# Patient Record
Sex: FEMALE | Race: WHITE | NOT HISPANIC OR LATINO | Employment: OTHER | URBAN - METROPOLITAN AREA
[De-identification: names, ages, dates, MRNs, and addresses within clinical notes are randomized per-mention and may not be internally consistent; named-entity substitution may affect disease eponyms.]

---

## 2018-07-03 ENCOUNTER — OFFICE VISIT (OUTPATIENT)
Dept: FAMILY MEDICINE | Facility: CLINIC | Age: 28
End: 2018-07-03
Payer: COMMERCIAL

## 2018-07-03 ENCOUNTER — HEALTH MAINTENANCE LETTER (OUTPATIENT)
Age: 28
End: 2018-07-03

## 2018-07-03 VITALS
TEMPERATURE: 98 F | HEIGHT: 60 IN | WEIGHT: 158 LBS | BODY MASS INDEX: 31.02 KG/M2 | HEART RATE: 87 BPM | RESPIRATION RATE: 16 BRPM | DIASTOLIC BLOOD PRESSURE: 67 MMHG | SYSTOLIC BLOOD PRESSURE: 105 MMHG

## 2018-07-03 DIAGNOSIS — M54.42 ACUTE BILATERAL LOW BACK PAIN WITH LEFT-SIDED SCIATICA: Primary | ICD-10-CM

## 2018-07-03 PROCEDURE — 99214 OFFICE O/P EST MOD 30 MIN: CPT | Performed by: PHYSICIAN ASSISTANT

## 2018-07-03 RX ORDER — ALBUTEROL SULFATE 90 UG/1
1-2 AEROSOL, METERED RESPIRATORY (INHALATION)
COMMUNITY
Start: 2017-12-20 | End: 2020-01-10

## 2018-07-03 RX ORDER — CYCLOBENZAPRINE HCL 10 MG
10 TABLET ORAL 3 TIMES DAILY PRN
Qty: 60 TABLET | Refills: 1 | Status: SHIPPED | OUTPATIENT
Start: 2018-07-03 | End: 2020-01-07

## 2018-07-03 RX ORDER — EPINEPHRINE 0.3 MG/.3ML
0.3 INJECTION SUBCUTANEOUS
COMMUNITY
Start: 2016-09-19 | End: 2020-01-03

## 2018-07-03 RX ORDER — KETOCONAZOLE 20 MG/G
CREAM TOPICAL
COMMUNITY
Start: 2017-12-20 | End: 2020-01-29

## 2018-07-03 ASSESSMENT — ENCOUNTER SYMPTOMS
FEVER: 0
WHEEZING: 0
HEMATURIA: 0
HEADACHES: 0
WEAKNESS: 0
BLURRED VISION: 0
COUGH: 0
SPUTUM PRODUCTION: 0
CHILLS: 0
DYSURIA: 0
VOMITING: 0
SENSORY CHANGE: 0
NAUSEA: 0
TINGLING: 0
FREQUENCY: 0
WEIGHT LOSS: 0
DOUBLE VISION: 0
DIZZINESS: 0
FOCAL WEAKNESS: 0
DIARRHEA: 0
ABDOMINAL PAIN: 0
SHORTNESS OF BREATH: 0
EYE PAIN: 0
PALPITATIONS: 0
SORE THROAT: 0
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
## 2018-07-03 NOTE — MR AVS SNAPSHOT
"              After Visit Summary   7/3/2018    Ana Maria Espinoza    MRN: [id number]           Patient Information     Date Of Birth          1990        Visit Information        Provider Department      7/3/2018 10:40 AM Rahul Barker PA-C [facility]        Today's Diagnoses     Acute bilateral low back pain with left-sided sciatica    -  1       Follow-ups after your visit        Additional Services     PHYSICAL THERAPY REFERRAL       *This therapy referral will be filtered to a centralized scheduling office at [facility] and the patient will receive a call to schedule an appointment at a [place] location most convenient for them. *     [facility] provides Physical Therapy evaluation and treatment and many specialty services across the [place] system.  If requesting a specialty program, please choose from the list below.    If you have not heard from the scheduling office within 2 business days, please call [phone] for all locations, with the exception of [place], please call [phone] and [facility], please call [phone]  Treatment: Evaluation & Treatment  Special Instructions/Modalities: Eval and treat  Special Programs: None    Please be aware that coverage of these services is subject to the terms and limitations of your health insurance plan.  Call member services at your health plan with any benefit or coverage questions.      **Note to Provider:  If you are referring outside of [place] for the therapy appointment, please list the name of the location in the \"special instructions\" above, print the referral and give to the patient to schedule the appointment.                  Follow-up notes from your care team     Return if symptoms worsen or fail to improve.      Who to contact     If you have questions or need follow up information about today's clinic visit or your schedule please contact [facility] directly " "at [phone].  Normal or non-critical lab and imaging results will be communicated to you by MyChart, letter or phone within 4 business days after the clinic has received the results. If you do not hear from us within 7 days, please contact the clinic through PushPagehart or phone. If you have a critical or abnormal lab result, we will notify you by phone as soon as possible.  Submit refill requests through [employer]ighbor[employer] or call your pharmacy and they will forward the refill request to us. Please allow 3 business days for your refill to be completed.          Additional Information About Your Visit        PushPagehart Information     [employer] gives you secure access to your electronic health record. If you see a primary care provider, you can also send messages to your care team and make appointments. If you have questions, please call your primary care clinic.  If you do not have a primary care provider, please call [phone] and they will assist you.        Care EveryWhere ID     This is your Care EveryWhere ID. This could be used by other organizations to access your [place] [facility] medical records  [id number]        Your Vitals Were     Pulse Temperature Respirations Height BMI (Body Mass Index)       87 98  F (36.7  C) (Tympanic) 16 5' 0.24\" (1.53 m) 30.61 kg/m2        Blood Pressure from Last 3 Encounters:   07/03/18 105/67   09/25/16 106/74   11/13/15 129/86    Weight from Last 3 Encounters:   07/03/18 158 lb (71.7 kg)              We Performed the Following     PHYSICAL THERAPY REFERRAL          Today's Medication Changes          These changes are accurate as of 7/3/18 12:01 PM.  If you have any questions, ask your nurse or doctor.               Start taking these medicines.        Dose/Directions    cyclobenzaprine 10 MG tablet   Commonly known as:  FLEXERIL   Used for:  Acute bilateral low back pain with left-sided sciatica   Started by:  Rahul Barker PA-C        Dose:  10 mg   Take 1 tablet (10 mg) by mouth 3 times " daily as needed for muscle spasms   Quantity:  60 tablet   Refills:  1       diclofenac 50 MG EC tablet   Commonly known as:  VOLTAREN   Used for:  Acute bilateral low back pain with left-sided sciatica   Started by:  Rahul Barker PA-C        Dose:  50 mg   Take 1 tablet (50 mg) by mouth 3 times daily as needed for moderate pain   Quantity:  60 tablet   Refills:  1         These medicines have changed or have updated prescriptions.        Dose/Directions    albuterol 108 (90 Base) MCG/ACT Inhaler   Commonly known as:  PROAIR HFA/PROVENTIL HFA/VENTOLIN HFA   This may have changed:  Another medication with the same name was removed. Continue taking this medication, and follow the directions you see here.   Changed by:  Rahul Barker PA-C        Dose:  1-2 puff   Inhale 1-2 puffs into the lungs   Refills:  0         Stop taking these medicines if you haven't already. Please contact your care team if you have questions.     TIZANIDINE HCL PO   Stopped by:  Rahul Barker PA-C                Where to get your medicines      These medications were sent to [place] [address]     Phone:  [phone]     cyclobenzaprine 10 MG tablet    diclofenac 50 MG EC tablet                Primary Care Provider Office Phone # Fax #    Rahul Barker PA-C [phone]       [address] [place], [place] [address]        Equal Access to Services     [facility] AH: Hadii aad ku hadasho Soomaali, waaxda luqadaha, qaybta kaalmada adeegyada, fay webb hayandrew arthur . So [facility] [phone].    ATENCIÓN: Si habla español, tiene a rm disposición servicios gratuitos de asistencia lingüística. Llame al [phone].    We comply with applicable federal civil rights laws and [place] laws. We do not discriminate on the basis of race, color, national origin, age, disability, sex, sexual orientation, or gender identity.             Thank you!     Thank you for choosing [facility]  for your care. Our goal is always to provide you with excellent care. Hearing back from our patients is one way we can continue to improve our services. Please take a few minutes to complete the written survey that you may receive in the mail after your visit with us. Thank you!             Your Updated Medication List - Protect others around you: Learn how to safely use, store and throw away your medicines at www.disposemymeds.org.          This list is accurate as of 7/3/18 12:01 PM.  Always use your most recent med list.                   Brand Name Dispense Instructions for use Diagnosis    albuterol 108 (90 Base) MCG/ACT Inhaler    PROAIR HFA/PROVENTIL HFA/VENTOLIN HFA     Inhale 1-2 puffs into the lungs        cyclobenzaprine 10 MG tablet    FLEXERIL    60 tablet    Take 1 tablet (10 mg) by mouth 3 times daily as needed for muscle spasms    Acute bilateral low back pain with left-sided sciatica       diclofenac 50 MG EC tablet    VOLTAREN    60 tablet    Take 1 tablet (50 mg) by mouth 3 times daily as needed for moderate pain    Acute bilateral low back pain with left-sided sciatica       EPINEPHrine 0.3 MG/0.3ML injection 2-pack    EPIPEN/ADRENACLICK/or ANY BX GENERIC EQUIV     Inject 0.3 mg into the muscle        etonogestrel 68 MG Impl    IMPLANON/NEXPLANON     Inject 1 each Subcutaneous        ibuprofen 200 MG tablet    ADVIL/MOTRIN    30 tablet    Take 4 tablets (800 mg) by mouth every 6 hours as needed for moderate pain        ketoconazole 2 % cream    NIZORAL

## 2018-07-05 ENCOUNTER — TELEPHONE (OUTPATIENT)
Dept: FAMILY MEDICINE | Facility: CLINIC | Age: 28
End: 2018-07-05

## 2018-07-05 ENCOUNTER — APPOINTMENT (OUTPATIENT)
Dept: OCCUPATIONAL MEDICINE | Facility: CLINIC | Age: 28
End: 2018-07-05

## 2018-07-05 ENCOUNTER — ALLIED HEALTH/NURSE VISIT (OUTPATIENT)
Dept: FAMILY MEDICINE | Facility: CLINIC | Age: 28
End: 2018-07-05
Payer: COMMERCIAL

## 2018-07-05 DIAGNOSIS — M54.9 BACK PAIN: Primary | ICD-10-CM

## 2018-07-05 PROCEDURE — 99000 SPECIMEN HANDLING OFFICE-LAB: CPT | Performed by: PHYSICIAN ASSISTANT

## 2018-07-05 PROCEDURE — 99207 ZZC NO CHARGE NURSE ONLY: CPT

## 2018-07-05 PROCEDURE — 99499 UNLISTED E&M SERVICE: CPT | Performed by: PHYSICIAN ASSISTANT

## 2018-07-17 ENCOUNTER — HOSPITAL ENCOUNTER (OUTPATIENT)
Dept: PHYSICAL THERAPY | Facility: CLINIC | Age: 28
Setting detail: THERAPIES SERIES
End: 2018-07-17
Attending: PHYSICIAN ASSISTANT
Payer: COMMERCIAL

## 2018-07-17 PROCEDURE — 97161 PT EVAL LOW COMPLEX 20 MIN: CPT | Mod: GP | Performed by: PHYSICAL THERAPIST

## 2018-07-17 PROCEDURE — 40000718 ZZHC STATISTIC PT DEPARTMENT ORTHO VISIT: Performed by: PHYSICAL THERAPIST

## 2018-07-17 PROCEDURE — 97140 MANUAL THERAPY 1/> REGIONS: CPT | Mod: GP | Performed by: PHYSICAL THERAPIST

## 2018-07-17 PROCEDURE — 97110 THERAPEUTIC EXERCISES: CPT | Mod: GP | Performed by: PHYSICAL THERAPIST
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
## 2018-07-19 ENCOUNTER — HOSPITAL ENCOUNTER (OUTPATIENT)
Dept: PHYSICAL THERAPY | Facility: CLINIC | Age: 28
Setting detail: THERAPIES SERIES
End: 2018-07-19
Attending: PHYSICIAN ASSISTANT
Payer: COMMERCIAL

## 2018-07-19 PROCEDURE — 97140 MANUAL THERAPY 1/> REGIONS: CPT | Mod: GP | Performed by: PHYSICAL THERAPIST

## 2018-07-19 PROCEDURE — 40000718 ZZHC STATISTIC PT DEPARTMENT ORTHO VISIT: Performed by: PHYSICAL THERAPIST

## 2018-07-19 PROCEDURE — 97110 THERAPEUTIC EXERCISES: CPT | Mod: GP | Performed by: PHYSICAL THERAPIST

## 2018-11-12 ENCOUNTER — TELEPHONE (OUTPATIENT)
Dept: FAMILY MEDICINE | Facility: CLINIC | Age: 28
End: 2018-11-12

## 2018-11-12 NOTE — TELEPHONE ENCOUNTER
Left message for patient to call the clinic.   We were reviewing charts and it appears that patient is due for a pap smear/ physical.   Wondering if patient still comes to this clinic and if we can help her make an appointment.   Libia Howell, CMA

## 2019-05-14 ENCOUNTER — APPOINTMENT (OUTPATIENT)
Dept: OCCUPATIONAL MEDICINE | Facility: CLINIC | Age: 29
End: 2019-05-14

## 2019-05-14 PROCEDURE — 99499 UNLISTED E&M SERVICE: CPT | Performed by: FAMILY MEDICINE

## 2019-05-14 PROCEDURE — 99000 SPECIMEN HANDLING OFFICE-LAB: CPT | Performed by: FAMILY MEDICINE

## 2019-05-14 PROCEDURE — 97799 UNLISTED PHYSCL MED/REHAB PX: CPT | Performed by: FAMILY MEDICINE

## 2019-09-16 ENCOUNTER — MYC MEDICAL ADVICE (OUTPATIENT)
Dept: FAMILY MEDICINE | Facility: CLINIC | Age: 29
End: 2019-09-16

## 2020-01-03 ENCOUNTER — OFFICE VISIT (OUTPATIENT)
Dept: FAMILY MEDICINE | Facility: CLINIC | Age: 30
End: 2020-01-03
Payer: MEDICAID

## 2020-01-03 VITALS
RESPIRATION RATE: 16 BRPM | SYSTOLIC BLOOD PRESSURE: 102 MMHG | HEART RATE: 85 BPM | WEIGHT: 145.6 LBS | HEIGHT: 60 IN | OXYGEN SATURATION: 98 % | BODY MASS INDEX: 28.58 KG/M2 | DIASTOLIC BLOOD PRESSURE: 60 MMHG | TEMPERATURE: 98.5 F

## 2020-01-03 DIAGNOSIS — Z30.46 NEXPLANON REMOVAL: Primary | ICD-10-CM

## 2020-01-03 DIAGNOSIS — T78.40XA ALLERGIC REACTION, INITIAL ENCOUNTER: ICD-10-CM

## 2020-01-03 LAB — HCG UR QL: NEGATIVE

## 2020-01-03 PROCEDURE — 99207 ZZC NO BILLABLE SERVICE THIS VISIT: CPT | Performed by: NURSE PRACTITIONER

## 2020-01-03 PROCEDURE — 81025 URINE PREGNANCY TEST: CPT | Performed by: NURSE PRACTITIONER

## 2020-01-03 RX ORDER — EPINEPHRINE 0.3 MG/.3ML
0.3 INJECTION SUBCUTANEOUS PRN
Qty: 1 EACH | Refills: 1 | Status: SHIPPED | OUTPATIENT
Start: 2020-01-03 | End: 2020-07-06

## 2020-01-03 ASSESSMENT — MIFFLIN-ST. JEOR: SCORE: 1301.94

## 2020-01-03 NOTE — PROGRESS NOTES
Subjective     Ana Maria Espinoza is a 29 year old female who presents to clinic today for the following health issues:    HPI   Chief Complaint   Patient presents with     Contraception     pt. is here today fro implanon replacement     Patient Nexplanon was placed 1/2018 not due to be replaced until 1/2021.      No intervention no charge visit.    Morenita Wylie CNP

## 2020-01-07 ENCOUNTER — MYC REFILL (OUTPATIENT)
Dept: FAMILY MEDICINE | Facility: CLINIC | Age: 30
End: 2020-01-07

## 2020-01-07 DIAGNOSIS — M54.42 ACUTE BILATERAL LOW BACK PAIN WITH LEFT-SIDED SCIATICA: ICD-10-CM

## 2020-01-07 RX ORDER — CYCLOBENZAPRINE HCL 10 MG
TABLET ORAL
Qty: 0.0001 TABLET | Refills: 0 | Status: SHIPPED | OUTPATIENT
Start: 2020-01-07 | End: 2020-01-29

## 2020-01-10 ENCOUNTER — OFFICE VISIT (OUTPATIENT)
Dept: URGENT CARE | Facility: URGENT CARE | Age: 30
End: 2020-01-10
Payer: MEDICAID

## 2020-01-10 VITALS
TEMPERATURE: 99.5 F | HEART RATE: 100 BPM | WEIGHT: 140 LBS | SYSTOLIC BLOOD PRESSURE: 112 MMHG | DIASTOLIC BLOOD PRESSURE: 52 MMHG | BODY MASS INDEX: 27.34 KG/M2 | RESPIRATION RATE: 16 BRPM | OXYGEN SATURATION: 97 %

## 2020-01-10 DIAGNOSIS — J20.9 ACUTE BRONCHITIS WITH COEXISTING CONDITION REQUIRING PROPHYLACTIC TREATMENT: Primary | ICD-10-CM

## 2020-01-10 PROCEDURE — 99214 OFFICE O/P EST MOD 30 MIN: CPT | Performed by: FAMILY MEDICINE

## 2020-01-10 RX ORDER — ALBUTEROL SULFATE 90 UG/1
1-2 AEROSOL, METERED RESPIRATORY (INHALATION) EVERY 6 HOURS PRN
Qty: 1 INHALER | Refills: 1 | Status: SHIPPED | OUTPATIENT
Start: 2020-01-10 | End: 2020-09-14

## 2020-01-10 RX ORDER — AZITHROMYCIN 250 MG/1
TABLET, FILM COATED ORAL
Qty: 6 TABLET | Refills: 0 | Status: SHIPPED | OUTPATIENT
Start: 2020-01-10 | End: 2020-01-16

## 2020-01-10 NOTE — NURSING NOTE
Chief Complaint   Patient presents with     URI     Started Tuesday/ WEdnesday with congestion/ headaches.  Coughing, vomiting, dizziness, lethargic, back spasms, and muscle aches.  Yesterday switched was able to get some food down.  No has a productive cough, lung pain, and diarrhea. Hot and cold chills  Tried evelyne pot, hot showers, vicks on chest, nyquil        Initial /52 (BP Location: Right arm, Patient Position: Chair, Cuff Size: Adult Regular)   Pulse 100   Temp 99.5  F (37.5  C) (Tympanic)   Resp 16   Wt 63.5 kg (140 lb)   SpO2 97%   BMI 27.34 kg/m   Estimated body mass index is 27.34 kg/m  as calculated from the following:    Height as of 1/3/20: 1.524 m (5').    Weight as of this encounter: 63.5 kg (140 lb).    Patient presents to the clinic using No DME    Health Maintenance that is potentially due pending provider review:  NONE    n/a    Is there anyone who you would like to be able to receive your results? No  If yes have patient fill out DAYSI Biswas M.A.

## 2020-01-11 NOTE — PROGRESS NOTES
SUBJECTIVE:  Ana Maria Espinoza is a 29 year old female who presents to the clinic today with a chief complaint of cough  for 4 day(s).  Her cough is described as productive green.    The patient's symptoms are moderate and worsening.  Associated symptoms include malaise, wheezing and smokes. The patient's symptoms are exacerbated by lying down  Patient has been using humidified air and OTC cough suppressants  to improve symptoms.    History reviewed. No pertinent past medical history.  Current Outpatient Medications   Medication Sig Dispense Refill     albuterol (PROAIR HFA/PROVENTIL HFA/VENTOLIN HFA) 108 (90 Base) MCG/ACT inhaler Inhale 1-2 puffs into the lungs every 6 hours as needed for shortness of breath / dyspnea or wheezing 1 Inhaler 1     azithromycin (ZITHROMAX Z-ANNA) 250 MG tablet 2 tabs day one, then 1 tab daily 6 tablet 0     cyclobenzaprine (FLEXERIL) 10 MG tablet Refill denied, must establish care 0.0001 tablet 0     diclofenac (VOLTAREN) 50 MG EC tablet Refill denied, must establish care 0.0001 tablet 0     EPINEPHrine (EPIPEN/ADRENACLICK/OR ANY BX GENERIC EQUIV) 0.3 MG/0.3ML injection 2-pack Inject 0.3 mLs (0.3 mg) into the muscle as needed for anaphylaxis 1 each 1     etonogestrel (IMPLANON/NEXPLANON) 68 MG IMPL Inject 1 each Subcutaneous       ibuprofen (ADVIL,MOTRIN) 200 MG tablet Take 4 tablets (800 mg) by mouth every 6 hours as needed for moderate pain 30 tablet 0     ketoconazole (NIZORAL) 2 % cream        History   Smoking Status     Current Every Day Smoker     Packs/day: 2.00     Types: Cigarettes   Smokeless Tobacco     Never Used       ROS  Review of systems negative except as stated above.    OBJECTIVE:  /52 (BP Location: Right arm, Patient Position: Chair, Cuff Size: Adult Regular)   Pulse 100   Temp 99.5  F (37.5  C) (Tympanic)   Resp 16   Wt 63.5 kg (140 lb)   SpO2 97%   BMI 27.34 kg/m    GENERAL APPEARANCE: alert, moderate distress and cooperative  EYES: EOMI,  PERRL,  conjunctiva clear  HENT: ear canals and TM's normal.  Nose and mouth without ulcers, erythema or lesions  NECK: supple, nontender, no lymphadenopathy  RESP: expiratory wheezes throughout  CV: regular rates and rhythm, normal S1 S2, no murmur noted  ABDOMEN:  soft, nontender, no HSM or masses and bowel sounds normal  NEURO: Normal strength and tone, sensory exam grossly normal,  normal speech and mentation  SKIN: no suspicious lesions or rashes    ASSESSMENT:    Acute Bronchitis    PLAN:  Zithromax and albuterol  Symptomatic measures encouraged, humidified air, plenty of fluids.  Follow up with primary care provider if no improvement.

## 2020-01-16 ENCOUNTER — OFFICE VISIT (OUTPATIENT)
Dept: FAMILY MEDICINE | Facility: CLINIC | Age: 30
End: 2020-01-16
Payer: MEDICAID

## 2020-01-16 VITALS
SYSTOLIC BLOOD PRESSURE: 124 MMHG | BODY MASS INDEX: 27.29 KG/M2 | TEMPERATURE: 98.7 F | RESPIRATION RATE: 22 BRPM | WEIGHT: 139 LBS | HEART RATE: 124 BPM | HEIGHT: 60 IN | OXYGEN SATURATION: 97 % | DIASTOLIC BLOOD PRESSURE: 62 MMHG

## 2020-01-16 DIAGNOSIS — M79.672 LEFT FOOT PAIN: Primary | ICD-10-CM

## 2020-01-16 PROCEDURE — 99213 OFFICE O/P EST LOW 20 MIN: CPT | Performed by: FAMILY MEDICINE

## 2020-01-16 ASSESSMENT — MIFFLIN-ST. JEOR: SCORE: 1272

## 2020-01-16 NOTE — PROGRESS NOTES
Ana Maria Espinoza is a 29 year old female comes in today with the following concerns.      1. Left foot pain / corn? Had cut out by [facility] years ago, came back is quite painful intermittently. Has had it froze with no resolve also.     Domenica Briggs CMA([facility])      *     s: Ana Maria Espinoza is a 29 year old female wit hcorn on L foot.  Bottom.  Has had pared down before.  Chronic foot troubles, hx of surgery, has needed orthotics in past, she lost them, would like to see podiatry again.     No acute injury.     On feet at work    Problem list, med list, additional histories reviewed and updated, as indicated.      No rash, no LE edema    O:/62   Pulse 124   Temp 98.7  F (37.1  C) (Tympanic)   Resp 22   Ht 1.524 m (5')   Wt 63 kg (139 lb)   SpO2 97%   BMI 27.15 kg/m    GEN: Alert and oriented, in no acute distress  Has firm corn bottom of L foot over ball of foot    Pared it down, felt much better    A: corns      Chronic foot pain    P: see podiatry, I think orthotics make sense.  Return prn for paring down

## 2020-01-16 NOTE — NURSING NOTE
Chief Complaint   Patient presents with     Toe Pain       Initial /62   Pulse 124   Temp 98.7  F (37.1  C) (Tympanic)   Resp 22   Ht 1.524 m (5')   Wt 63 kg (139 lb)   SpO2 97%   BMI 27.15 kg/m   Estimated body mass index is 27.15 kg/m  as calculated from the following:    Height as of this encounter: 1.524 m (5').    Weight as of this encounter: 63 kg (139 lb).    Patient presents to the clinic using No DME    Health Maintenance that is potentially due pending provider review:  Pap Smear    Pt will schedule appt.    Is there anyone who you would like to be able to receive your results? No  If yes have patient fill out DAYSI      Domenica Briggs CMA([facility])

## 2020-01-22 ENCOUNTER — OFFICE VISIT (OUTPATIENT)
Dept: PODIATRY | Facility: CLINIC | Age: 30
End: 2020-01-22
Payer: MEDICAID

## 2020-01-22 VITALS
HEIGHT: 60 IN | BODY MASS INDEX: 27.29 KG/M2 | SYSTOLIC BLOOD PRESSURE: 120 MMHG | DIASTOLIC BLOOD PRESSURE: 78 MMHG | HEART RATE: 102 BPM | WEIGHT: 139 LBS

## 2020-01-22 DIAGNOSIS — L85.1 PLANTAR POROKERATOSIS, ACQUIRED: Primary | ICD-10-CM

## 2020-01-22 DIAGNOSIS — M77.42 METATARSALGIA OF BOTH FEET: ICD-10-CM

## 2020-01-22 DIAGNOSIS — M77.41 METATARSALGIA OF BOTH FEET: ICD-10-CM

## 2020-01-22 PROCEDURE — 17110 DESTRUCTION B9 LES UP TO 14: CPT | Performed by: PODIATRIST

## 2020-01-22 PROCEDURE — 99203 OFFICE O/P NEW LOW 30 MIN: CPT | Mod: 25 | Performed by: PODIATRIST

## 2020-01-22 ASSESSMENT — MIFFLIN-ST. JEOR: SCORE: 1272

## 2020-01-22 NOTE — LETTER
1/22/2020         RE: Ana Maria Espinoza  [address] [place] [id number]        Dear Colleague,    Thank you for referring your patient, Ana Maria Espinoza, to the [facility]. Please see a copy of my visit note below.    PATIENT HISTORY:  Ana Maria Espinoza is a 29 year old female who presents to clinic for a painful right left foot .  The patient describes the pain as sharp stabbing.  The patient relates the pain level is moderate.  The patient relates pain is located under the balls of both feet.  The patient relates the pain has been present for the past several weeks to months.  The patient relates pain with ambulation.  The patient has tried different shoe inserts with little relief.  The patient was sent by Dr. Michael Price for consultation on the right and left foot.       REVIEW OF SYSTEMS:  Constitutional, HEENT, cardiovascular, pulmonary, GI, , musculoskeletal, neuro, skin, endocrine and psych systems are negative, except as otherwise noted.     PAST MEDICAL HISTORY: History reviewed. No pertinent past medical history.     PAST SURGICAL HISTORY: History reviewed. No pertinent surgical history.     MEDICATIONS:   Current Outpatient Medications:      albuterol (PROAIR HFA/PROVENTIL HFA/VENTOLIN HFA) 108 (90 Base) MCG/ACT inhaler, Inhale 1-2 puffs into the lungs every 6 hours as needed for shortness of breath / dyspnea or wheezing, Disp: 1 Inhaler, Rfl: 1     etonogestrel (IMPLANON/NEXPLANON) 68 MG IMPL, Inject 1 each Subcutaneous, Disp: , Rfl:      ibuprofen (ADVIL,MOTRIN) 200 MG tablet, Take 4 tablets (800 mg) by mouth every 6 hours as needed for moderate pain, Disp: 30 tablet, Rfl: 0     ketoconazole (NIZORAL) 2 % cream, , Disp: , Rfl:      cyclobenzaprine (FLEXERIL) 10 MG tablet, Refill denied, must establish care (Patient not taking: Reported on 1/16/2020), Disp: 0.0001 tablet, Rfl: 0     diclofenac (VOLTAREN) 50 MG EC tablet, Refill denied, must establish care (Patient not taking: Reported on  1/16/2020), Disp: 0.0001 tablet, Rfl: 0     EPINEPHrine (EPIPEN/ADRENACLICK/OR ANY BX GENERIC EQUIV) 0.3 MG/0.3ML injection 2-pack, Inject 0.3 mLs (0.3 mg) into the muscle as needed for anaphylaxis (Patient not taking: Reported on 1/16/2020), Disp: 1 each, Rfl: 1     ALLERGIES:    Allergies   Allergen Reactions     Bee Venom Anaphylaxis     Has epipen     Codeine Rash     Other reaction(s): Intolerance-Can't Take        SOCIAL HISTORY:   Social History     Socioeconomic History     Marital status: Single     Spouse name: Not on file     Number of children: Not on file     Years of education: Not on file     Highest education level: Not on file   Occupational History     Not on file   Social Needs     Financial resource strain: Not on file     Food insecurity:     Worry: Not on file     Inability: Not on file     Transportation needs:     Medical: Not on file     Non-medical: Not on file   Tobacco Use     Smoking status: Current Every Day Smoker     Packs/day: 2.00     Types: Cigarettes     Smokeless tobacco: Never Used   Substance and Sexual Activity     Alcohol use: No     Drug use: No     Sexual activity: Yes     Partners: Male     Birth control/protection: Implant   Lifestyle     Physical activity:     Days per week: Not on file     Minutes per session: Not on file     Stress: Not on file   Relationships     Social connections:     Talks on phone: Not on file     Gets together: Not on file     Attends [employer] service: Not on file     Active member of club or organization: Not on file     Attends meetings of clubs or organizations: Not on file     Relationship status: Not on file     Intimate partner violence:     Fear of current or ex partner: Not on file     Emotionally abused: Not on file     Physically abused: Not on file     Forced sexual activity: Not on file   Other Topics Concern     Parent/sibling w/ CABG, MI or angioplasty before 65F 55M? Not Asked   Social History Narrative     Not on file         FAMILY HISTORY: History reviewed. No pertinent family history.     EXAM:Vitals: /78   Pulse 102   Ht 1.524 m (5')   Wt 63 kg (139 lb)   BMI 27.15 kg/m     BMI= Body mass index is 27.15 kg/m .    Weight management plan: Patient was referred to their PCP to discuss a diet and exercise plan.    General appearance: Patient is alert and fully cooperative with history & exam.  No sign of distress is noted during the visit.     Psychiatric: Affect is pleasant & appropriate.  Patient appears motivated to improve health.     Respiratory: Breathing is regular & unlabored while sitting.     HEENT: Hearing is intact to spoken word.  Speech is clear.  No gross evidence of visual impairment that would impact ambulation.     Dermatologic: Skin is intact to right and left lower extremities without significant lesions, rash or abrasion.  No paronychia or evidence of soft tissue infection is noted.  One notes small hyperkeratotic pinpoint lesions on the bottom of the soles of both feet.  No surrounding erythema noted.  No subdermal hemorrhage noted.     Vascular: DP & PT pulses are intact & regular on the right and left.  No significant edema or varicosities noted.  CFT and skin temperature is normal to the right and left lower extremities.     Neurologic: Lower extremity sensation is intact to light touch.  No evidence of weakness or contracture in the right and left lower extremities.  No evidence of neuropathy.     Musculoskeletal: Patient is ambulatory without assistive device or brace.  No gross ankle deformity noted.  No foot or ankle joint effusion is noted.  Noted pain on palpation under the second metatarsal phalangeal joint bilaterally.  One notes a positive Lockman's test of the second toe bilaterally.       ASSESSMENT / PLAN:     ICD-10-CM    1. 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 Metatarsalgia of both feet M77.41 ORTHOTICS REFERRAL    M77.42        I have explained to  Ana Maria  about the conditions.  We discussed the nature of the condition as well as the treatment plan and expected length of recovery.  At this time, the hyperkeratotic skin lesions were sharply debrided with a #10 blade down to healthy epidermis.  The skin lesions were treated with topical Cantharone application to the skin lesions under occlusion.  The patient was informed that should cause a blister hopefully removing the underlying skin lesions.    The patient was instructed on icing, stretching, tissue massage and support.  The patient was referred to [facility] for custom orthotics that will aid in offloading the tension forces to the soft tissues and prevent further inflammation.    The patient will return in four weeks for reevaluation if the symptoms do not resolve.          Ana Maria verbalized agreement with and understanding of the rational for the diagnosis and treatment plan.  All questions were answered to best of my ability and the patient's satisfaction. The patient was advised to contact the clinic with any questions that may arise after the clinic visit.      Disclaimer: This note consists of symbols derived from keyboarding, dictation and/or voice recognition software. As a result, there may be errors in the script that have gone undetected. Please consider this when interpreting information found in this chart.       GISSEL Gates D.P.M., F.A.C.F.A.S.        Again, thank you for allowing me to participate in the care of your patient.        Sincerely,        Radu Gates DPM

## 2020-01-22 NOTE — NURSING NOTE
Chief Complaint   Patient presents with     Consult     new orthtics and wants a corn removed on left foot       Initial /78   Pulse 102   Ht 1.524 m (5')   Wt 63 kg (139 lb)   BMI 27.15 kg/m   Estimated body mass index is 27.15 kg/m  as calculated from the following:    Height as of this encounter: 1.524 m (5').    Weight as of this encounter: 63 kg (139 lb).  Medications and allergies reviewed.      Rosita PATEL MA

## 2020-01-22 NOTE — PROGRESS NOTES
PATIENT HISTORY:  Ana Maria Espinoza is a 29 year old female who presents to clinic for a painful right left foot .  The patient describes the pain as sharp stabbing.  The patient relates the pain level is moderate.  The patient relates pain is located under the balls of both feet.  The patient relates the pain has been present for the past several weeks to months.  The patient relates pain with ambulation.  The patient has tried different shoe inserts with little relief.  The patient was sent by Dr. Michael Price for consultation on the right and left foot.       REVIEW OF SYSTEMS:  Constitutional, HEENT, cardiovascular, pulmonary, GI, , musculoskeletal, neuro, skin, endocrine and psych systems are negative, except as otherwise noted.     PAST MEDICAL HISTORY: History reviewed. No pertinent past medical history.     PAST SURGICAL HISTORY: History reviewed. No pertinent surgical history.     MEDICATIONS:   Current Outpatient Medications:      albuterol (PROAIR HFA/PROVENTIL HFA/VENTOLIN HFA) 108 (90 Base) MCG/ACT inhaler, Inhale 1-2 puffs into the lungs every 6 hours as needed for shortness of breath / dyspnea or wheezing, Disp: 1 Inhaler, Rfl: 1     etonogestrel (IMPLANON/NEXPLANON) 68 MG IMPL, Inject 1 each Subcutaneous, Disp: , Rfl:      ibuprofen (ADVIL,MOTRIN) 200 MG tablet, Take 4 tablets (800 mg) by mouth every 6 hours as needed for moderate pain, Disp: 30 tablet, Rfl: 0     ketoconazole (NIZORAL) 2 % cream, , Disp: , Rfl:      cyclobenzaprine (FLEXERIL) 10 MG tablet, Refill denied, must establish care (Patient not taking: Reported on 1/16/2020), Disp: 0.0001 tablet, Rfl: 0     diclofenac (VOLTAREN) 50 MG EC tablet, Refill denied, must establish care (Patient not taking: Reported on 1/16/2020), Disp: 0.0001 tablet, Rfl: 0     EPINEPHrine (EPIPEN/ADRENACLICK/OR ANY BX GENERIC EQUIV) 0.3 MG/0.3ML injection 2-pack, Inject 0.3 mLs (0.3 mg) into the muscle as needed for anaphylaxis (Patient not taking: Reported on  1/16/2020), Disp: 1 each, Rfl: 1     ALLERGIES:    Allergies   Allergen Reactions     Bee Venom Anaphylaxis     Has epipen     Codeine Rash     Other reaction(s): Intolerance-Can't Take        SOCIAL HISTORY:   Social History     Socioeconomic History     Marital status: Single     Spouse name: Not on file     Number of children: Not on file     Years of education: Not on file     Highest education level: Not on file   Occupational History     Not on file   Social Needs     Financial resource strain: Not on file     Food insecurity:     Worry: Not on file     Inability: Not on file     Transportation needs:     Medical: Not on file     Non-medical: Not on file   Tobacco Use     Smoking status: Current Every Day Smoker     Packs/day: 2.00     Types: Cigarettes     Smokeless tobacco: Never Used   Substance and Sexual Activity     Alcohol use: No     Drug use: No     Sexual activity: Yes     Partners: Male     Birth control/protection: Implant   Lifestyle     Physical activity:     Days per week: Not on file     Minutes per session: Not on file     Stress: Not on file   Relationships     Social connections:     Talks on phone: Not on file     Gets together: Not on file     Attends [employer] service: Not on file     Active member of club or organization: Not on file     Attends meetings of clubs or organizations: Not on file     Relationship status: Not on file     Intimate partner violence:     Fear of current or ex partner: Not on file     Emotionally abused: Not on file     Physically abused: Not on file     Forced sexual activity: Not on file   Other Topics Concern     Parent/sibling w/ CABG, MI or angioplasty before 65F 55M? Not Asked   Social History Narrative     Not on file        FAMILY HISTORY: History reviewed. No pertinent family history.     EXAM:Vitals: /78   Pulse 102   Ht 1.524 m (5')   Wt 63 kg (139 lb)   BMI 27.15 kg/m    BMI= Body mass index is 27.15 kg/m .    Weight management plan: Patient  was referred to their PCP to discuss a diet and exercise plan.    General appearance: Patient is alert and fully cooperative with history & exam.  No sign of distress is noted during the visit.     Psychiatric: Affect is pleasant & appropriate.  Patient appears motivated to improve health.     Respiratory: Breathing is regular & unlabored while sitting.     HEENT: Hearing is intact to spoken word.  Speech is clear.  No gross evidence of visual impairment that would impact ambulation.     Dermatologic: Skin is intact to right and left lower extremities without significant lesions, rash or abrasion.  No paronychia or evidence of soft tissue infection is noted.  One notes small hyperkeratotic pinpoint lesions on the bottom of the soles of both feet.  No surrounding erythema noted.  No subdermal hemorrhage noted.     Vascular: DP & PT pulses are intact & regular on the right and left.  No significant edema or varicosities noted.  CFT and skin temperature is normal to the right and left lower extremities.     Neurologic: Lower extremity sensation is intact to light touch.  No evidence of weakness or contracture in the right and left lower extremities.  No evidence of neuropathy.     Musculoskeletal: Patient is ambulatory without assistive device or brace.  No gross ankle deformity noted.  No foot or ankle joint effusion is noted.  Noted pain on palpation under the second metatarsal phalangeal joint bilaterally.  One notes a positive Lockman's test of the second toe bilaterally.       ASSESSMENT / PLAN:     ICD-10-CM    1. 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 Metatarsalgia of both feet M77.41 ORTHOTICS REFERRAL    M77.42        I have explained to Ana Maria  about the conditions.  We discussed the nature of the condition as well as the treatment plan and expected length of recovery.  At this time, the hyperkeratotic skin lesions were sharply debrided with a #10 blade down to healthy  epidermis.  The skin lesions were treated with topical Cantharone application to the skin lesions under occlusion.  The patient was informed that should cause a blister hopefully removing the underlying skin lesions.    The patient was instructed on icing, stretching, tissue massage and support.  The patient was referred to [facility] for custom orthotics that will aid in offloading the tension forces to the soft tissues and prevent further inflammation.    The patient will return in four weeks for reevaluation if the symptoms do not resolve.          Ana Maria verbalized agreement with and understanding of the rational for the diagnosis and treatment plan.  All questions were answered to best of my ability and the patient's satisfaction. The patient was advised to contact the clinic with any questions that may arise after the clinic visit.      Disclaimer: This note consists of symbols derived from keyboarding, dictation and/or voice recognition software. As a result, there may be errors in the script that have gone undetected. Please consider this when interpreting information found in this chart.       GISSEL Gates D.P.M., F.DIANNA.C.F.A.S.

## 2020-01-22 NOTE — PATIENT INSTRUCTIONS
CALLUS / CORNS / IPKs  When there is excessive friction or pressure on the skin, the body responds by making the skin thicker to protect the deeper structures from becoming exposed. While this works well to protect the deeper structures, the thickened skin can increase pressure and pain.    CALLUS: Flat, diffuse thickening are simple calluses and they are usually caused by friction. Often these are the result of rubbing on a shoe or going barefoot.    CORNS: Calluses with a central core between the toes are called corns. These result from prominent joints on adjacent toes rubbing together. Theses are a symptom of bone malalignment and will always recur unless the underlying bones are addressed surgically.    IPKs: Calluses with a central core on the ball of the foot are usually IPKs (intractable plantar keratosis). These are caused by excessive pressure from the metatarsals, the bones that make up the ball of the foot. Often one of these bones is too long or too prominent.  Again, these will always recur unless the underlying bone issue is addressed. There is no cure for these. They will either go away by themselves, recur, or more could develop.    ROUTINE MAINTENANCE  1. File them down with a pumice stone or callus file a couple times a week.   2. An electric callus removing device. Amope Pedi Perfect Electronic Pedicure Foot File and Callus Remover can be a good option.   3. Lotion can be applied to soften the callus. A urea based cream such as Kersal or Vanicream or thicker cream with shea butter are good options.  4. Toe spacers or toe covers can be used for corns, gel pads can be used for other lesions on the bottom of the foot.   If there is a surgical pathology noted, such as a prominent bone, often this needs to be addressed surgically to minimize recurrence. However, sometimes the lesion simply migrates to another spot after surgery, so it is not a guaranteed cure.     There was also discussion of the cost  structure of callus care if they were to come back and have it treated in the clinic. Explained that if insurance does not cover it, they would be billed. This charge could range from $100 - $160.  They were also provided information on places to get the callus treatment.          SMOKING CESSATION  What's in cigarette smoke? - Cigarette smoke contains over 4,000 chemicals. Nicotine is one of the main ingredients which is an insecticide/herbicide. It is poisonous to our nervous system, increases blood clotting risk, and decreases the body's defenses to fight off infection. Another chemical is Carbon Monoxide is an asphyxiating gas that permanently binds to blood cells and blocks the transport of oxygen. This leads to tissue death and decreases your metabolism. Tar is a chemical that coats your lungs and trachea which impairs new oxygen coming in and carbon dioxide getting out of your body.   How does smoking impact surgery? - Smoking is particularly hazardous with regards to surgery. Surgery puts stress on the body and a smoker's body is already under strain from these chemicals. Putting the two together, especially for an elective surgery, could be a recipe for disaster. Smoking before and after surgery increases your risk of heart problems, slow wound healing, delayed bone healing, blood clots, wound infection and anesthesia complications.   What are the benefits of quitting? - In 20 minutes your blood pressure will drop back down to normal. In 8 hours the carbon monoxide (a toxic gas) levels in your blood stream will drop by half, and oxygen levels will return to normal. In 48 hours your chance of having a heart attack will have decreased. All nicotine will have left your body. Your sense of taste and smell will return to a normal level. In 72 hours your bronchial tubes will relax, and your energy levels will increase. In 2 weeks your circulation will increase, and it will continue to improve for the next 10  weeks.    Recommendations for elective surgery - Ideally, patients should quit smoking 8 weeks before and at least 2 weeks after elective surgery in order to avoid complications. Simply cutting back on the amount of cigarettes smoked per day does not offer any benefit or decrease the risk of poor wound healing, heart problems, and infection. Smokers should also start taking Vitamin C and B for two weeks before surgery and two weeks after surgery.    Ways to Stop Smokin. Nicotine patches, lozenges, or gum  2. Support Groups  3. Medications (see below)    List of Medications:  1. Varenicline Tartrate (CHANTIX)   2. Bupropion HCL (WELLBUTRIN, ZYBAN) - note: make sure Wellbutrin is for smoking cessation and not other issues   3. Nicotine Patch (NICODERM)   4. Nicotine Inhaler (NICOTROL)   5. Nicotine Gum Nicotine Polacrilex   6. Nicotine Lozenge: Nicotine Polacrilex (COMMIT)   * [facility] offers a smoking support group as well!  Please visit: [email or web address]  If you are interested in these, ask about getting a prescription or talk to your primary care doctor about what may be the best way for you to quit.

## 2020-01-28 ASSESSMENT — ENCOUNTER SYMPTOMS
PALPITATIONS: 0
SHORTNESS OF BREATH: 0
EYE PAIN: 0
DYSURIA: 0
CHILLS: 1
HEARTBURN: 1
SORE THROAT: 0
HEADACHES: 1
DIZZINESS: 0
NAUSEA: 0
DIARRHEA: 0
MYALGIAS: 1
FEVER: 0
BREAST MASS: 0
HEMATURIA: 0
WEAKNESS: 0
FREQUENCY: 1
ARTHRALGIAS: 1
CONSTIPATION: 0
COUGH: 0
NERVOUS/ANXIOUS: 0
ABDOMINAL PAIN: 0
PARESTHESIAS: 1
HEMATOCHEZIA: 0
JOINT SWELLING: 1

## 2020-01-29 ENCOUNTER — OFFICE VISIT (OUTPATIENT)
Dept: FAMILY MEDICINE | Facility: CLINIC | Age: 30
End: 2020-01-29
Payer: MEDICAID

## 2020-01-29 VITALS
OXYGEN SATURATION: 99 % | BODY MASS INDEX: 28.74 KG/M2 | SYSTOLIC BLOOD PRESSURE: 106 MMHG | DIASTOLIC BLOOD PRESSURE: 76 MMHG | RESPIRATION RATE: 16 BRPM | WEIGHT: 146.4 LBS | HEART RATE: 107 BPM | HEIGHT: 60 IN | TEMPERATURE: 98.4 F

## 2020-01-29 DIAGNOSIS — Z72.0 TOBACCO USE: ICD-10-CM

## 2020-01-29 DIAGNOSIS — M54.50 LOW BACK PAIN, UNSPECIFIED BACK PAIN LATERALITY, UNSPECIFIED CHRONICITY, UNSPECIFIED WHETHER SCIATICA PRESENT: ICD-10-CM

## 2020-01-29 DIAGNOSIS — Z00.00 WELL ADULT EXAM: Primary | ICD-10-CM

## 2020-01-29 DIAGNOSIS — L21.9 SEBORRHEIC DERMATITIS: ICD-10-CM

## 2020-01-29 PROCEDURE — 99213 OFFICE O/P EST LOW 20 MIN: CPT | Mod: 25 | Performed by: FAMILY MEDICINE

## 2020-01-29 PROCEDURE — 99395 PREV VISIT EST AGE 18-39: CPT | Performed by: FAMILY MEDICINE

## 2020-01-29 RX ORDER — KETOCONAZOLE 20 MG/G
CREAM TOPICAL DAILY
Qty: 30 G | Refills: 3 | Status: SHIPPED | OUTPATIENT
Start: 2020-01-29 | End: 2020-07-06

## 2020-01-29 RX ORDER — CYCLOBENZAPRINE HCL 10 MG
TABLET ORAL
Qty: 30 TABLET | Refills: 3 | Status: SHIPPED | OUTPATIENT
Start: 2020-01-29 | End: 2020-07-06

## 2020-01-29 RX ORDER — KETOCONAZOLE 20 MG/ML
SHAMPOO TOPICAL
Qty: 120 ML | Refills: 4 | Status: SHIPPED | OUTPATIENT
Start: 2020-01-29 | End: 2020-07-06

## 2020-01-29 ASSESSMENT — ENCOUNTER SYMPTOMS
ARTHRALGIAS: 1
PALPITATIONS: 0
PARESTHESIAS: 1
DIZZINESS: 0
CHILLS: 1
JOINT SWELLING: 1
NAUSEA: 0
BREAST MASS: 0
ABDOMINAL PAIN: 0
WEAKNESS: 0
FEVER: 0
DYSURIA: 0
HEMATURIA: 0
HEADACHES: 1
HEARTBURN: 1
BACK PAIN: 1
COUGH: 0
HEMATOCHEZIA: 0
CONSTIPATION: 0
SORE THROAT: 0
SHORTNESS OF BREATH: 0
NERVOUS/ANXIOUS: 0
EYE PAIN: 0
FREQUENCY: 1
MYALGIAS: 1
DIARRHEA: 0

## 2020-01-29 ASSESSMENT — MIFFLIN-ST. JEOR: SCORE: 1309.32
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
## 2020-01-30 ENCOUNTER — HOSPITAL ENCOUNTER (OUTPATIENT)
Dept: PHYSICAL THERAPY | Facility: CLINIC | Age: 30
Setting detail: THERAPIES SERIES
End: 2020-01-30
Attending: FAMILY MEDICINE
Payer: MEDICAID

## 2020-01-30 DIAGNOSIS — M54.50 LOW BACK PAIN, UNSPECIFIED BACK PAIN LATERALITY, UNSPECIFIED CHRONICITY, UNSPECIFIED WHETHER SCIATICA PRESENT: ICD-10-CM

## 2020-01-30 PROCEDURE — 97110 THERAPEUTIC EXERCISES: CPT | Mod: GP | Performed by: PHYSICAL THERAPIST

## 2020-01-30 PROCEDURE — 97161 PT EVAL LOW COMPLEX 20 MIN: CPT | Mod: GP | Performed by: PHYSICAL THERAPIST
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
## 2020-01-30 NOTE — PROGRESS NOTES
[facility]          OUTPATIENT PHYSICAL THERAPY ORTHOPEDIC EVALUATION  PLAN OF TREATMENT FOR OUTPATIENT REHABILITATION  (COMPLETE FOR INITIAL CLAIMS ONLY)  Patient's Last Name, First Name, M.I.  YOB: 1990  Ana Maria Espinoza       Provider s Name:  [facility]   Medical Record No.  [id number]   Start of Care Date:  01/30/20   Onset Date:  07/01/18   Type:     _X__PT   ___OT   ___SLP Medical Diagnosis:  Low back pain, unspecified back pain laterality, unspecified chronicity, unspecified whether sciatica present M54.5      PT Diagnosis:  chronic LBP    Visits from SOC:  1      _________________________________________________________________________________  Plan of Treatment/Functional Goals:  balance training, gait training, joint mobilization, manual therapy, neuromuscular re-education, ROM, strengthening, stretching     Cryotherapy, Electrical stimulation, TENS, Traction, Ultrasound     Goals  Goal Identifier: walking  Goal Description: Pt will be fabi to walk 60 min w less than 2/10 pain to be able to go gorcery shopping  Target Date: 02/20/20    Goal Identifier: sitting  Goal Description: Pt will be able to sit 30 min to be able to sit and have family dinner  Target Date: 02/20/20    Goal Identifier: core strength  Goal Description: Pt will be able to hold plank 30 secs to demonstrate improve core strength and reduce LBP  Target Date: 03/12/20    Goal Identifier: DOUGLAS  Goal Description: Pt will report <10% disability on DOUGLAS to demonstrate improved ability to complete daily activities and demonstrate significant clinical improvement.   Target Date: 03/12/20                                                Therapy Frequency:  1 time/week  Predicted Duration of Therapy Intervention:  6 weeks    Margo Capellan, PT                 I CERTIFY THE NEED FOR THESE SERVICES FURNISHED UNDER        THIS PLAN OF TREATMENT AND WHILE UNDER MY CARE .             Physician  Signature               Date    X_____________________________________________________                             Certification Date From:  01/30/20   Certification Date To:  03/12/20    Referring Provider:  Michael Price MD     Initial Assessment        See Epic Evaluation Start of Care Date: 01/30/20

## 2020-03-10 ENCOUNTER — HEALTH MAINTENANCE LETTER (OUTPATIENT)
Age: 30
End: 2020-03-10

## 2020-03-19 ENCOUNTER — VIRTUAL VISIT (OUTPATIENT)
Dept: FAMILY MEDICINE | Facility: CLINIC | Age: 30
End: 2020-03-19
Payer: COMMERCIAL

## 2020-03-19 DIAGNOSIS — Z72.0 TOBACCO USE: Primary | ICD-10-CM

## 2020-03-19 DIAGNOSIS — Z71.6 ENCOUNTER FOR SMOKING CESSATION COUNSELING: ICD-10-CM

## 2020-03-19 PROCEDURE — 99442 ZZC PHYSICIAN TELEPHONE EVALUATION 11-20 MIN: CPT | Performed by: NURSE PRACTITIONER

## 2020-03-19 RX ORDER — BUPROPION HYDROCHLORIDE 150 MG/1
150 TABLET, FILM COATED, EXTENDED RELEASE ORAL 2 TIMES DAILY
Qty: 60 TABLET | Refills: 2 | Status: SHIPPED | OUTPATIENT
Start: 2020-03-19 | End: 2020-07-06

## 2020-03-19 RX ORDER — NICOTINE 21 MG/24HR
1 PATCH, TRANSDERMAL 24 HOURS TRANSDERMAL EVERY 24 HOURS
Qty: 28 PATCH | Refills: 0 | Status: SHIPPED | OUTPATIENT
Start: 2020-03-19 | End: 2021-02-09
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
## 2020-03-23 NOTE — PROGRESS NOTES
Outpatient Physical Therapy Discharge Note     Patient: Ana Maria Espinoza  : 1990    Evaluation date:  20     Referring Provider: Albert Chacko Diagnosis: LBP    Client Self Report:   Current status is unknown since patient did not return for further PT visits.    Objective Measurements:  Current objective status is unknown since patient failed to complete treatment     Goals:  Goal Identifier walking   Goal Description Pt will be fabi to walk 60 min w less than 2/10 pain to be able to go gorcery shopping   Target Date 20   Date Met      Progress:     Goal Identifier sitting   Goal Description Pt will be able to sit 30 min to be able to sit and have family dinner   Target Date 20   Date Met      Progress:     Goal Identifier core strength   Goal Description Pt will be able to hold plank 30 secs to demonstrate improve core strength and reduce LBP   Target Date 20   Date Met      Progress:     Goal Identifier DOUGLAS   Goal Description Pt will report <10% disability on DOUGLAS to demonstrate improved ability to complete daily activities and demonstrate significant clinical improvement.    Target Date 20   Date Met      Progress:           Progress towards Goals:   Progress this reporting period: Pt has failed to schedule f/u visits within 30 days from last visit thus is being d/c from therapy at this time. Objective measures are all taken from last visit. Current status is unknown at this time.    Plan:  Discharge from therapy.    Discharge:    Reason for Discharge: Patient has failed to schedule further appointments.    Equipment Issued: none    Discharge Plan:  Not completed since patient failed to schedule further appointments.    Margo Capellan  Physical Therapist  [address] [address]  [email or web address]   [email or web address]   Office: [phone] Fax: [phone]

## 2020-04-23 ENCOUNTER — VIRTUAL VISIT (OUTPATIENT)
Dept: FAMILY MEDICINE | Facility: OTHER | Age: 30
End: 2020-04-23

## 2020-04-23 NOTE — PROGRESS NOTES
"Date: 2020 13:30:46  Clinician: Betsey Cunningham  Clinician NPI: [id number]  Patient: Ana Maria Espinoza  Patient : 1990  Patient Address: [address] [place] [address]  Patient Phone: [phone]  Visit Protocol: URI  Patient Summary:  Ana Maria is a 30 year old ( : 1990 ) female who initiated a Visit for COVID-19 (Coronavirus) evaluation and screening. When asked the question \"Please sign me up to receive news, health information and promotions. \", Ana Maria responded \"No\".    Ana Maria states her symptoms started suddenly 3-4 days ago.   Her symptoms consist of nasal congestion, malaise, myalgia, and a headache. Ana Maria also feels feverish but was unable to measure her temperature.   Symptom details     Nasal secretions: The color of her mucus is yellow.    Headache: She states the headache is severe (7-9 on a 10 point pain scale).      Ana Maria denies having rhinitis, chills, diarrhea, facial pain or pressure, sore throat, cough, ear pain, vomiting, nausea, teeth pain, wheezing, anosmia, and ageusia. She also denies taking antibiotic medication for the symptoms, having a sinus infection within the past year, double sickening (worsening symptoms after initial improvement), and having recent facial or sinus surgery in the past 60 days. She is not experiencing dyspnea.   Precipitating events  She has not recently been exposed to someone with influenza. Ana Maria has been in close contact with the following high risk individuals: people with asthma, heart disease or diabetes.   Pertinent COVID-19 (Coronavirus) information  Ana Maria has not traveled internationally or to the areas where COVID-19 (Coronavirus) is widespread, including cruise ship travel in the last 14 days before the start of her symptoms.   Ana Maria does not work or volunteer as healthcare worker or a  and does not work or volunteer in a healthcare facility.   She does not live with a healthcare worker.   Ana Maria " has not had a close contact with a laboratory-confirmed COVID-19 patient within 14 days of symptom onset. She has had a close contact with a suspected COVID-19 patient within 14 days of symptom onset. Additional information about contact with COVID-19 (Coronavirus) patient as reported by the patient (free text): Neighbors daughter. Kavon has weekend custody of her daughter who was [place] with her father who is a [employer] officer who came into contact with prisoner who was positive for covid 19. Neighbor got her daughter 10 days after her father found out and came to [place]   Pertinent medical history  Ana Maria does not get yeast infections when she takes antibiotics.   Ana Maria does not need a return to work/school note.   Weight: 157 lbs   Ana Maria smokes or uses smokeless tobacco.   She denies pregnancy and denies breastfeeding. She does not menstruate.   Weight: 157 lbs    MEDICATIONS: No current medications, ALLERGIES: NKDA  Clinician Response:  Dear Ana Maria,  Based on the information provided, you have a viral upper respiratory infection, otherwise known as a cold. Symptoms vary from person to person, but can include sneezing, coughing, a runny nose, sore throat, and headache and range from mild to severe.  Unfortunately, there are no medications that can cure a cold, so treatment is focused on controlling symptoms as much as possible. Most people gradually feel better until symptoms are gone in 1-2 weeks.  Based on the information provided, you have viral sinusitis, also known as a sinus infection. Sinus infections are caused by bacteria or a virus and symptoms are almost always identical. The difference between the 2 types of infections is timing.  Sinus infections start as viral infections and symptoms improve on their own in about 7 days. If symptoms have not improved after 7 days or have even worsened, a bacterial infection may have developed.  Medication information  Because you have a viral  infection, antibiotics will not help you get better. Treating a viral infection with antibiotics could actually make you feel worse.  I am prescribing:     Fluticasone 50 mcg/actuation nasal spray. Inhale 2 sprays in each nostril 1 time per day; after 1 week, may adjust to 1 - 2 sprays in each nostril 1 time per day. This medication takes several days to start working, so keep taking it even if it doesn't help right away. There are no refills with this prescription.   Unless you are allergic to the over-the-counter medication(s) below, I recommend using:       Acetaminophen (Tylenol or store brand) oral tablet. Take 1-2 tablets by mouth every 4-6 hours to help with the discomfort.    A decongestant such as Sudafed PE or store brand.     Over-the-counter medications do not require a prescription. Ask the pharmacist if you have any questions.  Self care  Steps you can take to be as comfortable as possible:     Rest.    Drink plenty of fluids.    Take a warm shower to loosen congestion    Use a cool-mist humidifier.     Also, as your provider, I need you to know that becoming tobacco-free is the most important thing you can do to protect your current and future health.  When to seek care  Please be seen in a clinic or urgent care if any of the following occur:   New symptoms develop, or symptoms become worse   Call ahead before going to the clinic or urgent care.  COVID-19 (Coronavirus) General Information  Because there is currently no vaccine to prevent infection, the best way to protect yourself is to avoid being exposed to this virus. Common symptoms of COVID-19 include but are not limited to fever, cough, and shortness of breath. These symptoms appear 2-14 days after you are exposed to the virus that causes COVID-19. Click here for more information from the CDC on how to protect yourself.  If you are sick with COVID-19 or suspect you are infected with the virus that causes COVID-19, follow the steps here from the  CDC to help prevent the disease from spreading to people in your home and community.  Click here for general information from the CDC on testing.  If you develop any of these emergency warning signs for COVID-19, get medical attention immediately:     Trouble breathing    Persistent pain or pressure in the chest    New confusion or inability to arouse    Bluish lips or face      Call your doctor or clinic before going in. Call 911 if you have a medical emergency and notify the  you have or think you may have COVID-19.  For more detailed and up to date information on COVID-19 (Coronavirus), please visit the CDC website.   Diagnosis: Viral URI  Diagnosis ICD: J06.9  Prescription: fluticasone 50 mcg/actuation nasal spray,suspension 1 120 spray aerosol with adapter (grams), 30 days supply. Inhale 2 sprays in each nostril 1 time per day; after 1 week, may adjust to 1 - 2 sprays in each nostril 1 time per day.. Refills: 0, Refill as needed: no, Allow substitutions: yes

## 2020-07-06 ENCOUNTER — VIRTUAL VISIT (OUTPATIENT)
Dept: FAMILY MEDICINE | Facility: CLINIC | Age: 30
End: 2020-07-06
Payer: COMMERCIAL

## 2020-07-06 ENCOUNTER — MYC REFILL (OUTPATIENT)
Dept: FAMILY MEDICINE | Facility: CLINIC | Age: 30
End: 2020-07-06

## 2020-07-06 DIAGNOSIS — Z71.6 ENCOUNTER FOR SMOKING CESSATION COUNSELING: ICD-10-CM

## 2020-07-06 DIAGNOSIS — T78.40XA ALLERGIC REACTION, INITIAL ENCOUNTER: ICD-10-CM

## 2020-07-06 DIAGNOSIS — Z72.0 TOBACCO USE: ICD-10-CM

## 2020-07-06 DIAGNOSIS — L55.9 SUNBURN: Primary | ICD-10-CM

## 2020-07-06 DIAGNOSIS — L21.9 SEBORRHEIC DERMATITIS: ICD-10-CM

## 2020-07-06 DIAGNOSIS — M54.50 LOW BACK PAIN, UNSPECIFIED BACK PAIN LATERALITY, UNSPECIFIED CHRONICITY, UNSPECIFIED WHETHER SCIATICA PRESENT: ICD-10-CM

## 2020-07-06 PROCEDURE — 99213 OFFICE O/P EST LOW 20 MIN: CPT | Mod: 95 | Performed by: NURSE PRACTITIONER

## 2020-07-06 NOTE — PROGRESS NOTES
"Ana Maria Espinoza is a 30 year old female who is being evaluated via a billable telephone visit.      The patient has been notified of following:     \"This telephone visit will be conducted via a call between you and your physician/provider. We have found that certain health care needs can be provided without the need for a physical exam.  This service lets us provide the care you need with a short phone conversation.  If a prescription is necessary we can send it directly to your pharmacy.  If lab work is needed we can place an order for that and you can then stop by our lab to have the test done at a later time.    Telephone visits are billed at different rates depending on your insurance coverage. During this emergency period, for some insurers they may be billed the same as an in-person visit.  Please reach out to your insurance provider with any questions.    If during the course of the call the physician/provider feels a telephone visit is not appropriate, you will not be charged for this service.\"    Patient has given verbal consent for Telephone visit?  Yes    What phone number would you like to be contacted at? [phone]    How would you like to obtain your AVS? MyChart    Subjective     Ana Maria Espinoza is a 30 year old female who presents via phone visit today for the following health issues:    HPI  Derm problem      Duration: Friday     Description  Location: shoulders, arms, chest, back, legs   Itching: no    Intensity:  7/10    Accompanying signs and symptoms: redness, feels heat come off the skin, no blisters as of now, body pain, felt light headed when she was taking a cold shower. Pt states she has a slight temp at 101.2F last night and it went down to 99.8F this morning.     History (similar episodes/previous evaluation): is allergic to aloe     Precipitating or alleviating factors:  New exposures:  None  Recent travel: no      Therapies tried and outcome: oatmeal, cold shower, sunburn care with soy, " luke warm showers and baths, ibuprofen, drinking more fluids. Nothing has helped     Aloe made it worse  Taking ibuprofen every 6 hours - takes the edge off      Patient Active Problem List   Diagnosis     Tobacco use     History reviewed. No pertinent surgical history.    Social History     Tobacco Use     Smoking status: Current Every Day Smoker     Packs/day: 2.00     Types: Cigarettes     Start date: 3/19/2009     Smokeless tobacco: Never Used   Substance Use Topics     Alcohol use: No     History reviewed. No pertinent family history.      Current Outpatient Medications   Medication Sig Dispense Refill     albuterol (PROAIR HFA/PROVENTIL HFA/VENTOLIN HFA) 108 (90 Base) MCG/ACT inhaler Inhale 1-2 puffs into the lungs every 6 hours as needed for shortness of breath / dyspnea or wheezing 1 Inhaler 1     buPROPion (ZYBAN) 150 MG 12 hr tablet Take 1 tablet (150 mg) by mouth 2 times daily 60 tablet 2     cyclobenzaprine (FLEXERIL) 10 MG tablet One tab bid prn 30 tablet 3     etonogestrel (IMPLANON/NEXPLANON) 68 MG IMPL Inject 1 each Subcutaneous       ketoconazole (NIZORAL) 2 % external cream Apply topically daily 30 g 3     ketoconazole (NIZORAL) 2 % external shampoo Use shampoo daily for flaky skin 120 mL 4     nicotine (NICODERM CQ) 21 MG/24HR 24 hr patch Place 1 patch onto the skin every 24 hours 28 patch 0     EPINEPHrine (EPIPEN/ADRENACLICK/OR ANY BX GENERIC EQUIV) 0.3 MG/0.3ML injection 2-pack Inject 0.3 mLs (0.3 mg) into the muscle as needed for anaphylaxis (Patient not taking: Reported on 1/16/2020) 1 each 1     Allergies   Allergen Reactions     Bee Venom Anaphylaxis     Has epipen     Aloe Hives     Dry skin as well      Codeine Rash     Other reaction(s): Intolerance-Can't Take       Reviewed and updated as needed this visit by Provider  Tobacco  Allergies  Meds  Problems  Med Hx  Surg Hx  Fam Hx         Review of Systems   Constitutional, HEENT, cardiovascular, pulmonary, gi and gu systems are  negative, except as otherwise noted.       Objective   Reported vitals:  There were no vitals taken for this visit.   healthy, alert and no distress  PSYCH: Alert and oriented times 3; coherent speech, normal   rate and volume, able to articulate logical thoughts, able   to abstract reason, no tangential thoughts, no hallucinations   or delusions  Her affect is normal and pleasant  RESP: No cough, no audible wheezing, able to talk in full sentences  Remainder of exam unable to be completed due to telephone visits    Diagnostic Test Results:  Labs reviewed in Epic  none         Assessment/Plan:  1. Sunburn  Acute, got on Friday. Has been doing multiple at home therapies, ibuprofen takes the edge off. Small blisters reported by patient, none open. Low grade fever last evening. Recommend conservative management with what patient has been doing with the addition of trial of calamine and cool compresses. Advised if not improving by the end of the week to be seen in clinic. Patient agreeable.       Return in about 1 week (around 7/13/2020), or if symptoms worsen or fail to improve.      Phone call duration:  13 minutes    SY Hunt CNP

## 2020-07-06 NOTE — PATIENT INSTRUCTIONS
For Sunburn try Calamine lotion on top of what you've been doing already    Start cool compresses with cold, wet towels on burn areas    Continue Ibuprofen as needed for discomfort    Hydrate with fluids     If blisters open keep clean with warm, soapy water     If things not improving over this week please let me know

## 2020-07-07 RX ORDER — EPINEPHRINE 0.3 MG/.3ML
0.3 INJECTION SUBCUTANEOUS PRN
Qty: 1 EACH | Refills: 1 | Status: SHIPPED | OUTPATIENT
Start: 2020-07-07 | End: 2022-04-11

## 2020-07-07 RX ORDER — BUPROPION HYDROCHLORIDE 150 MG/1
150 TABLET, FILM COATED, EXTENDED RELEASE ORAL 2 TIMES DAILY
Qty: 60 TABLET | Refills: 0 | Status: SHIPPED | OUTPATIENT
Start: 2020-07-07 | End: 2021-01-08

## 2020-07-08 RX ORDER — KETOCONAZOLE 20 MG/G
CREAM TOPICAL DAILY
Qty: 30 G | Refills: 3 | Status: SHIPPED | OUTPATIENT
Start: 2020-07-08 | End: 2023-03-08

## 2020-07-08 RX ORDER — CYCLOBENZAPRINE HCL 10 MG
TABLET ORAL
Qty: 30 TABLET | Refills: 3 | Status: SHIPPED | OUTPATIENT
Start: 2020-07-08 | End: 2021-01-08

## 2020-07-08 RX ORDER — KETOCONAZOLE 20 MG/ML
SHAMPOO TOPICAL
Qty: 120 ML | Refills: 4 | Status: SHIPPED | OUTPATIENT
Start: 2020-07-08 | End: 2021-12-31

## 2020-09-10 ENCOUNTER — VIRTUAL VISIT (OUTPATIENT)
Dept: FAMILY MEDICINE | Facility: OTHER | Age: 30
End: 2020-09-10

## 2020-09-10 NOTE — PROGRESS NOTES
"Date: 09/10/2020 07:47:09  Clinician: Cassi Wallace  Clinician NPI: [id number]  Patient: Ana Maria Espinoza  Patient : 1990  Patient Address: [address] [place], [place] [address]  Patient Phone: [phone]  Visit Protocol: URI  Patient Summary:  Ana Maria is a 30 year old ( : 1990 ) female who initiated a Visit for COVID-19 (Coronavirus) evaluation and screening. When asked the question \"Please sign me up to receive news, health information and promotions. \", Ana Maria responded \"No\".    Ana Maria states her symptoms started 1-2 days ago.   Her symptoms consist of ear pain, vomiting, rhinitis, nausea, a sore throat, a cough, nasal congestion, a headache, chills, malaise, and myalgia.   Symptom details     Nasal secretions: The color of her mucus is yellow.    Cough: Ana Maria coughs a few times an hour and her cough is more bothersome at night. Phlegm comes into her throat when she coughs. She does not believe her cough is caused by post-nasal drip. The color of the phlegm is yellow.     Sore throat: Ana Maria reports having mild throat pain (1-3 on a 10 point pain scale), does not have exudate on her tonsils, and can swallow liquids. She is not sure if the lymph nodes in her neck are enlarged. A rash has not appeared on the skin since the sore throat started.     Headache: She states the headache is moderate (4-6 on a 10 point pain scale).      Ana Maria denies having anosmia, facial pain or pressure, fever, wheezing, teeth pain, ageusia, and diarrhea. She also denies taking antibiotic medication in the past month, having recent facial or sinus surgery in the past 60 days, and having a sinus infection within the past year. She is not experiencing dyspnea.   Precipitating events  Within the past week, Ana Maria has not been exposed to someone with strep throat. She has not recently been exposed to someone with influenza. Ana Maria has been in close contact with the following high risk individuals: adults 65 " or older and people with asthma, heart disease or diabetes.   Pertinent COVID-19 (Coronavirus) information  In the past 14 days, Ana Maria has not worked in a congregate living setting.   She does not work or volunteer as healthcare worker or a  and does not work or volunteer in a healthcare facility.   Ana Maria also has not lived in a congregate living setting in the past 14 days. She does not live with a healthcare worker.   Ana Maria has not had a close contact with a laboratory-confirmed COVID-19 patient within 14 days of symptom onset.   Since December 2019, Ana Maria and has had upper respiratory infection (URI) or influenza-like illness. Has not been diagnosed with lab-confirmed COVID-19 test      Date(s) of previous URI or influenza-like illness (free-text): December for 3 weeks     Symptoms Ana Maria experienced during previous URI or influenza-like illness as reported by the patient (free-text): Fever, chills, vomiting, diarrhea, fatigue, dizziness, coughing, body pain        Pertinent medical history  Ana Maria does not get yeast infections when she takes antibiotics.   Ana Maria needs a return to work/school note.   Weight: 160 lbs   Ana Maria smokes or uses smokeless tobacco.   She denies pregnancy and denies breastfeeding. She does not menstruate.   Weight: 160 lbs    MEDICATIONS: albuterol sulfate inhalation, Nexplanon subdermal, ALLERGIES: NKDA  Clinician Response:  Dear Ana Maria,   Your symptoms show that you may have coronavirus (COVID-19). This illness can cause fever, cough and trouble breathing. Many people get a mild case and get better on their own. Some people can get very sick.  Based on the symptoms you have shared, I would like you to be re-checked in 2 to 3 days. Please call your family clinic to set up a video or phone visit.  Will I be tested for COVID-19?  We would like to test you for this virus.   Please call [phone] to schedule your visit. Explain that you were referred  "by [facility] to have a COVID-19 test. Be ready to share your [facility] visit ID number.   The following will serve as your written order for this COVID Test, ordered by me, for the indication of suspected COVID [Z20.828]: The test will be ordered in [facility], our electronic health record, after you are scheduled. It will show as ordered and authorized by Ridge Espinoza MD.  Order: COVID-19 (Coronavirus) PCR for SYMPTOMATIC testing from [facility].  1.When it's time for your COVID test:   Stay at least 6 feet away from others. (If someone will drive you to your test, stay in the backseat, as far away from the  as you can.)   Cover your mouth and nose with a mask, tissue or washcloth.  Go straight to the testing site. Don't make any stops on the way there or back.      2.Starting now: Stay home and away from others (self-isolate) until:   You've had no fever---and no medicine that reduces fever---for one full day (24 hours). And...   Your other symptoms have gotten better. For example, your cough or breathing has improved. And...   At least 10 days have passed since your symptoms started.       During this time, don't leave the house except for testing or medical care.   Stay in your own room, even for meals. Use your own bathroom if you can.   Stay away from others in your home. No hugging, kissing or shaking hands. No visitors.  Don't go to work, school or anywhere else.    Clean \"high touch\" surfaces often (doorknobs, counters, handles, etc.). Use a household cleaning spray or wipes. You'll find a full list of  on the EPA website: www.epa.gov/pesticide-registration/list-n-disinfectants-use-against-sars-cov-2.   Cover your mouth and nose with a mask, tissue or washcloth to avoid spreading germs.  Wash your hands and face often. Use soap and water.  Caregivers in these groups are at risk for severe illness due to COVID-19:  o People 65 years and older  o People who live in a nursing home or long-term care facility  o " People with chronic disease (lung, heart, cancer, diabetes, kidney, liver, immunologic)   o People who have a weakened immune system, including those who:   Are in cancer treatment  Take medicine that weakens the immune system, such as corticosteroids  Had a bone marrow or organ transplant  Have an immune deficiency  Have poorly controlled HIV or AIDS  Are obese (body mass index of 40 or higher)  Smoke regularly   o Caregivers should wear gloves while washing dishes, handling laundry and cleaning bedrooms and bathrooms.  o Use caution when washing and drying laundry: Don't shake dirty laundry, and use the warmest water setting that you can.  o For more tips, go to www.cdc.gov/coronavirus/2019-ncov/downloads/10Things.pdf.      How can I take care of myself?   Get lots of rest. Drink extra fluids (unless a doctor has told you not to)   Take Tylenol (acetaminophen) for fever or pain. If you have liver or kidney problems, ask your family doctor if it's okay to take Tylenol.   Adults can take either:    650 mg (two 325 mg pills) every 4 to 6 hours, or...   1,000 mg (two 500 mg pills) every 8 hours as needed.    Note: Don't take more than 3,000 mg in one day. Acetaminophen is found in many medicines (both prescribed and over-the-counter medicines). Read all labels to be sure you don't take too much.   For children, check the Tylenol bottle for the right dose. The dose is based on the child's age or weight.    If you have other health problems (like cancer, heart failure, an organ transplant or severe kidney disease): Call your specialty clinic if you don't feel better in the next 2 days.       Know when to call 911. Emergency warning signs include:    Trouble breathing or shortness of breath Pain or pressure in the chest that doesn't go away Feeling confused like you haven't felt before, or not being able to wake up Bluish-colored lips or face  Where can I get more information?   [facility] -- About COVID-19:  [email or web address]   CDC -- What to Do If You're Sick: www.cdc.gov/coronavirus/2019-ncov/about/steps-when-sick.html   CDC -- Ending Home Isolation: www.cdc.gov/coronavirus/2019-ncov/hcp/disposition-in-home-patients.html   CDC -- Caring for Someone: www.cdc.gov/coronavirus/2019-ncov/if-you-are-sick/care-for-someone.html   [facility] -- Interim Guidance for Hospital Discharge to Home: www.[facility].mn./diseases/coronavirus/hcp/hospdischarge.pdf   [facility] clinical trials (COVID-19 research studies): clinicalaffairs.[facility]-clinical-trials    Below are the COVID-19 hotlines at the [place] [facility] ([facility]). Interpreters are available.    For health questions: Call 633-995-8468 or 1-811.274.1654 (7 a.m. to 7 p.m.) For questions about schools and childcare: Call 953-116-3433 or 1-431.827.4654 (7 a.m. to 7 p.m.)       Diagnosis: Cough  Diagnosis ICD: R05

## 2020-09-12 DIAGNOSIS — Z20.822 ENCOUNTER FOR LABORATORY TESTING FOR COVID-19 VIRUS: Primary | ICD-10-CM

## 2020-09-12 PROCEDURE — U0003 INFECTIOUS AGENT DETECTION BY NUCLEIC ACID (DNA OR RNA); SEVERE ACUTE RESPIRATORY SYNDROME CORONAVIRUS 2 (SARS-COV-2) (CORONAVIRUS DISEASE [COVID-19]), AMPLIFIED PROBE TECHNIQUE, MAKING USE OF HIGH THROUGHPUT TECHNOLOGIES AS DESCRIBED BY CMS-2020-01-R: HCPCS | Performed by: FAMILY MEDICINE

## 2020-09-13 ENCOUNTER — MYC MEDICAL ADVICE (OUTPATIENT)
Dept: FAMILY MEDICINE | Facility: CLINIC | Age: 30
End: 2020-09-13

## 2020-09-13 LAB
SARS-COV-2 RNA SPEC QL NAA+PROBE: NOT DETECTED
SPECIMEN SOURCE: NORMAL

## 2020-09-14 ENCOUNTER — VIRTUAL VISIT (OUTPATIENT)
Dept: FAMILY MEDICINE | Facility: CLINIC | Age: 30
End: 2020-09-14
Payer: COMMERCIAL

## 2020-09-14 DIAGNOSIS — J06.9 VIRAL URI WITH COUGH: Primary | ICD-10-CM

## 2020-09-14 PROCEDURE — 99213 OFFICE O/P EST LOW 20 MIN: CPT | Mod: 95 | Performed by: NURSE PRACTITIONER

## 2020-09-14 RX ORDER — ALBUTEROL SULFATE 90 UG/1
1-2 AEROSOL, METERED RESPIRATORY (INHALATION) EVERY 6 HOURS PRN
Qty: 1 INHALER | Refills: 1 | Status: SHIPPED | OUTPATIENT
Start: 2020-09-14 | End: 2022-04-11

## 2020-09-14 NOTE — LETTER
September 14, 2020      Ana Maria Espinoza  [id number] [place] [facility] [place] [facility] [address]        To Whom It May Concern:      Ana Maria Espinoza has been evaluated and needs to remain out of work to isolate for 10 days from when symptoms started AND 72 hours after fever resolves (without fever reducing medications) AND improvement of respiratory symptoms (whichever is longer).  Symptoms started 9/10/2020.        Sincerely,        SY Manuel CNP

## 2020-09-14 NOTE — PATIENT INSTRUCTIONS
"Discharge Instructions for COVID-19 Patients  You have--or may have--COVID-19. Please follow the instructions listed below.   If you have a weakened immune system, discuss with your doctor any other actions you need to take.  How can I protect others?  If you have symptoms (fever, cough, body aches or trouble breathing):    Stay home and away from others (self-isolate) until:  ? At least 10 days have passed since your symptoms started. And   ? You've had no fever--and no medicine that reduces fever--for 1 full day (24 hours). And   ? Your other symptoms have resolved (gotten better).  If you don't show symptoms, but testing showed that you have COVID-19:    Stay home and away from others (self-isolate) until at least 10 days have passed since the date of your first positive COVID-19 test.  During this time    Stay in your own room, even for meals. Use your own bathroom if you can.    Stay away from others in your home. No hugging, kissing or shaking hands. No visitors.    Don't go to work, school or anywhere else.    Clean \"high touch\" surfaces often (doorknobs, counters, handles). Use household cleaning spray or wipes. You'll find a full list of  on the EPA website: www.epa.gov/pesticide-registration/list-n-disinfectants-use-against-sars-cov-2.    Cover your mouth and nose with a mask or other face covering to avoid spreading germs.    Wash your hands and face often. Use soap and water.    Caregivers in these groups are at risk for severe illness due to COVID-19:  ? People 65 years and older  ? People who live in a nursing home or long-term care facility  ? People with chronic disease (lung, heart, cancer, diabetes, kidney, liver, immunologic)  ? People who have a weakened immune system, including those who:    Are in cancer treatment    Take medicine that weakens the immune system, such as corticosteroids    Had a bone marrow or organ transplant    Have an immune deficiency    Have poorly controlled HIV or " AIDS    Are obese (body mass index of 40 or higher)    Smoke regularly    Caregivers should wear gloves while washing dishes, handling laundry and cleaning bedrooms and bathrooms.    Use caution when washing and drying laundry: Don't shake dirty laundry and use the warmest water setting that you can.    For more tips on managing your health at home, go to www.cdc.gov/coronavirus/2019-ncov/downloads/10Things.pdf.  How can I take care of myself at home?  1. Get lots of rest. Drink extra fluids (unless a doctor has told you not to).  2. Take Tylenol (acetaminophen) for fever or pain. If you have liver or kidney problems, ask your family doctor if it's okay to take Tylenol.     Adults can take either:  ? 650 mg (two 325 mg pills) every 4 to 6 hours, or   ? 1,000 mg (two 500 mg pills) every 8 hours as needed.  ? Note: Don't take more than 3,000 mg in one day. Acetaminophen is found in many medicines (both prescribed and over-the-counter medicines). Read all labels to be sure you don't take too much.   For children, check the Tylenol bottle for the right dose. The dose is based on the child's age or weight.  3. If you have other health problems (like cancer, heart failure, an organ transplant or severe kidney disease): Call your specialty clinic if you don't feel better in the next 2 days.  4. Know when to call 911. Emergency warning signs include:  ? Trouble breathing or shortness of breath  ? Pain or pressure in the chest that doesn't go away  ? Feeling confused like you haven't felt before, or not being able to wake up  ? Bluish-colored lips or face  5. Your doctor may have prescribed a blood thinner medicine. Follow their instructions.  Where can I get more information?     [employer] [place] - About COVID-19: [email or web address]    CDC - What to Do If You're Sick: www.cdc.gov/coronavirus/2019-ncov/about/steps-when-sick.html    CDC - Ending Home Isolation:  www.cdc.gov/coronavirus/2019-ncov/hcp/disposition-in-home-patients.html    CDC - Caring for Someone: www.cdc.gov/coronavirus/2019-ncov/if-you-are-sick/care-for-someone.html    [facility] - Interim Guidance for Hospital Discharge to Home: www.[facility].[place]/diseases/coronavirus/hcp/hospdischarge.pdf    [facility] clinical trials (COVID-19 research studies): clinicalaffairs.[facility]-clinical-trials    Below are the COVID-19 hotlines at the [place] Department of Health ([facility]). Interpreters are available.  ? For health questions: Call 439-900-2818 or 1-135.953.8449 (7 a.m. to 7 p.m.)  ? For questions about schools and childcare: Call 573-452-4822 or 1-769.831.3955 (7 a.m. to 7 p.m.)    For informational purposes only. Not to replace the advice of your health care provider. Clinically reviewed by the Infection Prevention Team. Copyright   2020 [facility]. All rights reserved. [employer] 608358 - REV 08/04/20.

## 2020-09-14 NOTE — PROGRESS NOTES
"Ana Maria Espinoza is a 30 year old female who is being evaluated via a billable video visit.      The patient has been notified of following:     \"This video visit will be conducted via a call between you and your physician/provider. We have found that certain health care needs can be provided without the need for an in-person physical exam.  This service lets us provide the care you need with a video conversation.  If a prescription is necessary we can send it directly to your pharmacy.  If lab work is needed we can place an order for that and you can then stop by our lab to have the test done at a later time.    Video visits are billed at different rates depending on your insurance coverage.  Please reach out to your insurance provider with any questions.    If during the course of the call the physician/provider feels a video visit is not appropriate, you will not be charged for this service.\"    Patient has given verbal consent for Video visit? Yes  How would you like to obtain your AVS? MyChart  If you are dropped from the video visit, the video invite should be resent to: Text to cell phone: [phone]  Will anyone else be joining your video visit? No    Subjective     Ana Maria Espinoza is a 30 year old female who presents today via video visit for the following health issues:    HPI    Acute Illness  Onset/Duration: 5 days   Symptoms:  Fever: no  Chills/Sweats: YES  Headache (location?): YES  Sinus Pressure: YES  Conjunctivitis:  no  Ear Pain: no - only 2 days   Rhinorrhea: YES  Congestion: YES  Sore Throat: YES  Cough: YES  Wheeze: no  Decreased Appetite: YES  Nausea: YES  Vomiting: YES  Diarrhea: YES - yesterday   Dysuria/Freq.: YES - more often   Dysuria or Hematuria: no  Fatigue/Achiness: YES  Sick/Strep Exposure: no  Therapies tried and outcome: Sudafed, ibu tylenol, halls   COVID test 9/12/2020    Fatigue, body pain and vomiting-2-3 times per day since Thursday  No abdominal pain or fever  Does not work in " health care or congregate care facility  No known exposure to COVID 19    Video Start Time: 08:05 AM    Review of Systems   Constitutional, HEENT, cardiovascular, pulmonary, gi and gu systems are negative, except as otherwise noted.      Objective           Vitals:  No vitals were obtained today due to virtual visit.    Physical Exam     GENERAL: Healthy, alert and no distress  EYES: Eyes grossly normal to inspection.  No discharge or erythema, or obvious scleral/conjunctival abnormalities.  RESP: No audible wheeze, cough, or visible cyanosis.  No visible retractions or increased work of breathing.    SKIN: Visible skin clear. No significant rash, abnormal pigmentation or lesions.  NEURO: Cranial nerves grossly intact.  Mentation and speech appropriate for age.  PSYCH: Mentation appears normal, affect normal/bright, judgement and insight intact, normal speech and appearance well-groomed.          Assessment & Plan     Viral URI with cough  No acute distress.  COVID 19 testing ordered as previous testing was only 2 days after symptoms started which could result in a negative test. Recommend quarantine until 10 days after symptoms started as long as symptoms improving, information provided.  Albuterol refilled.  Symptomatic care and follow up discussed.  - albuterol (PROAIR HFA/PROVENTIL HFA/VENTOLIN HFA) 108 (90 Base) MCG/ACT inhaler; Inhale 1-2 puffs into the lungs every 6 hours as needed for shortness of breath / dyspnea or wheezing  - Symptomatic COVID-19 Virus (Coronavirus) by PCR; Future     Home care instructions were reviewed with the patient. The risks, benefits and treatment options of prescribed medications or other treatments have been discussed with the patient. The patient verbalized their understanding and should call or follow up if no improvement or if they develop further problems.    Patient Instructions   Discharge Instructions for COVID-19 Patients  You have--or may have--COVID-19. Please follow  "the instructions listed below.   If you have a weakened immune system, discuss with your doctor any other actions you need to take.  How can I protect others?  If you have symptoms (fever, cough, body aches or trouble breathing):    Stay home and away from others (self-isolate) until:  ? At least 10 days have passed since your symptoms started. And   ? You've had no fever--and no medicine that reduces fever--for 1 full day (24 hours). And   ? Your other symptoms have resolved (gotten better).  If you don't show symptoms, but testing showed that you have COVID-19:    Stay home and away from others (self-isolate) until at least 10 days have passed since the date of your first positive COVID-19 test.  During this time    Stay in your own room, even for meals. Use your own bathroom if you can.    Stay away from others in your home. No hugging, kissing or shaking hands. No visitors.    Don't go to work, school or anywhere else.    Clean \"high touch\" surfaces often (doorknobs, counters, handles). Use household cleaning spray or wipes. You'll find a full list of  on the EPA website: www.epa.gov/pesticide-registration/list-n-disinfectants-use-against-sars-cov-2.    Cover your mouth and nose with a mask or other face covering to avoid spreading germs.    Wash your hands and face often. Use soap and water.    Caregivers in these groups are at risk for severe illness due to COVID-19:  ? People 65 years and older  ? People who live in a nursing home or long-term care facility  ? People with chronic disease (lung, heart, cancer, diabetes, kidney, liver, immunologic)  ? People who have a weakened immune system, including those who:    Are in cancer treatment    Take medicine that weakens the immune system, such as corticosteroids    Had a bone marrow or organ transplant    Have an immune deficiency    Have poorly controlled HIV or AIDS    Are obese (body mass index of 40 or higher)    Smoke regularly    Caregivers should " wear gloves while washing dishes, handling laundry and cleaning bedrooms and bathrooms.    Use caution when washing and drying laundry: Don't shake dirty laundry and use the warmest water setting that you can.    For more tips on managing your health at home, go to www.cdc.gov/coronavirus/2019-ncov/downloads/10Things.pdf.  How can I take care of myself at home?  1. Get lots of rest. Drink extra fluids (unless a doctor has told you not to).  2. Take Tylenol (acetaminophen) for fever or pain. If you have liver or kidney problems, ask your family doctor if it's okay to take Tylenol.     Adults can take either:  ? 650 mg (two 325 mg pills) every 4 to 6 hours, or   ? 1,000 mg (two 500 mg pills) every 8 hours as needed.  ? Note: Don't take more than 3,000 mg in one day. Acetaminophen is found in many medicines (both prescribed and over-the-counter medicines). Read all labels to be sure you don't take too much.   For children, check the Tylenol bottle for the right dose. The dose is based on the child's age or weight.  3. If you have other health problems (like cancer, heart failure, an organ transplant or severe kidney disease): Call your specialty clinic if you don't feel better in the next 2 days.  4. Know when to call 911. Emergency warning signs include:  ? Trouble breathing or shortness of breath  ? Pain or pressure in the chest that doesn't go away  ? Feeling confused like you haven't felt before, or not being able to wake up  ? Bluish-colored lips or face  5. Your doctor may have prescribed a blood thinner medicine. Follow their instructions.  Where can I get more information?     [employer] [place] - About COVID-19: [email or web address]    CDC - What to Do If You're Sick: www.cdc.gov/coronavirus/2019-ncov/about/steps-when-sick.html    CDC - Ending Home Isolation: www.cdc.gov/coronavirus/2019-ncov/hcp/disposition-in-home-patients.html    CDC - Caring for Someone:  www.cdc.gov/coronavirus/2019-ncov/if-you-are-sick/care-for-someone.html    [facility] - Interim Guidance for Hospital Discharge to Home: www.health.[facility].mn.us/diseases/coronavirus/hcp/hospdischarge.pdf    [facility] clinical trials (COVID-19 research studies): clinicalaffairs.[facility]-clinical-trials    Below are the COVID-19 hotlines at the [place] Department of Health ([facility]). Interpreters are available.  ? For health questions: Call 990-792-0246 or 1-269.707.7683 (7 a.m. to 7 p.m.)  ? For questions about schools and childcare: Call 573-703-5316 or 1-681.685.8965 (7 a.m. to 7 p.m.)    For informational purposes only. Not to replace the advice of your health care provider. Clinically reviewed by the Infection Prevention Team. Copyright   2020 [facility]. All rights reserved. [employer] 928119 - REV 08/04/20.          Return in about 1 week (around 9/21/2020), or if symptoms worsen or fail to improve.    SY Manuel CNP  [facility]      Video-Visit Details    Type of service:  Video Visit    Video End Time:8:15 AM    Originating Location (pt. Location): Home    Distant Location (provider location):  [facility]     Platform used for Video Visit: Kevin

## 2020-09-14 NOTE — LETTER
September 14, 2020      Ana Maria Espinoza  [id number] [address]        To Whom It May Concern:    Ana Maria Espinoza has been evaluated and needs to remain out of work to isolate for 10 days from when symptoms started AND 72 hours after fever resolves (without fever reducing medications) AND improvement of respiratory symptoms (whichever is longer).  Symptoms started 9/10/2020.      Sincerely,        SY Manuel CNP

## 2020-09-23 ENCOUNTER — VIRTUAL VISIT (OUTPATIENT)
Dept: FAMILY MEDICINE | Facility: CLINIC | Age: 30
End: 2020-09-23
Payer: COMMERCIAL

## 2020-09-23 DIAGNOSIS — Z20.822 SUSPECTED 2019 NOVEL CORONAVIRUS INFECTION: Primary | ICD-10-CM

## 2020-09-23 DIAGNOSIS — M77.9 TENDONITIS: ICD-10-CM

## 2020-09-23 DIAGNOSIS — J01.90 ACUTE SINUSITIS WITH SYMPTOMS > 10 DAYS: ICD-10-CM

## 2020-09-23 PROCEDURE — 99214 OFFICE O/P EST MOD 30 MIN: CPT | Mod: 95 | Performed by: NURSE PRACTITIONER

## 2020-09-23 RX ORDER — FLUTICASONE PROPIONATE 50 MCG
1-2 SPRAY, SUSPENSION (ML) NASAL DAILY
Qty: 1 G | Refills: 0 | Status: SHIPPED | OUTPATIENT
Start: 2020-09-23 | End: 2020-12-29

## 2020-09-23 NOTE — PROGRESS NOTES
"Ana Maria Espinoza is a 30 year old female who is being evaluated via a billable video visit.      The patient has been notified of following:     \"This video visit will be conducted via a call between you and your physician/provider. We have found that certain health care needs can be provided without the need for an in-person physical exam.  This service lets us provide the care you need with a video conversation.  If a prescription is necessary we can send it directly to your pharmacy.  If lab work is needed we can place an order for that and you can then stop by our lab to have the test done at a later time.    Video visits are billed at different rates depending on your insurance coverage.  Please reach out to your insurance provider with any questions.    If during the course of the call the physician/provider feels a video visit is not appropriate, you will not be charged for this service.\"    Patient has given verbal consent for Video visit? Yes  How would you like to obtain your AVS? MyChart  If you are dropped from the video visit, the video invite should be resent to: Text to cell phone: [phone]  Will anyone else be joining your video visit? No    Subjective     Ana Maria Espinoza is a 30 year old female who presents today via video visit for the following health issues:    HPI    Patient would like to follow up with her COVID symptoms. She needs a letter for work because she is still having fevers, nausea, and vomiting.    Musculoskeletal problem/pain  Onset/Duration: a couple days  Description  Location: left hand  Joint Swelling: YES  Redness: no  Pain: YES  Warmth: no  Intensity:  moderate  Progression of Symptoms:  worsening  Accompanying signs and symptoms:   Fevers: no  Numbness/tingling/weakness: YES  History  Trauma to the area: YES- about a year ago  Recent illness:  no  Previous similar problem: YES- inured it about a year ago  Previous evaluation:  no  Precipitating or alleviating " "factors:  Aggravating factors include: none  Therapies tried and outcome: acetaminophen     Video Start Time: 9:54 AM        Review of Systems   Constitutional, HEENT, cardiovascular, pulmonary, GI, , musculoskeletal, neuro, skin, endocrine and psych systems are negative, except as otherwise noted.      Objective           Vitals:  No vitals were obtained today due to virtual visit.    Physical Exam     GENERAL: Healthy, alert and no distress  EYES: Eyes grossly normal to inspection.  No discharge or erythema, or obvious scleral/conjunctival abnormalities.  RESP: No audible wheeze, cough, or visible cyanosis.  No visible retractions or increased work of breathing.    SKIN: Visible skin clear. No significant rash, abnormal pigmentation or lesions.  NEURO: Cranial nerves grossly intact.  Mentation and speech appropriate for age.  PSYCH: Mentation appears normal, affect normal/bright, judgement and insight intact, normal speech and appearance well-groomed.      No results found for any visits on 09/23/20.        Assessment & Plan     Suspected 2019 novel coronavirus infection  Patient continues to have a cough and cold symptoms was once tested for Kovic was negative however still has concerns for COVID.  Will have patient retested  - Symptomatic COVID-19 Virus (Coronavirus) by PCR; Future    Acute sinusitis with symptoms > 10 days  - fluticasone (FLONASE) 50 MCG/ACT nasal spray; Spray 1-2 sprays into both nostrils daily  - amoxicillin-clavulanate (AUGMENTIN) 875-125 MG tablet; Take 1 tablet by mouth 2 times daily    Tendonitis  Patient continues to have hand pain most likely a tendinitis of the hand will have her start with hand therapy  - OCCUPATIONAL THERAPY REFERRAL; Future     BMI:   Estimated body mass index is 28.37 kg/m  as calculated from the following:    Height as of 1/29/20: 1.53 m (5' 0.24\").    Weight as of 1/29/20: 66.4 kg (146 lb 6.4 oz).           See Patient Instructions    No follow-ups on " file.    SY Romo CNP  [facility]      Video-Visit Details    Type of service:  Video Visit    Video End Time:10:24    Originating Location (pt. Location): Home    Distant Location (provider location):  [facility]     Platform [facility] for Video Visit[facility]

## 2020-09-23 NOTE — LETTER
[facility]  [address] [place] [id number]  Phone: [phone]  Fax: [phone]    September 23, 2020        Ana Maria Espinoza  [address] [place] [id number]          To whom it may concern:    RE: Ana Maria Espinoza    Patient was seen and treated today at our clinic and missed work. Will be off of work for the next 7 days.  Can return to work in 9/30/2020 if symptoms improve and fever for 72 hours.    When pateint returns to work on 9/30/2020 she will have the following restrictions in place for 2 weeks: Light duty-unable to lift more than 15  Pounds and no repetitive  movements of left hand.     Please contact me for questions or concerns.      Sincerely,        SY Romo CNP

## 2020-09-23 NOTE — PATIENT INSTRUCTIONS
"  Patient Education   Discharge Instructions for COVID-19 Patients  You have--or may have--COVID-19. Please follow the instructions listed below.   If you have a weakened immune system, discuss with your doctor any other actions you need to take.  How can I protect others?  If you have symptoms (fever, cough, body aches or trouble breathing):    Stay home and away from others (self-isolate) until:  ? At least 10 days have passed since your symptoms started. And   ? You've had no fever--and no medicine that reduces fever--for 1 full day (24 hours). And   ? Your other symptoms have resolved (gotten better).  If you don't show symptoms, but testing showed that you have COVID-19:    Stay home and away from others (self-isolate) until at least 10 days have passed since the date of your first positive COVID-19 test.  During this time    Stay in your own room, even for meals. Use your own bathroom if you can.    Stay away from others in your home. No hugging, kissing or shaking hands. No visitors.    Don't go to work, school or anywhere else.    Clean \"high touch\" surfaces often (doorknobs, counters, handles). Use household cleaning spray or wipes. You'll find a full list of  on the EPA website: www.epa.gov/pesticide-registration/list-n-disinfectants-use-against-sars-cov-2.    Cover your mouth and nose with a mask or other face covering to avoid spreading germs.    Wash your hands and face often. Use soap and water.    Caregivers in these groups are at risk for severe illness due to COVID-19:  ? People 65 years and older  ? People who live in a nursing home or long-term care facility  ? People with chronic disease (lung, heart, cancer, diabetes, kidney, liver, immunologic)  ? People who have a weakened immune system, including those who:    Are in cancer treatment    Take medicine that weakens the immune system, such as corticosteroids    Had a bone marrow or organ transplant    Have an immune deficiency    Have " poorly controlled HIV or AIDS    Are obese (body mass index of 40 or higher)    Smoke regularly    Caregivers should wear gloves while washing dishes, handling laundry and cleaning bedrooms and bathrooms.    Use caution when washing and drying laundry: Don't shake dirty laundry and use the warmest water setting that you can.    For more tips on managing your health at home, go to www.cdc.gov/coronavirus/2019-ncov/downloads/10Things.pdf.  How can I take care of myself at home?  1. Get lots of rest. Drink extra fluids (unless a doctor has told you not to).  2. Take Tylenol (acetaminophen) for fever or pain. If you have liver or kidney problems, ask your family doctor if it's okay to take Tylenol.     Adults can take either:  ? 650 mg (two 325 mg pills) every 4 to 6 hours, or   ? 1,000 mg (two 500 mg pills) every 8 hours as needed.  ? Note: Don't take more than 3,000 mg in one day. Acetaminophen is found in many medicines (both prescribed and over-the-counter medicines). Read all labels to be sure you don't take too much.   For children, check the Tylenol bottle for the right dose. The dose is based on the child's age or weight.  3. If you have other health problems (like cancer, heart failure, an organ transplant or severe kidney disease): Call your specialty clinic if you don't feel better in the next 2 days.  4. Know when to call 911. Emergency warning signs include:  ? Trouble breathing or shortness of breath  ? Pain or pressure in the chest that doesn't go away  ? Feeling confused like you haven't felt before, or not being able to wake up  ? Bluish-colored lips or face  5. Your doctor may have prescribed a blood thinner medicine. Follow their instructions.  Where can I get more information?     [facility] - About COVID-19: [facility] at [email or web address]    CDC - What to Do If You're Sick: www.cdc.gov/coronavirus/2019-ncov/about/steps-when-sick.html    CDC - Ending Home Isolation:  www.cdc.gov/coronavirus/2019-ncov/hcp/disposition-in-home-patients.html    CDC - Caring for Someone: www.cdc.gov/coronavirus/2019-ncov/if-you-are-sick/care-for-someone.html    [facility] - Interim Guidance for Hospital Discharge to Home: www.health.[facility].[place]/diseases/coronavirus/hcp/hospdischarge.pdf    [facility] clinical trials (COVID-19 research studies): clinicalaffairs.[facility]-clinical-trials    Below are the COVID-19 hotlines at the [place] Department of Health ([facility]). Interpreters are available.  ? For health questions: Call 595-557-0846 or 1-257.760.3847 (7 a.m. to 7 p.m.)  ? For questions about schools and childcare: Call 009-147-7358 or 1-809.804.1395 (7 a.m. to 7 p.m.)    For informational purposes only. Not to replace the advice of your health care provider. Clinically reviewed by the Infection Prevention Team. Copyright   2020 [facility]. All rights reserved. [employer] [id number] - REV 08/04/20.       Patient Education     Sinusitis (Antibiotic Treatment)    The sinuses are air-filled spaces within the bones of the face. They connect to the inside of the nose. Sinusitis is an inflammation of the tissue that lines the sinuses. Sinusitis can occur during a cold. It can also happen due to allergies to pollens and other particles in the air. Sinusitis can cause symptoms of sinus congestion and a feeling of fullness. A sinus infection causes fever, headache, and facial pain. There is often green or yellow fluid draining from the nose or into the back of the throat (post-nasal drip). You have been given antibiotics to treat this condition.  Home care    Take the full course of antibiotics as instructed. Do not stop taking them, even when you feel better.    Drink plenty of water, hot tea, and other liquids. This may help thin nasal mucus. It also may help your sinuses drain fluids.    Heat may help soothe painful areas of your face. Use a towel soaked in hot water. Or,  the  shower and direct the warm spray onto your face. Using a vaporizer along with a menthol rub at night may also help soothe symptoms.     An expectorant with guaifenesin may help thin nasal mucus and help your sinuses drain fluids.    You can use an over-the-counter decongestant, unless a similar medicine was prescribed to you. Nasal sprays work the fastest. Use one that contains phenylephrine or oxymetazoline. First blow your nose gently. Then use the spray. Do not use these medicines more often than directed on the label. If you do, your symptoms may get worse. You may also take pills that contain pseudoephedrine. Don t use products that combine multiple medicines. This is because side effects may be increased. Read labels. You can also ask the pharmacist for help. (People with high blood pressure should not use decongestants. They can raise blood pressure.)    Over-the-counter antihistamines may help if allergies contributed to your sinusitis.      Do not use nasal rinses or irrigation during an acute sinus infection, unless your healthcare provider tells you to. Rinsing may spread the infection to other areas in your sinuses.    Use acetaminophen or ibuprofen to control pain, unless another pain medicine was prescribed to you. If you have chronic liver or kidney disease or ever had a stomach ulcer, talk with your healthcare provider before using these medicines. (Aspirin should never be taken by anyone under age 18 who is ill with a fever. It may cause severe liver damage.)    Don't smoke. This can make symptoms worse.  Follow-up care  Follow up with your healthcare provider or our staff if you are not better in 1 week.  When to seek medical advice  Call your healthcare provider if any of these occur:    Facial pain or headache that gets worse    Stiff neck    Unusual drowsiness or confusion    Swelling of your forehead or eyelids    Vision problems, such as blurred or double vision    Fever of 100.4 F (38 C) or  higher, or as directed by your healthcare provider    Seizure    Breathing problems    Symptoms don't go away in 10 days  Prevention  Here are steps you can take to help prevent an infection:    Keep good hand washing habits.    Don t have close contact with people who have sore throats, colds, or other upper respiratory infections.    Don t smoke, and stay away from secondhand smoke.    Stay up to date with of your vaccines.  Date Last Reviewed: 11/1/2017 2000-2019 [employer]. [address] [place] [id number]. All rights reserved. This information is not intended as a substitute for professional medical care. Always follow your healthcare professional's instructions.           Patient Education     Tendonitis  A tendon is the thick fibrous cord that joins muscle to bone and allows joints to move. When a tendon becomes inflamed, it is called tendonitis. This can occur from overuse, injury, or infection. This usually involves the shoulders, forearm, wrist, hands and feet. Symptoms include pain, swelling and tenderness to the touch. Moving the joint increases the pain.  It takes 4 to 6 weeks or more for tendonitis to heal. It is treated by preventing motion of the tendon, occasionally with a splint or brace, and the use of anti-inflammatory medicine.  Home care    Some people find relief with ice packs. These can be crushed or cubed ice in a plastic bag or a bag of frozen vegetables wrapped in a thin towel. Other people get better relief with heat. This can include a hot shower, hot bath, or a moist towel warmed in a microwave. Try each and use the method that feels best, for 15 to 20 minutes several times a day.    Rest the inflamed joint and protect it from movement.    You may use over-the-counter ibuprofen or naproxen to treat pain and inflammation, unless another medicine was prescribed. If you can't take these medicines, acetaminophen may help with the pain, but does not treat  inflammation. If you have chronic liver or kidney disease or ever had a stomach ulcer or gastrointestinal bleeding, talk with your doctor before using these medicines.    As your symptoms improve, begin gradual motion at the involved joint.  Follow-up care  Follow up with your healthcare provider if you are not improving after 5 to 7 days of treatment.  When to seek medical advice  Call your healthcare provider right away if any of these occur:    Redness over the painful area    Increasing pain or swelling at the joint    Fever lasting 24 to 48 hours or chills, or as advised by your healthcare provider  Date Last Reviewed: 5/1/2018 2000-2019 [employer]. [address] [place] [address]. All rights reserved. This information is not intended as a substitute for professional medical care. Always follow your healthcare professional's instructions.

## 2020-09-27 DIAGNOSIS — Z20.822 SUSPECTED 2019 NOVEL CORONAVIRUS INFECTION: ICD-10-CM

## 2020-09-27 PROCEDURE — U0003 INFECTIOUS AGENT DETECTION BY NUCLEIC ACID (DNA OR RNA); SEVERE ACUTE RESPIRATORY SYNDROME CORONAVIRUS 2 (SARS-COV-2) (CORONAVIRUS DISEASE [COVID-19]), AMPLIFIED PROBE TECHNIQUE, MAKING USE OF HIGH THROUGHPUT TECHNOLOGIES AS DESCRIBED BY CMS-2020-01-R: HCPCS | Performed by: NURSE PRACTITIONER

## 2020-09-28 LAB
SARS-COV-2 RNA SPEC QL NAA+PROBE: NOT DETECTED
SPECIMEN SOURCE: NORMAL

## 2020-10-05 ENCOUNTER — HOSPITAL ENCOUNTER (OUTPATIENT)
Dept: OCCUPATIONAL THERAPY | Facility: CLINIC | Age: 30
Setting detail: THERAPIES SERIES
End: 2020-10-05
Attending: NURSE PRACTITIONER
Payer: COMMERCIAL

## 2020-10-05 DIAGNOSIS — M77.9 TENDONITIS: ICD-10-CM

## 2020-10-05 PROCEDURE — 97535 SELF CARE MNGMENT TRAINING: CPT | Mod: GO | Performed by: OCCUPATIONAL THERAPIST

## 2020-10-05 PROCEDURE — 97165 OT EVAL LOW COMPLEX 30 MIN: CPT | Mod: GO | Performed by: OCCUPATIONAL THERAPIST

## 2020-10-05 PROCEDURE — 97760 ORTHOTIC MGMT&TRAING 1ST ENC: CPT | Mod: GO | Performed by: OCCUPATIONAL THERAPIST

## 2020-10-05 PROCEDURE — 97110 THERAPEUTIC EXERCISES: CPT | Mod: GO | Performed by: OCCUPATIONAL THERAPIST

## 2020-10-05 NOTE — PROGRESS NOTES
10/05/20   Hand Therapy Evaluation   General Information/History   Start Of Care Date 10/05/20   Referring Physician Morenita Wylie CNP   Orders Evaluate And Treat As Indicated   Orders Date 09/23/20   Medical Diagnosis Tendonitis   Precautions/Limitations no lifting over 15 pounds  had for a week.   Additional Occupational Profile Info/Pertinent history of current problem Patient relates that back in 2019 had a left wrist injury that she was braced for and therapy. She has had on and off pain since and says she has been having pain through her fingers and wrist since in left and just started having some pain in left this past week. Feels like she is getting shocked bu not touching anything that would shock her.    Previous treatment or current condition therapy and bracing on left in 2019  and nothing on right .    Past medical history smoker 2 packs per day   How/Where did it occur During contact with an object;At work   Onset date of current episode/exacerbation 04/04/19  (left 3 months ago for right ( overuse on right))   Chronicity New   Hand Dominance Right   Affected side Bilateral   Functional limitations perform activities of daily living;perform required work activities;perform desired leisure / sports activities   Reported Symptoms Pain;Loss of Motion/Stiffness;Tingling;Locking;Edema   Prior level of function Independent ADL;Independent IADL   Important Activities arts, crafts, hunting, riding motorcycle, 4 wheeling, caring for child   Level of functions comments has to do a lot of lifting at work and at home   Patient role/Employment history Employed   Occupation    Employment Status Working in normal job without restrictions   Primary Job Tasks Keyboarding;Pushing/pulling;Repetitive tasks;Lifting;Carrying;Gripping/pinching;Using a mouse;Prolonged standing;Reaching   Occupation Comments hurts to lift at work   Patient/Family goals statement Patient wants to be able to lift without pain  tingling and numbness   Fall Risk Screen   Fall screen completed by OT   Have you fallen 2 or more times in the past year? No   Have you fallen and had an injury in the past year? No   Is patient a fall risk? No   Abuse Screen (yes response referral indicated)   Feels Unsafe at Home or Work/School no   Feels Threatened by Someone no   Does Anyone Try to Keep You From Having Contact with Others or Doing Things Outside Your Home? no   Physical Signs of Abuse Present no   Pain   Pain Primary Pain Report   Primary Pain Report   Location left ulnar digits and hand, right ulnar digits and into index an middle with shocking pain - more on dorsal side   Pain Quality Sharp;Shooting;Stabbing;Cramping;Throbbing   Frequency Intermittent   Scale 0/10;7/10   Pain Is Worse During The Day   Pain Is Exacerbated By Lifting;Carrying;Pushing;Gripping;Twisting , Pulling;Activity/movement   Pain Is Relieved By Immobilization;Ice   Progression Since Onset Unchanged   Edema   Edema Distal Wrist Crease;Distal Palmar Crease   Distal Palmar Crease (measured in cm)   - Left 18.5   - Right 19   Distal Wrist Crease (measured in cm)   Distal Wrist Crease - - Left 16   Distal Wrist Crease - - Right 16   Tenderness   Overall - Left numbness inside of index and thumb   Overall - Right ulnar wrist   Palpation   Palpation Assessed   Left Hand  Palpation Comments FCR insertion and into fingers with mild palpation   Right Hand Tenderness Present At: ECU Insertion;FCU Insertion   Right Hand Palpation Comments moderate   ROM   ROM AROM   AROM   Comments generally WNL   Special Tests   Special Tests Assessed   Left Hand Positive For The Following Special Tests Phalen's   Right Hand Positive For The Following Special Tests Phalen's   Sensation Findings   Sensation Findings Other (see comments)   Sensation Comments reported numbness and tingling bilaterally today) [place] monofilament test 3.61 entire hand  bilaterally   Strength   Strength Strength;Other  (see comments)    Avg - Left 50    Avg - Right 45   Lateral Pinch - Left 14   Lateral Pinch - Right 16   3 Point Pinch - Left 14   3 Point Pinch - Right 14   Education Assessment   Preferred Learning Style Demonstration   Barriers to Learning No barriers   Therapy Interventions   Planned Therapy Interventions Ultrasound;Iontophoresis (list drug);Light Therapy;Fluidotherapy;Strengthening;Stretching;Manual Therapy;Splinting;Education of splint wear, care, fit and precautions;Edema Management;Self Care/Home Management;Ergonomic Patient Education;Home Program   Therapy plan comments to be added as appropriate   Clinical Impression   Criteria for Skilled Therapeutic Interventions Met yes;treatment indicated   OT Diagnosis decreased ADL's IADL's   Influenced by the following impairments Pain;Edema;Decreased strength;Impaired sensation   Assessment of Occupational Performance 3-5 Performance Deficits   Identified Performance Deficits hunting, four- wheeling, lifting child, work tasks, using tools, lifting groceries   Clinical Decision Making (Complexity) Low complexity   Therapy Frequency 2x/week   Predicted Duration of Therapy Intervention (days/wks) 6 weeks   Risks and Benefits of Treatment have been explained. Yes   Patient, Family & other staff in agreement with plan of care Yes   Clinical Impression Comments clinical findings suggestive of both Tendonitis and has Carpal Tunnel type symptoms as well.   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

## 2020-10-07 ENCOUNTER — HOSPITAL ENCOUNTER (OUTPATIENT)
Dept: OCCUPATIONAL THERAPY | Facility: CLINIC | Age: 30
Setting detail: THERAPIES SERIES
End: 2020-10-07
Attending: NURSE PRACTITIONER
Payer: COMMERCIAL

## 2020-10-07 PROCEDURE — 97760 ORTHOTIC MGMT&TRAING 1ST ENC: CPT | Mod: GO | Performed by: OCCUPATIONAL THERAPIST

## 2020-10-07 PROCEDURE — 97110 THERAPEUTIC EXERCISES: CPT | Mod: GO | Performed by: OCCUPATIONAL THERAPIST

## 2020-10-07 PROCEDURE — 97026 INFRARED THERAPY: CPT | Mod: GO | Performed by: OCCUPATIONAL THERAPIST

## 2020-10-07 NOTE — PROGRESS NOTES
[facility]      OUTPATIENT OCCUPATIONAL THERAPY HAND EVALUATION  PLAN OF TREATMENT FOR OUTPATIENT REHABILITATION  (COMPLETE FOR INITIAL CLAIMS ONLY)  Patient's Last Name, First Name, M.I.  YOB: 1990  Ana Maria Espinoza                        Provider s Name: [facility] Medical Record No.  [id number]     Onset Date: 04/04/19(left 3 months ago for right ( overuse on right))    Start of Care Date: 10/05/20   Type:     ___PT  _X_OT   ___SLP    Medical Diagnosis: Bilateral tendonitis   Occupational Therapy Diagnosis:  decreased ADL's IADL's    Visits from SOC: 1      _________________________________________________________________________________  Plan of Treatment/Functional Goals:  Planned Therapy Interventions:  Ultrasound, Iontophoresis (list drug), Light Therapy, Fluidotherapy, Strengthening, Stretching, Manual Therapy, Splinting, Education of splint wear, care, fit and precautions, Edema Management, Self Care/Home Management, Ergonomic Patient Education, Home Program     Goals  1.  Goal Identifier: lifting child       Goal Description: Patient to be able to lift her child into her truck with minimal pain complaints       Target Date: 11/19/20   2. 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 Goal Identifier: 4 caicedo       Goal Description: Patient to report 80% improvement in being able to ride four caicedo without  symptoms       Target Date: 11/12/20              Treatment Frequency: Therapy Frequency: 2x/week  Predicated Duration of Therapy Intervention:  Predicted Duration of Therapy Intervention (days/wks): 6 weeks    SHERRIE Shell/L CHT  Occupational Therapist, Certified Hand Therapist         I CERTIFY THE NEED FOR THESE SERVICES FURNISHED  UNDER        THIS PLAN OF TREATMENT AND WHILE UNDER MY CARE     (Physician co-signature of this document indicates review and certification of the therapy plan).                Certification Period:   10/5/20  to 11/30/20             Referring Physician:  Morenita Wylie CNP    Initial Assessment        See Epic Evaluation Start of Care Date: 10/05/20

## 2020-10-12 ENCOUNTER — HOSPITAL ENCOUNTER (OUTPATIENT)
Dept: OCCUPATIONAL THERAPY | Facility: CLINIC | Age: 30
Setting detail: THERAPIES SERIES
End: 2020-10-12
Attending: NURSE PRACTITIONER
Payer: COMMERCIAL

## 2020-10-12 PROCEDURE — 97760 ORTHOTIC MGMT&TRAING 1ST ENC: CPT | Mod: GO | Performed by: OCCUPATIONAL THERAPIST

## 2020-10-12 PROCEDURE — 97140 MANUAL THERAPY 1/> REGIONS: CPT | Mod: GO | Performed by: OCCUPATIONAL THERAPIST

## 2020-10-12 PROCEDURE — 97026 INFRARED THERAPY: CPT | Mod: GO | Performed by: OCCUPATIONAL THERAPIST

## 2020-10-30 ENCOUNTER — VIRTUAL VISIT (OUTPATIENT)
Dept: FAMILY MEDICINE | Facility: OTHER | Age: 30
End: 2020-10-30
Payer: COMMERCIAL

## 2020-10-30 DIAGNOSIS — J02.9 SORE THROAT: ICD-10-CM

## 2020-10-30 DIAGNOSIS — J02.9 SORE THROAT: Primary | ICD-10-CM

## 2020-10-30 LAB
DEPRECATED S PYO AG THROAT QL EIA: NEGATIVE
SPECIMEN SOURCE: NORMAL
SPECIMEN SOURCE: NORMAL
STREP GROUP A PCR: NOT DETECTED

## 2020-10-30 PROCEDURE — 99N1174 PR STATISTIC STREP A RAPID: Performed by: PHYSICIAN ASSISTANT

## 2020-10-30 PROCEDURE — 87651 STREP A DNA AMP PROBE: CPT | Performed by: PHYSICIAN ASSISTANT

## 2020-10-30 PROCEDURE — 99213 OFFICE O/P EST LOW 20 MIN: CPT | Mod: 95 | Performed by: PHYSICIAN ASSISTANT

## 2020-10-30 RX ORDER — PREDNISONE 20 MG/1
40 TABLET ORAL DAILY
Qty: 10 TABLET | Refills: 0 | Status: SHIPPED | OUTPATIENT
Start: 2020-10-30 | End: 2020-11-04

## 2020-10-30 NOTE — PROGRESS NOTES
"Ana Maria Espinoza is a 30 year old female who is being evaluated via a billable video visit.      The patient has been notified of following:     \"This video visit will be conducted via a call between you and your physician/provider. We have found that certain health care needs can be provided without the need for an in-person physical exam.  This service lets us provide the care you need with a video conversation.  If a prescription is necessary we can send it directly to your pharmacy.  If lab work is needed we can place an order for that and you can then stop by our lab to have the test done at a later time.    Video visits are billed at different rates depending on your insurance coverage.  Please reach out to your insurance provider with any questions.    If during the course of the call the physician/provider feels a video visit is not appropriate, you will not be charged for this service.\"    Patient has given verbal consent for Video visit? Yes  How would you like to obtain your AVS? MyChart  If you are dropped from the video visit, the video invite should be resent to: Text to cell phone: shant  Will anyone else be joining your video visit? No      Subjective     Ana Maria Espinoza is a 30 year old female who presents today via video visit for the following health issues:    HPI     Acute Illness  Acute illness concerns: throat pain   Onset/Duration: 2 days  Symptoms:  Fever: no  Chills/Sweats: no  Headache (location?): no  Sinus Pressure: no  Conjunctivitis:  no  Ear Pain: no  Rhinorrhea: no  Congestion: no  Sore Throat: YES, feels like its swelling   Cough: no  Wheeze: no  Decreased Appetite: no  Nausea: no  Vomiting: no  Diarrhea: no  Dysuria/Freq.: no  Dysuria or Hematuria: no  Fatigue/Achiness: no  Sick/Strep Exposure: no  Therapies tried and outcome: advil    Patient reports throat feels very irritated. Yesterday was talking fine but today voice is horse and throat feels more swollen. She reports waking up " last night due to the pain. Otherwise has been feeling well Denies headaches, nasal congestion, cough, fevers, etc. No ill contacts.     Video Start Time: 7:07 AM    Review of Systems   Constitutional, HEENT, cardiovascular, pulmonary, gi and gu systems are negative, except as otherwise noted.      Objective           Vitals:  No vitals were obtained today due to virtual visit.    Physical Exam     GENERAL: Healthy, alert and no distress  EYES: Eyes grossly normal to inspection.  No discharge or erythema, or obvious scleral/conjunctival abnormalities.  Mouth: Uvula appears erythematous and edematous  RESP: No audible wheeze, cough, or visible cyanosis.  No visible retractions or increased work of breathing.    SKIN: Visible skin clear. No significant rash, abnormal pigmentation or lesions.  NEURO: Cranial nerves grossly intact.  Mentation and speech appropriate for age.  PSYCH: Mentation appears normal, affect normal/bright, judgement and insight intact, normal speech and appearance well-groomed.      No results found for this or any previous visit (from the past 24 hour(s)).        Assessment & Plan     Sore throat  Patient reports her work is requiring her to have a COVID test. Will place orders for this as below. She will have strep testing done today with nursing staff. Given swelling and pain will start patient on a 5 day course of prednisone to help symptomatically. Continue supportive cares. Patient will follow-up in clinic if new symptoms develop or current symptoms fail to improve.  - predniSONE (DELTASONE) 20 MG tablet; Take 2 tablets (40 mg) by mouth daily for 5 days  - Streptococcus A Rapid Scr w Reflx to PCR; Future  - Symptomatic COVID-19 Virus (Coronavirus) by PCR; Future      The patient indicates understanding of these issues and agrees with the plan.    Lizzy Walker PA-C  [facility]      Video-Visit Details    Type of service:  Video Visit    Video End Time:7:21  [place]    Originating Location (pt. Location): Home    Distant Location (provider location):  [facility]     Platform used for Video Visit: Raoul

## 2020-12-26 ENCOUNTER — MYC MEDICAL ADVICE (OUTPATIENT)
Dept: FAMILY MEDICINE | Facility: CLINIC | Age: 30
End: 2020-12-26

## 2020-12-28 ENCOUNTER — MYC MEDICAL ADVICE (OUTPATIENT)
Dept: FAMILY MEDICINE | Facility: CLINIC | Age: 30
End: 2020-12-28

## 2020-12-29 ENCOUNTER — VIRTUAL VISIT (OUTPATIENT)
Dept: FAMILY MEDICINE | Facility: CLINIC | Age: 30
End: 2020-12-29
Payer: COMMERCIAL

## 2020-12-29 DIAGNOSIS — Z86.19 H/O HERPES SIMPLEX TYPE 2 INFECTION: Primary | ICD-10-CM

## 2020-12-29 PROCEDURE — 99213 OFFICE O/P EST LOW 20 MIN: CPT | Mod: 95 | Performed by: NURSE PRACTITIONER

## 2020-12-29 RX ORDER — VALACYCLOVIR HYDROCHLORIDE 500 MG/1
500 TABLET, FILM COATED ORAL 2 TIMES DAILY
Qty: 6 TABLET | Refills: 0 | Status: SHIPPED | OUTPATIENT
Start: 2020-12-29 | End: 2021-01-08

## 2020-12-29 NOTE — PROGRESS NOTES
"Ana Maria Espinoza is a 30 year old female who is being evaluated via a billable video visit.      The patient has been notified of following:     \"This video visit will be conducted via a call between you and your physician/provider. We have found that certain health care needs can be provided without the need for an in-person physical exam.  This service lets us provide the care you need with a video conversation.  If a prescription is necessary we can send it directly to your pharmacy.  If lab work is needed we can place an order for that and you can then stop by our lab to have the test done at a later time.    Video visits are billed at different rates depending on your insurance coverage.  Please reach out to your insurance provider with any questions.    If during the course of the call the physician/provider feels a video visit is not appropriate, you will not be charged for this service.\"    Patient has given verbal consent for Video visit? Yes  How would you like to obtain your AVS? MyChart  If you are dropped from the video visit, the video invite should be resent to: Text to cell phone: .[phone]  Will anyone else be joining your video visit? No      Subjective     Ana Maria Espinoza is a 30 year old female who presents today via video visit for the following health issues:    HPI     Rash  Onset/Duration: 1 week  Description  Location: back of thigh  Character: Painful puss filled blisters on back of thigh. STD outbreak  Itching: moderate  Intensity:  moderate  Progression of Symptoms:  worsening  Accompanying signs and symptoms:   Fever: no  Body aches or joint pain: no  Sore throat symptoms: no  Recent cold symptoms: no  History:           Previous episodes of similar rash: yes   New exposures:  None  Recent travel: no  Exposure to similar rash: YES- in 2017 exposed to genital warts  Precipitating or alleviating factors: 2017 diagnosed with herpes and chlamydia, diagnosed at [facility]  Therapies tried and " outcome: none, this time, patient had gabapentin and Valtrex in 2017 - - usually goes away for a long period of time and then comes back  Has been more stressed recently            Video Start Time: 3:56 PM        Review of Systems   Constitutional, HEENT, cardiovascular, pulmonary, gi and gu systems are negative, except as otherwise noted.      Objective           Vitals:  No vitals were obtained today due to virtual visit.    Physical Exam     GENERAL: Healthy, alert and no distress  EYES: Eyes grossly normal to inspection.  No discharge or erythema, or obvious scleral/conjunctival abnormalities.  RESP: No audible wheeze, cough, or visible cyanosis.  No visible retractions or increased work of breathing.    SKIN: red, erythematous pustules on posterior thight  NEURO: Cranial nerves grossly intact.  Mentation and speech appropriate for age.  PSYCH: Mentation appears normal, affect normal/bright, judgement and insight intact, normal speech and appearance well-groomed.      Diagnostic Test Results:  none          Assessment & Plan     1. H/O herpes simplex type 2 infection  History of HSV with outbreaks. Current outbreak, painful lesions present. Will start course of Valtrex. If pain worsens patient to contact provider for Gabapentin. Otherwise Return to clinic if symptoms not improving or worsening.   - valACYclovir (VALTREX) 500 MG tablet; Take 1 tablet (500 mg) by mouth 2 times daily for 3 days  Dispense: 6 tablet; Refill: 0      Tobacco Cessation:   reports that she has been smoking cigarettes. She started smoking about 11 years ago. She has been smoking about 2.00 packs per day. She has never used smokeless tobacco.         See Patient Instructions    Return in about 1 week (around 1/5/2021), or if symptoms worsen or fail to improve.    SY [facility]      Video-Visit Details    Type of service:  Video Visit    Video End Time:4:07 PM    Originating Location (pt.  Location): Home    Distant Location (provider location):  [facility]     Platform used for Video Visit: Raoul

## 2020-12-30 NOTE — PATIENT INSTRUCTIONS
1. H/O herpes simplex type 2 infection  History of HSV with outbreaks. Current outbreak, painful lesions present. Will start course of Valtrex. If pain worsens patient to contact provider for Gabapentin. Otherwise Return to clinic if symptoms not improving or worsening.   - valACYclovir (VALTREX) 500 MG tablet; Take 1 tablet (500 mg) by mouth 2 times daily for 3 days  Dispense: 6 tablet; Refill: 0

## 2021-01-08 ENCOUNTER — OFFICE VISIT (OUTPATIENT)
Dept: FAMILY MEDICINE | Facility: CLINIC | Age: 31
End: 2021-01-08
Payer: COMMERCIAL

## 2021-01-08 VITALS
HEIGHT: 60 IN | TEMPERATURE: 98.7 F | SYSTOLIC BLOOD PRESSURE: 120 MMHG | BODY MASS INDEX: 31.8 KG/M2 | WEIGHT: 162 LBS | DIASTOLIC BLOOD PRESSURE: 70 MMHG | RESPIRATION RATE: 18 BRPM | HEART RATE: 68 BPM

## 2021-01-08 DIAGNOSIS — Z97.5 NEXPLANON IN PLACE: ICD-10-CM

## 2021-01-08 DIAGNOSIS — Z30.017 NEXPLANON INSERTION: Primary | ICD-10-CM

## 2021-01-08 DIAGNOSIS — Z30.46 NEXPLANON REMOVAL: ICD-10-CM

## 2021-01-08 PROCEDURE — 11983 REMOVE/INSERT DRUG IMPLANT: CPT | Performed by: FAMILY MEDICINE

## 2021-01-08 ASSESSMENT — MIFFLIN-ST. JEOR: SCORE: 1375.14

## 2021-01-08 NOTE — NURSING NOTE
"Chief Complaint   Patient presents with     Contraception     /70 (Cuff Size: Adult Large)   Pulse 68   Temp 98.7  F (37.1  C) (Tympanic)   Resp 18   Ht 1.53 m (5' 0.24\")   Wt 73.5 kg (162 lb)   Breastfeeding No   BMI 31.39 kg/m   Estimated body mass index is 31.39 kg/m  as calculated from the following:    Height as of this encounter: 1.53 m (5' 0.24\").    Weight as of this encounter: 73.5 kg (162 lb).  Patient presents to the clinic using No DME      Health Maintenance that is potentially due pending provider review:    Health Maintenance Due   Topic Date Due     Pneumococcal Vaccine: Pediatrics (0 to 5 Years) and At-Risk Patients (6 to 64 Years) (1 of 1 - PPSV23) 04/14/1995     HIV SCREENING  04/14/2004     HEPATITIS C SCREENING  04/14/2007     PAP  10/20/2017     INFLUENZA VACCINE (1) 09/01/2020     PHQ-2  01/01/2021     PREVENTIVE CARE VISIT  01/29/2021                "

## 2021-01-08 NOTE — PROGRESS NOTES
"NEXPLANON REMOVAL/REINSERTION:    Is a pregnancy test required: No.  inserted on 1/30/2018   Was a consent obtained?  Yes    Subjective: Ana Maria Espinoza is a 30 year old No obstetric history on file. presents for Nexplanon.    Patient has been given the opportunity to ask questions about all forms of birth control, including all options appropriate for Ana Maria Espinoza. Discussed that no method of birth control, except abstinence is 100% effective against pregnancy or sexually transmitted infection.     Ana Maria Espinoza understands planning removal and reinsertion today    The entire removal and insertion procedure was reviewed with the patient, including care after placement.      /70 (Cuff Size: Adult Large)   Pulse 68   Temp 98.7  F (37.1  C) (Tympanic)   Resp 18   Ht 1.53 m (5' 0.24\")   Wt 73.5 kg (162 lb)   Breastfeeding No   BMI 31.39 kg/m      PROCEDURE NOTE: -- Nexplanon Removal/Insertion    Technique: On the left arm  Skin prep Betadine  Anesthesia 2% lidocaine  Procedure: Patient was placed supine with left arm exposed.  Implant located by palpitation. Chace was made 8-10 cm above medial epicondyle and a guiding chace 4 cm above the first.  Arm was prepped with Betadine. Incision point was anesthetized with 3 mL 2% lidocaine.     Small incision (<5mm) was made at distal end of palpable implant, curved hemostat or mosquito forceps was used to isolate the implant and bring it to the incision, the fibrous capsule containing the implant  was incised and the implant was removed intact.    After stretching the skin with thumb and index finger around the insertion site, skin punctured with the tip of the needle inserted at 30 degrees and then lowered to horizontal position. While lifting the skin with the tip of the needle, inserted the needle to its full length. Applicator was then stabilized and the slider was unlocked by pushing it slightly down. Slider moved back completely until it stopped. Applicator " was then removed.    Correct placement of the implant was confirmed by palpation in the patient's arm and visualizing the purple top of the obturator.   Bandage and pressure dressing applied to insertion site.      EBL: minimal    Complications: none    ASSESSMENT:     ICD-10-CM    1. Nexplanon insertion  Z30.017 etonogestrel (NEXPLANON) subdermal implant 68 mg     INSERTION NON-BIODEGRADABLE DRUG DELIVERY IMPLANT   2. Nexplanon removal  Z30.46 REMOVAL NON-BIODEGRADABLE DRUG DELIVERY IMPLANT   3. Nexplanon in place  Z97.5        PLAN:  Given 's handouts, including when to have Nexplanon removed, list of danger s/sx, side effects and follow up recommended. Encouraged condom use for prevention of STD. Back up contraception advised for 7 days. Advised to call for any fever, for prolonged or severe pain or bleeding, abnormal vaginal dischage. She was advised to use pain medications (ibuprofen) as needed for mild to moderate pain.     Dimitris Zambrano MD

## 2021-01-27 ENCOUNTER — MYC MEDICAL ADVICE (OUTPATIENT)
Dept: PSYCHOLOGY | Facility: CLINIC | Age: 31
End: 2021-01-27

## 2021-01-27 ENCOUNTER — OFFICE VISIT (OUTPATIENT)
Dept: FAMILY MEDICINE | Facility: CLINIC | Age: 31
End: 2021-01-27
Payer: COMMERCIAL

## 2021-01-27 VITALS
BODY MASS INDEX: 31.61 KG/M2 | RESPIRATION RATE: 16 BRPM | TEMPERATURE: 99 F | SYSTOLIC BLOOD PRESSURE: 122 MMHG | DIASTOLIC BLOOD PRESSURE: 80 MMHG | HEART RATE: 74 BPM | HEIGHT: 60 IN | WEIGHT: 161 LBS

## 2021-01-27 DIAGNOSIS — M25.50 PAIN IN JOINT, MULTIPLE SITES: Primary | ICD-10-CM

## 2021-01-27 DIAGNOSIS — R76.8 POSITIVE ANA (ANTINUCLEAR ANTIBODY): ICD-10-CM

## 2021-01-27 DIAGNOSIS — F33.1 MODERATE EPISODE OF RECURRENT MAJOR DEPRESSIVE DISORDER (H): ICD-10-CM

## 2021-01-27 DIAGNOSIS — F41.1 GENERALIZED ANXIETY DISORDER: ICD-10-CM

## 2021-01-27 LAB
CRP SERPL-MCNC: 4.6 MG/L (ref 0–8)
ERYTHROCYTE [SEDIMENTATION RATE] IN BLOOD BY WESTERGREN METHOD: 9 MM/H (ref 0–20)

## 2021-01-27 PROCEDURE — 86618 LYME DISEASE ANTIBODY: CPT | Performed by: NURSE PRACTITIONER

## 2021-01-27 PROCEDURE — 86039 ANTINUCLEAR ANTIBODIES (ANA): CPT | Performed by: NURSE PRACTITIONER

## 2021-01-27 PROCEDURE — 36415 COLL VENOUS BLD VENIPUNCTURE: CPT | Performed by: NURSE PRACTITIONER

## 2021-01-27 PROCEDURE — 99214 OFFICE O/P EST MOD 30 MIN: CPT | Performed by: NURSE PRACTITIONER

## 2021-01-27 PROCEDURE — 86038 ANTINUCLEAR ANTIBODIES: CPT | Performed by: NURSE PRACTITIONER

## 2021-01-27 PROCEDURE — 86431 RHEUMATOID FACTOR QUANT: CPT | Performed by: NURSE PRACTITIONER

## 2021-01-27 PROCEDURE — 85652 RBC SED RATE AUTOMATED: CPT | Performed by: NURSE PRACTITIONER

## 2021-01-27 PROCEDURE — 86140 C-REACTIVE PROTEIN: CPT | Performed by: NURSE PRACTITIONER

## 2021-01-27 RX ORDER — DULOXETIN HYDROCHLORIDE 20 MG/1
20 CAPSULE, DELAYED RELEASE ORAL 2 TIMES DAILY
Qty: 60 CAPSULE | Refills: 3 | Status: SHIPPED | OUTPATIENT
Start: 2021-01-27 | End: 2021-08-04

## 2021-01-27 RX ORDER — HYDROXYZINE HYDROCHLORIDE 25 MG/1
12-25 TABLET, FILM COATED ORAL 3 TIMES DAILY PRN
Qty: 30 TABLET | Refills: 1 | Status: SHIPPED | OUTPATIENT
Start: 2021-01-27 | End: 2021-09-02

## 2021-01-27 ASSESSMENT — ANXIETY QUESTIONNAIRES
1. FEELING NERVOUS, ANXIOUS, OR ON EDGE: MORE THAN HALF THE DAYS
7. FEELING AFRAID AS IF SOMETHING AWFUL MIGHT HAPPEN: SEVERAL DAYS
2. NOT BEING ABLE TO STOP OR CONTROL WORRYING: MORE THAN HALF THE DAYS
GAD7 TOTAL SCORE: 16
3. WORRYING TOO MUCH ABOUT DIFFERENT THINGS: NEARLY EVERY DAY
6. BECOMING EASILY ANNOYED OR IRRITABLE: NEARLY EVERY DAY
5. BEING SO RESTLESS THAT IT IS HARD TO SIT STILL: MORE THAN HALF THE DAYS

## 2021-01-27 ASSESSMENT — MIFFLIN-ST. JEOR: SCORE: 1370.76

## 2021-01-27 ASSESSMENT — PATIENT HEALTH QUESTIONNAIRE - PHQ9
SUM OF ALL RESPONSES TO PHQ QUESTIONS 1-9: 12
5. POOR APPETITE OR OVEREATING: NEARLY EVERY DAY
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
## 2021-01-27 NOTE — PATIENT INSTRUCTIONS
Labs today-we will notify you with those results    Cymbalta 20 mg twice daily    Hydroxyzine as needed for sleep    Referral for counseling    Recheck in 1-2 months, sooner if needed

## 2021-01-28 LAB
ANA PAT SER IF-IMP: ABNORMAL
ANA SER QL IF: ABNORMAL
ANA TITR SER IF: ABNORMAL {TITER}
B BURGDOR IGG+IGM SER QL: 0.1 (ref 0–0.89)
RHEUMATOID FACT SER NEPH-ACNC: <7 IU/ML (ref 0–20)

## 2021-01-28 ASSESSMENT — ANXIETY QUESTIONNAIRES: GAD7 TOTAL SCORE: 16

## 2021-02-08 NOTE — PROGRESS NOTES
10/12/20   Note: This is a copy of patient's last daily visit and will also serve as their Discharge Summary as they have not been in for further treatment 30 days past this date. Final assessment of goals and physical and functional status , therefore unavailable.     Signing Clinician's Name / Credentials   Signing clinician's name / credentials SHERRIE Shell/L CHT   Session Number   Session Number 3 ( reporting period 10/5/20 to 10/12/20)   Quick Add   Quick Add  Hand   Hand   Referring Physician Morenita Wylie CNP   Medical Diagnosis Tendonitis   Orders Evaluate And Treat As Indicated   Other Orders suspect bilateral CTS as well as ECU/ FCU involvement   Adult OT Eval Goals   Hand Eval Goals 1;2;3   Hand Goal 1   Goal Identifier lifting child   Goal Description Patient to be able to lift her child into her truck with minimal pain complaints   Target Date 11/19/20   Hand Goal 2   Goal Identifier work   Goal Description 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 in wrists   Target Date 11/05/20   Hand Goal 3   Goal Identifier 4 caicedo   Goal Description Patient to report 80% improvement in being able to ride four caicedo without  symptoms   Target Date 11/12/20   Subjective Report   Subjective Report not having any pain and exercises are really helping   Initial Pain level 7/10  (at worst 1 at best)   Current Pain level 0/10   Objective Measures   Objective Measures Objective Measure 1   Objective Measure 1   Objective Measure phalens   Details no issue today   Modalities   Modalities Infrared   Infrared   Infrared Treatment Minutes (51457) 5 Minutes   Skilled Intervention to enhance tissue repair   Treatment Detail 3 j/cm2 bilateral carpal tunnel areas and dorsal irritated areas of hand   Therapeutic Interventions   Therapeutic Interventions Therapeutic Procedure/Exercise;Self Care/Home Management;Orthotics;Manual Therapy   Therapeutic Procedure/Exercise    Therapeutic Procedure: strength, endurance, ROM, flexibility minutes (28165) 3   Skilled Intervention verbal and physical cuing needed   Treatment Detail review only today   HAND Therapeutic Exercise Special Techniques;Other Exercises/Activities   Other Exer/Activities/Educ   Exercise 1 distal nerve gliding   Description 1 PTRX- x10   Exercise 2 proximal median nerve gliding   Description 2 x 5 bilaterally with HEP instruction   Special Techniques   Tendon Gliding Hook fist;Full fist;Straight fist   Sets/Reps 1 set;10 reps;reviewed;HEP   Manual Therapy   Manual Therapy Minutes (05197) 10   Skilled Intervention to enhance tissue mobility   Treatment Detail STM into volar forearm lightly with myofacial techniques   Orthotics   Orthotic Assessment, Initial session minutes (42403) 8   Type of Orthotic refit dorsal portion of left splint   Skilled Intervention positioning for night   Assessments Completed   Assessments Completed splint is helping at night   Education   Learner Patient   Readiness Eager   Method Booklet/handout;Explanation;Demonstration   Response Verbalizes understanding;Demonstrates understanding   Plan   Plan for next session patient doing very well- may cancel next treatment   Total Session Time

## 2021-02-09 ENCOUNTER — OFFICE VISIT (OUTPATIENT)
Dept: FAMILY MEDICINE | Facility: CLINIC | Age: 31
End: 2021-02-09
Payer: COMMERCIAL

## 2021-02-09 ENCOUNTER — MYC MEDICAL ADVICE (OUTPATIENT)
Dept: FAMILY MEDICINE | Facility: CLINIC | Age: 31
End: 2021-02-09

## 2021-02-09 VITALS
HEIGHT: 60 IN | DIASTOLIC BLOOD PRESSURE: 70 MMHG | TEMPERATURE: 98.6 F | BODY MASS INDEX: 31.61 KG/M2 | WEIGHT: 161 LBS | HEART RATE: 82 BPM | SYSTOLIC BLOOD PRESSURE: 120 MMHG | RESPIRATION RATE: 14 BRPM

## 2021-02-09 DIAGNOSIS — M25.512 ACUTE PAIN OF LEFT SHOULDER: Primary | ICD-10-CM

## 2021-02-09 DIAGNOSIS — Z72.0 TOBACCO USE: ICD-10-CM

## 2021-02-09 DIAGNOSIS — Z71.6 ENCOUNTER FOR SMOKING CESSATION COUNSELING: ICD-10-CM

## 2021-02-09 PROCEDURE — 99214 OFFICE O/P EST MOD 30 MIN: CPT | Performed by: NURSE PRACTITIONER

## 2021-02-09 RX ORDER — NICOTINE 21 MG/24HR
1 PATCH, TRANSDERMAL 24 HOURS TRANSDERMAL EVERY 24 HOURS
Qty: 42 PATCH | Refills: 0 | Status: SHIPPED | OUTPATIENT
Start: 2021-02-09 | End: 2021-08-04

## 2021-02-09 ASSESSMENT — MIFFLIN-ST. JEOR: SCORE: 1370.76

## 2021-02-09 NOTE — PROGRESS NOTES
Assessment & Plan     Tobacco use  Interested in smoking cessation.  Patches have been effective in the past.  Stop smoking and start with the 21 mg/day patch daily for 6 weeks then go down to the 14 mg/day patch for 2 weeks and finally the 7 mg/day patch for 2 weeks.  - nicotine (NICODERM CQ) 21 MG/24HR 24 hr patch; Place 1 patch onto the skin every 24 hours    Encounter for smoking cessation counseling  Per above.   - nicotine (NICODERM CQ) 21 MG/24HR 24 hr patch; Place 1 patch onto the skin every 24 hours    Acute pain of left shoulder  No acute distress.  No evidence of fracture on exam, with improving symptoms since injury this morning will defer imaging at this time.  Use Tylenol or Ibuprofen for pain, ice. Plan to rest shoulder and follow up with any worsening or ongoing symptoms.      Return in about 2 days (around 2/11/2021), or if symptoms worsen or fail to improve.    SY Manuel CNP  [facility]    Doris Rodgers is a 30 year old who presents to clinic today for the following health issues     HPI       Musculoskeletal problem/pain  Onset/Duration: this am  Description  Location: left shoulder -   Joint Swelling: no  Redness: no  Pain: YES  Warmth: no  Intensity:  4/10 sitting still     7/10 with movement  Progression of Symptoms:  improving  Accompanying signs and symptoms:   Fevers: no  Numbness/tingling/weakness: no  History  Trauma to the area: YES  Recent illness:    Previous similar problem:   Previous evaluation:    Precipitating or alleviating factors:  Aggravating factors include: lift   Therapies tried and outcome: ibuprofen     Fell down the stairs landing on left shoulder    Smoking 1.5-2 PPD  Patches has been helpful in the past  Chantix caused bad thoughts      Review of Systems   Constitutional, HEENT, cardiovascular, pulmonary, gi and gu systems are negative, except as otherwise noted.      Objective    /70   Pulse 82   Temp 98.6  F  "(37  C) (Tympanic)   Resp 14   Ht 1.53 m (5' 0.25\")   Wt 73 kg (161 lb)   BMI 31.18 kg/m    Body mass index is 31.18 kg/m .  Physical Exam   GENERAL: healthy, alert and no distress  MS: tenderness to palpation left ac, significant limitation in flexion, extension, abduction, full internal and external rotation  PSYCH: mentation appears normal, affect normal/bright            "

## 2021-02-09 NOTE — PATIENT INSTRUCTIONS
Stop smoking and start with the 21 mg/day patch daily for 6 weeks then go down to the 14 mg/day patch for 2 weeks and finally the 7 mg/day patch for 2 weeks.    Let me know how things are going and we can extend your note if needed    Patient Education     Shoulder Pain  Shoulder pain can have many causes. Pain often comes from the structures that surround the shoulder joint. These are the joint capsule, ligaments, tendons, muscles, and bursa. Pain can also come from cartilage in the joint. Cartilage can become worn out or injured. It s important to know what s causing your pain so the healthcare provider can use the correct treatment. But sometimes it s difficult to find the exact cause of shoulder pain. You may need to see a specialist (orthopedist). You may also need special tests such as a CT scan or MRI. The provider may need to use special tools to look inside the joint (arthroscopy).  Shoulder pain can be treated with a sling or a device that keeps your shoulder from moving. You can take an anti-inflammatory medicine such as ibuprofen to ease pain. You may need to do special shoulder exercises. Follow up with a specialist if the pain is severe or doesn t go away after a few weeks.  Home care  Follow these tips when caring for yourself at home:    If a sling was given to you, leave it in place for the time advised by your healthcare provider. If you aren t sure how long to wear it, ask for advice. If the sling becomes loose, adjust it so that your forearm is level with the ground. Your shoulder should feel well supported.    Put an ice pack on the injured area for 20 minutes every 1 to 2 hours the first day. You can make your own ice pack by putting ice cubes in a plastic bag. Wrap the bag in a thin towel. Continue with ice packs 3 to 4 times a day for the next 2 days. Then use the pack as needed to ease pain and swelling.    You may use acetaminophen or ibuprofen to control pain, unless another pain medicine  was prescribed. If you have chronic liver or kidney disease, talk with your healthcare provider before using these medicines. Also talk with your provider if you ve ever had a stomach ulcer or digestive bleeding.    Shoulder pain may seem worse at night, when there is less to distract you from the pain. If you sleep on your side, try to keep weight off your painful shoulder. Propping pillows behind you may stop you from rolling over onto that shoulder during sleep.     Shoulder and elbow joints can become stiff if left in a sling for too long. You should start range of motion exercises about 7 to 10 days after the injury. Talk with your provider to find out what type of exercises to do and how soon to start.    You can take the sling off to shower or bathe.  Follow-up care  Follow up with your healthcare provider if you don t start to get better in the next 5 days.  When to seek medical advice  Call your healthcare provider right away if any of these occur:    Pain or swelling gets worse or continues for more than a few days    Your hand or fingers become cold, blue, numb, or tingly    Large amount of bruising on your shoulder or upper arm    Trouble moving your hand or fingers    Weakness in your hand or fingers    Your shoulder becomes stiff    It feels like your shoulder is popping out    You are less able to do your daily activities  [employer] last reviewed this educational content on 1/1/2020 2000-2020 [employer] [address] [place] [address], [place] [address]. All rights reserved. This information is not intended as a substitute for professional medical care. Always follow your healthcare professional's instructions.
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
## 2021-02-25 ENCOUNTER — OFFICE VISIT - HEALTHEAST (OUTPATIENT)
Dept: RHEUMATOLOGY | Facility: CLINIC | Age: 31
End: 2021-02-25

## 2021-02-25 DIAGNOSIS — M54.42 CHRONIC BILATERAL LOW BACK PAIN WITH BILATERAL SCIATICA: ICD-10-CM

## 2021-02-25 DIAGNOSIS — M25.50 CHRONIC PAIN OF MULTIPLE JOINTS: ICD-10-CM

## 2021-02-25 DIAGNOSIS — M24.9 HYPERMOBILE JOINTS: ICD-10-CM

## 2021-02-25 DIAGNOSIS — G89.29 CHRONIC PAIN OF MULTIPLE JOINTS: ICD-10-CM

## 2021-02-25 DIAGNOSIS — M54.9 CHRONIC MID BACK PAIN: ICD-10-CM

## 2021-02-25 DIAGNOSIS — M54.41 CHRONIC BILATERAL LOW BACK PAIN WITH BILATERAL SCIATICA: ICD-10-CM

## 2021-02-25 DIAGNOSIS — G89.29 CHRONIC MID BACK PAIN: ICD-10-CM

## 2021-02-25 DIAGNOSIS — R76.8 ANA POSITIVE: ICD-10-CM

## 2021-02-25 DIAGNOSIS — G89.29 CHRONIC BILATERAL LOW BACK PAIN WITH BILATERAL SCIATICA: ICD-10-CM

## 2021-02-27 ENCOUNTER — HOSPITAL ENCOUNTER (OUTPATIENT)
Dept: RADIOLOGY | Facility: HOSPITAL | Age: 31
Discharge: HOME | End: 2021-02-27
Attending: INTERNAL MEDICINE

## 2021-02-27 ENCOUNTER — COMMUNICATION - HEALTHEAST (OUTPATIENT)
Dept: PHYSICAL MEDICINE AND REHAB | Facility: CLINIC | Age: 31
End: 2021-02-27

## 2021-02-27 DIAGNOSIS — G89.29 CHRONIC PAIN OF MULTIPLE JOINTS: ICD-10-CM

## 2021-02-27 DIAGNOSIS — M25.50 CHRONIC PAIN OF MULTIPLE JOINTS: ICD-10-CM

## 2021-03-01 ENCOUNTER — COMMUNICATION - HEALTHEAST (OUTPATIENT)
Dept: RHEUMATOLOGY | Facility: CLINIC | Age: 31
End: 2021-03-01

## 2021-03-05 ENCOUNTER — AMBULATORY - HEALTHEAST (OUTPATIENT)
Dept: LAB | Facility: HOSPITAL | Age: 31
End: 2021-03-05

## 2021-03-05 ENCOUNTER — HOSPITAL ENCOUNTER (OUTPATIENT)
Dept: PHYSICAL MEDICINE AND REHAB | Facility: CLINIC | Age: 31
Discharge: HOME | End: 2021-03-05
Attending: INTERNAL MEDICINE

## 2021-03-05 DIAGNOSIS — G89.29 CHRONIC MID BACK PAIN: ICD-10-CM

## 2021-03-05 DIAGNOSIS — G89.29 CHRONIC PAIN OF MULTIPLE JOINTS: ICD-10-CM

## 2021-03-05 DIAGNOSIS — M25.50 CHRONIC PAIN OF MULTIPLE JOINTS: ICD-10-CM

## 2021-03-05 DIAGNOSIS — M54.42 CHRONIC BILATERAL LOW BACK PAIN WITH BILATERAL SCIATICA: ICD-10-CM

## 2021-03-05 DIAGNOSIS — M25.562 CHRONIC PAIN OF BOTH KNEES: ICD-10-CM

## 2021-03-05 DIAGNOSIS — M54.41 CHRONIC BILATERAL LOW BACK PAIN WITH BILATERAL SCIATICA: ICD-10-CM

## 2021-03-05 DIAGNOSIS — R76.8 ANA POSITIVE: ICD-10-CM

## 2021-03-05 DIAGNOSIS — G89.29 CHRONIC BILATERAL LOW BACK PAIN WITHOUT SCIATICA: ICD-10-CM

## 2021-03-05 DIAGNOSIS — G89.29 CHRONIC BILATERAL LOW BACK PAIN WITH BILATERAL SCIATICA: ICD-10-CM

## 2021-03-05 DIAGNOSIS — G89.29 CHRONIC PAIN OF BOTH KNEES: ICD-10-CM

## 2021-03-05 DIAGNOSIS — M79.641 BILATERAL HAND PAIN: ICD-10-CM

## 2021-03-05 DIAGNOSIS — M25.561 CHRONIC PAIN OF BOTH KNEES: ICD-10-CM

## 2021-03-05 DIAGNOSIS — M79.642 BILATERAL HAND PAIN: ICD-10-CM

## 2021-03-05 DIAGNOSIS — M54.50 CHRONIC BILATERAL LOW BACK PAIN WITHOUT SCIATICA: ICD-10-CM

## 2021-03-05 DIAGNOSIS — M54.9 CHRONIC MID BACK PAIN: ICD-10-CM

## 2021-03-05 LAB
ALBUMIN SERPL-MCNC: 3.9 G/DL (ref 3.5–5)
ALBUMIN UR-MCNC: ABNORMAL MG/DL
ALT SERPL W P-5'-P-CCNC: 25 U/L (ref 0–45)
APPEARANCE UR: CLEAR
AST SERPL W P-5'-P-CCNC: 19 U/L (ref 0–40)
BACTERIA #/AREA URNS HPF: ABNORMAL HPF
BASOPHILS # BLD AUTO: 0 THOU/UL (ref 0–0.2)
BASOPHILS NFR BLD AUTO: 0 % (ref 0–2)
BILIRUB UR QL STRIP: NEGATIVE
C REACTIVE PROTEIN LHE: 0.4 MG/DL (ref 0–0.8)
COLOR UR AUTO: YELLOW
CREAT SERPL-MCNC: 0.81 MG/DL (ref 0.6–1.1)
EOSINOPHIL # BLD AUTO: 0.2 THOU/UL (ref 0–0.4)
EOSINOPHIL NFR BLD AUTO: 1 % (ref 0–6)
ERYTHROCYTE [DISTWIDTH] IN BLOOD BY AUTOMATED COUNT: 13 % (ref 11–14.5)
ERYTHROCYTE [SEDIMENTATION RATE] IN BLOOD BY WESTERGREN METHOD: 8 MM/HR (ref 0–20)
GFR SERPL CREATININE-BSD FRML MDRD: >60 ML/MIN/1.73M2
GLUCOSE UR STRIP-MCNC: NEGATIVE MG/DL
HCT VFR BLD AUTO: 38.8 % (ref 35–47)
HGB BLD-MCNC: 13.6 G/DL (ref 12–16)
HGB UR QL STRIP: NEGATIVE
IMM GRANULOCYTES # BLD: 0 THOU/UL
IMM GRANULOCYTES NFR BLD: 0 %
KETONES UR STRIP-MCNC: NEGATIVE MG/DL
LEUKOCYTE ESTERASE UR QL STRIP: NEGATIVE
LYMPHOCYTES # BLD AUTO: 4.2 THOU/UL (ref 0.8–4.4)
LYMPHOCYTES NFR BLD AUTO: 35 % (ref 20–40)
MCH RBC QN AUTO: 33.9 PG (ref 27–34)
MCHC RBC AUTO-ENTMCNC: 35.1 G/DL (ref 32–36)
MCV RBC AUTO: 97 FL (ref 80–100)
MONOCYTES # BLD AUTO: 0.3 THOU/UL (ref 0–0.9)
MONOCYTES NFR BLD AUTO: 3 % (ref 2–10)
MUCOUS THREADS #/AREA URNS LPF: ABNORMAL LPF
NEUTROPHILS # BLD AUTO: 7.1 THOU/UL (ref 2–7.7)
NEUTROPHILS NFR BLD AUTO: 60 % (ref 50–70)
NITRATE UR QL: NEGATIVE
PH UR STRIP: 8 [PH] (ref 4.5–8)
PLATELET # BLD AUTO: 321 THOU/UL (ref 140–440)
PMV BLD AUTO: 9.6 FL (ref 8.5–12.5)
RBC # BLD AUTO: 4.01 MILL/UL (ref 3.8–5.4)
RBC #/AREA URNS AUTO: ABNORMAL HPF
SP GR UR STRIP: 1.02 (ref 1–1.03)
SQUAMOUS #/AREA URNS AUTO: ABNORMAL LPF
TSH SERPL DL<=0.005 MIU/L-ACNC: 1.5 UIU/ML (ref 0.3–5)
URATE SERPL-MCNC: 4.1 MG/DL (ref 2–7.5)
UROBILINOGEN UR STRIP-ACNC: ABNORMAL
WBC #/AREA URNS AUTO: ABNORMAL HPF
WBC: 11.8 THOU/UL (ref 4–11)

## 2021-03-05 ASSESSMENT — MIFFLIN-ST. JEOR: SCORE: 1374.49

## 2021-03-06 LAB
CREAT UR-MCNC: 236.2 MG/DL
MICROALBUMIN UR-MCNC: 1.09 MG/DL (ref 0–1.99)
MICROALBUMIN/CREAT UR: 4.6 MG/G

## 2021-03-08 LAB
25(OH)D3 SERPL-MCNC: 19.2 NG/ML (ref 30–80)
B BURGDOR IGG+IGM SER QL: 0.06 INDEX VALUE
CARDIOLIPIN IGA SER IA-ACNC: 0.7 APL U/ML (ref 0–19.9)
CARDIOLIPIN IGG SER IA-ACNC: <1.6 GPL-U/ML (ref 0–19.9)
CARDIOLIPIN IGM SER IA-ACNC: 0.5 MPL-U/ML (ref 0–19.9)

## 2021-03-09 ENCOUNTER — COMMUNICATION - HEALTHEAST (OUTPATIENT)
Dept: RHEUMATOLOGY | Facility: CLINIC | Age: 31
End: 2021-03-09

## 2021-03-09 DIAGNOSIS — E55.9 VITAMIN D DEFICIENCY: ICD-10-CM

## 2021-03-09 LAB
DNA (DS) ANTIBODY - HISTORICAL: 4 IU
LA PPP-IMP: NEGATIVE
SCL-70 AUTOANTIBODIES - HISTORICAL: 0 EU
SM (SMITH AUTOANTIBODIES - HISTORICAL: 1 EU
SM/RNP AUTOANTIBODIES - HISTORICAL: 0 EU
SS-A/RO AUTOANTIBODIES - HISTORICAL: 0 EU
SS-B/LA AUTOANTIBODIES - HISTORICAL: 0 EU

## 2021-03-10 LAB — JO-1 AUTOANTIBODIES - HISTORICAL: 0 EU

## 2021-03-11 LAB
B LOCUS: NORMAL
B27TEST METHOD: NORMAL

## 2021-03-15 ENCOUNTER — MYC MEDICAL ADVICE (OUTPATIENT)
Dept: FAMILY MEDICINE | Facility: CLINIC | Age: 31
End: 2021-03-15

## 2021-03-15 DIAGNOSIS — Z71.6 ENCOUNTER FOR SMOKING CESSATION COUNSELING: ICD-10-CM

## 2021-03-15 DIAGNOSIS — Z72.0 TOBACCO USE: ICD-10-CM

## 2021-03-16 ENCOUNTER — COMMUNICATION - HEALTHEAST (OUTPATIENT)
Dept: RHEUMATOLOGY | Facility: CLINIC | Age: 31
End: 2021-03-16

## 2021-03-16 DIAGNOSIS — G89.29 CHRONIC PAIN OF MULTIPLE JOINTS: ICD-10-CM

## 2021-03-16 DIAGNOSIS — R76.8 ANA POSITIVE: ICD-10-CM

## 2021-03-16 DIAGNOSIS — M25.50 CHRONIC PAIN OF MULTIPLE JOINTS: ICD-10-CM

## 2021-03-17 NOTE — TELEPHONE ENCOUNTER
Left message for patient to return call re: bupropion request. Last visit on 02/09/21 indicates patient using nicotine patch.   Keiry KIM RN

## 2021-03-22 RX ORDER — BUPROPION HYDROCHLORIDE 150 MG/1
TABLET, FILM COATED, EXTENDED RELEASE ORAL
Qty: 60 TABLET | Refills: 0 | OUTPATIENT
Start: 2021-03-22

## 2021-03-22 NOTE — TELEPHONE ENCOUNTER
buPROPion (ZYBAN) 150 MG 12 hr tablet (Discontinued)  This Order Has Been Discontinued    Order Status Reason By On   Discontinued Therapy completed Natty Mendiola, CARINA 1/8/21 0012     Isi KIM RN, BSN

## 2021-03-24 ENCOUNTER — HOSPITAL ENCOUNTER (OUTPATIENT)
Dept: PHYSICAL MEDICINE AND REHAB | Facility: CLINIC | Age: 31
Discharge: HOME | End: 2021-03-24
Attending: PAIN MEDICINE

## 2021-03-24 DIAGNOSIS — M79.642 BILATERAL HAND PAIN: ICD-10-CM

## 2021-03-24 DIAGNOSIS — M79.641 BILATERAL HAND PAIN: ICD-10-CM

## 2021-06-02 ENCOUNTER — MYC MEDICAL ADVICE (OUTPATIENT)
Dept: FAMILY MEDICINE | Facility: CLINIC | Age: 31
End: 2021-06-02

## 2021-06-03 ENCOUNTER — VIRTUAL VISIT (OUTPATIENT)
Dept: FAMILY MEDICINE | Facility: CLINIC | Age: 31
End: 2021-06-03
Payer: COMMERCIAL

## 2021-06-03 DIAGNOSIS — J01.00 ACUTE MAXILLARY SINUSITIS, RECURRENCE NOT SPECIFIED: Primary | ICD-10-CM

## 2021-06-03 DIAGNOSIS — Z72.0 TOBACCO ABUSE: ICD-10-CM

## 2021-06-03 PROCEDURE — 99214 OFFICE O/P EST MOD 30 MIN: CPT | Mod: 95 | Performed by: FAMILY MEDICINE

## 2021-06-03 RX ORDER — NICOTINE 21 MG/24HR
1 PATCH, TRANSDERMAL 24 HOURS TRANSDERMAL EVERY 24 HOURS
Qty: 30 PATCH | Refills: 1 | Status: SHIPPED | OUTPATIENT
Start: 2021-06-03 | End: 2021-08-04

## 2021-06-03 NOTE — PROGRESS NOTES
Ana Maria is a 31 year old who is being evaluated via a billable video visit.      How would you like to obtain your AVS? MyChart  If the video visit is dropped, the invitation should be resent by: Text to cell phone: [phone]  Will anyone else be joining your video visit? No    Video Start Time: 3:37 PM    Assessment & Plan     Acute maxillary sinusitis, recurrence not specified  Differential discussed in detail.  Suspect symptoms secondary to acute maxillary sinusitis.  Patient deferred COVID-19 test.  Suggested well hydration, warm fluids, over-the-counter antihistamine, Flonase, steam inhalation.  Delayed prescription of Augmentin sent to patient's pharmacy, recommended to use if symptoms persist by next week.  Patient understood and in agreement with above plan.  All questions answered.  - amoxicillin-clavulanate (AUGMENTIN) 875-125 MG tablet; Take 1 tablet by mouth 2 times daily for 10 days    Tobacco abuse  Smoking about half a pack of cigarettes per day, associated health hazards explained in detail, agreeable to try nicotine patches prescription sent to patient's pharmacy.  - nicotine (NICODERM CQ) 14 MG/24HR 24 hr patch; Place 1 patch onto the skin every 24 hours       Tobacco Cessation:   reports that she has been smoking cigarettes. She started smoking about 12 years ago. She has a 20.00 pack-year smoking history. She has never used smokeless tobacco.  Tobacco Cessation Action Plan: Pharmacotherapies : Nicotine patch        Dimitris Zambrano MD  [facility]    Subjective   Ana Maria is a 31 year old who presents for the following health issues     HPI     Acute Illness  Acute illness concerns: Sinus   Onset/Duration: 3 days   Symptoms:  Fever: no  Chills/Sweats: no  Headache (location?): YES  Sinus Pressure: YES  Conjunctivitis:  no  Ear Pain: no  Rhinorrhea: YES  Congestion: YES  Sore Throat: YES- in the mornings from drainage   Cough: YES - when she wakes up lots of phlegm-  greenish  Wheeze: no  Decreased Appetite: no  Nausea: no  Vomiting: no  Diarrhea: no  Dysuria/Freq.: no  Dysuria or Hematuria: no  Fatigue/Achiness: no  Sick/Strep Exposure: no  Therapies tried and outcome: Sudafed, tylenol, ibuprofen         Review of Systems   Constitutional, HEENT, cardiovascular, pulmonary, GI, , musculoskeletal, neuro, skin, endocrine and psych systems are negative, except as otherwise noted.      Objective           Vitals:  No vitals were obtained today due to virtual visit.    Physical Exam   GENERAL: alert and no distress  EYES: Eyes grossly normal to inspection.  No discharge or erythema, or obvious scleral/conjunctival abnormalities.  RESP: No audible wheeze, cough, or visible cyanosis.  No visible retractions or increased work of breathing.    SKIN: Visible skin clear. No significant rash, abnormal pigmentation or lesions.  NEURO: Cranial nerves grossly intact.  Mentation and speech appropriate for age.  PSYCH: Mentation appears normal, affect normal/bright, judgement and insight intact, normal speech and appearance well-groomed.      Video-Visit Details    Type of service:  Video Visit    Video End Time:3:49 PM    Originating Location (pt. Location): Home    Distant Location (provider location):  [facility]     Platform used for Video Visit: Kevin

## 2021-06-05 VITALS — WEIGHT: 163.8 LBS | HEIGHT: 60 IN | BODY MASS INDEX: 32.16 KG/M2
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
## 2021-06-15 NOTE — PATIENT INSTRUCTIONS - HE
I have ordered an EMG nerve conduction study of your hands.  Once have reviewed this I will have one of our schedulers call you to get you scheduled for a video visit to discuss findings.    ~Please call Nurse Navigation line [phone] with any questions or concerns about your treatment plan, if symptoms worsen and you would like to be seen urgently, or if you have problems controlling bladder and bowel function.

## 2021-06-15 NOTE — PATIENT INSTRUCTIONS - HE
Summary of Your Rheumatology Visit    Next Appointment:  2 Months    Medications:    Please follow directives on pill bottle on how to take medication(s) provided.    Referrals:    Spine clinic     Tests:     Please have labs and x-rays that were ordered performed.     Recommend having labs (and x-rays if ordered) performed at a [facility] facility, as results are then automatically uploaded into our in-basket system.  If having labs performed at a [facility] facility then patient should contact/notify us soon thereafter, so we can actively retrieve results from [facility] system.         Injections:      Other:

## 2021-06-16 NOTE — TELEPHONE ENCOUNTER
Pt notified of lab result comments, pt picked up Vit D rx and started taking it.     Pt will have labs repeated prior to May f/u appt with Dr Landon. Lab orders entered.

## 2021-06-16 NOTE — PROGRESS NOTES
Patient presents at the request of Dr. Roque Griffith for bilateral upper extremity EMG.  She has bilateral forearm and hand pain and cramping through digits 1-4.  Right equal to left in severity.  She is right-handed.  Happens mostly at night.  On exam she has normal sensation and strength in the upper extremities and negative Garfield's.    EMG/NCS  results: Please see scanned full report    Comment NCS: Normal study  1.  Normal nerve conduction studies bilateral upper extremities.  This includes right median and ulnar SNAPs, bilateral median and ulnar transcarpal studies, and bilateral median and ulnar CMAP's.    Comment EMG: Normal study  1.  Normal needle EMG bilateral upper extremities.     Interpretation: Normal study:    1. There is no electrodiagnostic evidence of cervical radiculopathy, brachial plexopathy, or focal neuropathy in the bilateral upper extremities.  Specifically, there is no electrodiagnostic evidence of median neuropathy at the wrist in the bilateral upper extremities.     The testing was completed in its entirety by the physician.       It was our pleasure caring for your patient today, if there any questions or concerns please do not hesitate to contact us.

## 2021-06-16 NOTE — TELEPHONE ENCOUNTER
----- Message from Andre Landon DO sent at 3/10/2021  8:57 PM CST -----  Vitamin D level was low, suggest replenishing with 50,000 units qweek x 12 weeks (12 doses), no refills. Thereafter, after completing this 12 week course, suggest taking over-the-counter 1000 units of vitamin D daily.  Can hold over-the-counter dose over summer months if has some sun exposure.    Noted have minimally elevated white blood cell count reassuring that white blood cell components (neutrophils, lymphocytes, etc.) were within normal limits.  Remainder of CBC/cell count results were within normal limits as well.    Some protein and microscopic RBCs detected on urinalysis.  Reassuring that urine microalbumin/creatinine ratio level was within limits.    Otherwise remainder of lab results to date were within normal limits.    HLA-B27 level still in process, will update when finalized.    Given microscopic RBCs/red blood cells on urinalysis recommend rechecking urinalysis along with urine microalbumin, urine protein/creatinine ratio, C3/C4, creatinine, AST/ALT and CBC with differential, double-stranded DNA approximately 1 week prior to follow-up visit with us.  Recommend scheduling a follow-up visit with us in about 2 months time.    We can discuss details of results more extensively on future visit.    Please place lab orders mentioned above.

## 2021-06-21 ENCOUNTER — MYC MEDICAL ADVICE (OUTPATIENT)
Dept: FAMILY MEDICINE | Facility: CLINIC | Age: 31
End: 2021-06-21

## 2021-06-22 ENCOUNTER — VIRTUAL VISIT (OUTPATIENT)
Dept: FAMILY MEDICINE | Facility: CLINIC | Age: 31
End: 2021-06-22
Payer: COMMERCIAL

## 2021-06-22 DIAGNOSIS — Z86.19 H/O HERPES SIMPLEX TYPE 2 INFECTION: Primary | ICD-10-CM

## 2021-06-22 PROCEDURE — 99213 OFFICE O/P EST LOW 20 MIN: CPT | Mod: 95 | Performed by: NURSE PRACTITIONER

## 2021-06-22 RX ORDER — VALACYCLOVIR HYDROCHLORIDE 500 MG/1
500 TABLET, FILM COATED ORAL 2 TIMES DAILY
Qty: 6 TABLET | Refills: 3 | Status: SHIPPED | OUTPATIENT
Start: 2021-06-22 | End: 2022-01-11

## 2021-06-22 NOTE — PROGRESS NOTES
Ana Maria is a 31 year old who is being evaluated via a billable video visit.      How would you like to obtain your AVS? MyChart  If the video visit is dropped, the invitation should be resent by: Text to cell phone: [phone]  Will anyone else be joining your video visit? No      Video Start Time: 4:21 PM    Assessment & Plan     H/O herpes simplex type 2 infection  Recurrent, chronic. Has about 3 outbreaks a year. Will treat with 3 day treatment of Valtrex. Refills provided for future outbreaks. Discussed with patient if having increased outbreaks to notify provider to discuss prophylactic treatment.   - valACYclovir (VALTREX) 500 MG tablet; Take 1 tablet (500 mg) by mouth 2 times daily for 3 days             BMI:   Estimated body mass index is 31.99 kg/m  as calculated from the following:    Height as of 3/5/21: 1.524 m (5').    Weight as of 3/5/21: 74.3 kg (163 lb 12.8 oz).       See Patient Instructions    Return in about 1 month (around 7/22/2021) for Physical Exam.    Susana Agosto, SY CNP  [facility]    Subjective   Ana Maria is a 31 year old who presents for the following health issues     HPI     Concern - herpes  Onset: dx 7 years ago, new breakout 2 days ago    Description: blister upper thigh and and buttocks  Intensity: mild  Progression of Symptoms:  worsening  Accompanying Signs & Symptoms: 0  Previous history of similar problem: 0  Precipitating factors:        Worsened by: 0  Alleviating factors:        Improved by: 0  Therapies tried and outcome:  none  this outbreak, covering with bandaid      Review of Systems   Constitutional, HEENT, cardiovascular, pulmonary, gi and gu systems are negative, except as otherwise noted.      Objective           Vitals:  No vitals were obtained today due to virtual visit.    Physical Exam   GENERAL: Healthy, alert and no distress  EYES: Eyes grossly normal to inspection.  No discharge or erythema, or obvious scleral/conjunctival  abnormalities.  RESP: No audible wheeze, cough, or visible cyanosis.  No visible retractions or increased work of breathing.    SKIN: erythematous blister on posterior right upper thigh  NEURO: Cranial nerves grossly intact.  Mentation and speech appropriate for age.  PSYCH: Mentation appears normal, affect normal/bright, judgement and insight intact, normal speech and appearance well-groomed.    Diagnostic Test Results:  none              Video-Visit Details    Type of service:  Video Visit    Video End Time:4:34 PM    Originating Location (pt. Location): Home    Distant Location (provider location):  [facility]     Platform used for Video Visit: [employer]

## 2021-06-22 NOTE — PATIENT INSTRUCTIONS
Your clinic record indicates that you are due for:   Pap and physical exam  Please schedule [phone] ([facility])      H/O herpes simplex type 2 infection  Recurrent, chronic. Has about 3 outbreaks a year. Will treat with 3 day treatment of Valtrex. Refills provided for future outbreaks. Discussed with patient if having increased outbreaks to notify provider to discuss prophylactic treatment.   - valACYclovir (VALTREX) 500 MG tablet; Take 1 tablet (500 mg) by mouth 2 times daily for 3 days

## 2021-06-30 ENCOUNTER — VIRTUAL VISIT (OUTPATIENT)
Dept: FAMILY MEDICINE | Facility: CLINIC | Age: 31
End: 2021-06-30
Payer: COMMERCIAL

## 2021-06-30 ENCOUNTER — MYC MEDICAL ADVICE (OUTPATIENT)
Dept: FAMILY MEDICINE | Facility: CLINIC | Age: 31
End: 2021-06-30

## 2021-06-30 DIAGNOSIS — R11.0 NAUSEA: ICD-10-CM

## 2021-06-30 DIAGNOSIS — G43.109 OPHTHALMIC MIGRAINE: ICD-10-CM

## 2021-06-30 DIAGNOSIS — A08.4 VIRAL GASTROENTERITIS: Primary | ICD-10-CM

## 2021-06-30 DIAGNOSIS — M25.50 ARTHRALGIA, UNSPECIFIED JOINT: ICD-10-CM

## 2021-06-30 PROCEDURE — 99213 OFFICE O/P EST LOW 20 MIN: CPT | Mod: 95 | Performed by: PHYSICIAN ASSISTANT

## 2021-06-30 NOTE — PROGRESS NOTES
Ana Maria is a 31 year old who is being evaluated via a billable telephone visit.      What phone number would you like to be contacted at? [phone]  How would you like to obtain your AVS? MyChart    Assessment & Plan     Viral gastroenteritis  Improving    Nausea  - ondansetron (ZOFRAN) 4 MG tablet; Take 1 tablet (4 mg) by mouth every 8 hours as needed for nausea    Ophthalmic migraine  New tentative diagnosis   - NEUROLOGY ADULT REFERRAL  - SUMAtriptan (IMITREX) 25 MG tablet; Take 1-2 tablets (25-50 mg) by mouth at onset of headache for migraine May repeat in 2 hours. Max 8 tablets/24 hours.    Arthralgia, unspecified joint  - Orthopedic  Referral; Future    Patient Instructions   Nausea medication ondansetron as needed  If stools not continuing to improve we can test further    Can try sumatriptan at first hint of oncoming headache.    Do see neurology as there are some unusual features to your headaches, and family history, would like neurology to confirm diagnosis and that there are no other concerns      No follow-ups on file.    Medina Peraza PA-C  [facility]    Subjective   Ana Maria is a 31 year old who presents for the following health issues     HPI     Concern - vomiting and diarrhea  Onset: Sat  Description: vomiting and change is stool pattern - from dark green to dark almost black  Intensity: severe  Progression of Symptoms:  same  Accompanying Signs & Symptoms :Headaches, cramping, fatigue, vision changes and decrease appetite  Previous history of similar problem: no  Precipitating factors:        Worsened by: no  Alleviating factors:        Improved by: tylenol and midol  Therapies tried and outcome: none    Tends to get morning phlegm - chronic.  But vomiting 1-3 times daily.  Tried water, apple juice, chicken noodle soup, crackers.  No appetite.  Does have nausea.  No belly pain.    Had been having diarrhea, now formed yesterday and today but still  dark color - very dark this morning but more green now.  She does feel was black and sticky this morning.  She sent pics.      HA not new, x several yrs but worsening.  Severe HA behind eyes.  Starts with funny vision.  Vision and HA bilateral.  Several times a week now.  Lasts couple hours.  Better with sleep.  Fam history coagulopathy.  Saw optometry a year ago or so - no concerns found.    Neg pregnancy test Monday, does have nexplanon.      Ortho request.            Objective           Vitals:  No vitals were obtained today due to virtual visit.    Phone call duration: 28 minutes

## 2021-06-30 NOTE — LETTER
[facility]  [address] [place] [id number]  Phone: [phone]  Fax: [phone]    07/02/21    Ana Maria Espinoza  [address] [place] [id number]      To whom it may concern:     Ana Maria was evaluated on 6/30/21.  She has been off work this week due to medical condition.  She can return this week if she feels able, otherwise can return next week without restrictions.    Sincerely,      Medina Peraza PA-C

## 2021-07-02 ENCOUNTER — MYC MEDICAL ADVICE (OUTPATIENT)
Dept: FAMILY MEDICINE | Facility: CLINIC | Age: 31
End: 2021-07-02

## 2021-07-02 RX ORDER — SUMATRIPTAN 25 MG/1
25 TABLET, FILM COATED ORAL
Qty: 18 TABLET | Refills: 0 | Status: SHIPPED | OUTPATIENT
Start: 2021-07-02 | End: 2021-07-02

## 2021-07-02 RX ORDER — SUMATRIPTAN 25 MG/1
25-50 TABLET, FILM COATED ORAL
Qty: 18 TABLET | Refills: 0 | Status: SHIPPED | OUTPATIENT
Start: 2021-07-02 | End: 2022-04-22

## 2021-07-02 RX ORDER — ONDANSETRON 4 MG/1
4 TABLET, FILM COATED ORAL EVERY 8 HOURS PRN
Qty: 12 TABLET | Refills: 0 | Status: SHIPPED | OUTPATIENT
Start: 2021-07-02 | End: 2022-04-22

## 2021-07-02 NOTE — PATIENT INSTRUCTIONS
Nausea medication ondansetron as needed  If stools not continuing to improve we can test further    Can try sumatriptan at first hint of oncoming headache.    Do see neurology as there are some unusual features to your headaches, and family history, would like neurology to confirm diagnosis and that there are no other concerns

## 2021-07-02 NOTE — TELEPHONE ENCOUNTER
Please call patient.  I am very sorry about delay.  All orders are in and she should be able to print off my chart.

## 2021-07-08 ENCOUNTER — OFFICE VISIT (OUTPATIENT)
Dept: NEUROLOGY | Facility: CLINIC | Age: 31
End: 2021-07-08
Payer: COMMERCIAL

## 2021-07-08 VITALS
HEIGHT: 60 IN | BODY MASS INDEX: 33.89 KG/M2 | HEART RATE: 88 BPM | DIASTOLIC BLOOD PRESSURE: 64 MMHG | SYSTOLIC BLOOD PRESSURE: 106 MMHG | WEIGHT: 172.6 LBS

## 2021-07-08 DIAGNOSIS — H53.9 VISION CHANGES: ICD-10-CM

## 2021-07-08 DIAGNOSIS — G43.109 MIGRAINE WITH AURA AND WITHOUT STATUS MIGRAINOSUS, NOT INTRACTABLE: Primary | ICD-10-CM

## 2021-07-08 PROCEDURE — 99204 OFFICE O/P NEW MOD 45 MIN: CPT | Performed by: PSYCHIATRY & NEUROLOGY

## 2021-07-08 ASSESSMENT — MIFFLIN-ST. JEOR: SCORE: 1414.41

## 2021-07-08 NOTE — LETTER
7/8/2021         RE: Ana Maria Espinoza  [address] [place] [id number]        Dear Colleague,    Thank you for referring your patient, Ana Maria Espinoza, to the [facility] [place]. Please see a copy of my visit note below.    INITIAL NEUROLOGY CONSULTATION    DATE OF VISIT: 7/8/2021  MRN: [id number]  PATIENT NAME: Ana Maria Espinoza  YOB: 1990    REFERRING PROVIDER: Medina Peraza    Chief Complaint   Patient presents with     Migraines     Vision changes with migraine.       SUBJECTIVE:                                                        HPI:   Ana Maria Espinoza is a 31 year old female whom I have been asked by Medina Hightower PA-C, to see in consultation for headaches.     Per primary care note from last week, the patient reported several year history of headaches.  She described a severe headache behind the eyes that began with visual changes.  She is having several headaches per week at this point.  There is family history of coagulopathy noted.  She did see an optometrist about a year ago, and was told there were no issues with her eyes.  She has been prescribed Imitrex to use as needed.  She also has some problems with nausea related to viral gastroenteritis.    Ana Maria says she has been having migraines for years. She has not seen a neurologist for these in the past.  She has also not been on treatment for migraines in the past.  She does feel like they have become a little bit more frequent lately.  She reports a typical migraine headache 1 or 2 times per month.    She gets water-like changes in her vision before the headaches.  Sometimes her vision will go completely blank, in either or both eyes.  Then she develops pain.  The pain can be in the forehead, back of the head, behind the eyes.  She describes pressure-like pain.  She has sensitivity and to light and sound with her headaches. She also has some nausea. The visual changes seem to last at least an  hour or 2, into the headache.  No numbness or tingling in the extremities with her headaches.  Between the headaches, her vision is fine.  Her most recent eye exam was 2 months ago.    She takes ibuprofen and Tylenol and usually the vision returns over an hour or so. Without treatment, the headaches and visual changes can last all day.     Her trigger seems to be extreme noise. 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  She says the smell is quite overwhelming and it is noisy there.  The headaches can occur at home as well.  She does have a very active household, with her brother and boyfriend living with she and her child.    She says she has not tried the Imitrex, yet as this was a new prescription for her last week.    She says her mother used to have headaches, and her mother and brother. Her daughter also has migraines.     There is family history of blood clots in her father. No known family history of brain aneurysm.     There is a brain MRI from 2011 through the [employer] system which shows two areas of T2 hyperintense in the left parietal and occipital lobes.      Past Medical History:   Diagnosis Date     Uncomplicated asthma 1996     Past Surgical History:   Procedure Laterality Date     ABDOMEN SURGERY      2 c sections 2009 @ 2011     ENT SURGERY      Unsure of year     ORTHOPEDIC SURGERY      Had surgery on both big toes       albuterol (PROAIR HFA/PROVENTIL HFA/VENTOLIN HFA) 108 (90 Base) MCG/ACT inhaler, Inhale 1-2 puffs into the lungs every 6 hours as needed for shortness of breath / dyspnea or wheezing  EPINEPHrine (ANY BX GENERIC EQUIV) 0.3 MG/0.3ML injection 2-pack, Inject 0.3 mLs (0.3 mg) into the muscle as needed for anaphylaxis  etonogestrel (NEXPLANON) 68 MG IMPL, 1 each by Subdermal route once Inserted 1/8/2021  Due to come out 1/8/2024  hydrOXYzine (ATARAX) 25 MG tablet, Take 0.5-1 tablets (12.5-25 mg) by mouth 3 times daily as  needed for anxiety (sleep)  ketoconazole (NIZORAL) 2 % external cream, Apply topically daily  ketoconazole (NIZORAL) 2 % external shampoo, Use shampoo daily for flaky skin  nicotine (NICODERM CQ) 21 MG/24HR 24 hr patch, Place 1 patch onto the skin every 24 hours  ondansetron (ZOFRAN) 4 MG tablet, Take 1 tablet (4 mg) by mouth every 8 hours as needed for nausea  SUMAtriptan (IMITREX) 25 MG tablet, Take 1-2 tablets (25-50 mg) by mouth at onset of headache for migraine May repeat in 2 hours. Max 8 tablets/24 hours.  DULoxetine (CYMBALTA) 20 MG capsule, Take 1 capsule (20 mg) by mouth 2 times daily (Patient not taking: Reported on 6/22/2021)  nicotine (NICODERM CQ) 14 MG/24HR 24 hr patch, Place 1 patch onto the skin every 24 hours (Patient not taking: Reported on 7/8/2021)  valACYclovir (VALTREX) 500 MG tablet, Take 1 tablet (500 mg) by mouth 2 times daily for 3 days (Patient not taking: Reported on 7/8/2021)    No current facility-administered medications on file prior to visit.     Allergies   Allergen Reactions     Bee Venom Anaphylaxis     Has epipen     Aloe Hives     Dry skin as well      Codeine Rash     Other reaction(s): Intolerance-Can't Take        Problem (# of Occurrences) Relation (Name,Age of Onset)    Asthma (2) Sister (April), Brother (Rahul)    Connective Tissue Disorder (4) Father (Dad): EDS, Mother (Mom): EDS, Maternal Cousin: EDS, Maternal Cousin: EDS    Coronary Artery Disease (1) Daughter (Kaylie)    Depression (1) Mother (Mom)    Diabetes (1) Father (Dad)    Genetic Disorder (1) Daughter (Alis cueto): Avascular necrosis and Legg calve perthes disease    Mental Illness (1) Mother (Mom)    Obesity (5) Father (Dad), Mother (Mom), Sister (April), Brother (Rahul), Paternal Grandmother (Sofi)    Thyroid Disease (1) Sister (April)        Social History     Tobacco Use     Smoking status: Former Smoker     Packs/day: 2.00     Years: 10.00     Pack years: 20.00     Types: Cigarettes     Start date:  3/19/2009     Quit date: 2021     Years since quittin.0     Smokeless tobacco: Never Used   Substance Use Topics     Alcohol use: No     Drug use: No       REVIEW OF SYSTEMS:                                                      10-point review of systems is negative except as mentioned above in HPI.     EXAM:                                                      Physical Exam:   Vitals: /64   Pulse 88   Ht 1.524 m (5')   Wt 78.3 kg (172 lb 9.6 oz)   BMI 33.71 kg/m    BMI= Body mass index is 33.71 kg/m .  GENERAL: NAD.  HEENT: NC/AT.   CV: RRR. S1, S2.   NECK: No bruits.  PULM: Non-labored breathing.   Neurologic:  MENTAL STATUS: Alert, attentive. Speech is fluent. Normal comprehension. Normal concentration. Adequate fund of knowledge.   CRANIAL NERVES: Discs flat. Visual fields intact to confrontation. Pupils equally, round and reactive to light. Facial sensation and movement normal. EOM full. Hearing intact to conversation. Trapezius strength intact. Palate moves symmetrically. Tongue midline.  MOTOR: 5/5 in proximal and distal muscle groups of upper and lower extremities. Tone and bulk normal.   DTRs: Intact and symmetric in biceps, BR, patellae.  Babinski down-going bilaterally.   SENSATION: Normal light touch and pinprick. Intact proprioception at great toes. Vibration: Normal at both ankles.   COORDINATION: Normal finger nose finger. Finger tapping normal. Knee heel shin normal.  STATION AND GAIT: Casual gait is normal.  Right hand-dominant.    Relevant Data:  Brain MRI (5.25.11):  IMPRESSION:    1.  No evidence of acute infarction, intracranial mass or hemorrhage or enhancing lesion.      2.  Two small areas of increased FLAIR signal intensity in the left parietal and occipital lobe periventricular white matter are not specific as described above.      Images not available for review.      ASSESSMENT and PLAN:                                                      Assessment:     ICD-10-CM     1. Migraine with aura and without status migrainosus, not intractable  G43.109 MR Brain w/o & w Contrast   2. Vision changes  H53.9         Ms. Espinoza is a pleasant 31-year-old woman with history of anxiety and tobacco use here for increased migraines.  Her headaches are typical for classic migraines with aura.  She has not tried any specific abortive therapies, so I agree with the plan to try Imitrex for headache symptom onset.  I discussed that she can take another dose in a couple of hours if the first dose is not helpful.  It does not sound like she is overusing over-the-counter analgesics at this time.  Because of the increase in frequency, and the rather prolonged vision changes that she has, I think it would be reasonable to get updated brain imaging.  I would like to see Ana Maria back in clinic in a few months.  I did tell her that if the headaches become more frequent we could consider preventative treatment options.  In the meantime we will see how things go with the Imitrex.  Sintia understands and agrees with the plan.    Plan:  -- Updated brain MRI.  We will notify you of the results.  -- Try the Imitrex (sumatriptan), right at the onset of visual/headache symptoms.  You can take an additional dose 2 hours later if symptoms persist.  -- As needed ibuprofen and Tylenol also okay. Make sure that you do not take these more than a couple of days per week, to avoid medication overuse headache.  -- Return to Neurology in 3-4 months.  Please let me know if the headaches become more frequent in the meantime.    Total Time: 45 minutes were spent with the patient and in chart review/documentation (as described above in Assessment and Plan) /coordinating the care on day of service.    Karen Morse MD  Neurology    CC: Medina Peraza PA-C    Dragon software used in the dictation of this note.        Again, thank you for allowing me to participate in the care of your patient.         Sincerely,        Karen Morse MD

## 2021-07-08 NOTE — PATIENT INSTRUCTIONS
Plan:  -- Updated brain MRI.  We will notify you of the results.  -- Try the Imitrex (sumatriptan), right at the onset of visual/headache symptoms.  You can take an additional dose 2 hours later if symptoms persist.  -- As needed ibuprofen and Tylenol also okay. Make sure that you do not take these more than a couple of days per week, to avoid medication overuse headache.  -- Return to Neurology in 3-4 months.  Please let me know if the headaches become more frequent in the meantime.

## 2021-07-08 NOTE — NURSING NOTE
Chief Complaint   Patient presents with     Migraines     Vision changes with migraine.     Carmen Ross RN on 7/8/2021 at 8:39 AM

## 2021-07-08 NOTE — PROGRESS NOTES
INITIAL NEUROLOGY CONSULTATION    DATE OF VISIT: 7/8/2021  MRN: [id number]  PATIENT NAME: Ana Maria Espinoza  YOB: 1990    REFERRING PROVIDER: Medina Peraza    Chief Complaint   Patient presents with     Migraines     Vision changes with migraine.       SUBJECTIVE:                                                        HPI:   Ana Maria Espinoza is a 31 year old female whom I have been asked by Medina Hightower PA-C, to see in consultation for headaches.     Per primary care note from last week, the patient reported several year history of headaches.  She described a severe headache behind the eyes that began with visual changes.  She is having several headaches per week at this point.  There is family history of coagulopathy noted.  She did see an optometrist about a year ago, and was told there were no issues with her eyes.  She has been prescribed Imitrex to use as needed.  She also has some problems with nausea related to viral gastroenteritis.    Ana Maria says she has been having migraines for years. She has not seen a neurologist for these in the past.  She has also not been on treatment for migraines in the past.  She does feel like they have become a little bit more frequent lately.  She reports a typical migraine headache 1 or 2 times per month.    She gets water-like changes in her vision before the headaches.  Sometimes her vision will go completely blank, in either or both eyes.  Then she develops pain.  The pain can be in the forehead, back of the head, behind the eyes.  She describes pressure-like pain.  She has sensitivity and to light and sound with her headaches. She also has some nausea. The visual changes seem to last at least an hour or 2, into the headache.  No numbness or tingling in the extremities with her headaches.  Between the headaches, her vision is fine.  Her most recent eye exam was 2 months ago.    She takes ibuprofen and Tylenol and usually the vision returns over an  hour or so. Without treatment, the headaches and visual changes can last all day.     Her trigger seems to be extreme noise. 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  She says the smell is quite overwhelming and it is noisy there.  The headaches can occur at home as well.  She does have a very active household, with her brother and boyfriend living with she and her child.    She says she has not tried the Imitrex, yet as this was a new prescription for her last week.    She says her mother used to have headaches, and her mother and brother. Her daughter also has migraines.     There is family history of blood clots in her father. No known family history of brain aneurysm.     There is a brain MRI from 2011 through the [employer] system which shows two areas of T2 hyperintense in the left parietal and occipital lobes.      Past Medical History:   Diagnosis Date     Uncomplicated asthma 1996     Past Surgical History:   Procedure Laterality Date     ABDOMEN SURGERY      2 c sections 2009 @ 2011     ENT SURGERY      Unsure of year     ORTHOPEDIC SURGERY      Had surgery on both big toes       albuterol (PROAIR HFA/PROVENTIL HFA/VENTOLIN HFA) 108 (90 Base) MCG/ACT inhaler, Inhale 1-2 puffs into the lungs every 6 hours as needed for shortness of breath / dyspnea or wheezing  EPINEPHrine (ANY BX GENERIC EQUIV) 0.3 MG/0.3ML injection 2-pack, Inject 0.3 mLs (0.3 mg) into the muscle as needed for anaphylaxis  etonogestrel (NEXPLANON) 68 MG IMPL, 1 each by Subdermal route once Inserted 1/8/2021  Due to come out 1/8/2024  hydrOXYzine (ATARAX) 25 MG tablet, Take 0.5-1 tablets (12.5-25 mg) by mouth 3 times daily as needed for anxiety (sleep)  ketoconazole (NIZORAL) 2 % external cream, Apply topically daily  ketoconazole (NIZORAL) 2 % external shampoo, Use shampoo daily for flaky skin  nicotine (NICODERM CQ) 21 MG/24HR 24 hr patch, Place 1 patch onto the skin every 24  hours  ondansetron (ZOFRAN) 4 MG tablet, Take 1 tablet (4 mg) by mouth every 8 hours as needed for nausea  SUMAtriptan (IMITREX) 25 MG tablet, Take 1-2 tablets (25-50 mg) by mouth at onset of headache for migraine May repeat in 2 hours. Max 8 tablets/24 hours.  DULoxetine (CYMBALTA) 20 MG capsule, Take 1 capsule (20 mg) by mouth 2 times daily (Patient not taking: Reported on 2021)  nicotine (NICODERM CQ) 14 MG/24HR 24 hr patch, Place 1 patch onto the skin every 24 hours (Patient not taking: Reported on 2021)  valACYclovir (VALTREX) 500 MG tablet, Take 1 tablet (500 mg) by mouth 2 times daily for 3 days (Patient not taking: Reported on 2021)    No current facility-administered medications on file prior to visit.     Allergies   Allergen Reactions     Bee Venom Anaphylaxis     Has epipen     Aloe Hives     Dry skin as well      Codeine Rash     Other reaction(s): Intolerance-Can't Take        Problem (# of Occurrences) Relation (Name,Age of Onset)    Asthma (2) Sister (April), Brother (Rahul)    Connective Tissue Disorder (4) Father (Dad): EDS, Mother (Mom): EDS, Maternal Cousin: EDS, Maternal Cousin: EDS    Coronary Artery Disease (1) Daughter (Kaylie)    Depression (1) Mother (Mom)    Diabetes (1) Father (Dad)    Genetic Disorder (1) Daughter (Alis cueto): Avascular necrosis and Legg calve perthes disease    Mental Illness (1) Mother (Mom)    Obesity (5) Father (Dad), Mother (Mom), Sister (April), Brother (Rahul), Paternal Grandmother (Sofi)    Thyroid Disease (1) Sister (April)        Social History     Tobacco Use     Smoking status: Former Smoker     Packs/day: 2.00     Years: 10.00     Pack years: 20.00     Types: Cigarettes     Start date: 3/19/2009     Quit date: 2021     Years since quittin.0     Smokeless tobacco: Never Used   Substance Use Topics     Alcohol use: No     Drug use: No       REVIEW OF SYSTEMS:                                                      10-point review of  systems is negative except as mentioned above in HPI.     EXAM:                                                      Physical Exam:   Vitals: /64   Pulse 88   Ht 1.524 m (5')   Wt 78.3 kg (172 lb 9.6 oz)   BMI 33.71 kg/m    BMI= Body mass index is 33.71 kg/m .  GENERAL: NAD.  HEENT: NC/AT.   CV: RRR. S1, S2.   NECK: No bruits.  PULM: Non-labored breathing.   Neurologic:  MENTAL STATUS: Alert, attentive. Speech is fluent. Normal comprehension. Normal concentration. Adequate fund of knowledge.   CRANIAL NERVES: Discs flat. Visual fields intact to confrontation. Pupils equally, round and reactive to light. Facial sensation and movement normal. EOM full. Hearing intact to conversation. Trapezius strength intact. Palate moves symmetrically. Tongue midline.  MOTOR: 5/5 in proximal and distal muscle groups of upper and lower extremities. Tone and bulk normal.   DTRs: Intact and symmetric in biceps, BR, patellae.  Babinski down-going bilaterally.   SENSATION: Normal light touch and pinprick. Intact proprioception at great toes. Vibration: Normal at both ankles.   COORDINATION: Normal finger nose finger. Finger tapping normal. Knee heel shin normal.  STATION AND GAIT: Casual gait is normal.  Right hand-dominant.    Relevant Data:  Brain MRI (5.25.11):  IMPRESSION:    1.  No evidence of acute infarction, intracranial mass or hemorrhage or enhancing lesion.      2.  Two small areas of increased FLAIR signal intensity in the left parietal and occipital lobe periventricular white matter are not specific as described above.      Images not available for review.      ASSESSMENT and PLAN:                                                      Assessment:     ICD-10-CM    1. Migraine with aura and without status migrainosus, not intractable  G43.109 MR Brain w/o & w Contrast   2. Vision changes  H53.9         Ms. Espinoza is a pleasant 31-year-old woman with history of anxiety and tobacco use here for increased migraines.  Her  headaches are typical for classic migraines with aura.  She has not tried any specific abortive therapies, so I agree with the plan to try Imitrex for headache symptom onset.  I discussed that she can take another dose in a couple of hours if the first dose is not helpful.  It does not sound like she is overusing over-the-counter analgesics at this time.  Because of the increase in frequency, and the rather prolonged vision changes that she has, I think it would be reasonable to get updated brain imaging.  I would like to see Ana Maria back in clinic in a few months.  I did tell her that if the headaches become more frequent we could consider preventative treatment options.  In the meantime we will see how things go with the Imitrex.  Sintia understands and agrees with the plan.    Plan:  -- Updated brain MRI.  We will notify you of the results.  -- Try the Imitrex (sumatriptan), right at the onset of visual/headache symptoms.  You can take an additional dose 2 hours later if symptoms persist.  -- As needed ibuprofen and Tylenol also okay. Make sure that you do not take these more than a couple of days per week, to avoid medication overuse headache.  -- Return to Neurology in 3-4 months.  Please let me know if the headaches become more frequent in the meantime.    Total Time: 45 minutes were spent with the patient and in chart review/documentation (as described above in Assessment and Plan) /coordinating the care on day of service.    Karen Morse MD  Neurology    CC: Medina Peraza PA-C    Dragon software used in the dictation of this note.

## 2021-07-12 ENCOUNTER — HOSPITAL ENCOUNTER (OUTPATIENT)
Dept: MRI IMAGING | Facility: CLINIC | Age: 31
Discharge: HOME | End: 2021-07-12
Attending: PSYCHIATRY & NEUROLOGY | Admitting: PSYCHIATRY & NEUROLOGY
Payer: COMMERCIAL

## 2021-07-12 DIAGNOSIS — G43.109 MIGRAINE WITH AURA AND WITHOUT STATUS MIGRAINOSUS, NOT INTRACTABLE: ICD-10-CM

## 2021-07-12 PROCEDURE — 70553 MRI BRAIN STEM W/O & W/DYE: CPT

## 2021-07-12 PROCEDURE — 255N000002 HC RX 255 OP 636: Performed by: PSYCHIATRY & NEUROLOGY

## 2021-07-12 PROCEDURE — A9585 GADOBUTROL INJECTION: HCPCS | Performed by: PSYCHIATRY & NEUROLOGY

## 2021-07-12 RX ORDER — GADOBUTROL 604.72 MG/ML
7 INJECTION INTRAVENOUS ONCE
Status: COMPLETED | OUTPATIENT
Start: 2021-07-12 | End: 2021-07-12

## 2021-07-12 RX ADMIN — GADOBUTROL 7 ML: 604.72 INJECTION INTRAVENOUS at 14:30

## 2021-07-13 ENCOUNTER — HOSPITAL ENCOUNTER (EMERGENCY)
Facility: CLINIC | Age: 31
Discharge: HOME | End: 2021-07-13
Attending: FAMILY MEDICINE | Admitting: FAMILY MEDICINE
Payer: COMMERCIAL

## 2021-07-13 ENCOUNTER — TELEPHONE (OUTPATIENT)
Dept: NEUROLOGY | Facility: CLINIC | Age: 31
End: 2021-07-13

## 2021-07-13 ENCOUNTER — VIRTUAL VISIT (OUTPATIENT)
Dept: FAMILY MEDICINE | Facility: CLINIC | Age: 31
End: 2021-07-13
Payer: COMMERCIAL

## 2021-07-13 VITALS
SYSTOLIC BLOOD PRESSURE: 138 MMHG | TEMPERATURE: 97.6 F | WEIGHT: 173 LBS | RESPIRATION RATE: 16 BRPM | DIASTOLIC BLOOD PRESSURE: 86 MMHG | BODY MASS INDEX: 33.96 KG/M2 | HEIGHT: 60 IN | HEART RATE: 72 BPM | OXYGEN SATURATION: 100 %

## 2021-07-13 DIAGNOSIS — R11.11 NON-INTRACTABLE VOMITING WITHOUT NAUSEA, UNSPECIFIED VOMITING TYPE: ICD-10-CM

## 2021-07-13 DIAGNOSIS — R11.11 NON-INTRACTABLE VOMITING WITHOUT NAUSEA, UNSPECIFIED VOMITING TYPE: Primary | ICD-10-CM

## 2021-07-13 DIAGNOSIS — K21.9 GASTROESOPHAGEAL REFLUX DISEASE, UNSPECIFIED WHETHER ESOPHAGITIS PRESENT: ICD-10-CM

## 2021-07-13 DIAGNOSIS — Z11.59 ENCOUNTER FOR SCREENING FOR OTHER VIRAL DISEASES: ICD-10-CM

## 2021-07-13 DIAGNOSIS — K21.00 GASTROESOPHAGEAL REFLUX DISEASE WITH ESOPHAGITIS, UNSPECIFIED WHETHER HEMORRHAGE: ICD-10-CM

## 2021-07-13 LAB
ALBUMIN SERPL-MCNC: 3.5 G/DL (ref 3.4–5)
ALP SERPL-CCNC: 111 U/L (ref 40–150)
ALT SERPL W P-5'-P-CCNC: 27 U/L (ref 0–50)
ANION GAP SERPL CALCULATED.3IONS-SCNC: 6 MMOL/L (ref 3–14)
AST SERPL W P-5'-P-CCNC: 28 U/L (ref 0–45)
BASOPHILS # BLD AUTO: 0.1 10E3/UL (ref 0–0.2)
BASOPHILS NFR BLD AUTO: 1 %
BILIRUB SERPL-MCNC: 0.3 MG/DL (ref 0.2–1.3)
BUN SERPL-MCNC: 9 MG/DL (ref 7–30)
CALCIUM SERPL-MCNC: 8.5 MG/DL (ref 8.5–10.1)
CHLORIDE BLD-SCNC: 110 MMOL/L (ref 94–109)
CO2 SERPL-SCNC: 25 MMOL/L (ref 20–32)
CREAT SERPL-MCNC: 0.81 MG/DL (ref 0.52–1.04)
EOSINOPHIL # BLD AUTO: 0.2 10E3/UL (ref 0–0.7)
EOSINOPHIL NFR BLD AUTO: 2 %
ERYTHROCYTE [DISTWIDTH] IN BLOOD BY AUTOMATED COUNT: 12.4 % (ref 10–15)
GFR SERPL CREATININE-BSD FRML MDRD: >90 ML/MIN/1.73M2
GLUCOSE BLD-MCNC: 84 MG/DL (ref 70–99)
HCG SERPL QL: NEGATIVE
HCT VFR BLD AUTO: 41.6 % (ref 35–47)
HGB BLD-MCNC: 14.4 G/DL (ref 11.7–15.7)
IMM GRANULOCYTES # BLD: 0 10E3/UL
IMM GRANULOCYTES NFR BLD: 0 %
LIPASE SERPL-CCNC: 150 U/L (ref 73–393)
LYMPHOCYTES # BLD AUTO: 3.5 10E3/UL (ref 0.8–5.3)
LYMPHOCYTES NFR BLD AUTO: 40 %
MCH RBC QN AUTO: 33.4 PG (ref 26.5–33)
MCHC RBC AUTO-ENTMCNC: 34.6 G/DL (ref 31.5–36.5)
MCV RBC AUTO: 97 FL (ref 78–100)
MONOCYTES # BLD AUTO: 0.4 10E3/UL (ref 0–1.3)
MONOCYTES NFR BLD AUTO: 4 %
NEUTROPHILS # BLD AUTO: 4.6 10E3/UL (ref 1.6–8.3)
NEUTROPHILS NFR BLD AUTO: 53 %
NRBC # BLD AUTO: 0 10E3/UL
NRBC BLD AUTO-RTO: 0 /100
PLATELET # BLD AUTO: 302 10E3/UL (ref 150–450)
POTASSIUM BLD-SCNC: 4.4 MMOL/L (ref 3.4–5.3)
PROT SERPL-MCNC: 6.9 G/DL (ref 6.8–8.8)
RBC # BLD AUTO: 4.31 10E6/UL (ref 3.8–5.2)
SODIUM SERPL-SCNC: 141 MMOL/L (ref 133–144)
WBC # BLD AUTO: 8.7 10E3/UL (ref 4–11)

## 2021-07-13 PROCEDURE — 36415 COLL VENOUS BLD VENIPUNCTURE: CPT | Performed by: FAMILY MEDICINE

## 2021-07-13 PROCEDURE — 99213 OFFICE O/P EST LOW 20 MIN: CPT | Mod: 95 | Performed by: NURSE PRACTITIONER

## 2021-07-13 PROCEDURE — 99284 EMERGENCY DEPT VISIT MOD MDM: CPT | Performed by: FAMILY MEDICINE

## 2021-07-13 PROCEDURE — 99283 EMERGENCY DEPT VISIT LOW MDM: CPT | Performed by: FAMILY MEDICINE

## 2021-07-13 PROCEDURE — 36592 COLLECT BLOOD FROM PICC: CPT | Performed by: FAMILY MEDICINE

## 2021-07-13 PROCEDURE — 83690 ASSAY OF LIPASE: CPT | Performed by: FAMILY MEDICINE

## 2021-07-13 PROCEDURE — 80053 COMPREHEN METABOLIC PANEL: CPT | Performed by: FAMILY MEDICINE

## 2021-07-13 PROCEDURE — 84703 CHORIONIC GONADOTROPIN ASSAY: CPT | Performed by: FAMILY MEDICINE

## 2021-07-13 PROCEDURE — 85025 COMPLETE CBC W/AUTO DIFF WBC: CPT | Performed by: FAMILY MEDICINE

## 2021-07-13 ASSESSMENT — MIFFLIN-ST. JEOR: SCORE: 1416.22

## 2021-07-13 NOTE — PROGRESS NOTES
Ana Maria is a 31 year old who is being evaluated via a billable video visit.      How would you like to obtain your AVS? MyChart  If the video visit is dropped, the invitation should be resent by: Text  [phone]  Will anyone else be joining your video visit? No      Video Start Time: 1:07 PM    Assessment & Plan     Non-intractable vomiting without nausea, unspecified vomiting type  2-week history of increased morning vomiting, and then seen in emergency room and started on Prilosec.  Patient has a history of acid reflux, was on medication years ago and has not taken any since.  Patient has also been having intermittent black stools, with diarrhea prior to this.  Discussed reflux management as below.  Also advised given black stools should proceed with upper endoscopy to further evaluate.  - Adult Gastro Ref - Procedure Only; Future    Gastroesophageal reflux disease, unspecified whether esophagitis present  Long history of reflux disease, had been on medication previously and has not taken it in some time.  When seen in ER, start Prilosec and patient has been taking since.  Has not noticed much of a difference yet.  Advised to continue Prilosec daily, reviewed proper administration instructions and advised to schedule upper endoscopy for further evaluation.  - Adult Gastro Ref - Procedure Only; Future             BMI:   Estimated body mass index is 33.79 kg/m  as calculated from the following:    Height as of 7/16/21: 1.524 m (5').    Weight as of 7/16/21: 78.5 kg (173 lb).     See Patient Instructions    Return in about 1 month (around 8/13/2021), or if symptoms worsen or fail to improve.    SY Hunt CNP  [facility]   Ana Maria is a 31 year old who presents for the following health issues     HPI     ED/UC Followup:    Facility:  [facility]   Date of visit: 07/13/2021  Reason for visit: Vomiting   Current Status: Says that she has not thrown up  since this morning. Was told that she should talk to the clinic about a referral for a endoscopy to look for a hital hernia.     Vomiting in the last 2 weeks, more in the morning   No blood noted  Stools started off black when stools started getting more formed after diarrhea - this is better   Has had reflux since 2009 - was on Zantac and changed to Prilosec  Was not taking prilosec consistently before ER visit   Prescribed Prilosec and has been taking       Review of Systems   Constitutional, HEENT, cardiovascular, pulmonary, gi and gu systems are negative, except as otherwise noted.      Objective           Vitals:  No vitals were obtained today due to virtual visit.    Physical Exam   GENERAL: Healthy, alert and no distress  EYES: Eyes grossly normal to inspection.  No discharge or erythema, or obvious scleral/conjunctival abnormalities.  RESP: No audible wheeze, cough, or visible cyanosis.  No visible retractions or increased work of breathing.    SKIN: Visible skin clear. No significant rash, abnormal pigmentation or lesions.  NEURO: Cranial nerves grossly intact.  Mentation and speech appropriate for age.  PSYCH: Mentation appears normal, affect normal/bright, judgement and insight intact, normal speech and appearance well-groomed.    Diagnostic Test Results:  Endoscopy ordered           Video-Visit Details    Type of service:  Video Visit    Video End Time:1:20 PM    Originating Location (pt. Location): Home    Distant Location (provider location):  [facility]     Platform used for Video Visit: [employer]

## 2021-07-13 NOTE — LETTER
July 13, 2021      To Whom It May Concern:      Ana Maria Espinoza was seen in our Emergency Department today, 07/13/21.       Sincerely,        Stevie Jones MD

## 2021-07-13 NOTE — TELEPHONE ENCOUNTER
Ana Maria informed of Dr. Morse message regarding MRI results.  I reminded Ana Maria that Dr. Morse plan was for follow up appointment in 3-4 months.  Ana Maria says she will call [facility] to schedule appointment at that location.  I asked Ana Maria to call back if she has any questions prior to next follow up appointment.  Carmen Ross RN on 7/13/2021 at 2:50 PM

## 2021-07-13 NOTE — TELEPHONE ENCOUNTER
----- Message from Karen Morse MD sent at 7/13/2021  8:02 AM CDT -----  Please let Ana Maria know that her brain MRI is normal/unremarkable.     Thank you,  Dr. Morse

## 2021-07-13 NOTE — PROGRESS NOTES
Called pt to let her know that she left without a work note. However, she was able to access it on her MyChart. Note shredded per patient request.

## 2021-07-13 NOTE — DISCHARGE INSTRUCTIONS
Return to the Emergency Room if the following occurs:     Severely worsened pain, vomiting and dehydration, fainting, fever greater than 101, or for any concern at anytime.    Or, follow-up with the following provider as we discussed:     Return to your primary doctor in 2 to 3 weeks for reevaluation.    Medications discussed:    Prilosec twice daily x 30 days.  Maalox/Mylanta/Tums for acute pain.  Avoid caffeine, smoking, chewing tobacco, ibuprofen/advil/aleve, alcohol, eating within 2-3 hours of bed.    If you received pain-relieving or sedating medication during your time in the ER, avoid alcohol, driving automobiles, or working with machinery.  Also, a responsible adult must stay with you.        Call the Nurse Advice Line at [phone] or [phone] for any concern at anytime.

## 2021-07-13 NOTE — ED TRIAGE NOTES
"Vomiting ongoing over the past week described as \"neon pink\" denies blood. Started with black stools. States the black stools have stopped. States long standing history of 10+ years of acid reflux. Virtual appointment with PCP last week, rx Zofran. No diagnostics done. Intermittent abdominal pain described as cramping, denies in triage. No abdominal surgeries except  x 2. Does state hx of pregnancy while on birth control. Currently has Nexplanon.  "

## 2021-07-13 NOTE — ED PROVIDER NOTES
"  HPI   The patient is a 31-year-old female presenting with vomiting.  Symptoms have been present for about 1 to 2 weeks.  She has sudden onset of vomiting especially in the morning.  Occasionally she will have symptoms in the afternoon.  She denies nausea.  She denies having a decreased appetite.  She does report some black stool about 2 weeks ago.  That slowly resolved on its own.  She has had normal bowel movements since that time.  She denies significant abdominal pain.  She does experience reflux on a regular basis, especially at night.  She denies regular use of NSAIDs.  She does smoke tobacco.  She does not use chewing tobacco.  She does not use alcohol.  No lightheadedness or fainting.  No chest pain.  No shortness of breath.  She threw up twice this morning.  She describes the vomit as \"hot pink in color.\"  No black material or bright red blood.  No clot.        Allergies:  Allergies   Allergen Reactions     Bee Venom Anaphylaxis     Has epipen     Aloe Hives     Dry skin as well      Codeine Rash     Other reaction(s): Intolerance-Can't Take     Problem List:    Patient Active Problem List    Diagnosis Date Noted     Moderate episode of recurrent major depressive disorder (H) 01/27/2021     Priority: Medium     Generalized anxiety disorder 01/27/2021     Priority: Medium     Nexplanon in place 01/08/2021     Priority: Medium     Placed in the left arm on 1/8/2021 by Dr. Dimitris Zambrano  Due to come out 1/8/2024       Tobacco use 01/29/2020     Priority: Medium     H/O herpes simplex type 2 infection 10/16/2017     Priority: Medium      Past Medical History:    Past Medical History:   Diagnosis Date     Uncomplicated asthma 1996     Past Surgical History:    Past Surgical History:   Procedure Laterality Date     ABDOMEN SURGERY      2 c sections 2009 @ 2011     ENT SURGERY      Unsure of year     ORTHOPEDIC SURGERY      Had surgery on both big toes     Family History:    Family History   Problem Relation Age " of Onset     Diabetes Father      Obesity Father      Connective Tissue Disorder Father         EDS     Coronary Artery Disease Daughter      Depression Mother      Mental Illness Mother      Obesity Mother      Connective Tissue Disorder Mother         EDS     Asthma Sister      Thyroid Disease Sister      Obesity Sister      Asthma Brother      Obesity Brother      Genetic Disorder Daughter         Avascular necrosis and Legg calve perthes disease     Obesity Paternal Grandmother      Connective Tissue Disorder Maternal Cousin         EDS     Connective Tissue Disorder Maternal Cousin         EDS     Social History:  Marital Status:  Single [1]  Social History     Tobacco Use     Smoking status: Former Smoker     Packs/day: 2.00     Years: 10.00     Pack years: 20.00     Types: Cigarettes     Start date: 3/19/2009     Quit date: 2021     Years since quittin.0     Smokeless tobacco: Never Used   Substance Use Topics     Alcohol use: No     Drug use: No      Medications:    omeprazole (PRILOSEC) 20 MG DR capsule  albuterol (PROAIR HFA/PROVENTIL HFA/VENTOLIN HFA) 108 (90 Base) MCG/ACT inhaler  DULoxetine (CYMBALTA) 20 MG capsule  EPINEPHrine (ANY BX GENERIC EQUIV) 0.3 MG/0.3ML injection 2-pack  etonogestrel (NEXPLANON) 68 MG IMPL  hydrOXYzine (ATARAX) 25 MG tablet  ketoconazole (NIZORAL) 2 % external cream  ketoconazole (NIZORAL) 2 % external shampoo  nicotine (NICODERM CQ) 14 MG/24HR 24 hr patch  nicotine (NICODERM CQ) 21 MG/24HR 24 hr patch  ondansetron (ZOFRAN) 4 MG tablet  SUMAtriptan (IMITREX) 25 MG tablet  valACYclovir (VALTREX) 500 MG tablet      Review of Systems   All other systems reviewed and are negative.      PE   BP: (!) 151/108  Pulse: 73  Temp: 97.6  F (36.4  C)  Resp: 16  Height: 152.4 cm (5')  Weight: 78.5 kg (173 lb)  SpO2: 99 %  Physical Exam  Vitals reviewed.   Constitutional:       General: She is not in acute distress.     Appearance: She is well-developed.   HENT:      Head:  Normocephalic and atraumatic.      Right Ear: External ear normal.      Left Ear: External ear normal.      Nose: Nose normal.      Mouth/Throat:      Mouth: Mucous membranes are moist.      Pharynx: Oropharynx is clear.   Eyes:      Extraocular Movements: Extraocular movements intact.      Conjunctiva/sclera: Conjunctivae normal.      Pupils: Pupils are equal, round, and reactive to light.   Cardiovascular:      Rate and Rhythm: Normal rate and regular rhythm.   Pulmonary:      Effort: Pulmonary effort is normal.   Abdominal:      Tenderness: There is no abdominal tenderness.   Musculoskeletal:         General: Normal range of motion.      Cervical back: Normal range of motion.   Skin:     General: Skin is warm and dry.   Neurological:      Mental Status: She is alert and oriented to person, place, and time.   Psychiatric:         Behavior: Behavior normal.         ED COURSE and [facility]   0725.  The patient has vomiting and reflux.  She reports having a recent history of black stool.  Lab values pending.  No emergent need for imaging at this point.    0835.  Lab values unremarkable.  The patient is without significant symptoms here in the ED.  Vital signs within normal limits except for some mild hypertension.  Follow-up discussed.  Start Prilosec twice a day.  Avoid risk factors.  Return for worsening.    LABS  Labs Ordered and Resulted from Time of ED Arrival Up to the Time of Departure from the ED   COMPREHENSIVE METABOLIC PANEL - Abnormal; Notable for the following components:       Result Value    Chloride 110 (*)     All other components within normal limits   CBC WITH PLATELETS AND DIFFERENTIAL - Abnormal; Notable for the following components:    MCH 33.4 (*)     All other components within normal limits   LIPASE - Normal   HCG QUALITATIVE PREGNANCY - Normal   CBC WITH PLATELETS & DIFFERENTIAL    Narrative:     The following orders were created for panel order CBC with platelets, differential.  Procedure                                Abnormality         Status                     ---------                               -----------         ------                     CBC with platelets and d...[[id number]]  Abnormal            Final result                 Please view results for these tests on the individual orders.       IMAGING  Images reviewed by me.  Radiology report also reviewed.  No orders to display       Procedures    Medications - No data to display      IMPRESSION       ICD-10-CM    1. Non-intractable vomiting without nausea, unspecified vomiting type  R11.11    2. Gastroesophageal reflux disease with esophagitis, unspecified whether hemorrhage  K21.00             Medication List      Started    omeprazole 20 MG DR capsule  Commonly known as: priLOSEC  20 mg, Oral, 2 TIMES DAILY                          Stevie Jones MD  07/13/21 0809

## 2021-07-16 ENCOUNTER — ANCILLARY PROCEDURE (OUTPATIENT)
Dept: GENERAL RADIOLOGY | Facility: CLINIC | Age: 31
End: 2021-07-16
Attending: FAMILY MEDICINE
Payer: COMMERCIAL

## 2021-07-16 ENCOUNTER — OFFICE VISIT (OUTPATIENT)
Dept: ORTHOPEDICS | Facility: CLINIC | Age: 31
End: 2021-07-16
Attending: PHYSICIAN ASSISTANT
Payer: COMMERCIAL

## 2021-07-16 VITALS
BODY MASS INDEX: 33.96 KG/M2 | HEIGHT: 60 IN | WEIGHT: 173 LBS | DIASTOLIC BLOOD PRESSURE: 78 MMHG | SYSTOLIC BLOOD PRESSURE: 122 MMHG

## 2021-07-16 DIAGNOSIS — G89.29 CHRONIC LEFT SHOULDER PAIN: Primary | ICD-10-CM

## 2021-07-16 DIAGNOSIS — M25.512 LEFT SHOULDER PAIN: ICD-10-CM

## 2021-07-16 DIAGNOSIS — M25.50 ARTHRALGIA, UNSPECIFIED JOINT: ICD-10-CM

## 2021-07-16 DIAGNOSIS — M25.512 CHRONIC LEFT SHOULDER PAIN: Primary | ICD-10-CM

## 2021-07-16 PROCEDURE — 73030 X-RAY EXAM OF SHOULDER: CPT | Mod: LT | Performed by: RADIOLOGY

## 2021-07-16 PROCEDURE — 99243 OFF/OP CNSLTJ NEW/EST LOW 30: CPT | Performed by: FAMILY MEDICINE

## 2021-07-16 ASSESSMENT — MIFFLIN-ST. JEOR: SCORE: 1416.22
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
## 2021-07-16 NOTE — PATIENT INSTRUCTIONS
# Multiple joint pain, notably left shoulder pain: Symptoms noted over the past 4 months with general left shoulder pain worse with movement, improved with rest.  She has been on anti-inflammatory medications particularly ones prescribed by her rheumatologist at [facility].  On exam today she does not have any significant pain with mild tenderness over the front part of her shoulder worse with biceps tendon testing.  Reviewed x-rays today showing a calcification in the subacromial region concerning for calcific tendinitis.  In the setting of this she has been worked up for possible rheumatological condition that would contribute to her multiple joint pain.  Also she has a family history of Judith Danlos syndrome.  Beighton score today was 2.  Given the family history and the multiple joint pain as well as reported history of shoulder dislocations will refer to the [facility] for genetic work-up.  She can follow-up with me as needed.  Image Findings: Calcific tendinitis over the left shoulder  Treatment: Activities as tolerated, restart home exercises given today  Job:  No restrictions  Medications/Injections: Limited tylenol/ibuprofen for pain for 1-2 weeks, defer steroid injection for now can return as needed for this  Follow-up: with [facility] Rheumatologist  Can consider subacromial steroid injection     Please call [phone]   Ask for my team if you have any questions or concerns    It was great seeing you today!    Navin Desai MD, [facility]

## 2021-07-18 ENCOUNTER — LAB (OUTPATIENT)
Dept: LAB | Facility: CLINIC | Age: 31
End: 2021-07-18
Attending: SURGERY
Payer: COMMERCIAL

## 2021-07-18 DIAGNOSIS — Z11.59 ENCOUNTER FOR SCREENING FOR OTHER VIRAL DISEASES: ICD-10-CM

## 2021-07-18 PROCEDURE — U0005 INFEC AGEN DETEC AMPLI PROBE: HCPCS

## 2021-07-18 PROCEDURE — U0003 INFECTIOUS AGENT DETECTION BY NUCLEIC ACID (DNA OR RNA); SEVERE ACUTE RESPIRATORY SYNDROME CORONAVIRUS 2 (SARS-COV-2) (CORONAVIRUS DISEASE [COVID-19]), AMPLIFIED PROBE TECHNIQUE, MAKING USE OF HIGH THROUGHPUT TECHNOLOGIES AS DESCRIBED BY CMS-2020-01-R: HCPCS

## 2021-07-19 ENCOUNTER — ANESTHESIA EVENT (OUTPATIENT)
Dept: GASTROENTEROLOGY | Facility: CLINIC | Age: 31
End: 2021-07-19
Payer: COMMERCIAL

## 2021-07-19 LAB — SARS-COV-2 RNA RESP QL NAA+PROBE: NEGATIVE

## 2021-07-19 ASSESSMENT — LIFESTYLE VARIABLES: TOBACCO_USE: 1

## 2021-07-19 NOTE — ANESTHESIA PREPROCEDURE EVALUATION
Anesthesia Pre-Procedure Evaluation    Patient: Ana Maria Espinoza   MRN: [id number] : 1990        Preoperative Diagnosis: Intractable vomiting without nausea, unspecified vomiting type [R11.11]  Gastroesophageal reflux disease, unspecified whether esophagitis present [K21.9]   Procedure : Procedure(s):  ESOPHAGOGASTRODUODENOSCOPY (EGD)     Past Medical History:   Diagnosis Date     Uncomplicated asthma       Past Surgical History:   Procedure Laterality Date     ABDOMEN SURGERY      2 c sections  @      ENT SURGERY      Unsure of year     ORTHOPEDIC SURGERY      Had surgery on both big toes      Allergies   Allergen Reactions     Bee Venom Anaphylaxis     Has epipen     Aloe Hives     Dry skin as well      Codeine Rash     Other reaction(s): Intolerance-Can't Take      Social History     Tobacco Use     Smoking status: Former Smoker     Packs/day: 2.00     Years: 10.00     Pack years: 20.00     Types: Cigarettes     Start date: 3/19/2009     Quit date: 2021     Years since quittin.0     Smokeless tobacco: Never Used   Substance Use Topics     Alcohol use: No      Wt Readings from Last 1 Encounters:   21 78.5 kg (173 lb)        Anesthesia Evaluation   Pt has had prior anesthetic. Type: General.        ROS/MED HX  ENT/Pulmonary:     (+) tobacco use, Current use, asthma     Neurologic:  - neg neurologic ROS     Cardiovascular:  - neg cardiovascular ROS     METS/Exercise Tolerance:     Hematologic:  - neg hematologic  ROS     Musculoskeletal:  - neg musculoskeletal ROS     GI/Hepatic:  - neg GI/hepatic ROS     Renal/Genitourinary:  - neg Renal ROS     Endo:  - neg endo ROS     Psychiatric/Substance Use:     (+) psychiatric history depression and anxiety     Infectious Disease:  - neg infectious disease ROS     Malignancy:  - neg malignancy ROS     Other:            Physical Exam    Airway        Mallampati: II   TM distance: > 3 FB   Neck ROM: full   Mouth opening: > 3 cm    Respiratory  Devices and Support         Dental  no notable dental history         Cardiovascular   cardiovascular exam normal          Pulmonary   pulmonary exam normal                OUTSIDE LABS:  CBC:   Lab Results   Component Value Date    WBC 8.7 07/13/2021    WBC 11.8 (H) 03/05/2021    HGB 14.4 07/13/2021    HGB 13.6 03/05/2021    HCT 41.6 07/13/2021    HCT 38.8 03/05/2021     07/13/2021     03/05/2021     BMP:   Lab Results   Component Value Date     07/13/2021    POTASSIUM 4.4 07/13/2021    CHLORIDE 110 (H) 07/13/2021    CO2 25 07/13/2021    BUN 9 07/13/2021    CR 0.81 07/13/2021    CR 0.81 03/05/2021    GLC 84 07/13/2021     COAGS: No results found for: PTT, INR, FIBR  POC:   Lab Results   Component Value Date    HCG Negative 01/03/2020    HCGS Negative 07/13/2021     HEPATIC:   Lab Results   Component Value Date    ALBUMIN 3.5 07/13/2021    PROTTOTAL 6.9 07/13/2021    ALT 27 07/13/2021    AST 28 07/13/2021    ALKPHOS 111 07/13/2021    BILITOTAL 0.3 07/13/2021     OTHER:   Lab Results   Component Value Date    FELICIA 8.5 07/13/2021    LIPASE 150 07/13/2021    TSH 1.50 03/05/2021    CRP 0.4 03/05/2021    SED 8 03/05/2021       Anesthesia Plan    ASA Status:  2      Anesthesia Type: General.   Induction: Intravenous.           Consents            Postoperative Care            Comments:                Andrzej Viveros, CRNA, APRN CRNA

## 2021-07-19 NOTE — PATIENT INSTRUCTIONS
Non-intractable vomiting without nausea, unspecified vomiting type  2-week history of increased morning vomiting, and then seen in emergency room and started on Prilosec.  Patient has a history of acid reflux, was on medication years ago and has not taken any since.  Patient has also been having intermittent black stools, with diarrhea prior to this.  Discussed reflux management as below.  Also advised given black stools should proceed with upper endoscopy to further evaluate.  - Adult Gastro Ref - Procedure Only; Future    Gastroesophageal reflux disease, unspecified whether esophagitis present  Long history of reflux disease, had been on medication previously and has not taken it in some time.  When seen in ER, start Prilosec and patient has been taking since.  Has not noticed much of a difference yet.  Advised to continue Prilosec daily, reviewed proper administration instructions and advised to schedule upper endoscopy for further evaluation.  - Adult Gastro Ref - Procedure Only; Future

## 2021-07-20 ENCOUNTER — HOSPITAL ENCOUNTER (OUTPATIENT)
Facility: CLINIC | Age: 31
Discharge: HOME | End: 2021-07-20
Attending: SURGERY | Admitting: SURGERY
Payer: COMMERCIAL

## 2021-07-20 ENCOUNTER — ANESTHESIA (OUTPATIENT)
Dept: GASTROENTEROLOGY | Facility: CLINIC | Age: 31
End: 2021-07-20
Payer: COMMERCIAL

## 2021-07-20 VITALS
DIASTOLIC BLOOD PRESSURE: 76 MMHG | TEMPERATURE: 97.6 F | WEIGHT: 173 LBS | OXYGEN SATURATION: 95 % | SYSTOLIC BLOOD PRESSURE: 111 MMHG | RESPIRATION RATE: 16 BRPM | HEART RATE: 70 BPM | HEIGHT: 60 IN | BODY MASS INDEX: 33.96 KG/M2

## 2021-07-20 LAB — UPPER GI ENDOSCOPY: NORMAL

## 2021-07-20 PROCEDURE — 88305 TISSUE EXAM BY PATHOLOGIST: CPT | Mod: TC | Performed by: SURGERY

## 2021-07-20 PROCEDURE — 43239 EGD BIOPSY SINGLE/MULTIPLE: CPT | Performed by: SURGERY

## 2021-07-20 PROCEDURE — 250N000011 HC RX IP 250 OP 636: Performed by: NURSE ANESTHETIST, CERTIFIED REGISTERED

## 2021-07-20 PROCEDURE — 250N000009 HC RX 250: Performed by: NURSE ANESTHETIST, CERTIFIED REGISTERED

## 2021-07-20 PROCEDURE — 250N000009 HC RX 250: Performed by: SURGERY

## 2021-07-20 PROCEDURE — 370N000017 HC ANESTHESIA TECHNICAL FEE, PER MIN: Performed by: SURGERY

## 2021-07-20 PROCEDURE — 258N000003 HC RX IP 258 OP 636: Performed by: SURGERY

## 2021-07-20 RX ORDER — PROPOFOL 10 MG/ML
INJECTION, EMULSION INTRAVENOUS PRN
Status: DISCONTINUED | OUTPATIENT
Start: 2021-07-20 | End: 2021-07-20

## 2021-07-20 RX ORDER — ONDANSETRON 2 MG/ML
4 INJECTION INTRAMUSCULAR; INTRAVENOUS
Status: DISCONTINUED | OUTPATIENT
Start: 2021-07-20 | End: 2021-07-20 | Stop reason: HOSPADM

## 2021-07-20 RX ORDER — LIDOCAINE 40 MG/G
CREAM TOPICAL
Status: DISCONTINUED | OUTPATIENT
Start: 2021-07-20 | End: 2021-07-20 | Stop reason: HOSPADM

## 2021-07-20 RX ORDER — LIDOCAINE HYDROCHLORIDE 10 MG/ML
INJECTION, SOLUTION EPIDURAL; INFILTRATION; INTRACAUDAL; PERINEURAL PRN
Status: DISCONTINUED | OUTPATIENT
Start: 2021-07-20 | End: 2021-07-20

## 2021-07-20 RX ORDER — SODIUM CHLORIDE, SODIUM LACTATE, POTASSIUM CHLORIDE, CALCIUM CHLORIDE 600; 310; 30; 20 MG/100ML; MG/100ML; MG/100ML; MG/100ML
INJECTION, SOLUTION INTRAVENOUS CONTINUOUS
Status: DISCONTINUED | OUTPATIENT
Start: 2021-07-20 | End: 2021-07-20 | Stop reason: HOSPADM

## 2021-07-20 RX ORDER — ERGOCALCIFEROL 1.25 MG/1
50000 CAPSULE ORAL
COMMUNITY
Start: 2021-03-11 | End: 2021-08-04

## 2021-07-20 RX ADMIN — LIDOCAINE HYDROCHLORIDE 50 MG: 10 INJECTION, SOLUTION EPIDURAL; INFILTRATION; INTRACAUDAL; PERINEURAL at 11:30

## 2021-07-20 RX ADMIN — PROPOFOL 100 MG: 10 INJECTION, EMULSION INTRAVENOUS at 11:30

## 2021-07-20 RX ADMIN — SODIUM CHLORIDE, POTASSIUM CHLORIDE, SODIUM LACTATE AND CALCIUM CHLORIDE: 600; 310; 30; 20 INJECTION, SOLUTION INTRAVENOUS at 11:25

## 2021-07-20 RX ADMIN — PROPOFOL 200 MG: 10 INJECTION, EMULSION INTRAVENOUS at 11:30

## 2021-07-20 ASSESSMENT — MIFFLIN-ST. JEOR: SCORE: 1416.22

## 2021-07-20 NOTE — BRIEF OP NOTE
[facility]   Brief Operative Note    Pre-operative diagnosis: Intractable vomiting without nausea, unspecified vomiting type [R11.11]  Gastroesophageal reflux disease, unspecified whether esophagitis present [K21.9]   Post-operative diagnosis small hiatal hernia, otherwise normal     Procedure: Procedure(s):  ESOPHAGOGASTRODUODENOSCOPY (EGD)   Surgeon(s): Surgeon(s) and Role:     * German Flynn MD - Primary   Estimated blood loss: * No values recorded between 7/20/2021 11:33 AM and 7/20/2021 11:41 AM *    Specimens: ID Type Source Tests Collected by Time Destination   1 :  Biopsy Stomach, Antrum SURGICAL PATHOLOGY EXAM German Flynn MD 7/20/2021 11:37 AM       Findings: 1. Normal esophagus  2. z-line regular at 33 cm  3. Hiatus at 35 cm = 2 cm hh  4. Normal stomach - biopsied for h. Pylori  5. Normal duodenum  6. No ulcers seen

## 2021-07-20 NOTE — ANESTHESIA POSTPROCEDURE EVALUATION
Patient: Ana Maria Espinoza    Procedure(s):  ESOPHAGOGASTRODUODENOSCOPY, WITH BIOPSY    Diagnosis:Intractable vomiting without nausea, unspecified vomiting type [R11.11]  Gastroesophageal reflux disease, unspecified whether esophagitis present [K21.9]  Diagnosis Additional Information: No value filed.    Anesthesia Type:  General    Note:  Disposition: Outpatient   Postop Pain Control: Uneventful            Sign Out: Well controlled pain   PONV: No   Neuro/Psych: Uneventful            Sign Out: Acceptable/Baseline neuro status   Airway/Respiratory: Uneventful            Sign Out: Acceptable/Baseline resp. status   CV/Hemodynamics: Uneventful            Sign Out: Acceptable CV status; No obvious hypovolemia; No obvious fluid overload   Other NRE: NONE   DID A NON-ROUTINE EVENT OCCUR?            Last vitals:  Vitals Value Taken Time   BP 99/50 07/20/21 1151   Temp  07/20/21 1155   Pulse 75 07/20/21 1151   Resp  07/20/21 1155   SpO2 100 % 07/20/21 1154   Vitals shown include unvalidated device data.    Electronically Signed By: SY Mares CRNA  July 20, 2021  11:55 AM

## 2021-07-20 NOTE — H&P
31 year old year old female here for upper endoscopy for evaluation of nausea, vomiting, and GERD symptoms.        Patient Active Problem List   Diagnosis     Tobacco use     H/O herpes simplex type 2 infection     Nexplanon in place     Moderate episode of recurrent major depressive disorder (H)     Generalized anxiety disorder       Past Medical History:   Diagnosis Date     Uncomplicated asthma 1996       Past Surgical History:   Procedure Laterality Date     ABDOMEN SURGERY      2 c sections 2009 @ 2011     ENT SURGERY      Unsure of year     ORTHOPEDIC SURGERY      Had surgery on both big toes       Family History   Problem Relation Age of Onset     Diabetes Father      Obesity Father      Connective Tissue Disorder Father         EDS     Coronary Artery Disease Daughter      Depression Mother      Mental Illness Mother      Obesity Mother      Connective Tissue Disorder Mother         EDS     Asthma Sister      Thyroid Disease Sister      Obesity Sister      Asthma Brother      Obesity Brother      Genetic Disorder Daughter         Avascular necrosis and Legg calve perthes disease     Obesity Paternal Grandmother      Connective Tissue Disorder Maternal Cousin         EDS     Connective Tissue Disorder Maternal Cousin         EDS       No current outpatient medications on file.       Allergies   Allergen Reactions     Bee Venom Anaphylaxis     Has epipen     Aloe Hives     Dry skin as well      Codeine Rash     Other reaction(s): Intolerance-Can't Take       Pt reports that she quit smoking about 4 weeks ago. Her smoking use included cigarettes. She started smoking about 12 years ago. She has a 20.00 pack-year smoking history. She has never used smokeless tobacco. She reports that she does not drink alcohol and does not use drugs.    Exam:    Awake, Alert OX3  Lungs - CTA bilaterally  CV - RRR, no murmurs, distal pulses intact  Abd - soft, non-distended, non-tender, +BS  Extr - No cyanosis or edema    A/P 31  year old year old female in need of upper endoscopy for N/V/GERD. Risks, benefits, alternatives, and complications were discussed including the possibility of perforation and the patient agreed to proceed.    German Flynn MD

## 2021-07-20 NOTE — ANESTHESIA CARE TRANSFER NOTE
Patient: Ana Maria Espinoza    Procedure(s):  ESOPHAGOGASTRODUODENOSCOPY, WITH BIOPSY    Diagnosis: Intractable vomiting without nausea, unspecified vomiting type [R11.11]  Gastroesophageal reflux disease, unspecified whether esophagitis present [K21.9]  Diagnosis Additional Information: No value filed.    Anesthesia Type:   General     Note:    Oropharynx: oropharynx clear of all foreign objects  Level of Consciousness: awake  Oxygen Supplementation: room air    Independent Airway: airway patency satisfactory and stable  Dentition: dentition unchanged  Vital Signs Stable: post-procedure vital signs reviewed and stable  Report to RN Given: handoff report given  Patient transferred to: Phase II    Handoff Report: Identifed the Patient, Identified the Reponsible Provider, Reviewed the pertinent medical history, Discussed the surgical course, Reviewed Intra-OP anesthesia mangement and issues during anesthesia, Set expectations for post-procedure period and Allowed opportunity for questions and acknowledgement of understanding      Vitals:  Vitals Value Taken Time   BP  07/20/21 1154   Temp  07/20/21 1154   Pulse  07/20/21 1154   Resp  07/20/21 1154   SpO2 96 % 07/20/21 1152   Vitals shown include unvalidated device data.    Electronically Signed By: SY Mares CRNA  July 20, 2021  11:54 AM

## 2021-07-22 LAB
PATH REPORT.COMMENTS IMP SPEC: NORMAL
PATH REPORT.COMMENTS IMP SPEC: NORMAL
PATH REPORT.FINAL DX SPEC: NORMAL
PATH REPORT.GROSS SPEC: NORMAL
PATH REPORT.MICROSCOPIC SPEC OTHER STN: NORMAL
PATH REPORT.RELEVANT HX SPEC: NORMAL
PHOTO IMAGE: NORMAL

## 2021-07-22 PROCEDURE — 88305 TISSUE EXAM BY PATHOLOGIST: CPT | Mod: 26 | Performed by: PATHOLOGY

## 2021-07-26 ENCOUNTER — OFFICE VISIT (OUTPATIENT)
Dept: FAMILY MEDICINE | Facility: CLINIC | Age: 31
End: 2021-07-26
Payer: COMMERCIAL

## 2021-07-26 VITALS
HEART RATE: 80 BPM | RESPIRATION RATE: 12 BRPM | DIASTOLIC BLOOD PRESSURE: 74 MMHG | WEIGHT: 172 LBS | BODY MASS INDEX: 33.59 KG/M2 | SYSTOLIC BLOOD PRESSURE: 100 MMHG

## 2021-07-26 DIAGNOSIS — R11.0 NAUSEA: Primary | ICD-10-CM

## 2021-07-26 PROCEDURE — 99214 OFFICE O/P EST MOD 30 MIN: CPT | Performed by: NURSE PRACTITIONER

## 2021-07-26 ASSESSMENT — ANXIETY QUESTIONNAIRES
5. BEING SO RESTLESS THAT IT IS HARD TO SIT STILL: NOT AT ALL
2. NOT BEING ABLE TO STOP OR CONTROL WORRYING: NOT AT ALL
7. FEELING AFRAID AS IF SOMETHING AWFUL MIGHT HAPPEN: NOT AT ALL
3. WORRYING TOO MUCH ABOUT DIFFERENT THINGS: NOT AT ALL
GAD7 TOTAL SCORE: 2
6. BECOMING EASILY ANNOYED OR IRRITABLE: NOT AT ALL
1. FEELING NERVOUS, ANXIOUS, OR ON EDGE: SEVERAL DAYS

## 2021-07-26 ASSESSMENT — PAIN SCALES - GENERAL: PAINLEVEL: NO PAIN (0)

## 2021-07-26 ASSESSMENT — PATIENT HEALTH QUESTIONNAIRE - PHQ9
SUM OF ALL RESPONSES TO PHQ QUESTIONS 1-9: 2
5. POOR APPETITE OR OVEREATING: SEVERAL DAYS

## 2021-07-26 NOTE — PROGRESS NOTES
Assessment & Plan     Nausea  No acute distress.  With ongoing symptoms after eating and normal EGD will do ultrasound for further evaluation.  Plan pending ultrasound results.  - US Abdomen Limited; Future    Return in about 1 week (around 8/2/2021), or if symptoms worsen or fail to improve.    SY Manuel CNP  [facility]    Doris Rodgers is a 31 year old who presents for the following health issues     HPI     EGD Results:  Findings: 1. Normal esophagus  2. z-line regular at 33 cm  3. Hiatus at 35 cm = 2 cm hh  4. Normal stomach - biopsied for h. Pylori  5. Normal duodenum  6. No ulcers seen       Pt states Surgeon suggested to have Gallbladder checked  Pt still vomiting for not much for the past week not as much diarrhea and dark stools  Pain through back but no other symptoms    Review of Systems   Constitutional, HEENT, cardiovascular, pulmonary, gi and gu systems are negative, except as otherwise noted.      Objective    /74   Pulse 80   Resp 12   Wt 78 kg (172 lb)   BMI 33.59 kg/m    Body mass index is 33.59 kg/m .  Physical Exam   GENERAL: healthy, alert and no distress  NECK: no adenopathy, no asymmetry, masses, or scars and thyroid normal to palpation  RESP: lungs clear to auscultation - no rales, rhonchi or wheezes  CV: regular rate and rhythm, normal S1 S2, no S3 or S4, no murmur, click or rub, no peripheral edema and peripheral pulses strong  ABDOMEN: no tenderness to palpation and bowel sounds normal  PSYCH: mentation appears normal, affect normal/bright    No results found for any visits on 07/26/21.

## 2021-07-26 NOTE — NURSING NOTE
Chief Complaint   Patient presents with     Results     /74   Pulse 80   Resp 12   Wt 78 kg (172 lb)   BMI 33.59 kg/m   Estimated body mass index is 33.59 kg/m  as calculated from the following:    Height as of 7/20/21: 1.524 m (5').    Weight as of this encounter: 78 kg (172 lb).  Patient presents to the clinic using No DME      Health Maintenance that is potentially due pending provider review:    Health Maintenance Due   Topic Date Due     ANNUAL REVIEW OF HM ORDERS  Never done     ADVANCE CARE PLANNING  Never done     DEPRESSION ACTION PLAN  Never done     COVID-19 Vaccine (1) Never done     HIV SCREENING  Never done     HEPATITIS C SCREENING  Never done     PAP  10/20/2017     PREVENTIVE CARE VISIT  01/29/2021     DTAP/TDAP/TD IMMUNIZATION (2 - Td or Tdap) 02/16/2021     PHQ-9  07/27/2021        Possibly completing today per provider review.

## 2021-07-27 ASSESSMENT — ANXIETY QUESTIONNAIRES: GAD7 TOTAL SCORE: 2

## 2021-08-03 ENCOUNTER — HOSPITAL ENCOUNTER (OUTPATIENT)
Dept: ULTRASOUND IMAGING | Facility: CLINIC | Age: 31
Discharge: HOME | End: 2021-08-03
Attending: NURSE PRACTITIONER | Admitting: NURSE PRACTITIONER
Payer: COMMERCIAL

## 2021-08-03 DIAGNOSIS — R11.0 NAUSEA: ICD-10-CM

## 2021-08-03 PROCEDURE — 76705 ECHO EXAM OF ABDOMEN: CPT

## 2021-08-04 ENCOUNTER — MYC MEDICAL ADVICE (OUTPATIENT)
Dept: FAMILY MEDICINE | Facility: CLINIC | Age: 31
End: 2021-08-04

## 2021-08-04 ENCOUNTER — OFFICE VISIT (OUTPATIENT)
Dept: FAMILY MEDICINE | Facility: CLINIC | Age: 31
End: 2021-08-04
Payer: COMMERCIAL

## 2021-08-04 VITALS
SYSTOLIC BLOOD PRESSURE: 110 MMHG | DIASTOLIC BLOOD PRESSURE: 62 MMHG | HEIGHT: 60 IN | BODY MASS INDEX: 32.59 KG/M2 | TEMPERATURE: 98.5 F | RESPIRATION RATE: 16 BRPM | WEIGHT: 166 LBS | HEART RATE: 84 BPM

## 2021-08-04 DIAGNOSIS — F33.1 MODERATE EPISODE OF RECURRENT MAJOR DEPRESSIVE DISORDER (H): ICD-10-CM

## 2021-08-04 DIAGNOSIS — Z01.419 ENCOUNTER FOR GYNECOLOGICAL EXAMINATION WITHOUT ABNORMAL FINDING: ICD-10-CM

## 2021-08-04 DIAGNOSIS — Z00.00 ROUTINE GENERAL MEDICAL EXAMINATION AT A HEALTH CARE FACILITY: Primary | ICD-10-CM

## 2021-08-04 DIAGNOSIS — B37.9 CANDIDA INFECTION: ICD-10-CM

## 2021-08-04 DIAGNOSIS — F41.1 GENERALIZED ANXIETY DISORDER: ICD-10-CM

## 2021-08-04 DIAGNOSIS — Z11.4 SCREENING FOR HUMAN IMMUNODEFICIENCY VIRUS WITHOUT PRESENCE OF RISK FACTORS: ICD-10-CM

## 2021-08-04 DIAGNOSIS — M25.50 PAIN IN JOINT, MULTIPLE SITES: ICD-10-CM

## 2021-08-04 DIAGNOSIS — Z13.6 CARDIOVASCULAR SCREENING; LDL GOAL LESS THAN 160: ICD-10-CM

## 2021-08-04 DIAGNOSIS — Z11.59 ENCOUNTER FOR HEPATITIS C SCREENING TEST FOR LOW RISK PATIENT: ICD-10-CM

## 2021-08-04 LAB
ALBUMIN UR-MCNC: 30 MG/DL
ALT SERPL W P-5'-P-CCNC: 25 U/L (ref 0–50)
APPEARANCE UR: CLEAR
AST SERPL W P-5'-P-CCNC: 20 U/L (ref 0–45)
BACTERIA #/AREA URNS HPF: ABNORMAL /HPF
BASOPHILS # BLD AUTO: 0 10E3/UL (ref 0–0.2)
BASOPHILS NFR BLD AUTO: 0 %
BILIRUB UR QL STRIP: ABNORMAL
CHOLEST SERPL-MCNC: 190 MG/DL
COLOR UR AUTO: YELLOW
CREAT SERPL-MCNC: 0.94 MG/DL (ref 0.52–1.04)
CREAT UR-MCNC: 459 MG/DL
CREAT UR-MCNC: 459 MG/DL
EOSINOPHIL # BLD AUTO: 0.2 10E3/UL (ref 0–0.7)
EOSINOPHIL NFR BLD AUTO: 2 %
ERYTHROCYTE [DISTWIDTH] IN BLOOD BY AUTOMATED COUNT: 12.9 % (ref 10–15)
FASTING STATUS PATIENT QL REPORTED: NO
FASTING STATUS PATIENT QL REPORTED: NO
GFR SERPL CREATININE-BSD FRML MDRD: 81 ML/MIN/1.73M2
GLUCOSE BLD-MCNC: 86 MG/DL (ref 70–99)
GLUCOSE UR STRIP-MCNC: NEGATIVE MG/DL
HCT VFR BLD AUTO: 41.2 % (ref 35–47)
HDLC SERPL-MCNC: 43 MG/DL
HGB BLD-MCNC: 14.7 G/DL (ref 11.7–15.7)
HGB UR QL STRIP: ABNORMAL
KETONES UR STRIP-MCNC: NEGATIVE MG/DL
LDLC SERPL CALC-MCNC: 129 MG/DL
LEUKOCYTE ESTERASE UR QL STRIP: NEGATIVE
LYMPHOCYTES # BLD AUTO: 3.4 10E3/UL (ref 0.8–5.3)
LYMPHOCYTES NFR BLD AUTO: 38 %
MCH RBC QN AUTO: 33.8 PG (ref 26.5–33)
MCHC RBC AUTO-ENTMCNC: 35.7 G/DL (ref 31.5–36.5)
MCV RBC AUTO: 95 FL (ref 78–100)
MICROALBUMIN UR-MCNC: 62 MG/L
MICROALBUMIN/CREAT UR: 13.51 MG/G CR (ref 0–25)
MONOCYTES # BLD AUTO: 0.4 10E3/UL (ref 0–1.3)
MONOCYTES NFR BLD AUTO: 5 %
NEUTROPHILS # BLD AUTO: 5.1 10E3/UL (ref 1.6–8.3)
NEUTROPHILS NFR BLD AUTO: 56 %
NITRATE UR QL: NEGATIVE
NONHDLC SERPL-MCNC: 147 MG/DL
PH UR STRIP: 6 [PH] (ref 5–7)
PLATELET # BLD AUTO: 344 10E3/UL (ref 150–450)
PROT UR-MCNC: 0.32 G/L
PROT/CREAT 24H UR: 0.07 G/G CR (ref 0–0.2)
RBC # BLD AUTO: 4.35 10E6/UL (ref 3.8–5.2)
RBC #/AREA URNS AUTO: ABNORMAL /HPF
SP GR UR STRIP: 1.02 (ref 1–1.03)
SQUAMOUS #/AREA URNS AUTO: ABNORMAL /LPF
TRIGL SERPL-MCNC: 88 MG/DL
TSH SERPL DL<=0.005 MIU/L-ACNC: 0.93 MU/L (ref 0.4–4)
UROBILINOGEN UR STRIP-ACNC: 1 E.U./DL
WBC # BLD AUTO: 9.1 10E3/UL (ref 4–11)
WBC #/AREA URNS AUTO: ABNORMAL /HPF

## 2021-08-04 PROCEDURE — 82947 ASSAY GLUCOSE BLOOD QUANT: CPT | Performed by: NURSE PRACTITIONER

## 2021-08-04 PROCEDURE — 84443 ASSAY THYROID STIM HORMONE: CPT | Performed by: NURSE PRACTITIONER

## 2021-08-04 PROCEDURE — 84460 ALANINE AMINO (ALT) (SGPT): CPT | Performed by: NURSE PRACTITIONER

## 2021-08-04 PROCEDURE — G0145 SCR C/V CYTO,THINLAYER,RESCR: HCPCS | Performed by: NURSE PRACTITIONER

## 2021-08-04 PROCEDURE — 99213 OFFICE O/P EST LOW 20 MIN: CPT | Mod: 25 | Performed by: NURSE PRACTITIONER

## 2021-08-04 PROCEDURE — 90471 IMMUNIZATION ADMIN: CPT | Performed by: NURSE PRACTITIONER

## 2021-08-04 PROCEDURE — 99395 PREV VISIT EST AGE 18-39: CPT | Mod: 25 | Performed by: NURSE PRACTITIONER

## 2021-08-04 PROCEDURE — 82043 UR ALBUMIN QUANTITATIVE: CPT | Performed by: NURSE PRACTITIONER

## 2021-08-04 PROCEDURE — 81001 URINALYSIS AUTO W/SCOPE: CPT | Performed by: NURSE PRACTITIONER

## 2021-08-04 PROCEDURE — 87389 HIV-1 AG W/HIV-1&-2 AB AG IA: CPT | Performed by: NURSE PRACTITIONER

## 2021-08-04 PROCEDURE — 36415 COLL VENOUS BLD VENIPUNCTURE: CPT | Performed by: NURSE PRACTITIONER

## 2021-08-04 PROCEDURE — 82565 ASSAY OF CREATININE: CPT | Performed by: NURSE PRACTITIONER

## 2021-08-04 PROCEDURE — 86160 COMPLEMENT ANTIGEN: CPT | Performed by: NURSE PRACTITIONER

## 2021-08-04 PROCEDURE — 87624 HPV HI-RISK TYP POOLED RSLT: CPT | Performed by: NURSE PRACTITIONER

## 2021-08-04 PROCEDURE — 84156 ASSAY OF PROTEIN URINE: CPT | Performed by: NURSE PRACTITIONER

## 2021-08-04 PROCEDURE — 80061 LIPID PANEL: CPT | Performed by: NURSE PRACTITIONER

## 2021-08-04 PROCEDURE — 84450 TRANSFERASE (AST) (SGOT): CPT | Performed by: NURSE PRACTITIONER

## 2021-08-04 PROCEDURE — 86225 DNA ANTIBODY NATIVE: CPT | Performed by: NURSE PRACTITIONER

## 2021-08-04 PROCEDURE — 86803 HEPATITIS C AB TEST: CPT | Performed by: NURSE PRACTITIONER

## 2021-08-04 PROCEDURE — 85025 COMPLETE CBC W/AUTO DIFF WBC: CPT | Performed by: NURSE PRACTITIONER

## 2021-08-04 PROCEDURE — 90715 TDAP VACCINE 7 YRS/> IM: CPT | Performed by: NURSE PRACTITIONER

## 2021-08-04 RX ORDER — DULOXETIN HYDROCHLORIDE 20 MG/1
20 CAPSULE, DELAYED RELEASE ORAL 2 TIMES DAILY
Qty: 60 CAPSULE | Refills: 5 | Status: SHIPPED | OUTPATIENT
Start: 2021-08-04 | End: 2021-11-04

## 2021-08-04 RX ORDER — NYSTATIN 100000 [USP'U]/G
POWDER TOPICAL 2 TIMES DAILY
Qty: 45 G | Refills: 0 | Status: ON HOLD | OUTPATIENT
Start: 2021-08-04 | End: 2023-09-07

## 2021-08-04 ASSESSMENT — ANXIETY QUESTIONNAIRES
2. NOT BEING ABLE TO STOP OR CONTROL WORRYING: NOT AT ALL
GAD7 TOTAL SCORE: 1
6. BECOMING EASILY ANNOYED OR IRRITABLE: SEVERAL DAYS
3. WORRYING TOO MUCH ABOUT DIFFERENT THINGS: NOT AT ALL
7. FEELING AFRAID AS IF SOMETHING AWFUL MIGHT HAPPEN: NOT AT ALL
5. BEING SO RESTLESS THAT IT IS HARD TO SIT STILL: NOT AT ALL
1. FEELING NERVOUS, ANXIOUS, OR ON EDGE: NOT AT ALL

## 2021-08-04 ASSESSMENT — PATIENT HEALTH QUESTIONNAIRE - PHQ9
SUM OF ALL RESPONSES TO PHQ QUESTIONS 1-9: 0
5. POOR APPETITE OR OVEREATING: NOT AT ALL

## 2021-08-04 ASSESSMENT — MIFFLIN-ST. JEOR: SCORE: 1384.47

## 2021-08-04 ASSESSMENT — ENCOUNTER SYMPTOMS
JOINT SWELLING: 0
CONSTIPATION: 0
EYE PAIN: 0
PALPITATIONS: 0
ABDOMINAL PAIN: 0
HEADACHES: 0
DIZZINESS: 0
HEMATURIA: 0
MYALGIAS: 0
SORE THROAT: 0
DIARRHEA: 0
HEMATOCHEZIA: 0
FREQUENCY: 0
DYSURIA: 0
NERVOUS/ANXIOUS: 0
HEARTBURN: 0
SHORTNESS OF BREATH: 0
NAUSEA: 0
ARTHRALGIAS: 0
FEVER: 0
CHILLS: 0
PARESTHESIAS: 0
COUGH: 0
WEAKNESS: 0
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
## 2021-08-04 NOTE — PATIENT INSTRUCTIONS
Nystatin sent to the pharmacy for rash    Follow up if symptoms do not improve or worsen.    Preventive Health Recommendations  Female Ages 26 - 39  Yearly exam:   See your health care provider every year in order to    Review health changes.     Discuss preventive care.      Review your medicines if you your doctor has prescribed any.    Until age 30: Get a Pap test every three years (more often if you have had an abnormal result).    After age 30: Talk to your doctor about whether you should have a Pap test every 3 years or have a Pap test with HPV screening every 5 years.   You do not need a Pap test if your uterus was removed (hysterectomy) and you have not had cancer.  You should be tested each year for STDs (sexually transmitted diseases), if you're at risk.   Talk to your provider about how often to have your cholesterol checked.  If you are at risk for diabetes, you should have a diabetes test (fasting glucose).  Shots: Get a flu shot each year. Get a tetanus shot every 10 years.   Nutrition:     Eat at least 5 servings of fruits and vegetables each day.    Eat whole-grain bread, whole-wheat pasta and brown rice instead of white grains and rice.    Get adequate Calcium and Vitamin D.     Lifestyle    Exercise at least 150 minutes a week (30 minutes a day, 5 days of the week). This will help you control your weight and prevent disease.    Limit alcohol to one drink per day.    No smoking.     Wear sunscreen to prevent skin cancer.    See your dentist every six months for an exam and cleaning.

## 2021-08-05 ENCOUNTER — TELEPHONE (OUTPATIENT)
Dept: RHEUMATOLOGY | Facility: CLINIC | Age: 31
End: 2021-08-05

## 2021-08-05 DIAGNOSIS — R82.90 ABNORMAL URINALYSIS: Primary | ICD-10-CM

## 2021-08-05 LAB
C3 SERPL-MCNC: 137 MG/DL (ref 81–157)
C4 SERPL-MCNC: 24 MG/DL (ref 13–39)
DSDNA AB SER-ACNC: <0.6 IU/ML
HCV AB SERPL QL IA: NONREACTIVE
HIV 1+2 AB+HIV1 P24 AG SERPL QL IA: NONREACTIVE

## 2021-08-05 ASSESSMENT — ANXIETY QUESTIONNAIRES: GAD7 TOTAL SCORE: 1

## 2021-08-05 NOTE — TELEPHONE ENCOUNTER
Patient is calling to get her lab results. She had them completed 8/4/2021 @ [facility].

## 2021-08-08 NOTE — TELEPHONE ENCOUNTER
Please see my comments associated with lab results and discuss with the patient.    Please place lab orders mentioned.

## 2021-08-09 LAB
BKR LAB AP GYN ADEQUACY: NORMAL
BKR LAB AP GYN INTERPRETATION: NORMAL
BKR LAB AP HPV REFLEX: NORMAL
BKR LAB AP PREVIOUS ABNORMAL: NORMAL
PATH REPORT.COMMENTS IMP SPEC: NORMAL
PATH REPORT.RELEVANT HX SPEC: NORMAL

## 2021-08-10 NOTE — TELEPHONE ENCOUNTER
"Pt notified of lab results below.     \"Although small amount of red blood cells and protein on urinalysis (not new) reassuring that urine microalbumin level, urine protein/creatinine ratio levels and creatinine/kidney function level were unremarkable.  Recommend rechecking urinalysis (when not experiencing period) and urine microalbumin level in about 4 to 6 weeks.  Please inquire if any history of kidney stones?     Otherwise remainder of lab results that have already returned were unremarkable.     We can discuss details of results more extensively on future visit.\"     "

## 2021-08-11 LAB
HUMAN PAPILLOMA VIRUS 16 DNA: NEGATIVE
HUMAN PAPILLOMA VIRUS 18 DNA: NEGATIVE
HUMAN PAPILLOMA VIRUS FINAL DIAGNOSIS: NORMAL
HUMAN PAPILLOMA VIRUS OTHER HR: NEGATIVE

## 2021-09-02 ENCOUNTER — OFFICE VISIT (OUTPATIENT)
Dept: FAMILY MEDICINE | Facility: CLINIC | Age: 31
End: 2021-09-02
Payer: COMMERCIAL

## 2021-09-02 VITALS
DIASTOLIC BLOOD PRESSURE: 70 MMHG | SYSTOLIC BLOOD PRESSURE: 120 MMHG | HEART RATE: 94 BPM | TEMPERATURE: 99.2 F | WEIGHT: 169 LBS | OXYGEN SATURATION: 98 % | BODY MASS INDEX: 33.18 KG/M2 | HEIGHT: 60 IN

## 2021-09-02 DIAGNOSIS — G89.29 CHRONIC RIGHT SHOULDER PAIN: Primary | ICD-10-CM

## 2021-09-02 DIAGNOSIS — M25.511 CHRONIC RIGHT SHOULDER PAIN: Primary | ICD-10-CM

## 2021-09-02 PROCEDURE — 99214 OFFICE O/P EST MOD 30 MIN: CPT | Performed by: NURSE PRACTITIONER

## 2021-09-02 ASSESSMENT — MIFFLIN-ST. JEOR: SCORE: 1398.08

## 2021-09-02 NOTE — PROGRESS NOTES
Assessment & Plan     Chronic right shoulder pain  Recommend MRI - suspect rotator cuff tendinitis vs partial tearing.  Next steps may include steroid injection +/or PT.  - MR Shoulder Left w/o Contrast; Future      The risks, benefits and treatment options of prescribed medications or other treatments have been discussed with the patient. The patient verbalized their understanding and should call or follow up if no improvement or if they develop further problems.    SY Pradhan CNP  [facility] Rodgers is a 31 year old who presents for the following health issues     HPI     Musculoskeletal problem/pain  Onset/Duration:   6 months  Description  Location: shoulder - left   Joint Swelling: no  Redness: no  Pain: YES  Warmth: no  Intensity:  Mild to moderate;   7-8/10 at its worst  Progression of Symptoms:  worsening  Accompanying signs and symptoms:   Fevers: no  Numbness/tingling/weakness: no  History  Trauma to the area: yes - started after she fell in January  Recent illness:  no  Previous similar problem: YES- -  2 previous appointments and xray  Discuss xray results  Previous evaluation:  YES  Precipitating or alleviating factors:  Aggravating factors include: hurts all the time; doesn't matter  Therapies tried and outcome: switched job- lighter lifting    Was lifting heavy objects at work all day.  Switched jobs a month ago - no lifting. Didn't make a difference.      XRAY 7/16/2021:  IMPRESSION: Small calcification which may represent supraspinatus or  infraspinatus calcific tendinitis. No evidence of fracture or  dislocation.        Review of Systems   Constitutional, HEENT, cardiovascular, pulmonary, gi and gu systems are negative, except as otherwise noted.      Objective    /70 (BP Location: Right arm)   Pulse 94   Temp 99.2  F (37.3  C) (Tympanic)   Ht 1.524 m (5')   Wt 76.7 kg (169 lb)   SpO2 98%   BMI 33.01 kg/m    Body mass index is  33.01 kg/m .  Physical Exam   GENERAL: healthy, alert and no distress  MS: Inspection: no swelling, bruising, discoloration, or obvious deformity or asymmetry  Non-tender: throughout  Range of Motion  Active:all normal  Passive: all normal  Strength: rotator cuff strength full  Special tests:  Positive: Neers, Toussaint and empty can test

## 2021-09-05 ENCOUNTER — HOSPITAL ENCOUNTER (OUTPATIENT)
Dept: MRI IMAGING | Facility: CLINIC | Age: 31
Discharge: HOME | End: 2021-09-05
Attending: NURSE PRACTITIONER | Admitting: NURSE PRACTITIONER
Payer: COMMERCIAL

## 2021-09-05 DIAGNOSIS — M25.511 CHRONIC RIGHT SHOULDER PAIN: ICD-10-CM

## 2021-09-05 DIAGNOSIS — G89.29 CHRONIC RIGHT SHOULDER PAIN: ICD-10-CM

## 2021-09-05 PROCEDURE — 73221 MRI JOINT UPR EXTREM W/O DYE: CPT | Mod: LT

## 2021-09-05 PROCEDURE — 73221 MRI JOINT UPR EXTREM W/O DYE: CPT | Mod: 26 | Performed by: RADIOLOGY

## 2021-09-07 DIAGNOSIS — M25.519 CHRONIC SHOULDER PAIN, UNSPECIFIED LATERALITY: Primary | ICD-10-CM

## 2021-09-07 DIAGNOSIS — G89.29 CHRONIC SHOULDER PAIN, UNSPECIFIED LATERALITY: Primary | ICD-10-CM

## 2021-09-07 NOTE — RESULT ENCOUNTER NOTE
Covering for primary/ordering provider    The MRI shows mild chronic tendonitis of the left shoulder with a very small partial tear of the tendon.  Next step is physical therapy, order placed.

## 2021-09-29 ENCOUNTER — MYC MEDICAL ADVICE (OUTPATIENT)
Dept: FAMILY MEDICINE | Facility: CLINIC | Age: 31
End: 2021-09-29

## 2021-10-05 ENCOUNTER — TELEPHONE (OUTPATIENT)
Dept: FAMILY MEDICINE | Facility: CLINIC | Age: 31
End: 2021-10-05

## 2021-10-05 NOTE — TELEPHONE ENCOUNTER
Reason for Call:      Detailed comments: Lethargic, cold chills, runny nose, 96.8 temp is this normal per pt. Pt said these symptoms last night.  Negative Covid Test 10/1/21 at [facility] with no symptoms.     Phone Number Patient can be reached at: Home number on file [phone] (home)    Best Time: Any Time      Can we leave a detailed message on this number? YES    Call taken on 10/5/2021 at 1:47 PM by Sushma Rajput

## 2021-10-13 ENCOUNTER — OFFICE VISIT (OUTPATIENT)
Dept: URGENT CARE | Facility: URGENT CARE | Age: 31
End: 2021-10-13
Payer: COMMERCIAL

## 2021-10-13 ENCOUNTER — ANCILLARY PROCEDURE (OUTPATIENT)
Dept: GENERAL RADIOLOGY | Facility: CLINIC | Age: 31
End: 2021-10-13
Attending: NURSE PRACTITIONER
Payer: COMMERCIAL

## 2021-10-13 VITALS
TEMPERATURE: 98.3 F | BODY MASS INDEX: 31.17 KG/M2 | OXYGEN SATURATION: 98 % | WEIGHT: 159.6 LBS | HEART RATE: 98 BPM | SYSTOLIC BLOOD PRESSURE: 112 MMHG | RESPIRATION RATE: 18 BRPM | DIASTOLIC BLOOD PRESSURE: 68 MMHG

## 2021-10-13 DIAGNOSIS — R07.0 THROAT PAIN: ICD-10-CM

## 2021-10-13 DIAGNOSIS — B34.9 VIRAL SYNDROME: Primary | ICD-10-CM

## 2021-10-13 DIAGNOSIS — R05.9 COUGH: ICD-10-CM

## 2021-10-13 DIAGNOSIS — R06.02 SOB (SHORTNESS OF BREATH): ICD-10-CM

## 2021-10-13 LAB — DEPRECATED S PYO AG THROAT QL EIA: NEGATIVE

## 2021-10-13 PROCEDURE — 87651 STREP A DNA AMP PROBE: CPT | Performed by: NURSE PRACTITIONER

## 2021-10-13 PROCEDURE — U0005 INFEC AGEN DETEC AMPLI PROBE: HCPCS | Performed by: NURSE PRACTITIONER

## 2021-10-13 PROCEDURE — 99214 OFFICE O/P EST MOD 30 MIN: CPT | Performed by: NURSE PRACTITIONER

## 2021-10-13 PROCEDURE — 71046 X-RAY EXAM CHEST 2 VIEWS: CPT | Performed by: RADIOLOGY

## 2021-10-13 PROCEDURE — U0003 INFECTIOUS AGENT DETECTION BY NUCLEIC ACID (DNA OR RNA); SEVERE ACUTE RESPIRATORY SYNDROME CORONAVIRUS 2 (SARS-COV-2) (CORONAVIRUS DISEASE [COVID-19]), AMPLIFIED PROBE TECHNIQUE, MAKING USE OF HIGH THROUGHPUT TECHNOLOGIES AS DESCRIBED BY CMS-2020-01-R: HCPCS | Performed by: NURSE PRACTITIONER

## 2021-10-13 RX ORDER — PREDNISONE 20 MG/1
40 TABLET ORAL DAILY
Qty: 10 TABLET | Refills: 0 | Status: SHIPPED | OUTPATIENT
Start: 2021-10-13 | End: 2021-10-18

## 2021-10-13 RX ORDER — BENZONATATE 200 MG/1
200 CAPSULE ORAL 3 TIMES DAILY PRN
Qty: 30 CAPSULE | Refills: 0 | Status: SHIPPED | OUTPATIENT
Start: 2021-10-13 | End: 2021-11-05

## 2021-10-13 NOTE — PROGRESS NOTES
Assessment & Plan   Problem List Items Addressed This Visit     None      Visit Diagnoses     Viral syndrome    -  Primary    Relevant Medications    benzonatate (TESSALON) 200 MG capsule    predniSONE (DELTASONE) 20 MG tablet    Cough        Relevant Medications    benzonatate (TESSALON) 200 MG capsule    predniSONE (DELTASONE) 20 MG tablet    Other Relevant Orders    Symptomatic COVID-19 Virus (Coronavirus) by PCR Nasopharyngeal    XR Chest 2 Views (Completed)    Streptococcus A Rapid Screen w/Reflex to PCR - Clinic Collect (Completed)    Group A Streptococcus PCR Throat Swab    SOB (shortness of breath)        Relevant Medications    benzonatate (TESSALON) 200 MG capsule    predniSONE (DELTASONE) 20 MG tablet    Other Relevant Orders    XR Chest 2 Views (Completed)    Streptococcus A Rapid Screen w/Reflex to PCR - Clinic Collect (Completed)    Group A Streptococcus PCR Throat Swab    Throat pain        Relevant Medications    benzonatate (TESSALON) 200 MG capsule    predniSONE (DELTASONE) 20 MG tablet    Other Relevant Orders    XR Chest 2 Views (Completed)    Streptococcus A Rapid Screen w/Reflex to PCR - Clinic Collect (Completed)    Group A Streptococcus PCR Throat Swab             15 minutes spent on the date of the encounter doing chart review, history and exam, documentation and further activities per the note       Patient Instructions   Increase rest and fluids. Tylenol and/or Ibuprofen for comfort. Cool mist vaporizer. If your symptoms worsen or do not resolve follow up with your primary care provider in 1 week and sooner if needed.     Mucinex 600-1200 mg 12 hour formula for ear, head and chest congestion.  It can also thin post nasal drip which can cause a cough and sore throat.       Indications for emergent return to emergency department discussed with patient, who verbalized good understanding and agreement.  Patient understands the limitations of today's evaluation.           Patient Education  "    Viral Syndrome (Adult)  A viral illness may cause a number of symptoms such as fever. Other symptoms depend on the part of the body that the virus affects. If it settles in your nose, throat, and lungs, it may cause cough, sore throat, congestion, runny nose, headache, earache and other ear symptoms, or shortness of breath. If it settles in your stomach and intestinal tract, it may cause nausea, vomiting, cramping, and diarrhea. Sometimes it causes generalized symptoms like \"aching all over,\" feeling tired, loss of energy, or loss of appetite.  A viral illness usually lasts anywhere from several days to several weeks, but sometimes it lasts longer. In some cases, a more serious infection can look like a viral syndrome in the first few days of the illness. You may need another exam and additional tests to know the difference. Watch for the warning signs listed below for when to seek medical advice.  Home care  Follow these guidelines for taking care of yourself at home:    If symptoms are severe, rest at home for the first 2 to 3 days.    Stay away from cigarette smoke - both your smoke and the smoke from others.    You may use over-the-counter acetaminophen or ibuprofen for fever, muscle aching, and headache, unless another medicine was prescribed for this. If you have chronic liver or kidney disease or ever had a stomach ulcer or gastrointestinal bleeding, talk with your healthcare provider before using these medicines. No one who is younger than 18 and ill with a fever should take aspirin. It may cause severe disease or death.    Your appetite may be poor, so a light diet is fine. Avoid dehydration by drinking 8 to 12, 8-ounce glasses of fluids each day. This may include water; orange juice; lemonade; apple, grape, and cranberry juice; clear fruit drinks; electrolyte replacement and sports drinks; and decaffeinated teas and coffee. If you have been diagnosed with a kidney disease, ask your healthcare provider " how much and what types of fluids you should drink to prevent dehydration. If you have kidney disease, drinking too much fluid can cause it build up in the your body and be dangerous to your health.    Over-the-counter remedies won't shorten the length of the illness but may be helpful for symptoms such as cough, sore throat, nasal and sinus congestion, or diarrhea. Don't use decongestants if you have high blood pressure.  Follow-up care  Follow up with your healthcare provider if you do not improve over the next week.  Call 911  Call 911 if any of the following occur:    Convulsion    Feeling weak, dizzy, or like you are going to faint    Chest pain, or more than mild shortness of breath  When to seek medical advice  Call your healthcare provider right away if any of these occur:    Cough with lots of colored sputum (mucus) or blood in your sputum    Chest pain, shortness of breath, wheezing, or trouble breathing    Severe headache; face, neck, or ear pain    Severe, constant pain in the lower right side of your belly (abdominal)    Continued vomiting (can t keep liquids down)    Frequent diarrhea (more than 5 times a day); blood (red or black color) or mucus in diarrhea    Feeling weak, dizzy, or like you are going to faint    Extreme thirst    Fever of 100.4 F (38 C) or higher, or as directed by your healthcare provider  [employer] last reviewed this educational content on 4/1/2018 2000-2021 [employer]. All rights reserved. This information is not intended as a substitute for professional medical care. Always follow your healthcare professional's instructions.           Patient Education       Coronavirus Disease 2019 (COVID-19): Caring for Yourself or Others  If you or a household member have symptoms of COVID-19, follow these guidelines for preventing spread of the virus and managing symptoms. This is regardless of your vaccination status.  If you have COVID-19 symptoms    Stay home. Call your  "healthcare provider and tell them you have symptoms of COVID-19. Do this before going to any hospital or clinic. Follow your provider's instructions. You may be advised to isolate yourself at home. This is called self-isolation. You may also be told to stay at least 6 feet from others to prevent the spread of COVID-19. This is called \"social distancing.\"    Stay away from work, school, and public places. Limit physical contact with family members. Limit visitors. Don't kiss anyone or share eating or drinking utensils. Clean surfaces you touch with disinfectant. This is to help prevent the virus from spreading.    If you need to cough or sneeze, do it into a tissue. Then throw the tissue into the trash. If you don't have tissues, cough or sneeze into the bend of your elbow.    Wear a cloth face mask with two or more layers of washable, breathable fabric and a nose wire while in public or when indoors with people who don't live with you. Or you can wear a disposable paper mask with a cloth mask on top. Wear the mask so that it covers both your nose and mouth.    Don t share food or personal items with people in your household. This includes items like eating and drinking utensils, towels, and bedding.    If you need to go to a hospital or clinic, expect that the healthcare staff will wear protective equipment such as masks, gowns, gloves, and eye protection. You may be advised to wait in or enter through a separate area. This is to prevent the possible virus from spreading.    Tell the healthcare staff about recent travel. This includes local travel on public transport. Staff may need to find other people you have been in contact with.    Follow all instructions the healthcare staff give you.    If you have been diagnosed with COVID-19    Stay home and start self-isolation. Don t leave your home unless you need to get medical care. Don't go to work, school, or public areas. Don't use public transportation or " taxis.    Follow all instructions from your healthcare provider. Call your healthcare provider s office before going. They can prepare and give you instructions. This will help prevent the virus from spreading.    If you need to go to a hospital or clinic, expect that the healthcare staff will wear protective equipment such as masks, gowns, gloves, and eye protection. You may be advised to wait in or enter through a separate area. This is to prevent the possible virus from spreading.    Wear a face mask with 2 or more layers and a nose wire. Use either a cloth mask with layers of tightly woven, breathable fabric or a disposable paper mask with a cloth mask on top. This is to protect other people from your germs. If you are not able to wear a mask, your caregivers should. Wear the mask so that it covers both your nose and mouth.    Stay away from other people in your home.    Stay away from pets and other animals.    Don t share food or personal items with people in your household. This includes items like eating and drinking utensils, towels, and bedding.    If you need to cough or sneeze, do it into a tissue. Then throw the tissue into the trash. If you don't have tissues, cough or sneeze into the bend of your elbow.    Wash your hands often.    Self-care at home   Prevention  The FDA has approved several vaccines to prevent COVID-19 or reduce its severity. These vaccines also reduce how severe the illness will be if you get the virus. No vaccine is ever 100% effective in preventing any illness, but the COVID-19 vaccines work well and are safe. One vaccine has been approved for people as young as 12. Expert groups, including ACOG and CDC, advise pregnant or breastfeeding people to be vaccinated. Talk with your healthcare provider about which vaccine is best for you and your family.  Treatment  Current treatment is mainly aimed at helping your body while it fights the virus. This is known as supportive care. For  serious COVID-19, you may need to stay in the hospital. Supportive care includes:    Getting rest. This helps your body fight the illness.    Staying hydrated.  Drinking liquids is the best way to prevent dehydration. Try to drink 6 to 8 glasses of liquids every day, or as advised by your provider. Also check with your provider about which fluids are best for you. Don't drink fluids that contain caffeine or alcohol.    Taking over-the-counter (OTC) pain medicine. These are used to help ease pain and reduce fever. Follow your healthcare provider's instructions for which OTC medicine to use.  If you've been treated for suspected or confirmed COVID-19 , follow all of your healthcare team's instructions. This will include when it's OK to stop self-isolation. You may also get instructions on position changes to help your breathing, such as lying on your belly (prone positioning). If you were treated at a hospital and discharged, you may be sent home with a pulse oximeter. This is a small electronic device that you clip on your fingertip. It measures the amount of oxygen in your body. Follow your healthcare team's instructions on its use, how they will be in touch with you, and when to call them.  The FDA approved monoclonal antibody therapy for emergency investigational use in certain people who have a positive COVID-19 viral test and have mild to moderate symptoms but are not in the hospital. Your healthcare provider will advise you on whether monoclonal antibody therapy is appropriate for you. It's not approved to prevent COVID-19. It's approved for people 12 years and older who weigh about 88 pounds (40 kgs) and are at high risk for severe COVID-19 and a hospital stay. This includes people who are 65 years and older and people with certain chronic conditions. Monoclonal antibody therapy is not approved for people who:    Are in the hospital with COVID-19, or    Need oxygen therapy for COVID-19, or    Need oxygen  therapy for a chronic condition and need to have oxygen flow increased because of COVID-19     If you've had confirmed COVID-19, your healthcare team may ask you to consider donating your plasma. This is called COVID-19 convalescent plasma donation. Plasma from people fully recovered from COVID-19 may contain antibodies to help fight COVID-19 in people who are currently seriously ill with the disease. Experts don't know the safety of COVID-19 convalescent plasma or how well it works. Research continues. The FDA has approved it for emergency use in certain people with serious or life-threatening COVID-19.  Home care for a sick person     Follow all instructions from healthcare staff.    Wash your hands often.    Wear protective clothing as advised.    Make sure the sick person wears a mask. If they can't wear a mask, don't stay in the same room with the person. If you must be in the same room, wear a face mask. When wearing a mask, make sure that it covers both the nose and mouth.    Keep track of the sick person s symptoms.    Clean home surfaces often with disinfectant. This includes phones, kitchen counters, fridge door handle, bathroom surfaces, and others.    Don t let anyone share household items with the sick person. This includes eating and drinking tools, towels, sheets, or blankets.    Clean fabrics and laundry thoroughly.    Keep other people and pets away from the sick person.    When you can stop self-isolation  When you are sick with COVID-19, you should stay away from other people. This is called self-isolation.  For normally healthy children or adults with COVID-19 symptoms, CDC advises stopping self-isolation when all 3 of these are true:  1. You have had no fever for at least 24 hours. This means no fever without medicine that reduces fever, such as acetaminophen, for at least 24 hours.  2. Your symptoms such as cough or trouble breathing have improved.  3. It has been at least 10 days since your  first symptoms started.  For people who have COVID-19 but no symptoms , isolation can stop 10 days after the first positive test.  If you have a weak immune system and COVID-19, or if you've had severe COVID-19,  your instructions on when to stop isolation will be somewhat different. Some conditions and treatments can cause a weak immune system. These include cancer treatment, bone marrow or organ transplants, and conditions such as HIV or other immune system disorders. You may be advised to self-isolate up to 20 days after your symptoms first started. Your healthcare provider may want to retest you for COVID-19. Follow your provider's instructions.  CDC mask guidance  Consider the CDC's guidance and your local community's instructions on face masks.       Cloth masks may help prevent people who have COVID-19 from spreading the virus to others.    Cloth masks are most likely to reduce COVID-19 spread when masks are widely used by people who are out in the public.    Wear a mask inside your house if you live with someone who has symptoms of COVID-19 or has tested positive for COVID-19.    CDC advises all people older than 2 who are not fully vaccinated to wear a mask and stay 6 feet away from others while in public.    CDC advises people with weak immune systems, even if fully vaccinated, to continue wearing masks and to stay 6 feet away from others while in public.    CDC advises indoor masking for all teachers, staff, students, and visitors to schools, regardless of vaccination status.    Like other viruses, the virus that causes COVID-19 changes (mutates) all the time. This leads to variants that are easily spread, including the delta variant. To protect against variants, CDC advises all people over age 2, including those who are fully vaccinated, to wear a mask indoors in public settings if you are in an area of high numbers of COVID-19 cases. See the CDC's county data website for current transmission information  in your area.     Certain people should not wear a face mask. This includes:    Children younger than 2 years old    Anyone with a health, developmental, or mental health condition that can be made worse by wearing a mask    Anyone who is unconscious or unable to remove the face covering without help     See the CDC's mask guidance.  When to call your healthcare provider  Call your healthcare provider right away if a sick person has any of these:    Trouble breathing    Pain or pressure in chest  If a sick person has any of these, call 911:    Trouble breathing that gets worse    Pain or pressure in chest that gets worse    Blue tint to lips or face    Fast or irregular heartbeat    Confusion or trouble waking    Fainting or loss of consciousness    Coughing up blood  Going home from the hospital  If you were diagnosed with COVID-19 and were recently discharged from the hospital:    Follow the instructions above for self-care and isolation.    Follow the hospital healthcare team s specific instructions.    Ask questions if anything is unclear to you. Write down answers so you remember them.  Date last modified: 9/24/2021  [employer] last reviewed this educational content on 4/1/2020 2000-2021 [employer]. All rights reserved. This information is not intended as a substitute for professional medical care. Always follow your healthcare professional's instructions.               Return in about 1 week (around 10/20/2021), or if symptoms worsen or fail to improve, for Follow up with your primary care provider.    SY Rausch [facility] [place]    Doris Rodgers is a 31 year old who presents for the following health issues     HPI     Chief Complaint   Patient presents with     URI or pnuemonia     sore throat, vomiting, cough, lathargic-per patient, sinus pressure, short of breath started Sunday. Negative test last week.            Review of Systems    Constitutional, HEENT, cardiovascular, pulmonary, GI, , musculoskeletal, neuro, skin, endocrine and psych systems are negative, except as otherwise noted.      Objective    /68 (BP Location: Right arm, Patient Position: Sitting, Cuff Size: Adult Regular)   Pulse 98   Temp 98.3  F (36.8  C) (Tympanic)   Resp 18   Wt 72.4 kg (159 lb 9.6 oz)   SpO2 98%   Breastfeeding No   BMI 31.17 kg/m    Body mass index is 31.17 kg/m .  Physical Exam   GENERAL: healthy, alert and no distress, nontoxic in appearance,nontoxic in appearance  EYES: Eyes grossly normal to inspection, PERRL and conjunctivae and sclerae normal  HENT: ear canals and TM's intact bilaterally and left is red, nose and mouth without ulcers or lesions  NECK: no adenopathy, supple with full ROM  RESP: lungs clear to auscultation - no rales, rhonchi or wheezes  CV: regular rate and rhythm, normal S1 S2, no S3 or S4, no murmur, click or rub, no peripheral edema   ABDOMEN: soft, nontender, no hepatosplenomegaly, no masses and bowel sounds normal  MS: no gross musculoskeletal defects noted, no edema    Results for orders placed or performed in visit on 10/13/21 (from the past 24 hour(s))   Streptococcus A Rapid Screen w/Reflex to PCR - Clinic Collect    Specimen: Throat; Swab   Result Value Ref Range    Group A Strep antigen Negative Negative       No opacities - will await over read        CHEST TWO VIEWS 10/13/2021 12:48 PM      HISTORY: Cough. SOB (shortness of breath). Throat pain.     COMPARISON: August 21, 2015                                                                       IMPRESSION:  There are no acute infiltrates. The cardiac silhouette is  not enlarged. Pulmonary vasculature is unremarkable.      NICOLÁS PATTON MD

## 2021-10-13 NOTE — LETTER
October 13, 2021      Ana Maria Espinoza  [address] [place] [id number]        To Whom It May Concern:    Ana Maria Espinoza was seen in our clinic. Please release from work today and the next 2 days.    Sincerely,        SY Rausch CNP

## 2021-10-14 LAB
GROUP A STREP BY PCR: NOT DETECTED
SARS-COV-2 RNA RESP QL NAA+PROBE: NEGATIVE

## 2021-10-14 NOTE — RESULT ENCOUNTER NOTE
Group A Streptococcus PCR is NEGATIVE  No treatment or change in treatment [facility] ED lab result Strep Group A protocol.

## 2021-10-15 ENCOUNTER — TELEPHONE (OUTPATIENT)
Dept: FAMILY MEDICINE | Facility: CLINIC | Age: 31
End: 2021-10-15

## 2021-10-15 NOTE — TELEPHONE ENCOUNTER
Reason for call:  Patient reporting a symptom    Symptom or request: Pt is having some SOB inhaler not helping. Pt was seen Wed 10/13/21 treated for Viral Bronchitis and sinus infection.  Pt tested Neg for Covid and Strep.    Phone Number patient can be reached at:  Home number on file [phone] (home)    Best Time:  Any Time      Can we leave a detailed message on this number:  YES    Call taken on 10/15/2021 at 10:20 AM by Sushma Rajput

## 2021-10-15 NOTE — TELEPHONE ENCOUNTER
S-(situation): 10-13-21 UC visit viral syndrome, cough SOA. Strep, COVID neg. Reports worsening productive cough, congestion, wheezing, chest tightness not relieved by albuterol inhaler. Also has some sinus pressure/ pain. In addition, states was up all night with GERD sx. Requesting advice.    B-(background): See above. States onset sx Mon 10/11, gradually worsening. Denies acute resp distress. No fevers. 10-13-21 CXR neg. Using albuterol inhaler which is not helping much.     A-(assessment): Worsening resp sx.    R-(recommendations): Advise return to  today for eval due to worsening sx. Pt voices understanding/ agreement.  ANNIE Capellan RN

## 2021-10-17 ENCOUNTER — OFFICE VISIT (OUTPATIENT)
Dept: URGENT CARE | Facility: URGENT CARE | Age: 31
End: 2021-10-17
Payer: COMMERCIAL

## 2021-10-17 VITALS
OXYGEN SATURATION: 99 % | TEMPERATURE: 98.6 F | RESPIRATION RATE: 16 BRPM | HEIGHT: 60 IN | WEIGHT: 159.6 LBS | HEART RATE: 84 BPM | DIASTOLIC BLOOD PRESSURE: 74 MMHG | SYSTOLIC BLOOD PRESSURE: 124 MMHG | BODY MASS INDEX: 31.33 KG/M2

## 2021-10-17 DIAGNOSIS — J01.00 ACUTE MAXILLARY SINUSITIS, RECURRENCE NOT SPECIFIED: Primary | ICD-10-CM

## 2021-10-17 DIAGNOSIS — J31.0 CHRONIC RHINITIS: ICD-10-CM

## 2021-10-17 PROCEDURE — 99213 OFFICE O/P EST LOW 20 MIN: CPT | Performed by: NURSE PRACTITIONER

## 2021-10-17 RX ORDER — FLUTICASONE PROPIONATE 50 MCG
2 SPRAY, SUSPENSION (ML) NASAL DAILY
Qty: 16 G | Refills: 1 | Status: SHIPPED | OUTPATIENT
Start: 2021-10-17 | End: 2022-04-22

## 2021-10-17 ASSESSMENT — MIFFLIN-ST. JEOR: SCORE: 1355.44

## 2021-10-17 NOTE — LETTER
[facility] [place]  [address] [id number]          October 17, 2021    Ana Maria Espinoza                                                                                                                     [address] [place] [id number]        To Whom it May Concern,    Ana Maria was seen in clinic today for acute illness and will need to be off of work until 10/19/2021 with return to work on 10/20/2021 without restrictions.         Sincerely,       Susana Ch, DNP, APRN-CNP

## 2021-10-17 NOTE — PATIENT INSTRUCTIONS
Acute maxillary sinusitis, recurrence not specified  Acute, symptoms x 1 week, worsening symptoms. No improvement with prednisone and tessalon. Will start course of antibiotics. Encouraged patient to  a saline nasal rinse and do 1-2 times daily. Keep head of bed elevated, push fluids, warm salt water gargles. Stop Allegra for now until feeling fully better. Follow-up with primary care provider if not improving or worsening in the next 7-10 days.   - amoxicillin-clavulanate (AUGMENTIN) 875-125 MG tablet; Take 1 tablet by mouth 2 times daily for 10 days    Chronic rhinitis  Chronic, would recommend starting Flonase nasal spray in about 48-72 hours and continue for good for allergies. Follow-up with primary care provider as needed if not improving.   - fluticasone (FLONASE) 50 MCG/ACT nasal spray; Spray 2 sprays into both nostrils daily

## 2021-10-17 NOTE — PROGRESS NOTES
Assessment & Plan     Acute maxillary sinusitis, recurrence not specified  Acute, symptoms x 1 week, worsening symptoms. No improvement with prednisone and tessalon. Will start course of antibiotics. Encouraged patient to  a saline nasal rinse and do 1-2 times daily. Keep head of bed elevated, push fluids, warm salt water gargles. Stop Allegra for now until feeling fully better. Follow-up with primary care provider if not improving or worsening in the next 7-10 days.   - amoxicillin-clavulanate (AUGMENTIN) 875-125 MG tablet; Take 1 tablet by mouth 2 times daily for 10 days    Chronic rhinitis  Chronic, would recommend starting Flonase nasal spray in about 48-72 hours and continue for good for allergies. Follow-up with primary care provider as needed if not improving.   - fluticasone (FLONASE) 50 MCG/ACT nasal spray; Spray 2 sprays into both nostrils daily           Patient Instructions   Acute maxillary sinusitis, recurrence not specified  Acute, symptoms x 1 week, worsening symptoms. No improvement with prednisone and tessalon. Will start course of antibiotics. Encouraged patient to  a saline nasal rinse and do 1-2 times daily. Keep head of bed elevated, push fluids, warm salt water gargles. Stop Allegra for now until feeling fully better. Follow-up with primary care provider if not improving or worsening in the next 7-10 days.   - amoxicillin-clavulanate (AUGMENTIN) 875-125 MG tablet; Take 1 tablet by mouth 2 times daily for 10 days    Chronic rhinitis  Chronic, would recommend starting Flonase nasal spray in about 48-72 hours and continue for good for allergies. Follow-up with primary care provider as needed if not improving.   - fluticasone (FLONASE) 50 MCG/ACT nasal spray; Spray 2 sprays into both nostrils daily      Return in about 1 week (around 10/24/2021), or if symptoms worsen or fail to improve.    Susana Ch, DNP, APRN-CNP   [facility]    Subjective      Ana Maria Espinoza is a 31 year old female who presents today for the following health issues:      HPI    RESPIRATORY SYMPTOMS      Duration: 6 days     Description  sore throat, facial pain/pressure, cough, acid reflux     Severity: moderate    Accompanying signs and symptoms: coughing, hard to breathe, phlegm, sinus pressure, hurts to swallow and acid reflux     History (predisposing factors):  none    Precipitating or alleviating factors: None    Therapies tried and outcome:  Tessalon, prednisone     Has been trying to the use mucinex at night, not helping   3 tums a night, pepto and prilosec   Using inhaler that is not working     CHEST TWO VIEWS 10/13/2021 12:48 PM   HISTORY: Cough. SOB (shortness of breath). Throat pain.   COMPARISON: August 21, 2015                                                                       IMPRESSION:  There are no acute infiltrates. The cardiac silhouette is  not enlarged. Pulmonary vasculature is unremarkable.      NICOLÁS PATTON MD        Review of Systems  Constitutional, HEENT, cardiovascular, pulmonary, gi and gu systems are negative, except as otherwise noted.    Objective   /74 (BP Location: Right arm, Patient Position: Sitting, Cuff Size: Adult Regular)   Pulse 84   Temp 98.6  F (37  C) (Tympanic)   Resp 16   Ht 1.524 m (5')   Wt 72.4 kg (159 lb 9.6 oz)   SpO2 99%   BMI 31.17 kg/m    Body mass index is 31.17 kg/m .    Physical Exam  GENERAL APPEARANCE: alert, no distress and fatigued  EYES: Eyes grossly normal to inspection, PERRL, conjunctivae and sclerae normal and upper eyelids-mildly swollen bilateral   HENT: ear canals normal and TM's congested with serous fluid bilateral, nose and mouth without ulcers or lesions and nasal mucosa edematous without rhinorrhea  NECK: no adenopathy, no asymmetry, masses, or scars and thyroid normal to palpation  RESP: lungs clear to auscultation - no rales, rhonchi or wheezes and congested cough present  CV: regular rates  and rhythm, normal S1 S2, no S3 or S4 and no murmur, click or rub  SKIN: no suspicious lesions or rashes  PSYCH: mentation appears normal and affect normal/bright    Diagnostic Test Results:  none          Chart documentation with Dragon Voice recognition Software. Although reviewed after completion, some words and grammatical errors may remain.

## 2021-11-04 ENCOUNTER — VIRTUAL VISIT (OUTPATIENT)
Dept: RHEUMATOLOGY | Facility: CLINIC | Age: 31
End: 2021-11-04
Payer: COMMERCIAL

## 2021-11-04 DIAGNOSIS — R76.8 ANA POSITIVE: ICD-10-CM

## 2021-11-04 DIAGNOSIS — E55.9 VITAMIN D DEFICIENCY: Primary | ICD-10-CM

## 2021-11-04 DIAGNOSIS — G89.29 CHRONIC LEFT SHOULDER PAIN: ICD-10-CM

## 2021-11-04 DIAGNOSIS — R82.90 ABNORMAL URINALYSIS: ICD-10-CM

## 2021-11-04 DIAGNOSIS — R31.29 MICROSCOPIC HEMATURIA: ICD-10-CM

## 2021-11-04 DIAGNOSIS — M24.9 HYPERMOBILE JOINTS: ICD-10-CM

## 2021-11-04 DIAGNOSIS — M25.512 CHRONIC LEFT SHOULDER PAIN: ICD-10-CM

## 2021-11-04 PROCEDURE — 99283 EMERGENCY DEPT VISIT LOW MDM: CPT | Performed by: EMERGENCY MEDICINE

## 2021-11-04 PROCEDURE — 99215 OFFICE O/P EST HI 40 MIN: CPT | Mod: 95 | Performed by: INTERNAL MEDICINE

## 2021-11-04 NOTE — PATIENT INSTRUCTIONS
Summary of Your Rheumatology Visit    Next Appointment:  6 Months, sooner as needed    Medications:      Referrals:      Tests:     Recommend having urine labs and vitamin D level checked this month.    Recheck labs approximately 1 week prior to next visit.  Injections:        Other:
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
## 2021-11-05 ENCOUNTER — HOSPITAL ENCOUNTER (EMERGENCY)
Facility: CLINIC | Age: 31
Discharge: HOME | End: 2021-11-05
Attending: EMERGENCY MEDICINE | Admitting: EMERGENCY MEDICINE
Payer: COMMERCIAL

## 2021-11-05 ENCOUNTER — PATIENT OUTREACH (OUTPATIENT)
Dept: FAMILY MEDICINE | Facility: CLINIC | Age: 31
End: 2021-11-05

## 2021-11-05 VITALS
OXYGEN SATURATION: 97 % | WEIGHT: 165 LBS | RESPIRATION RATE: 16 BRPM | HEIGHT: 60 IN | TEMPERATURE: 98.8 F | SYSTOLIC BLOOD PRESSURE: 130 MMHG | HEART RATE: 112 BPM | BODY MASS INDEX: 32.39 KG/M2 | DIASTOLIC BLOOD PRESSURE: 88 MMHG

## 2021-11-05 DIAGNOSIS — H10.32 ACUTE CONJUNCTIVITIS OF LEFT EYE, UNSPECIFIED ACUTE CONJUNCTIVITIS TYPE: ICD-10-CM

## 2021-11-05 RX ORDER — POLYMYXIN B SULFATE AND TRIMETHOPRIM 1; 10000 MG/ML; [USP'U]/ML
2 SOLUTION OPHTHALMIC EVERY 6 HOURS
Status: DISCONTINUED | OUTPATIENT
Start: 2021-11-05 | End: 2021-11-05 | Stop reason: HOSPADM

## 2021-11-05 ASSESSMENT — MIFFLIN-ST. JEOR: SCORE: 1379.94

## 2021-11-05 ASSESSMENT — VISUAL ACUITY
OD: 20/30
OS: 20/40

## 2021-11-05 NOTE — LETTER
November 5, 2021      To Whom It May Concern:      Ana Maria Espinoza was seen in our Emergency Department today, 11/05/21. May return to work on 11/7/2021 without restriction.    Sincerely,        Orlin White MD

## 2021-11-05 NOTE — TELEPHONE ENCOUNTER
ED / Discharge Outreach Protocol    Patient Contact    Attempt # 1    Was call answered?  No. Generic message left on unidentified voicemail to return call to care team as follow up.   Keiry KIM RN

## 2021-11-09 ASSESSMENT — ENCOUNTER SYMPTOMS
EYE DISCHARGE: 1
PHOTOPHOBIA: 0
NECK PAIN: 0
EYE REDNESS: 1
FEVER: 0
HEADACHES: 0

## 2021-11-10 NOTE — ED PROVIDER NOTES
History     Chief Complaint   Patient presents with     Eye Drainage     eye drainage started today - awoke this morning with crusty discharge - now with continued yellow discharge to left eye     HPI  Ana Maria Espinoza is a 31 year old female with one day of left eye redness, irritation and yellow colored drainage. Does not wear contact lenses. No headache, neck pain, pain with eye movement. No occular trauma. No change in visual acuity. No fever, chills, n/v/d. No neck pain.     The patient's PMHx, Surgical Hx, Allergies, and Medications were all reviewed with the patient.    Allergies:  Allergies   Allergen Reactions     Bee Venom Anaphylaxis     Has epipen     Aloe Hives     Dry skin as well      Codeine Rash     Other reaction(s): Intolerance-Can't Take       Problem List:    Patient Active Problem List    Diagnosis Date Noted     Moderate episode of recurrent major depressive disorder (H) 01/27/2021     Priority: Medium     Generalized anxiety disorder 01/27/2021     Priority: Medium     Nexplanon in place 01/08/2021     Priority: Medium     Placed in the left arm on 1/8/2021 by Dr. Dimitris Zambrano  Due to come out 1/8/2024       Tobacco use 01/29/2020     Priority: Medium     H/O herpes simplex type 2 infection 10/16/2017     Priority: Medium        Past Medical History:    Past Medical History:   Diagnosis Date     Uncomplicated asthma 1996       Past Surgical History:    Past Surgical History:   Procedure Laterality Date     ABDOMEN SURGERY      2 c sections 2009 @ 2011     ENT SURGERY      Unsure of year     ESOPHAGOSCOPY, GASTROSCOPY, DUODENOSCOPY (EGD), COMBINED N/A 7/20/2021    Procedure: ESOPHAGOGASTRODUODENOSCOPY, WITH BIOPSY;  Surgeon: German Flynn MD;  Location: [facility]     ORTHOPEDIC SURGERY      Had surgery on both big toes       Family History:    Family History   Problem Relation Age of Onset     Diabetes Father      Obesity Father      Connective Tissue Disorder Father         EDS     Coronary  Artery Disease Daughter      Depression Mother      Mental Illness Mother      Obesity Mother      Connective Tissue Disorder Mother         EDS     Asthma Sister      Thyroid Disease Sister      Obesity Sister      Asthma Brother      Obesity Brother      Genetic Disorder Daughter         Avascular necrosis and Legg calve perthes disease     Obesity Paternal Grandmother      Connective Tissue Disorder Maternal Cousin         EDS     Connective Tissue Disorder Maternal Cousin         EDS       Social History:  Marital Status:  Single [1]  Social History     Tobacco Use     Smoking status: Current Every Day Smoker     Packs/day: 0.50     Years: 10.00     Pack years: 5.00     Types: Cigarettes     Start date: 3/19/2009     Last attempt to quit: 2021     Years since quittin.3     Smokeless tobacco: Never Used   Vaping Use     Vaping Use: Never used   Substance Use Topics     Alcohol use: No     Drug use: No        Medications:    albuterol (PROAIR HFA/PROVENTIL HFA/VENTOLIN HFA) 108 (90 Base) MCG/ACT inhaler  EPINEPHrine (ANY BX GENERIC EQUIV) 0.3 MG/0.3ML injection 2-pack  etonogestrel (NEXPLANON) 68 MG IMPL  fluticasone (FLONASE) 50 MCG/ACT nasal spray  ketoconazole (NIZORAL) 2 % external cream  ketoconazole (NIZORAL) 2 % external shampoo  nystatin (MYCOSTATIN) 146767 UNIT/GM external powder  ondansetron (ZOFRAN) 4 MG tablet  SUMAtriptan (IMITREX) 25 MG tablet  valACYclovir (VALTREX) 500 MG tablet          Review of Systems   Constitutional: Negative for fever.   Eyes: Positive for discharge and redness. Negative for photophobia.   Musculoskeletal: Negative for neck pain.   Skin: Negative for rash.   Neurological: Negative for headaches.       Physical Exam   BP: 137/85  Pulse: 112  Temp: 97.9  F (36.6  C)  Resp: 12  Height: 152.4 cm (5')  Weight: 74.8 kg (165 lb)  SpO2: 99 %    Physical Exam  GEN: Awake, alert, and cooperative. Appears mildly distressed.   HENT: MMM. External ears and nose normal  bilaterally.  EYES: EOM intact. Right conjunctiva injected with sparing of limbus. No  RAPD. Acuity right eye 20/20, left eye 20/30.   NECK: Symmetric, freely mobile.   CV : Extremities warm and well perfused.   PULM: Normal effort. Speaking in full sentences.    NEURO: Normal speech. Following commands. CN II-XII grossly intact. Answering questions and interacting appropriately.   EXT: No gross deformity.   INT: Warm. No diaphoresis. Normal color.        ED Course        Procedures          Critical Care time:  none               No results found for this or any previous visit (from the past 24 hour(s)).    Medications - No data to display    Assessments & Plan (with Medical Decision Making)   31 year old female with one day of left eye redness with yellowish drainage. Exam consistent with conjunctivitis.  polytrim given in ED with plans for 2 drops q 6 hours for one week.  Eye clinic follow up if symptoms do not resolve in 2-3 days. Will return to ED if symptoms worsen.     I have reviewed the nursing notes.         Discharge Medication List as of 11/5/2021  4:04 AM          Final diagnoses:   Acute conjunctivitis of left eye, unspecified acute conjunctivitis type     Orlin White MD    11/4/2021   [facility] EMERGENCY DEPT    Disclaimer: This note consists of words and symbols derived from keyboarding and dictation using voice recognition software.  As a result, there may be errors that have gone undetected.  Please consider this when interpreting information found in this note.             Orlin White MD  11/09/21 2044

## 2021-11-18 ENCOUNTER — VIRTUAL VISIT (OUTPATIENT)
Dept: FAMILY MEDICINE | Facility: CLINIC | Age: 31
End: 2021-11-18
Payer: COMMERCIAL

## 2021-11-18 DIAGNOSIS — N30.00 ACUTE CYSTITIS WITHOUT HEMATURIA: Primary | ICD-10-CM

## 2021-11-18 PROCEDURE — 99213 OFFICE O/P EST LOW 20 MIN: CPT | Mod: 95 | Performed by: NURSE PRACTITIONER

## 2021-11-18 RX ORDER — CEPHALEXIN 500 MG/1
500 CAPSULE ORAL 2 TIMES DAILY
Qty: 14 CAPSULE | Refills: 0 | Status: SHIPPED | OUTPATIENT
Start: 2021-11-18 | End: 2021-11-25

## 2021-11-18 NOTE — LETTER
[facility]  [address] [place] [address]  Phone: [phone]  Fax: [phone]    November 18, 2021        Ana Maria Espinoza  [address] [place] [id number]          To whom it may concern:    RE: Ana Maria Espinoza    Patient was seen and treated today at our clinic and missed work.    Please contact me for questions or concerns.      Sincerely,        SY Romo CNP

## 2021-11-18 NOTE — PROGRESS NOTES
Ana Maria is a 31 year old who is being evaluated via a billable telephone visit.      What phone number would you like to be contacted at? [phone]  How would you like to obtain your AVS? Dyana    Assessment & Plan     (N30.00) Acute cystitis without hematuria  (primary encounter diagnosis)  Comment: Based on symptoms will treat for UTI if not improving patient should be seen or make lab appointment of ua.  Reviewed symptoms when patient should be seen in the emergency department.   Plan: cephALEXin (KEFLEX) 500 MG capsule, UA macro         with reflex to Microscopic and Culture - Clinc         Collect      Return in about 1 week (around 11/25/2021), or if symptoms worsen or fail to improve.    SY Romo CNP  [facility]    Subjective   Ana Maria is a 31 year old who presents for the following health issues     HPI     Genitourinary - Female  Onset/Duration: 2 days   Description:   Painful urination (Dysuria): YES           Frequency: YES  Blood in urine (Hematuria): YES- possibly  Delay in urine (Hesitency): YES  Intensity: severe  Progression of Symptoms:  worsening  Accompanying Signs & Symptoms:  Fever/chills: no  Flank pain: no  Nausea and vomiting: no  Vaginal symptoms: none  Abdominal/Pelvic Pain: YES- cramping   History:   History of frequent UTI s: no  History of kidney stones: no  Sexually Active: YES  Possibility of pregnancy: No  Precipitating or alleviating factors: None  Therapies tried and outcome: Cranberry juice prn (contraindicated in Coumadin patients)        Review of Systems   Constitutional, HEENT, cardiovascular, pulmonary, gi and gu systems are negative, except as otherwise noted.      Objective           Vitals:  No vitals were obtained today due to virtual visit.    Physical Exam   healthy, alert and no distress  PSYCH: Alert and oriented times 3; coherent speech, normal   rate and volume, able to articulate logical thoughts, able   to abstract  reason, no tangential thoughts, no hallucinations   or delusions  Her affect is normal  RESP: No cough, no audible wheezing, able to talk in full sentences  Remainder of exam unable to be completed due to telephone visits    No results found for any visits on 11/18/21.          Phone call duration: 15  minutes

## 2021-11-18 NOTE — PATIENT INSTRUCTIONS
Patient Education     Understanding Urinary Tract Infections (UTIs)   Most UTIs are caused by bacteria, but they may also be caused by viruses or fungi. Bacteria from the bowel are the most common source of infection. The infection may start because of any of the following:     Sexual activity.  During sex, bacteria can travel from the penis, vagina, or rectum into the urethra.     Bacteria outside the rectum getting into the urethra.  Bacteria on the skin outside the rectum may travel into the urethra. This is more common in women since the rectum and urethra are closer to each other than in men. Wiping from front to back after using the toilet and keeping the area clean can help prevent germs from getting to the urethra.    Blocked urine flow through the urinary tract. If urine sits too long, germs may start to grow out of control.  Parts of the urinary tract  The infection can occur in any part of the urinary tract.       The kidneys. These organs collect and store urine.    The ureters. These tubes carry urine from the kidneys to the bladder.    The bladder. This holds urine until you are ready to let it out.    The urethra. This tube carries urine from the bladder out of the body. It is shorter in women, so bacteria can move through it more easily. The urethra is longer in men, so a UTI is less likely to reach the bladder or kidneys in men.  [employer] last reviewed this educational content on 1/1/2020 2000-2021 [employer]. All rights reserved. This information is not intended as a substitute for professional medical care. Always follow your healthcare professional's instructions.

## 2021-11-28 ENCOUNTER — HEALTH MAINTENANCE LETTER (OUTPATIENT)
Age: 31
End: 2021-11-28

## 2021-12-06 ENCOUNTER — HOSPITAL ENCOUNTER (OUTPATIENT)
Dept: PHYSICAL THERAPY | Facility: CLINIC | Age: 31
Setting detail: THERAPIES SERIES
End: 2021-12-06
Attending: INTERNAL MEDICINE
Payer: COMMERCIAL

## 2021-12-06 DIAGNOSIS — G89.29 CHRONIC SHOULDER PAIN, UNSPECIFIED LATERALITY: ICD-10-CM

## 2021-12-06 DIAGNOSIS — M25.519 CHRONIC SHOULDER PAIN, UNSPECIFIED LATERALITY: ICD-10-CM

## 2021-12-06 PROCEDURE — 97161 PT EVAL LOW COMPLEX 20 MIN: CPT | Mod: GP | Performed by: PHYSICAL THERAPIST

## 2021-12-06 PROCEDURE — 97110 THERAPEUTIC EXERCISES: CPT | Mod: GP | Performed by: PHYSICAL THERAPIST

## 2021-12-07 NOTE — PROGRESS NOTES
[facility]    OUTPATIENT PHYSICAL THERAPY ORTHOPEDIC EVALUATION  PLAN OF TREATMENT FOR OUTPATIENT REHABILITATION  (COMPLETE FOR INITIAL CLAIMS ONLY)  Patient's Last Name, First Name, M.I.  YOB: 1990  Ana Maria Espinoza    Provider s Name:  [facility]   Medical Record No.  [id number]   Start of Care Date:  12/06/21   Onset Date:      Type:     _X__PT   ___OT   ___SLP Medical Diagnosis:  Chronic shoulder pain, unspecified laterality M25.519, G89.29  - Primary     PT Diagnosis:  L shoulder pain   Visits from SOC:  1      _________________________________________________________________________________  Plan of Treatment/Functional Goals:  gait training,balance training,joint mobilization,manual therapy,neuromuscular re-education,ROM,strengthening,stretching,transfer training     Cryotherapy,Electrical stimulation,TENS     Goals  Goal Identifier: Full ROM  Goal Description: Pt will demonstrate 180 deg shoulder flex and abd to complete overhead tasks at work  Target Date: 12/27/21    Goal Identifier: Strength  Goal Description: Pt will demonstrate 5/5 UE strength to be able to complete lifting tasks at work.   Target Date: 12/27/21    Goal Identifier: HEP  Goal Description: Pt will be compliant and competent in HEP to allow them to achieve maximal strength and reduce pain   Target Date: 01/17/22               Therapy Frequency:  other (see comments) (1x/every other work)  Predicted Duration of Therapy Intervention:  6 weeks    Margo Capellan, PT                 I CERTIFY THE NEED FOR THESE SERVICES FURNISHED UNDER        THIS PLAN OF TREATMENT AND WHILE UNDER MY CARE     (Physician co-signature of this document indicates review and certification of the therapy plan).                       Certification Date From:  12/06/21   Certification Date To:  01/17/22    Referring  Provider:  Polina Esteban, DO     Initial Assessment        See Epic Evaluation Start of Care Date: 12/06/21 12/06/21 1100   General Information   Type of Visit Initial OP Ortho PT Evaluation   Start of Care Date 12/06/21   Referring Physician Polina Esteban, DO    Patient/Family Goals Statement reduce pain   Orders Evaluate and Treat   Date of Order 09/07/21   Certification Required? Yes   Medical Diagnosis Chronic shoulder pain, unspecified laterality M25.519, G89.29  - Primary   Surgical/Medical history reviewed Yes   Precautions/Limitations no known precautions/limitations   Body Part(s)   Body Part(s) Shoulder   Presentation and Etiology   Pertinent history of current problem (include personal factors and/or comorbidities that impact the POC) Pt relates chronic L shoulder pain probably due to bone spur. Ongoing for 6+ months. Pt relates is also getting tested for autoimmune issues, possibly lupus or EDS. Pt relates also has neck and back pain but working with chiropractor to relieve those symptoms. Original injury may be due to fall. Pt also works in a physically demanding job.     Impairments A. Pain   Functional Limitations perform required work activities   Symptom Location L arm    How/Where did it occur Other   Chronicity Chronic   Pain rating (0-10 point scale) Best (/10);Worst (/10)   Best (/10) 0   Worst (/10) 7   Pain quality F. Stabbing   Frequency of pain/symptoms B. Intermittent   Pain/symptoms exacerbated by C. Lifting;D. Carrying;L. Work tasks   Pain/symptoms eased by C. Rest   Progression of symptoms since onset: Improved   Current Level of Function   Current Community Support Family/friend caregiver   Patient role/employment history A. Employed   Employment Comments siding    Living environment [facility]   Fall Risk Screen   Fall screen completed by PT   Have you fallen 2 or more times in the past year? Yes   Have you fallen and had an injury in the past year? Yes    Is patient a fall risk? No   Fall screen comments Ambulation WNL   Abuse Screen (yes response referral indicated)   Feels Unsafe at Home or Work/School no   Feels Threatened by Someone no   Does Anyone Try to Keep You From Having Contact with Others or Doing Things Outside Your Home? no   Physical Signs of Abuse Present no   Functional Scales   Functional Scales Other   Other Scales  SPADI:16%    Shoulder Objective Findings   Side (if bilateral, select both right and left) Left   Posture fwd rounded shoulders   Cervical Screen (ROM, quadrant) WNL   Neer's Test -   Toussaint-Thien Test -   Dyer's Test -   Load and Shift Test -   Relocation Test +   Sulcus Test -   Crossover Test +   Left Shoulder Flexion AROM 170   Left Shoulder Abduction AROM 160   Left Shoulder ER AROM T2   Left Shoulder ER PROM 100   Left Shoulder IR AROM T10   Left Shoulder IR PROM 60   Left Shoulder Flexion Strength 4/5   Left Shoulder Abduction Strength 5/5   Left Shoulder ER Strength 4/5   Left Shoulder IR Strength 5/5   Left Mid Trapezius Strength 3/5   Left Lower Trapezius Strength 2/5   Planned Therapy Interventions   Planned Therapy Interventions gait training;balance training;joint mobilization;manual therapy;neuromuscular re-education;ROM;strengthening;stretching;transfer training   Planned Modality Interventions   Planned Modality Interventions Cryotherapy;Electrical stimulation;TENS   Clinical Impression   Criteria for Skilled Therapeutic Interventions Met yes, treatment indicated   PT Diagnosis L shoulder pain   Influenced by the following impairments limitations in ROM, weakness -    Functional limitations due to impairments pain at end range of motion, mild weakness noted and pt relates increased pain at work   Clinical Presentation Stable/Uncomplicated   Clinical Presentation Rationale Pt is 31 yr old female who presents w L shoulder pain. Pt has fair ROM and strength but has increased pain with work tasks such as carrying and  cris julia. Pt is motivated to get better but is also beinng tested for autoimmune disease as well. Pt overall is improving   Clinical Decision Making (Complexity) Low complexity   Therapy Frequency other (see comments)  (1x/every other work)   Predicted Duration of Therapy Intervention (days/wks) 6 weeks   Risk & Benefits of therapy have been explained Yes   Patient, Family & other staff in agreement with plan of care Yes   Education Assessment   Preferred Learning Style Listening;Reading;Demonstration;Pictures/video   Barriers to Learning No barriers   ORTHO GOALS   PT Ortho Eval Goals 1;2;3;4   Ortho Goal 1   Goal Identifier Full ROM   Goal Description Pt will demonstrate 180 deg shoulder flex and abd to complete overhead tasks at work   Target Date 12/27/21   Ortho Goal 2   Goal Identifier Strength   Goal Description Pt will demonstrate 5/5 UE strength to be able to complete lifting tasks at work.    Target Date 12/27/21   Ortho Goal 3   Goal Identifier HEP   Goal Description Pt will be compliant and competent in HEP to allow them to achieve maximal strength and reduce pain    Target Date 01/17/22   Total Evaluation Time   PT Eval, Low Complexity Minutes (47944) 20   Therapy Certification   Certification date from 12/06/21   Certification date to 01/17/22   Medical Diagnosis Chronic shoulder pain, unspecified laterality M25.519, G89.29  - Primary

## 2021-12-15 ENCOUNTER — LAB (OUTPATIENT)
Dept: LAB | Facility: CLINIC | Age: 31
End: 2021-12-15
Payer: COMMERCIAL

## 2021-12-15 DIAGNOSIS — E55.9 VITAMIN D DEFICIENCY: ICD-10-CM

## 2021-12-15 DIAGNOSIS — R31.29 MICROSCOPIC HEMATURIA: ICD-10-CM

## 2021-12-15 DIAGNOSIS — R82.90 ABNORMAL URINALYSIS: ICD-10-CM

## 2021-12-15 LAB
ALBUMIN UR-MCNC: 30 MG/DL
APPEARANCE UR: CLEAR
BACTERIA #/AREA URNS HPF: ABNORMAL /HPF
BILIRUB UR QL STRIP: ABNORMAL
COLOR UR AUTO: YELLOW
CREAT UR-MCNC: 430 MG/DL
CREAT UR-MCNC: 430 MG/DL
DEPRECATED CALCIDIOL+CALCIFEROL SERPL-MC: 18 UG/L (ref 20–75)
GLUCOSE UR STRIP-MCNC: NEGATIVE MG/DL
HGB UR QL STRIP: ABNORMAL
KETONES UR STRIP-MCNC: NEGATIVE MG/DL
LEUKOCYTE ESTERASE UR QL STRIP: NEGATIVE
MICROALBUMIN UR-MCNC: 26 MG/L
MICROALBUMIN/CREAT UR: 6.05 MG/G CR (ref 0–25)
MUCOUS THREADS #/AREA URNS LPF: PRESENT /LPF
NITRATE UR QL: NEGATIVE
PH UR STRIP: 6.5 [PH] (ref 5–7)
PROT UR-MCNC: 0.32 G/L
PROT/CREAT 24H UR: 0.07 G/G CR (ref 0–0.2)
RBC #/AREA URNS AUTO: ABNORMAL /HPF
SP GR UR STRIP: 1.02 (ref 1–1.03)
SQUAMOUS #/AREA URNS AUTO: ABNORMAL /LPF
UROBILINOGEN UR STRIP-ACNC: 2 E.U./DL
WBC #/AREA URNS AUTO: ABNORMAL /HPF

## 2021-12-15 PROCEDURE — 84156 ASSAY OF PROTEIN URINE: CPT

## 2021-12-15 PROCEDURE — 82043 UR ALBUMIN QUANTITATIVE: CPT

## 2021-12-15 PROCEDURE — 36415 COLL VENOUS BLD VENIPUNCTURE: CPT

## 2021-12-15 PROCEDURE — 81001 URINALYSIS AUTO W/SCOPE: CPT

## 2021-12-15 PROCEDURE — 82306 VITAMIN D 25 HYDROXY: CPT

## 2021-12-18 NOTE — PATIENT INSTRUCTIONS
"Increase rest and fluids. Tylenol and/or Ibuprofen for comfort. Cool mist vaporizer. If your symptoms worsen or do not resolve follow up with your primary care provider in 1 week and sooner if needed.     Mucinex 600-1200 mg 12 hour formula for ear, head and chest congestion.  It can also thin post nasal drip which can cause a cough and sore throat.       Indications for emergent return to emergency department discussed with patient, who verbalized good understanding and agreement.  Patient understands the limitations of today's evaluation.           Patient Education     Viral Syndrome (Adult)  A viral illness may cause a number of symptoms such as fever. Other symptoms depend on the part of the body that the virus affects. If it settles in your nose, throat, and lungs, it may cause cough, sore throat, congestion, runny nose, headache, earache and other ear symptoms, or shortness of breath. If it settles in your stomach and intestinal tract, it may cause nausea, vomiting, cramping, and diarrhea. Sometimes it causes generalized symptoms like \"aching all over,\" feeling tired, loss of energy, or loss of appetite.  A viral illness usually lasts anywhere from several days to several weeks, but sometimes it lasts longer. In some cases, a more serious infection can look like a viral syndrome in the first few days of the illness. You may need another exam and additional tests to know the difference. Watch for the warning signs listed below for when to seek medical advice.  Home care  Follow these guidelines for taking care of yourself at home:    If symptoms are severe, rest at home for the first 2 to 3 days.    Stay away from cigarette smoke - both your smoke and the smoke from others.    You may use over-the-counter acetaminophen or ibuprofen for fever, muscle aching, and headache, unless another medicine was prescribed for this. If you have chronic liver or kidney disease or ever had a stomach ulcer or gastrointestinal " bleeding, talk with your healthcare provider before using these medicines. No one who is younger than 18 and ill with a fever should take aspirin. It may cause severe disease or death.    Your appetite may be poor, so a light diet is fine. Avoid dehydration by drinking 8 to 12, 8-ounce glasses of fluids each day. This may include water; orange juice; lemonade; apple, grape, and cranberry juice; clear fruit drinks; electrolyte replacement and sports drinks; and decaffeinated teas and coffee. If you have been diagnosed with a kidney disease, ask your healthcare provider how much and what types of fluids you should drink to prevent dehydration. If you have kidney disease, drinking too much fluid can cause it build up in the your body and be dangerous to your health.    Over-the-counter remedies won't shorten the length of the illness but may be helpful for symptoms such as cough, sore throat, nasal and sinus congestion, or diarrhea. Don't use decongestants if you have high blood pressure.  Follow-up care  Follow up with your healthcare provider if you do not improve over the next week.  Call 911  Call 911 if any of the following occur:    Convulsion    Feeling weak, dizzy, or like you are going to faint    Chest pain, or more than mild shortness of breath  When to seek medical advice  Call your healthcare provider right away if any of these occur:    Cough with lots of colored sputum (mucus) or blood in your sputum    Chest pain, shortness of breath, wheezing, or trouble breathing    Severe headache; face, neck, or ear pain    Severe, constant pain in the lower right side of your belly (abdominal)    Continued vomiting (can t keep liquids down)    Frequent diarrhea (more than 5 times a day); blood (red or black color) or mucus in diarrhea    Feeling weak, dizzy, or like you are going to faint    Extreme thirst    Fever of 100.4 F (38 C) or higher, or as directed by your healthcare provider  [employer] last reviewed this  "educational content on 4/1/2018 2000-2021 [employer]. All rights reserved. This information is not intended as a substitute for professional medical care. Always follow your healthcare professional's instructions.           Patient Education       Coronavirus Disease 2019 (COVID-19): Caring for Yourself or Others  If you or a household member have symptoms of COVID-19, follow these guidelines for preventing spread of the virus and managing symptoms. This is regardless of your vaccination status.  If you have COVID-19 symptoms    Stay home. Call your healthcare provider and tell them you have symptoms of COVID-19. Do this before going to any hospital or clinic. Follow your provider's instructions. You may be advised to isolate yourself at home. This is called self-isolation. You may also be told to stay at least 6 feet from others to prevent the spread of COVID-19. This is called \"social distancing.\"    Stay away from work, school, and public places. Limit physical contact with family members. Limit visitors. Don't kiss anyone or share eating or drinking utensils. Clean surfaces you touch with disinfectant. This is to help prevent the virus from spreading.    If you need to cough or sneeze, do it into a tissue. Then throw the tissue into the trash. If you don't have tissues, cough or sneeze into the bend of your elbow.    Wear a cloth face mask with two or more layers of washable, breathable fabric and a nose wire while in public or when indoors with people who don't live with you. Or you can wear a disposable paper mask with a cloth mask on top. Wear the mask so that it covers both your nose and mouth.    Don t share food or personal items with people in your household. This includes items like eating and drinking utensils, towels, and bedding.    If you need to go to a hospital or clinic, expect that the healthcare staff will wear protective equipment such as masks, gowns, gloves, and eye " protection. You may be advised to wait in or enter through a separate area. This is to prevent the possible virus from spreading.    Tell the healthcare staff about recent travel. This includes local travel on public transport. Staff may need to find other people you have been in contact with.    Follow all instructions the healthcare staff give you.    If you have been diagnosed with COVID-19    Stay home and start self-isolation. Don t leave your home unless you need to get medical care. Don't go to work, school, or public areas. Don't use public transportation or taxis.    Follow all instructions from your healthcare provider. Call your healthcare provider s office before going. They can prepare and give you instructions. This will help prevent the virus from spreading.    If you need to go to a hospital or clinic, expect that the healthcare staff will wear protective equipment such as masks, gowns, gloves, and eye protection. You may be advised to wait in or enter through a separate area. This is to prevent the possible virus from spreading.    Wear a face mask with 2 or more layers and a nose wire. Use either a cloth mask with layers of tightly woven, breathable fabric or a disposable paper mask with a cloth mask on top. This is to protect other people from your germs. If you are not able to wear a mask, your caregivers should. Wear the mask so that it covers both your nose and mouth.    Stay away from other people in your home.    Stay away from pets and other animals.    Don t share food or personal items with people in your household. This includes items like eating and drinking utensils, towels, and bedding.    If you need to cough or sneeze, do it into a tissue. Then throw the tissue into the trash. If you don't have tissues, cough or sneeze into the bend of your elbow.    Wash your hands often.    Self-care at home   Prevention  The FDA has approved several vaccines to prevent COVID-19 or reduce its  severity. These vaccines also reduce how severe the illness will be if you get the virus. No vaccine is ever 100% effective in preventing any illness, but the COVID-19 vaccines work well and are safe. One vaccine has been approved for people as young as 12. Expert groups, including ACOG and CDC, advise pregnant or breastfeeding people to be vaccinated. Talk with your healthcare provider about which vaccine is best for you and your family.  Treatment  Current treatment is mainly aimed at helping your body while it fights the virus. This is known as supportive care. For serious COVID-19, you may need to stay in the hospital. Supportive care includes:    Getting rest. This helps your body fight the illness.    Staying hydrated.  Drinking liquids is the best way to prevent dehydration. Try to drink 6 to 8 glasses of liquids every day, or as advised by your provider. Also check with your provider about which fluids are best for you. Don't drink fluids that contain caffeine or alcohol.    Taking over-the-counter (OTC) pain medicine. These are used to help ease pain and reduce fever. Follow your healthcare provider's instructions for which OTC medicine to use.  If you've been treated for suspected or confirmed COVID-19 , follow all of your healthcare team's instructions. This will include when it's OK to stop self-isolation. You may also get instructions on position changes to help your breathing, such as lying on your belly (prone positioning). If you were treated at a hospital and discharged, you may be sent home with a pulse oximeter. This is a small electronic device that you clip on your fingertip. It measures the amount of oxygen in your body. Follow your healthcare team's instructions on its use, how they will be in touch with you, and when to call them.  The FDA approved monoclonal antibody therapy for emergency investigational use in certain people who have a positive COVID-19 viral test and have mild to moderate  symptoms but are not in the hospital. Your healthcare provider will advise you on whether monoclonal antibody therapy is appropriate for you. It's not approved to prevent COVID-19. It's approved for people 12 years and older who weigh about 88 pounds (40 kgs) and are at high risk for severe COVID-19 and a hospital stay. This includes people who are 65 years and older and people with certain chronic conditions. Monoclonal antibody therapy is not approved for people who:    Are in the hospital with COVID-19, or    Need oxygen therapy for COVID-19, or    Need oxygen therapy for a chronic condition and need to have oxygen flow increased because of COVID-19     If you've had confirmed COVID-19, your healthcare team may ask you to consider donating your plasma. This is called COVID-19 convalescent plasma donation. Plasma from people fully recovered from COVID-19 may contain antibodies to help fight COVID-19 in people who are currently seriously ill with the disease. Experts don't know the safety of COVID-19 convalescent plasma or how well it works. Research continues. The FDA has approved it for emergency use in certain people with serious or life-threatening COVID-19.  Home care for a sick person     Follow all instructions from healthcare staff.    Wash your hands often.    Wear protective clothing as advised.    Make sure the sick person wears a mask. If they can't wear a mask, don't stay in the same room with the person. If you must be in the same room, wear a face mask. When wearing a mask, make sure that it covers both the nose and mouth.    Keep track of the sick person s symptoms.    Clean home surfaces often with disinfectant. This includes phones, kitchen counters, fridge door handle, bathroom surfaces, and others.    Don t let anyone share household items with the sick person. This includes eating and drinking tools, towels, sheets, or blankets.    Clean fabrics and laundry thoroughly.    Keep other people and  pets away from the sick person.    When you can stop self-isolation  When you are sick with COVID-19, you should stay away from other people. This is called self-isolation.  For normally healthy children or adults with COVID-19 symptoms, CDC advises stopping self-isolation when all 3 of these are true:  1. You have had no fever for at least 24 hours. This means no fever without medicine that reduces fever, such as acetaminophen, for at least 24 hours.  2. Your symptoms such as cough or trouble breathing have improved.  3. It has been at least 10 days since your first symptoms started.  For people who have COVID-19 but no symptoms , isolation can stop 10 days after the first positive test.  If you have a weak immune system and COVID-19, or if you've had severe COVID-19,  your instructions on when to stop isolation will be somewhat different. Some conditions and treatments can cause a weak immune system. These include cancer treatment, bone marrow or organ transplants, and conditions such as HIV or other immune system disorders. You may be advised to self-isolate up to 20 days after your symptoms first started. Your healthcare provider may want to retest you for COVID-19. Follow your provider's instructions.  CDC mask guidance  Consider the CDC's guidance and your local community's instructions on face masks.       Cloth masks may help prevent people who have COVID-19 from spreading the virus to others.    Cloth masks are most likely to reduce COVID-19 spread when masks are widely used by people who are out in the public.    Wear a mask inside your house if you live with someone who has symptoms of COVID-19 or has tested positive for COVID-19.    CDC advises all people older than 2 who are not fully vaccinated to wear a mask and stay 6 feet away from others while in public.    CDC advises people with weak immune systems, even if fully vaccinated, to continue wearing masks and to stay 6 feet away from others while in  public.    CDC advises indoor masking for all teachers, staff, students, and visitors to schools, regardless of vaccination status.    Like other viruses, the virus that causes COVID-19 changes (mutates) all the time. This leads to variants that are easily spread, including the delta variant. To protect against variants, CDC advises all people over age 2, including those who are fully vaccinated, to wear a mask indoors in public settings if you are in an area of high numbers of COVID-19 cases. See the CDC's county data website for current transmission information in your area.     Certain people should not wear a face mask. This includes:    Children younger than 2 years old    Anyone with a health, developmental, or mental health condition that can be made worse by wearing a mask    Anyone who is unconscious or unable to remove the face covering without help     See the CDC's mask guidance.  When to call your healthcare provider  Call your healthcare provider right away if a sick person has any of these:    Trouble breathing    Pain or pressure in chest  If a sick person has any of these, call 911:    Trouble breathing that gets worse    Pain or pressure in chest that gets worse    Blue tint to lips or face    Fast or irregular heartbeat    Confusion or trouble waking    Fainting or loss of consciousness    Coughing up blood  Going home from the hospital  If you were diagnosed with COVID-19 and were recently discharged from the hospital:    Follow the instructions above for self-care and isolation.    Follow the hospital healthcare team s specific instructions.    Ask questions if anything is unclear to you. Write down answers so you remember them.  Date last modified: 9/24/2021  [employer] last reviewed this educational content on 4/1/2020 2000-2021 [employer]. All rights reserved. This information is not intended as a substitute for professional medical care. Always follow your healthcare  professional's instructions.            IV discontinued, cath removed intact

## 2021-12-26 ENCOUNTER — APPOINTMENT (OUTPATIENT)
Dept: CT IMAGING | Facility: CLINIC | Age: 31
End: 2021-12-26
Attending: EMERGENCY MEDICINE
Payer: COMMERCIAL

## 2021-12-26 ENCOUNTER — HOSPITAL ENCOUNTER (EMERGENCY)
Facility: CLINIC | Age: 31
Discharge: HOME | End: 2021-12-26
Attending: EMERGENCY MEDICINE | Admitting: EMERGENCY MEDICINE
Payer: COMMERCIAL

## 2021-12-26 VITALS
RESPIRATION RATE: 15 BRPM | OXYGEN SATURATION: 100 % | DIASTOLIC BLOOD PRESSURE: 95 MMHG | SYSTOLIC BLOOD PRESSURE: 170 MMHG | HEART RATE: 57 BPM | TEMPERATURE: 98.1 F

## 2021-12-26 DIAGNOSIS — R10.9 ACUTE RIGHT FLANK PAIN: ICD-10-CM

## 2021-12-26 LAB
ALBUMIN UR-MCNC: 100 MG/DL
ANION GAP SERPL CALCULATED.3IONS-SCNC: 6 MMOL/L (ref 3–14)
APPEARANCE UR: ABNORMAL
BASOPHILS # BLD AUTO: 0 10E3/UL (ref 0–0.2)
BASOPHILS NFR BLD AUTO: 0 %
BILIRUB UR QL STRIP: NEGATIVE
BUN SERPL-MCNC: 11 MG/DL (ref 7–30)
CALCIUM SERPL-MCNC: 8.6 MG/DL (ref 8.5–10.1)
CHLORIDE BLD-SCNC: 114 MMOL/L (ref 94–109)
CO2 SERPL-SCNC: 23 MMOL/L (ref 20–32)
COLOR UR AUTO: ABNORMAL
CREAT SERPL-MCNC: 0.89 MG/DL (ref 0.52–1.04)
EOSINOPHIL # BLD AUTO: 0.1 10E3/UL (ref 0–0.7)
EOSINOPHIL NFR BLD AUTO: 1 %
ERYTHROCYTE [DISTWIDTH] IN BLOOD BY AUTOMATED COUNT: 12.6 % (ref 10–15)
GFR SERPL CREATININE-BSD FRML MDRD: 88 ML/MIN/1.73M2
GLUCOSE BLD-MCNC: 106 MG/DL (ref 70–99)
GLUCOSE UR STRIP-MCNC: NEGATIVE MG/DL
HCG UR QL: NEGATIVE
HCT VFR BLD AUTO: 41.1 % (ref 35–47)
HGB BLD-MCNC: 14.2 G/DL (ref 11.7–15.7)
HGB UR QL STRIP: ABNORMAL
HOLD SPECIMEN: NORMAL
HYALINE CASTS: 3 /LPF
IMM GRANULOCYTES # BLD: 0.1 10E3/UL
IMM GRANULOCYTES NFR BLD: 1 %
KETONES UR STRIP-MCNC: 5 MG/DL
LEUKOCYTE ESTERASE UR QL STRIP: NEGATIVE
LYMPHOCYTES # BLD AUTO: 2.9 10E3/UL (ref 0.8–5.3)
LYMPHOCYTES NFR BLD AUTO: 23 %
MCH RBC QN AUTO: 33.1 PG (ref 26.5–33)
MCHC RBC AUTO-ENTMCNC: 34.5 G/DL (ref 31.5–36.5)
MCV RBC AUTO: 96 FL (ref 78–100)
MONOCYTES # BLD AUTO: 0.5 10E3/UL (ref 0–1.3)
MONOCYTES NFR BLD AUTO: 4 %
MUCOUS THREADS #/AREA URNS LPF: PRESENT /LPF
NEUTROPHILS # BLD AUTO: 9.2 10E3/UL (ref 1.6–8.3)
NEUTROPHILS NFR BLD AUTO: 71 %
NITRATE UR QL: NEGATIVE
NRBC # BLD AUTO: 0 10E3/UL
NRBC BLD AUTO-RTO: 0 /100
PH UR STRIP: 6 [PH] (ref 5–7)
PLATELET # BLD AUTO: 283 10E3/UL (ref 150–450)
POTASSIUM BLD-SCNC: 4 MMOL/L (ref 3.4–5.3)
RBC # BLD AUTO: 4.29 10E6/UL (ref 3.8–5.2)
RBC URINE: 13 /HPF
SODIUM SERPL-SCNC: 143 MMOL/L (ref 133–144)
SP GR UR STRIP: 1.03 (ref 1–1.03)
SQUAMOUS EPITHELIAL: 13 /HPF
UROBILINOGEN UR STRIP-MCNC: 2 MG/DL
WBC # BLD AUTO: 12.9 10E3/UL (ref 4–11)
WBC URINE: 6 /HPF

## 2021-12-26 PROCEDURE — 96374 THER/PROPH/DIAG INJ IV PUSH: CPT | Performed by: EMERGENCY MEDICINE

## 2021-12-26 PROCEDURE — 258N000003 HC RX IP 258 OP 636: Performed by: EMERGENCY MEDICINE

## 2021-12-26 PROCEDURE — 99285 EMERGENCY DEPT VISIT HI MDM: CPT | Mod: 25 | Performed by: EMERGENCY MEDICINE

## 2021-12-26 PROCEDURE — 96375 TX/PRO/DX INJ NEW DRUG ADDON: CPT | Performed by: EMERGENCY MEDICINE

## 2021-12-26 PROCEDURE — 99284 EMERGENCY DEPT VISIT MOD MDM: CPT | Performed by: EMERGENCY MEDICINE

## 2021-12-26 PROCEDURE — 96361 HYDRATE IV INFUSION ADD-ON: CPT | Performed by: EMERGENCY MEDICINE

## 2021-12-26 PROCEDURE — 250N000013 HC RX MED GY IP 250 OP 250 PS 637: Performed by: EMERGENCY MEDICINE

## 2021-12-26 PROCEDURE — 74176 CT ABD & PELVIS W/O CONTRAST: CPT

## 2021-12-26 PROCEDURE — 81025 URINE PREGNANCY TEST: CPT | Performed by: EMERGENCY MEDICINE

## 2021-12-26 PROCEDURE — 36415 COLL VENOUS BLD VENIPUNCTURE: CPT | Performed by: EMERGENCY MEDICINE

## 2021-12-26 PROCEDURE — 250N000011 HC RX IP 250 OP 636: Performed by: EMERGENCY MEDICINE

## 2021-12-26 PROCEDURE — 85025 COMPLETE CBC W/AUTO DIFF WBC: CPT | Performed by: EMERGENCY MEDICINE

## 2021-12-26 PROCEDURE — 80048 BASIC METABOLIC PNL TOTAL CA: CPT | Performed by: EMERGENCY MEDICINE

## 2021-12-26 PROCEDURE — 81001 URINALYSIS AUTO W/SCOPE: CPT | Performed by: EMERGENCY MEDICINE

## 2021-12-26 RX ORDER — KETOROLAC TROMETHAMINE 10 MG/1
10 TABLET, FILM COATED ORAL EVERY 6 HOURS PRN
Qty: 20 TABLET | Refills: 0 | Status: SHIPPED | OUTPATIENT
Start: 2021-12-26 | End: 2022-04-15

## 2021-12-26 RX ORDER — HYDROMORPHONE HYDROCHLORIDE 2 MG/1
2 TABLET ORAL ONCE
Status: COMPLETED | OUTPATIENT
Start: 2021-12-26 | End: 2021-12-26

## 2021-12-26 RX ORDER — ONDANSETRON 4 MG/1
4 TABLET, ORALLY DISINTEGRATING ORAL EVERY 8 HOURS PRN
Qty: 7 TABLET | Refills: 0 | Status: SHIPPED | OUTPATIENT
Start: 2021-12-26 | End: 2022-01-04

## 2021-12-26 RX ORDER — ONDANSETRON 2 MG/ML
4 INJECTION INTRAMUSCULAR; INTRAVENOUS ONCE
Status: DISCONTINUED | OUTPATIENT
Start: 2021-12-26 | End: 2021-12-26 | Stop reason: HOSPADM

## 2021-12-26 RX ORDER — KETOROLAC TROMETHAMINE 15 MG/ML
10 INJECTION, SOLUTION INTRAMUSCULAR; INTRAVENOUS
Status: COMPLETED | OUTPATIENT
Start: 2021-12-26 | End: 2021-12-26

## 2021-12-26 RX ADMIN — SODIUM CHLORIDE 500 ML: 9 INJECTION, SOLUTION INTRAVENOUS at 13:57

## 2021-12-26 RX ADMIN — HYDROMORPHONE HYDROCHLORIDE 2 MG: 2 TABLET ORAL at 15:01

## 2021-12-26 RX ADMIN — KETOROLAC TROMETHAMINE 10 MG: 15 INJECTION, SOLUTION INTRAMUSCULAR; INTRAVENOUS at 13:57

## 2021-12-26 ASSESSMENT — ENCOUNTER SYMPTOMS
RESPIRATORY NEGATIVE: 1
CARDIOVASCULAR NEGATIVE: 1
GASTROINTESTINAL NEGATIVE: 1
ENDOCRINE NEGATIVE: 1
CONSTITUTIONAL NEGATIVE: 1
EYES NEGATIVE: 1
ALLERGIC/IMMUNOLOGIC NEGATIVE: 1
FLANK PAIN: 1
PSYCHIATRIC NEGATIVE: 1
HEMATOLOGIC/LYMPHATIC NEGATIVE: 1
NEUROLOGICAL NEGATIVE: 1

## 2021-12-26 NOTE — ED TRIAGE NOTES
Pt arrives via EMS. Pt states she had a sudden onset of right flank pain this morning around 1100. Pt states she is in the process of being diagnosed with an autoimmune disorder and states that she has problems with her kidneys. Pt also experiencing nausea and vomiting that started along with the pain.

## 2021-12-26 NOTE — ED PROVIDER NOTES
History     Chief Complaint   Patient presents with     Flank Pain     HPI  Ana Maria Espinoza is a 31 year old female who presents for evaluation with report of flank pain.  Patient's medical records note a history of tobacco use disorder history of herpes simplex infection x2, generalized anxiety disorder and history of depression.  Patient's records show she has a Nexplanon in place, is prescribed albuterol, Flonase, epinephrine and Imitrex and Valtrex.   On examination patient reports she awoke with sudden onset of right low back and flank pain.  Patient reports no personal history of kidney stones but her father has kidney stones.  She reports she is being evaluated for possible lupus/autoimmune disease/disorder.  No fever chills she has a Nexplanon in place and does not have a menstrual period.  She reports no abnormal vaginal spotting discharge or bleeding.  Lower abdominal cramping.  No fever chills she voided spontaneously without difficulty.  With pain she arrived by EMS from home and received intravenous fentanyl prior to arrival    Allergies:  Allergies   Allergen Reactions     Bee Venom Anaphylaxis     Has epipen     Aloe Hives     Dry skin as well      Codeine Rash     Other reaction(s): Intolerance-Can't Take       Problem List:    Patient Active Problem List    Diagnosis Date Noted     Moderate episode of recurrent major depressive disorder (H) 01/27/2021     Priority: Medium     Generalized anxiety disorder 01/27/2021     Priority: Medium     Nexplanon in place 01/08/2021     Priority: Medium     Placed in the left arm on 1/8/2021 by Dr. Dimitris Zambrano  Due to come out 1/8/2024       Tobacco use 01/29/2020     Priority: Medium     H/O herpes simplex type 2 infection 10/16/2017     Priority: Medium        Past Medical History:    Past Medical History:   Diagnosis Date     Uncomplicated asthma 1996       Past Surgical History:    Past Surgical History:   Procedure Laterality Date     ABDOMEN SURGERY       2 c sections  @      ENT SURGERY      Unsure of year     ESOPHAGOSCOPY, GASTROSCOPY, DUODENOSCOPY (EGD), COMBINED N/A 2021    Procedure: ESOPHAGOGASTRODUODENOSCOPY, WITH BIOPSY;  Surgeon: German Flynn MD;  Location: [facility]     ORTHOPEDIC SURGERY      Had surgery on both big toes       Family History:    Family History   Problem Relation Age of Onset     Diabetes Father      Obesity Father      Connective Tissue Disorder Father         EDS     Coronary Artery Disease Daughter      Depression Mother      Mental Illness Mother      Obesity Mother      Connective Tissue Disorder Mother         EDS     Asthma Sister      Thyroid Disease Sister      Obesity Sister      Asthma Brother      Obesity Brother      Genetic Disorder Daughter         Avascular necrosis and Legg calve perthes disease     Obesity Paternal Grandmother      Connective Tissue Disorder Maternal Cousin         EDS     Connective Tissue Disorder Maternal Cousin         EDS       Social History:  Marital Status:  Single [1]  Social History     Tobacco Use     Smoking status: Current Every Day Smoker     Packs/day: 0.50     Years: 10.00     Pack years: 5.00     Types: Cigarettes     Start date: 3/19/2009     Last attempt to quit: 2021     Years since quittin.5     Smokeless tobacco: Never Used   Vaping Use     Vaping Use: Never used   Substance Use Topics     Alcohol use: No     Drug use: No        Medications:    ketorolac (TORADOL) 10 MG tablet  ondansetron (ZOFRAN ODT) 4 MG ODT tab  albuterol (PROAIR HFA/PROVENTIL HFA/VENTOLIN HFA) 108 (90 Base) MCG/ACT inhaler  EPINEPHrine (ANY BX GENERIC EQUIV) 0.3 MG/0.3ML injection 2-pack  etonogestrel (NEXPLANON) 68 MG IMPL  fluticasone (FLONASE) 50 MCG/ACT nasal spray  ketoconazole (NIZORAL) 2 % external cream  ketoconazole (NIZORAL) 2 % external shampoo  nystatin (MYCOSTATIN) 771128 UNIT/GM external powder  ondansetron (ZOFRAN) 4 MG tablet  SUMAtriptan (IMITREX) 25 MG tablet  valACYclovir  (VALTREX) 500 MG tablet  vitamin D3 (CHOLECALCIFEROL) 1.25 MG (91919 UT) capsule          Review of Systems   Constitutional: Negative.    HENT: Negative.    Eyes: Negative.    Respiratory: Negative.    Cardiovascular: Negative.    Gastrointestinal: Negative.    Endocrine: Negative.    Genitourinary: Positive for flank pain.   Skin: Negative.    Allergic/Immunologic: Negative.    Neurological: Negative.    Hematological: Negative.    Psychiatric/Behavioral: Negative.    All other systems reviewed and are negative.      Physical Exam   BP: (!) 172/95  Pulse: 57  Temp: 98.1  F (36.7  C)  Resp: 20  SpO2: 99 %      Physical Exam  Constitutional:       General: She is not in acute distress.     Appearance: Normal appearance. She is not ill-appearing, toxic-appearing or diaphoretic.   HENT:      Head: Normocephalic and atraumatic.      Right Ear: Tympanic membrane normal.      Nose: Nose normal.      Mouth/Throat:      Mouth: Mucous membranes are moist.   Eyes:      Extraocular Movements: Extraocular movements intact.      Pupils: Pupils are equal, round, and reactive to light.   Neck:      Vascular: No carotid bruit.   Cardiovascular:      Rate and Rhythm: Normal rate and regular rhythm.      Pulses: Normal pulses.   Pulmonary:      Effort: Pulmonary effort is normal. No respiratory distress.      Breath sounds: Normal breath sounds. No stridor. No wheezing, rhonchi or rales.   Chest:      Chest wall: No tenderness.   Musculoskeletal:         General: No swelling, deformity or signs of injury.      Cervical back: Normal range of motion and neck supple. No rigidity or tenderness.      Lumbar back: Tenderness present.        Back:       Right lower leg: No edema.      Left lower leg: No edema.   Lymphadenopathy:      Cervical: No cervical adenopathy.   Skin:     Capillary Refill: Capillary refill takes less than 2 seconds.      Coloration: Skin is not jaundiced or pale.      Findings: No bruising, erythema, lesion or rash.    Neurological:      General: No focal deficit present.      Mental Status: She is alert and oriented to person, place, and time.      Cranial Nerves: No cranial nerve deficit.      Sensory: No sensory deficit.      Motor: No weakness.      Coordination: Coordination normal.      Gait: Gait normal.      Deep Tendon Reflexes: Reflexes normal.   Psychiatric:         Mood and Affect: Mood normal.         Behavior: Behavior normal.         Thought Content: Thought content normal.         Judgment: Judgment normal.         ED Course                 Procedures              Critical Care time:  none               ED medications:  Medications   0.9% sodium chloride BOLUS (0 mLs Intravenous Stopped 12/26/21 1646)   ketorolac (TORADOL) injection 10 mg (10 mg Intravenous Given 12/26/21 1357)   HYDROmorphone (DILAUDID) tablet 2 mg (2 mg Oral Given 12/26/21 1501)       ED Vitals:  Vitals:    12/26/21 1330 12/26/21 1400 12/26/21 1430 12/26/21 1500   BP: (!) 181/97 (!) 168/108 (!) 170/95    Pulse: 56 61 (!) 48 57   Resp: 15      Temp:       TempSrc:       SpO2: 100% 99% 100% 100%     Vitals:    12/26/21 1330 12/26/21 1400 12/26/21 1430 12/26/21 1500   BP: (!) 181/97 (!) 168/108 (!) 170/95    Pulse: 56 61 (!) 48 57   Resp: 15      Temp:       TempSrc:       SpO2: 100% 99% 100% 100%       ED labs and imaging:  Results for orders placed or performed during the hospital encounter of 12/26/21   Abd/pelvis CT - no contrast - Stone Protocol     Status: None    Narrative    EXAM: CT ABDOMEN PELVIS W/O CONTRAST  LOCATION: [facility]  DATE/TIME: 12/26/2021 1:41 PM    INDICATION: Right flank pain.  COMPARISON: None.  TECHNIQUE: CT scan of the abdomen and pelvis was performed without IV contrast. Multiplanar reformats were obtained. Dose reduction techniques were used.  CONTRAST: None.    FINDINGS:   LOWER CHEST: The visualized lung bases are clear. Small hiatal hernia.    HEPATOBILIARY: No hepatic masses are  identified.    PANCREAS: Normal.    SPLEEN: Normal.    ADRENAL GLANDS: Normal.    KIDNEYS/BLADDER: No urinary calculi or hydronephrosis.    BOWEL: No obstruction or inflammatory change.    LYMPH NODES: No lymphadenopathy.    VASCULATURE: Unremarkable.    PELVIC ORGANS: Normal.    MUSCULOSKELETAL: Unremarkable.      Impression    IMPRESSION:   No acute abnormality in the abdomen or pelvis. No cause for right flank pain is identified.     Extra Blue Top Tube     Status: None   Result Value Ref Range    Hold Specimen JIC    Extra Red Top Tube     Status: None   Result Value Ref Range    Hold Specimen JIC    Extra Green Top (Lithium Heparin) Tube     Status: None   Result Value Ref Range    Hold Specimen JIC    Extra Purple Top Tube     Status: None   Result Value Ref Range    Hold Specimen JIC    Basic metabolic panel     Status: Abnormal   Result Value Ref Range    Sodium 143 133 - 144 mmol/L    Potassium 4.0 3.4 - 5.3 mmol/L    Chloride 114 (H) 94 - 109 mmol/L    Carbon Dioxide (CO2) 23 20 - 32 mmol/L    Anion Gap 6 3 - 14 mmol/L    Urea Nitrogen 11 7 - 30 mg/dL    Creatinine 0.89 0.52 - 1.04 mg/dL    Calcium 8.6 8.5 - 10.1 mg/dL    Glucose 106 (H) 70 - 99 mg/dL    GFR Estimate 88 >60 mL/min/1.73m2   CBC with platelets and differential     Status: Abnormal   Result Value Ref Range    WBC Count 12.9 (H) 4.0 - 11.0 10e3/uL    RBC Count 4.29 3.80 - 5.20 10e6/uL    Hemoglobin 14.2 11.7 - 15.7 g/dL    Hematocrit 41.1 35.0 - 47.0 %    MCV 96 78 - 100 fL    MCH 33.1 (H) 26.5 - 33.0 pg    MCHC 34.5 31.5 - 36.5 g/dL    RDW 12.6 10.0 - 15.0 %    Platelet Count 283 150 - 450 10e3/uL    % Neutrophils 71 %    % Lymphocytes 23 %    % Monocytes 4 %    % Eosinophils 1 %    % Basophils 0 %    % Immature Granulocytes 1 %    NRBCs per 100 WBC 0 <1 /100    Absolute Neutrophils 9.2 (H) 1.6 - 8.3 10e3/uL    Absolute Lymphocytes 2.9 0.8 - 5.3 10e3/uL    Absolute Monocytes 0.5 0.0 - 1.3 10e3/uL    Absolute Eosinophils 0.1 0.0 - 0.7 10e3/uL     Absolute Basophils 0.0 0.0 - 0.2 10e3/uL    Absolute Immature Granulocytes 0.1 <=0.4 10e3/uL    Absolute NRBCs 0.0 10e3/uL   UA with Microscopic reflex to Culture     Status: Abnormal    Specimen: Urine, Midstream   Result Value Ref Range    Color Urine Bisi (A) Colorless, Straw, Light Yellow, Yellow    Appearance Urine Cloudy (A) Clear    Glucose Urine Negative Negative mg/dL    Bilirubin Urine Negative Negative    Ketones Urine 5  (A) Negative mg/dL    Specific Gravity Urine 1.028 1.003 - 1.035    Blood Urine Small (A) Negative    pH Urine 6.0 5.0 - 7.0    Protein Albumin Urine 100  (A) Negative mg/dL    Urobilinogen Urine 2.0 Normal, 2.0 mg/dL    Nitrite Urine Negative Negative    Leukocyte Esterase Urine Negative Negative    Mucus Urine Present (A) None Seen /LPF    RBC Urine 13 (H) <=2 /HPF    WBC Urine 6 (H) <=5 /HPF    Squamous Epithelials Urine 13 (H) <=1 /HPF    Hyaline Casts Urine 3 (H) <=2 /LPF    Narrative    Urine Culture not indicated   HCG qualitative urine     Status: Normal   Result Value Ref Range    hCG Urine Qualitative Negative Negative   [place] Draw     Status: None    Narrative    The following orders were created for panel order [place] Draw.  Procedure                               Abnormality         Status                     ---------                               -----------         ------                     Extra Blue Top Tube[[id number]]                              Final result               Extra Red Top Tube[[id number]]                               Final result               Extra Green Top (Lithium...[[id number]]                      Final result               Extra Purple Top Tube[[id number]]                            Final result                 Please view results for these tests on the individual orders.   CBC with platelets differential     Status: Abnormal    Narrative    The following orders were created for panel order CBC with platelets differential.  Procedure                                Abnormality         Status                     ---------                               -----------         ------                     CBC with platelets and d...[[id number]]  Abnormal            Final result                 Please view results for these tests on the individual orders.       Assessments & Plan (with Medical Decision Making)   Assessment Summary and Clinical Impression: 31-year-old female presented with report of right-sided flank pain around 11 AM upon awakening from sleep.  She noted a family history of kidney stones but no personal history of kidney stones.  She reported no red flags on exam specifically no fever chills unintentional weight loss no vaginal spotting or bleeding abnormal vaginal discharge.  Low abdominal cramping but no difficulty urinating or defecating.  She.  Comfortable after receiving therapies given by EMS providers prior to arrival.  Intake blood pressure is 172/95, 99% on room air afebrile.  No CVA tenderness but pain in the right lower flank and lumbar area.  Due to patient's age symptoms reported she was offered additional therapies for symptom management and imaging obtained to evaluate for retroperitoneal process and obstructing urinary stone versus other.  Work-up was unrevealing.  She was discharged home with flank pain of uncertain cause with low threshold to return for evaluation.    ED course and plan:  Reviewed the medical record.  She was evaluated for acute cystitis on November 18, 2021.  A broad differential was considered and we reviewed possible causes for her symptoms.  She was offered therapies to manage her discomfort and symptoms as reported.  Blood work and imaging was obtained.  Work-up revealed white count of 12.9.  Normal electrolytes and creatinine.  CT stone protocol was unrevealing.  See additional details in the radiology report.  She was reevaluated after therapies given and blood work and imaging.  Patient's pain persisted and  we trialed oral therapies.  Urinalysis revealed 6 white cells and 13 squamous cells per small blood was noted.  Urine pregnancy was negative.  We discussed that the cause of her flank pain is described is unclear.  History is reported did not raise concern for lumbar disc disease or spinal epidural hematoma abscess or other acute musculoskeletal emergency.  Patient and her partner expressed comfort going home with watchful waiting with plan to trial over-the-counter medication such as Salonpas or Aspercreme and she was offered Toradol and Zofran for home use.      Disclaimer: This note consists of symbols derived from keyboarding, dictation and/or voice recognition software. As a result, there may be errors in the script that have gone undetected. Please consider this when interpreting information found in this chart.  I have reviewed the nursing notes.    I have reviewed the findings, diagnosis, plan and need for follow up with the patient.       Discharge Medication List as of 12/26/2021  4:47 PM      START taking these medications    Details   ketorolac (TORADOL) 10 MG tablet Take 1 tablet (10 mg) by mouth every 6 hours as needed for moderate pain, Disp-20 tablet, R-0, E-Prescribe      ondansetron (ZOFRAN ODT) 4 MG ODT tab Take 1 tablet (4 mg) by mouth every 8 hours as needed for nausea or vomiting, Disp-7 tablet, R-0, E-Prescribe             Final diagnoses:   Acute right flank pain       12/26/2021   [facility] EMERGENCY DEPT     Livan Fall MD  12/26/21 [id number]

## 2021-12-26 NOTE — LETTER
December 26, 2021      To Whom It May Concern:      Ana Maria Espinoza was seen in our Emergency Department today, 12/26/21.  Please excuse her from missing work based on her symptoms and visit in the emergency department.  If her symptoms worsen she may need to return to be reevaluated.  This work note is valid until December 29 2021.    Sincerely,          Yousif Fall MD

## 2021-12-26 NOTE — DISCHARGE INSTRUCTIONS
1) Your evaluation today did not confirm the cause of your pain and discomfort.  Imaging did not show any evidence to suggest a kidney infection or kidney stone.  Urine did not show any evidence to suggest a kidney infection or bladder infection.    2) Blood work was reassuring today.  We discussed that you should try to go home to manage her pain at home including consider using Salonpas or Aspercreme.  He may also use pain medication provided and nausea medication as needed.    3) the exact cause of your sudden onset of pain is reported at 11 AM this morning's not clear if you develop new symptoms of concern you should return to reevaluated

## 2021-12-27 ENCOUNTER — TELEPHONE (OUTPATIENT)
Dept: FAMILY MEDICINE | Facility: CLINIC | Age: 31
End: 2021-12-27
Payer: COMMERCIAL

## 2021-12-27 NOTE — TELEPHONE ENCOUNTER
S-(situation): Patient calling to report nausea and vomiting and right back pain. Symptoms started 12/26/21 around 11 Am upon awakening for the day.     B-(background): seen in ER 12/26/21 for the same. CT and labs done. RX for zofran and toradol given.     A-(assessment): Patient tearful, rating pain 10/10, yesterday it was constant, today she is stating it subsides every 15 minutes for 1 minute intervals. Unable to eat or drink, throws med up, even zofran every 8 hours ineffective. Emesis approximately every 3 hours. Had to leave phone while talking with writer, can hear her retching in the background. Significant other came to the phone. Afebrile but has chills. Used heating pad and tried ice without relief.     R-(recommendations): Advised to return to ER.   Keiry KIM RN

## 2021-12-30 ENCOUNTER — NURSE TRIAGE (OUTPATIENT)
Dept: NURSING | Facility: CLINIC | Age: 31
End: 2021-12-30
Payer: COMMERCIAL

## 2021-12-30 NOTE — TELEPHONE ENCOUNTER
Pt's boyfriend John calling. Pt gave verbal approval to speak with John and give any information in chart related to call. Pt seen in ER 12/27/21. Diagnosed with renal infarct and referred to vascular surgery.  Per John pt has enough pain medication to get through tomorrow. Needs to be seen for refill. John requesting appointment for pt tomorrow.     No available appointments in clinic tomorrow. PCP please advise.             Reason for Disposition    Caller requesting a CONTROLLED substance prescription refill (e.g., narcotics, ADHD medicines)    Protocols used: MEDICATION QUESTION CALL-A-

## 2021-12-31 ENCOUNTER — VIRTUAL VISIT (OUTPATIENT)
Dept: FAMILY MEDICINE | Facility: CLINIC | Age: 31
End: 2021-12-31
Payer: COMMERCIAL

## 2021-12-31 DIAGNOSIS — L21.9 SEBORRHEIC DERMATITIS: ICD-10-CM

## 2021-12-31 DIAGNOSIS — N28.0 RENAL INFARCTION (H): Primary | ICD-10-CM

## 2021-12-31 DIAGNOSIS — K59.00 CONSTIPATION, UNSPECIFIED CONSTIPATION TYPE: ICD-10-CM

## 2021-12-31 PROCEDURE — 99213 OFFICE O/P EST LOW 20 MIN: CPT | Mod: 95 | Performed by: NURSE PRACTITIONER

## 2021-12-31 RX ORDER — ENOXAPARIN SODIUM 300 MG/3ML
77 INJECTION INTRAVENOUS; SUBCUTANEOUS
COMMUNITY
Start: 2021-12-27 | End: 2022-01-03

## 2021-12-31 RX ORDER — OXYCODONE AND ACETAMINOPHEN 5; 325 MG/1; MG/1
1 TABLET ORAL EVERY 4 HOURS PRN
Qty: 18 TABLET | Refills: 0 | Status: SHIPPED | OUTPATIENT
Start: 2021-12-31 | End: 2022-01-03

## 2021-12-31 RX ORDER — DOCUSATE SODIUM 100 MG/1
100 CAPSULE, LIQUID FILLED ORAL 2 TIMES DAILY PRN
Qty: 60 CAPSULE | Refills: 0 | Status: SHIPPED | OUTPATIENT
Start: 2021-12-31 | End: 2022-04-22

## 2021-12-31 RX ORDER — KETOCONAZOLE 20 MG/ML
SHAMPOO TOPICAL
Qty: 120 ML | Refills: 4 | Status: SHIPPED | OUTPATIENT
Start: 2021-12-31 | End: 2023-03-08

## 2021-12-31 ASSESSMENT — PAIN SCALES - GENERAL: PAINLEVEL: EXTREME PAIN (8)

## 2021-12-31 NOTE — TELEPHONE ENCOUNTER
ED visits 12-26-21 ([facility]), 12-27-21 ([facility])- Dx acute rt flank pain, renal infarct.  Percocet qty 15 prescribed by ED provider 12-27-21 for flank pain. States vomited first 2 days, not sure how much Percocet she retained. Has 4 tabs left, requesting refill. Lovenox prescribed. Has Zofran for nausea.   Has vascular appt 01-03-21, [facility].   Scheduled VV today 4 PM with Susana.  Forwarded to Susana for review, any further recommendations.  ANNIE Capellan RN

## 2021-12-31 NOTE — PROGRESS NOTES
Ana Maria is a 31 year old who is being evaluated via a billable video visit.      How would you like to obtain your AVS? MyChart  If the video visit is dropped, the invitation should be resent by: Text to cell phone: [phone]  Will anyone else be joining your video visit? No      Video Start Time: 3:38 PM    Assessment & Plan     Renal infarction (H)  Acute, diagnosed her contrast CT scan on 12/27/2021.  Patient sent home on pain medication and vascular follow-up.  Pain could be under better control, using Percocet and wearing within 4 hours accompanied by mild nausea.  Advised to continue pain medication, work on constipation as below and follow-up with vascular as scheduled.    Seborrheic dermatitis  Chronic, stable.  Needs refill of shampoo.  - ketoconazole (NIZORAL) 2 % external shampoo; Use shampoo daily for flaky skin    Constipation, unspecified constipation type  Issues with constipation, recommend patient start a stool softener 2 times daily as needed.  Encouraged increasing fluids and follow-up if not improving or worsening.  - docusate sodium (COLACE) 100 MG capsule; Take 1 capsule (100 mg) by mouth 2 times daily as needed for constipation    Review of prior external note(s) from - [facility] information from [facility] reviewed  10 minutes spent on the date of the encounter doing chart review, history and exam, documentation and further activities per the note       See Patient Instructions    Return in about 1 month (around 1/31/2022) for Recheck as needed.    Susana Mccollum, ARTIS, APRN-CNP   [facility]   Ana Maria is a 31 year old who presents for the following health issues     HPI     ED/UC Followup:    Facility:  [facility]  and [facility] on 12/26/2021   Date of visit: 12/27/2021  Reason for visit: Renal infarct (HC) (Primary Dx);   ED visits 12-26-21 ([facility]), 12-27-21 ([facility] [place])- Dx acute rt flank pain, renal  infarct.  Percocet qty 15 prescribed by ED provider 12-27-21 for flank pain. States vomited first 2 days, not sure how much Percocet she retained. Has 3 tabs left, ( plans on taking one at 4) requesting refill. Lovenox prescribed. Has Zofran for nausea.   Has vascular appt 01-03-21, [facility].   Current Status: continued severe pain and hard BM. Would like pain medication refill and also something for her bowels     Has been able to keep it under control, definitely wears off after about 4 hours or sooner       Review of Systems   Constitutional, HEENT, cardiovascular, pulmonary, gi and gu systems are negative, except as otherwise noted.      Objective           Vitals:  No vitals were obtained today due to virtual visit.    Physical Exam   GENERAL: Healthy, alert and appears to be in mild distress  EYES: Eyes grossly normal to inspection.  No discharge or erythema, or obvious scleral/conjunctival abnormalities.  RESP: No audible wheeze, cough, or visible cyanosis.  No visible retractions or increased work of breathing.    SKIN: Visible skin clear. No significant rash, abnormal pigmentation or lesions.  NEURO: Cranial nerves grossly intact.  Mentation and speech appropriate for age.  PSYCH: Mentation appears normal, affect normal/bright, judgement and insight intact, normal speech and appearance well-groomed.    Diagnostic Test Results:  none            Video-Visit Details    Type of service:  Video Visit    Video End Time:3:52 PM    Originating Location (pt. Location): Home    Distant Location (provider location):  [facility]     Platform used for Video Visit: "[employer]     Chart documentation with Dragon Voice recognition Software. Although reviewed after completion, some words and grammatical errors may remain.

## 2022-01-03 ENCOUNTER — TELEPHONE (OUTPATIENT)
Dept: FAMILY MEDICINE | Facility: CLINIC | Age: 32
End: 2022-01-03
Payer: COMMERCIAL

## 2022-01-03 DIAGNOSIS — N28.0 RENAL INFARCTION (H): ICD-10-CM

## 2022-01-03 RX ORDER — OXYCODONE AND ACETAMINOPHEN 5; 325 MG/1; MG/1
1 TABLET ORAL EVERY 4 HOURS PRN
Qty: 18 TABLET | Refills: 0 | Status: SHIPPED | OUTPATIENT
Start: 2022-01-03 | End: 2022-01-11

## 2022-01-03 NOTE — TELEPHONE ENCOUNTER
Patient is aware that Susana is out of the office until tomorrow  This is OK to wait  Krista Mobley RN

## 2022-01-03 NOTE — TELEPHONE ENCOUNTER
Reason for Call:  Other call back    Detailed comments: PT SAW VASCULAR SPECIALIST TODAY THROUGH [facility]. HE ADVISES SHE NEEDS NEXPLANON REMOVED DUE TO BLOOD CLOT AND SHOULD BE REMOVED ASAP- THE LONGER IT STAYS IN THE LONGER SHE WOULD NEED TO STAY ON BLOOD THINNERS. PT HAS A VID VISIT WITH ASHLYN ANGEL TOMORROW AT 12:40. PT HAS ENOUGH PAIN PILLS TO TAKE LAST ONE AT 10 AM TOMORROW. PT IS REQUESTING THAT MORE PAIN MED BE PRESCRIBED TODAY IF POSSIBLE.    Phone Number Patient can be reached at: Home number on file [phone] (home)    Best Time: ANYTIME    Can we leave a detailed message on this number? YES    Call taken on 1/3/2022 at 12:51 PM by Briseida Galvan

## 2022-01-04 ENCOUNTER — TELEPHONE (OUTPATIENT)
Dept: FAMILY MEDICINE | Facility: CLINIC | Age: 32
End: 2022-01-04

## 2022-01-04 ENCOUNTER — VIRTUAL VISIT (OUTPATIENT)
Dept: FAMILY MEDICINE | Facility: CLINIC | Age: 32
End: 2022-01-04
Payer: COMMERCIAL

## 2022-01-04 DIAGNOSIS — D68.51 FACTOR V LEIDEN MUTATION (H): ICD-10-CM

## 2022-01-04 DIAGNOSIS — N28.0 RENAL INFARCTION (H): Primary | ICD-10-CM

## 2022-01-04 PROCEDURE — 99213 OFFICE O/P EST LOW 20 MIN: CPT | Mod: 95 | Performed by: NURSE PRACTITIONER

## 2022-01-04 RX ORDER — APIXABAN 5 MG (74)
KIT ORAL
COMMUNITY
Start: 2022-01-03 | End: 2022-01-26

## 2022-01-04 RX ORDER — ONDANSETRON 4 MG/1
4 TABLET, ORALLY DISINTEGRATING ORAL EVERY 8 HOURS PRN
Qty: 20 TABLET | Refills: 0 | Status: SHIPPED | OUTPATIENT
Start: 2022-01-04 | End: 2022-04-22

## 2022-01-04 NOTE — PATIENT INSTRUCTIONS
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 Patient currently seeing/saw Vascular - was switched form Lovenox to Eliquis. Has follow up CT scans scheduled in 3 months as well as ECHOcardiogram scheduled coming up. Is tolerating the Eliquis well. Patient still having significant amount of pain, only some improvement since last spoke with patient. Taking percocet as needed every 4 hours and zofran as needed for nausea. Patient has not been back to work, has a very demanding job with heavy lifting. New prescription for Percocet placed last evening, advised patient can add in 1  - extra strength tylenol a couple of times a day with the percocet for pain control. Continue Zofran as needed and follow up with vascular as scheduled. Work letter written for 2 weeks, patient to check in via [employer] with update on return to work    - ondansetron (ZOFRAN ODT) 4 MG ODT tab; Take 1 tablet (4 mg) by mouth every 8 hours as needed for nausea or vomiting    Factor V Leiden mutation (H)  Incidental finding during workup for above. Patient scheduled to have Nexplanon removed next week.

## 2022-01-04 NOTE — LETTER
[facility]  [address] [place] [id number]  [phone]          January 4, 2022    Ana Maria Espinoza                                                                                                                     [address] [place] [id number]        To Whom it May Concern,    Ana Maria is under my general care and due to health condition is not able to work from 12/27/2021 - 1/16/2022 at which time return to work will be re-evaluated.       Sincerely,     Susana Mccollum, ARTIS, APRN-CNP

## 2022-01-04 NOTE — PROGRESS NOTES
Ana Maria is a 31 year old who is being evaluated via a billable video visit.      How would you like to obtain your AVS? [employer]  If the video visit is dropped, the invitation should be resent by: Text to cell phone: [phone]  Will anyone else be joining your video visit? 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 Patient currently seeing/saw Vascular - was switched form Lovenox to Eliquis. Has follow up CT scans scheduled in 3 months as well as ECHOcardiogram scheduled coming up. Is tolerating the Eliquis well. Patient still having significant amount of pain, only some improvement since last spoke with patient. Taking percocet as needed every 4 hours and zofran as needed for nausea. Patient has not been back to work, has a very demanding job with heavy lifting. New prescription for Percocet placed last evening, advised patient can add in 1  - extra strength tylenol a couple of times a day with the percocet for pain control. Continue Zofran as needed and follow up with vascular as scheduled. Work letter written for 2 weeks, patient to check in via [employer] with update on return to work    - ondansetron (ZOFRAN ODT) 4 MG ODT tab; Take 1 tablet (4 mg) by mouth every 8 hours as needed for nausea or vomiting    Factor V Leiden mutation (H)  Incidental finding during workup for above. Patient scheduled to have Nexplanon removed next week.                See Patient Instructions    Return in about 2 weeks (around 1/18/2022) for Recheck.    Susana Mccollum, ARTIS, APRN-CNP   [facility]    Subjective   Ana Maria is a 31 year old who presents for the following health issues     HPI   Chief Complaint   Patient presents with     Kidney Problem     Patient Request for Note/Letter     needs a letter for work, not sure when can return to work and  if has restrictions       Renal infarct    ED/UC :  Facility:  [facility]  and [facility] on 12/26/2021   Date of visit: 12/27/2021 and 12/26/2021  Reason for visit: Renal infarct (HC) (Primary Dx);   ED visits 12-26-21 ([facility]), 12-27-21 ([facility] [place])- Dx acute rt flank pain, renal infarct.  Had vascular appt 01-03-21, [facility].       Duration: since 12/26/2021    Description (location/character/radiation): flank pain    Intensity:  moderate    Accompanying signs and symptoms: constipation, nausea    History (similar episodes/previous evaluation): None    Precipitating or alleviating factors:     Has an appointment on 1/13 for Nexplanon removal.     Therapies tried and outcome: most recently: oxycodone 1/3/2022  #18 given. Eliquis starter pack 1/3/2021   Lovenox, Zofran for nausea, colace for constipation, on 12/26/2021 Toradol # 20 given        Using Zofran about 1-2/day      taking pain medication every 4 hours. Tried to go 6 hours and was miserable. Stools are softer with the colace. Continues to have nausea off and on, takes Zofran.   Has ECHO on the 25th     Needs work letter  Off of work from the 27th of December     Review of Systems   Constitutional, HEENT, cardiovascular, pulmonary, gi and gu systems are negative, except as otherwise noted.      Objective           Vitals:  No vitals were obtained today due to virtual visit.    Physical Exam   GENERAL: healthy, alert and appears uncomfortable   EYES: Eyes grossly normal to inspection.  No discharge or erythema, or obvious scleral/conjunctival abnormalities.  RESP: No audible wheeze, cough, or visible cyanosis.  No visible retractions or increased work of breathing.    SKIN: Visible skin clear. No significant rash, abnormal pigmentation or lesions.  NEURO: Cranial nerves grossly intact.  Mentation and speech appropriate for age.  PSYCH: Mentation appears normal, affect normal/bright, judgement and insight intact, normal speech and  appearance well-groomed.    Diagnostic Test Results:  none            Video-Visit Details    Type of service:  Video Visit    Video End Time:1:14 PM    Originating Location (pt. Location): Home    Distant Location (provider location):  [facility]     Platform used for Video Visit: Raoul

## 2022-01-04 NOTE — TELEPHONE ENCOUNTER
Prior Authorization Retail Medication Request    Medication/Dose: ondansetron 4mg odt  ICD code (if different than what is on RX):    Previously Tried and Failed:    Rationale ins max 21 tabs in 26 days     Insurance Name: [facility] [phone]  Insurance ID:  [id number]      Pharmacy Information (if different than what is on RX)  Name:    Phone:

## 2022-01-06 ENCOUNTER — MYC MEDICAL ADVICE (OUTPATIENT)
Dept: FAMILY MEDICINE | Facility: CLINIC | Age: 32
End: 2022-01-06
Payer: COMMERCIAL

## 2022-01-06 DIAGNOSIS — N28.0 RENAL INFARCTION (H): Primary | ICD-10-CM

## 2022-01-06 RX ORDER — ONDANSETRON 4 MG/1
4 TABLET, FILM COATED ORAL EVERY 8 HOURS PRN
Qty: 20 TABLET | Refills: 0 | Status: SHIPPED | OUTPATIENT
Start: 2022-01-06 | End: 2022-04-15

## 2022-01-06 NOTE — TELEPHONE ENCOUNTER
RECORDS RECEIVED FROM: Internal   DATE RECEIVED: 02.10.2022   NOTES STATUS DETAILS   OFFICE NOTE from referring provider Internal 12.15.2021 Andre Landon DO   OFFICE NOTE from other specialist  N/A    *Only VASCULITIS or LUPUS gather office notes for the following N/A    *PULMONARY   N/A    *ENT N/A    *DERMATOLOGY N/A    *RHEUMATOLOGY N/A    DISCHARGE SUMMARY from hospital N/A    DISCHARGE REPORT from the ER N/A    MEDICATION LIST Internal    IMAGING  (NEED IMAGES AND REPORTS)     KIDNEY CT SCAN N/A    KIDNEY ULTRASOUND N/A    MR ABDOMEN N/A    NUCLEAR MEDICINE RENAL N/A    LABS     CBC Care Everywhere 01.03.2022   CMP Care Everywhere 01.03.2022   BMP Internal 12.26.2021   UA Care Everywhere  Internal 12.27.2021    12.15.2021   URINE PROTEIN Care Everywhere  Internal 12.27.2021    12.15.2021   RENAL PANEL N/A    BIOPSY     KIDNEY BIOPSY  N/A

## 2022-01-06 NOTE — LETTER
[facility]  [address] [place] [id number]  [phone]          January 22, 2022    Ana Maria Espinoza                                                                                                                     [address] [place] [id number]        To Whom it May Concern,    Ana Maria is under my general care and will need to continue to be off of work from 1/22/2022 - 2/5/2022.        Sincerely,       Susana Mccollum, DNP, APRN-CNP

## 2022-01-06 NOTE — LETTER
[facility]  [address] [place] [phone]  [phone]          January 31, 2022    Ana Maria Espinoza                                                                                                                     [address] [place] [address]        To whom it may concern,    In reference to claim # [id number].  Ana Maria is under my general care and is being followed by both myself and vascular for blood clot.  She is unable to work from 1/15/2022 -2/5/2022 due to complications and the inability to work with restrictions.    Feel free to contact the clinic with any questions or concerns.    Sincerely,       Susana Mccollum, DNP, APRN-CNP

## 2022-01-06 NOTE — TELEPHONE ENCOUNTER
zofran ODT tabs are non preferred.  Please fax a new rx for IR tablets if OK to change.    Thanks.

## 2022-01-06 NOTE — LETTER
[facility]  [address] [place] [phone]  [phone]          January 14, 2022    Ana Maria Espinoza                                                                                                                     [address] [place] [address]        To Whom it May Concern,    Ana Maria is under my general care and will need to be excused from work 1/17/2022 - 1/21/2022 due to illness.         Sincerely,       Susana Mccollum, DNP, APRN-CNP

## 2022-01-07 NOTE — TELEPHONE ENCOUNTER
S-(situation): received call from patient.    B-(background): she has known blood clot in leg and kidney. She reports she is on blood thinner since 1/3/22 and is taking as directed. Pain is worse in leg and back    A-(assessment): know blood clots with increased pain and area of pain.    R-(recommendations): She is advised to return to the ER for evaluation

## 2022-01-07 NOTE — TELEPHONE ENCOUNTER
Reason for Call:  Pain - Rt Kidney & top portion of Rt front Leg. Pt is on Blood Thinner already. This started last night Please Advise.     Phone Number Patient can be reached at: Home number on file [phone] (home)    Best Time: Any Time      Can we leave a detailed message on this number? YES    Call taken on 1/7/2022 at 11:36 AM by Sushma Rajput

## 2022-01-07 NOTE — PATIENT INSTRUCTIONS
Renal infarction (H)  Acute, diagnosed her contrast CT scan on 12/27/2021.  Patient sent home on pain medication and vascular follow-up.  Pain could be under better control, using Percocet and wearing within 4 hours accompanied by mild nausea.  Advised to continue pain medication, work on constipation as below and follow-up with vascular as scheduled.    Seborrheic dermatitis  Chronic, stable.  Needs refill of shampoo.  - ketoconazole (NIZORAL) 2 % external shampoo; Use shampoo daily for flaky skin    Constipation, unspecified constipation type  Issues with constipation, recommend patient start a stool softener 2 times daily as needed.  Encouraged increasing fluids and follow-up if not improving or worsening.  - docusate sodium (COLACE) 100 MG capsule; Take 1 capsule (100 mg) by mouth 2 times daily as needed for constipation

## 2022-01-11 ENCOUNTER — VIRTUAL VISIT (OUTPATIENT)
Dept: FAMILY MEDICINE | Facility: CLINIC | Age: 32
End: 2022-01-11
Payer: COMMERCIAL

## 2022-01-11 DIAGNOSIS — N28.0 RENAL INFARCTION (H): ICD-10-CM

## 2022-01-11 DIAGNOSIS — S76.911D MUSCLE STRAIN OF RIGHT THIGH, SUBSEQUENT ENCOUNTER: Primary | ICD-10-CM

## 2022-01-11 PROCEDURE — 99213 OFFICE O/P EST LOW 20 MIN: CPT | Mod: 95 | Performed by: NURSE PRACTITIONER

## 2022-01-11 RX ORDER — OXYCODONE AND ACETAMINOPHEN 5; 325 MG/1; MG/1
1 TABLET ORAL 2 TIMES DAILY PRN
Qty: 28 TABLET | Refills: 0 | Status: SHIPPED | OUTPATIENT
Start: 2022-01-11 | End: 2022-02-03

## 2022-01-11 NOTE — PATIENT INSTRUCTIONS
Renal infarction (H)  Acute, following with vascular and nephrology.  Pain is somewhat improving.  Tolerating Eliquis without any side effects.  Still taking Percocet 1 time at night and in the morning.  Encouraged activity.  We will refill Percocet for 2 times daily as needed, otherwise advised patient to use extra Tylenol throughout the day as needed, not to exceed 4000 mg in a 24-hour period.  Patient to follow-up with vascular and neurology as scheduled.  Patient to update provider on Friday on return to work status.  - oxyCODONE-acetaminophen (PERCOCET) 5-325 MG tablet; Take 1 tablet by mouth 2 times daily as needed for severe pain    Muscle strain of right thigh, subsequent encounter  Recent muscle strain of right thigh and hip area, seen in the emergency department.  Was concerned for possible blood clot.  Is currently anticoagulated on Eliquis for renal infarction as above.  Was prescribed Voltaren gel and heat to area, with improvement.  Advised to continue with supportive cares and follow-up if worsening.

## 2022-01-11 NOTE — PROGRESS NOTES
Ana Maria is a 31 year old who is being evaluated via a billable video visit.      How would you like to obtain your AVS? MyChart  If the video visit is dropped, the invitation should be resent by: Text to cell phone: . [phone]  Will anyone else be joining your video visit? No      Video Start Time: 10:32 AM    Assessment & Plan     Renal infarction (H)  Acute, following with vascular and nephrology.  Pain is somewhat improving.  Tolerating Eliquis without any side effects.  Still taking Percocet 1 time at night and in the morning.  Encouraged activity.  We will refill Percocet for 2 times daily as needed, otherwise advised patient to use extra Tylenol throughout the day as needed, not to exceed 4000 mg in a 24-hour period.  Patient to follow-up with vascular and neurology as scheduled.  Patient to update provider on Friday on return to work status.  - oxyCODONE-acetaminophen (PERCOCET) 5-325 MG tablet; Take 1 tablet by mouth 2 times daily as needed for severe pain    Muscle strain of right thigh, subsequent encounter  Recent muscle strain of right thigh and hip area, seen in the emergency department.  Was concerned for possible blood clot.  Is currently anticoagulated on Eliquis for renal infarction as above.  Was prescribed Voltaren gel and heat to area, with improvement.  Advised to continue with supportive cares and follow-up if worsening.      Review of prior external note(s) from - CareEverywhere information from [facility] reviewed  5 minutes spent on the date of the encounter doing chart review, history and exam, documentation and further activities per the note       See Patient Instructions    Return in about 2 weeks (around 1/25/2022), or if symptoms worsen or fail to improve.    [facility]    Subjective   Ana Maria is a 31 year old who presents for the following health issues     HPI     ED/UC Followup:    Facility:  [facility]    Date  of visit: 1/8/2022  Reason for visit: Right thigh pain (Primary Dx);   Right hip pain  Normal venous ultrasound exam  Patient Story: Pt has a blood clot in her right kidney diagnosed on 12/27/2022. Pt is now having pain in her right upper thigh/groin area which she is concerned may be a blood clot. Pt has STD outbreak to her posterior right upper leg. She states she needs more pain medicine    Current Status: thigh is feeling better, the Diclofenac gel has been helping.      Kidney pain slightly better, taking pain medication more at night.   More tolerable during the day.   2 Percocet left     See's Nephrology in February       Review of Systems   Constitutional, HEENT, cardiovascular, pulmonary, gi and gu systems are negative, except as otherwise noted.      Objective           Vitals:  No vitals were obtained today due to virtual visit.    Physical Exam   GENERAL: Healthy, alert and no distress  EYES: Eyes grossly normal to inspection.  No discharge or erythema, or obvious scleral/conjunctival abnormalities.  RESP: No audible wheeze, cough, or visible cyanosis.  No visible retractions or increased work of breathing.    SKIN: Visible skin clear. No significant rash, abnormal pigmentation or lesions.  NEURO: Cranial nerves grossly intact.  Mentation and speech appropriate for age.  PSYCH: Mentation appears normal, affect normal/bright, judgement and insight intact, normal speech and appearance well-groomed.    Diagnostic Test Results:  none            Video-Visit Details    Type of service:  Video Visit    Video End Time:10:44 AM    Originating Location (pt. Location): Home    Distant Location (provider location):  [facility]     Platform used for Video Visit: TOSA ([employer])

## 2022-01-13 ENCOUNTER — OFFICE VISIT (OUTPATIENT)
Dept: FAMILY MEDICINE | Facility: CLINIC | Age: 32
End: 2022-01-13
Payer: COMMERCIAL

## 2022-01-13 VITALS
WEIGHT: 158 LBS | DIASTOLIC BLOOD PRESSURE: 62 MMHG | BODY MASS INDEX: 31.02 KG/M2 | HEART RATE: 109 BPM | HEIGHT: 60 IN | TEMPERATURE: 98.3 F | SYSTOLIC BLOOD PRESSURE: 124 MMHG

## 2022-01-13 DIAGNOSIS — Z30.46 NEXPLANON REMOVAL: Primary | ICD-10-CM

## 2022-01-13 PROBLEM — F33.1 MODERATE EPISODE OF RECURRENT MAJOR DEPRESSIVE DISORDER (H): Status: RESOLVED | Noted: 2021-01-27 | Resolved: 2022-01-13

## 2022-01-13 PROBLEM — L25.3 CONTACT DERMATITIS DUE TO CHEMICALS: Status: ACTIVE | Noted: 2019-08-06

## 2022-01-13 PROBLEM — F41.1 GENERALIZED ANXIETY DISORDER: Status: RESOLVED | Noted: 2021-01-27 | Resolved: 2022-01-13

## 2022-01-13 PROBLEM — M79.642 PAIN IN LEFT HAND: Status: ACTIVE | Noted: 2018-12-11

## 2022-01-13 PROBLEM — M25.559 HIP PAIN: Status: ACTIVE | Noted: 2020-01-18

## 2022-01-13 PROCEDURE — 11982 REMOVE DRUG IMPLANT DEVICE: CPT | Performed by: FAMILY MEDICINE

## 2022-01-13 ASSESSMENT — MIFFLIN-ST. JEOR: SCORE: 1348.18

## 2022-01-13 NOTE — PROGRESS NOTES
Nexplanon Removal:     Is a pregnancy test required: No.  Was a consent obtained?  Yes    Ana Maria Espinoza is here for removal of etonogestrel implant Nexplanon/Implanon    Indication: Renal function, factor V Leiden mutation      Preoperative Diagnosis: etonogestrel implant  Postoperative Diagnosis: etonogestrel implant removed    Technique: On the left arm  Skin prep Betadine  Anesthesia 1% lidocaine, with epi  Procedure: Small incision (<5mm) was made at distal end of palpable implant, curved hemostat or mosquito forceps was used to isolate the implant and bring it to the incision, the fibrous capsule containing the implant  was incised and the Implant was removed intact.      EBL: minimal  Complications:  No  Tolerance:  Pt tolerated procedure well and was in stable condition.   Dressing:    A pressure bandage was placed for the next 12-24 hours.    Contraception was discussed and patient chose the following method unsure      Follow up: Pt was instructed to call if bleeding, severe pain or foul smell.     Dimitris Zambrano MD

## 2022-01-31 ENCOUNTER — MYC MEDICAL ADVICE (OUTPATIENT)
Dept: NEPHROLOGY | Facility: CLINIC | Age: 32
End: 2022-01-31
Payer: COMMERCIAL

## 2022-01-31 DIAGNOSIS — R31.29 MICROSCOPIC HEMATURIA: Primary | ICD-10-CM

## 2022-01-31 NOTE — TELEPHONE ENCOUNTER
Could we please fax the most recent letter to patient's short-term disability claims, fax number her latest [employer] message.    Thanks,  Susana Mccollum, DNP, APRN-CNP

## 2022-02-03 ENCOUNTER — MYC REFILL (OUTPATIENT)
Dept: FAMILY MEDICINE | Facility: CLINIC | Age: 32
End: 2022-02-03
Payer: COMMERCIAL

## 2022-02-03 DIAGNOSIS — N28.0 RENAL INFARCTION (H): ICD-10-CM

## 2022-02-03 RX ORDER — OXYCODONE AND ACETAMINOPHEN 5; 325 MG/1; MG/1
1 TABLET ORAL 2 TIMES DAILY PRN
Qty: 28 TABLET | Refills: 0 | Status: SHIPPED | OUTPATIENT
Start: 2022-02-03 | End: 2022-04-22

## 2022-02-04 ENCOUNTER — LAB (OUTPATIENT)
Dept: LAB | Facility: CLINIC | Age: 32
End: 2022-02-04
Payer: COMMERCIAL

## 2022-02-04 DIAGNOSIS — R31.29 MICROSCOPIC HEMATURIA: ICD-10-CM

## 2022-02-04 DIAGNOSIS — R82.90 ABNORMAL URINALYSIS: ICD-10-CM

## 2022-02-04 LAB
ALBUMIN SERPL-MCNC: 3.7 G/DL (ref 3.4–5)
ALBUMIN UR-MCNC: NEGATIVE MG/DL
ANION GAP SERPL CALCULATED.3IONS-SCNC: 3 MMOL/L (ref 3–14)
APPEARANCE UR: CLEAR
BACTERIA #/AREA URNS HPF: ABNORMAL /HPF
BILIRUB UR QL STRIP: NEGATIVE
BUN SERPL-MCNC: 16 MG/DL (ref 7–30)
CALCIUM SERPL-MCNC: 8.9 MG/DL (ref 8.5–10.1)
CHLORIDE BLD-SCNC: 107 MMOL/L (ref 94–109)
CO2 SERPL-SCNC: 28 MMOL/L (ref 20–32)
COLOR UR AUTO: YELLOW
CREAT SERPL-MCNC: 1.13 MG/DL (ref 0.52–1.04)
CREAT UR-MCNC: 221 MG/DL
DEPRECATED CALCIDIOL+CALCIFEROL SERPL-MC: 23 UG/L (ref 20–75)
ERYTHROCYTE [DISTWIDTH] IN BLOOD BY AUTOMATED COUNT: 12.8 % (ref 10–15)
GFR SERPL CREATININE-BSD FRML MDRD: 66 ML/MIN/1.73M2
GLUCOSE BLD-MCNC: 86 MG/DL (ref 70–99)
GLUCOSE UR STRIP-MCNC: NEGATIVE MG/DL
HCT VFR BLD AUTO: 39.1 % (ref 35–47)
HGB BLD-MCNC: 13.7 G/DL (ref 11.7–15.7)
HGB UR QL STRIP: ABNORMAL
KETONES UR STRIP-MCNC: NEGATIVE MG/DL
LEUKOCYTE ESTERASE UR QL STRIP: NEGATIVE
MCH RBC QN AUTO: 33.5 PG (ref 26.5–33)
MCHC RBC AUTO-ENTMCNC: 35 G/DL (ref 31.5–36.5)
MCV RBC AUTO: 96 FL (ref 78–100)
NITRATE UR QL: NEGATIVE
PH UR STRIP: 6 [PH] (ref 5–7)
PHOSPHATE SERPL-MCNC: 3.3 MG/DL (ref 2.5–4.5)
PLATELET # BLD AUTO: 262 10E3/UL (ref 150–450)
POTASSIUM BLD-SCNC: 4.1 MMOL/L (ref 3.4–5.3)
PROT UR-MCNC: 0.22 G/L
PROT/CREAT 24H UR: 0.1 G/G CR (ref 0–0.2)
PTH-INTACT SERPL-MCNC: 25 PG/ML (ref 18–80)
RBC # BLD AUTO: 4.09 10E6/UL (ref 3.8–5.2)
RBC #/AREA URNS AUTO: ABNORMAL /HPF
SODIUM SERPL-SCNC: 138 MMOL/L (ref 133–144)
SP GR UR STRIP: >=1.03 (ref 1–1.03)
SQUAMOUS #/AREA URNS AUTO: ABNORMAL /LPF
UROBILINOGEN UR STRIP-ACNC: 0.2 E.U./DL
WBC # BLD AUTO: 6.7 10E3/UL (ref 4–11)
WBC #/AREA URNS AUTO: ABNORMAL /HPF

## 2022-02-04 PROCEDURE — 82306 VITAMIN D 25 HYDROXY: CPT

## 2022-02-04 PROCEDURE — 81001 URINALYSIS AUTO W/SCOPE: CPT

## 2022-02-04 PROCEDURE — 80069 RENAL FUNCTION PANEL: CPT

## 2022-02-04 PROCEDURE — 84156 ASSAY OF PROTEIN URINE: CPT

## 2022-02-04 PROCEDURE — 85027 COMPLETE CBC AUTOMATED: CPT

## 2022-02-04 PROCEDURE — 83970 ASSAY OF PARATHORMONE: CPT

## 2022-02-04 PROCEDURE — 36415 COLL VENOUS BLD VENIPUNCTURE: CPT

## 2022-02-06 ENCOUNTER — MYC MEDICAL ADVICE (OUTPATIENT)
Dept: FAMILY MEDICINE | Facility: CLINIC | Age: 32
End: 2022-02-06
Payer: COMMERCIAL
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
## 2022-02-10 ENCOUNTER — PRE VISIT (OUTPATIENT)
Dept: NEPHROLOGY | Facility: CLINIC | Age: 32
End: 2022-02-10
Payer: COMMERCIAL

## 2022-02-10 ENCOUNTER — VIRTUAL VISIT (OUTPATIENT)
Dept: NEPHROLOGY | Facility: CLINIC | Age: 32
End: 2022-02-10
Attending: INTERNAL MEDICINE
Payer: COMMERCIAL

## 2022-02-10 VITALS — HEIGHT: 60 IN | BODY MASS INDEX: 31.02 KG/M2 | WEIGHT: 158 LBS

## 2022-02-10 DIAGNOSIS — R31.29 MICROSCOPIC HEMATURIA: ICD-10-CM

## 2022-02-10 DIAGNOSIS — N28.0 RENAL INFARCTION (H): Primary | ICD-10-CM

## 2022-02-10 DIAGNOSIS — E66.09 CLASS 1 OBESITY DUE TO EXCESS CALORIES WITHOUT SERIOUS COMORBIDITY WITH BODY MASS INDEX (BMI) OF 30.0 TO 30.9 IN ADULT: ICD-10-CM

## 2022-02-10 DIAGNOSIS — E66.811 CLASS 1 OBESITY DUE TO EXCESS CALORIES WITHOUT SERIOUS COMORBIDITY WITH BODY MASS INDEX (BMI) OF 30.0 TO 30.9 IN ADULT: ICD-10-CM

## 2022-02-10 PROCEDURE — 99205 OFFICE O/P NEW HI 60 MIN: CPT | Mod: 95 | Performed by: INTERNAL MEDICINE

## 2022-02-10 ASSESSMENT — MIFFLIN-ST. JEOR: SCORE: 1348.18

## 2022-02-10 ASSESSMENT — PAIN SCALES - GENERAL: PAINLEVEL: NO PAIN (0)
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
## 2022-02-10 NOTE — PATIENT INSTRUCTIONS
Thank you for coming in today.  As we discussed your blood in the urine is likely from recent infarction. The  Previous RBC in the urine was may related to thin basement membrane disease but no need for further work-up.   Keep monitoring your blood pressure.  Please have urine checked at least once a year.

## 2022-02-10 NOTE — PROGRESS NOTES
Ana Maria is a 31 year old who is being evaluated via a billable video visit.      How would you like to obtain your AVS? [email or web address]  If the video visit is dropped, the invitation should be resent by: Text to cell phone: [phone]  Will anyone else be joining your video visit? No      Video Start Time: 11.05  Video-Visit Details    Type of service:  Video Visit    Video End Time:11.31    Originating Location (pt. Location): Home    Distant Location (provider location):  [facility] [place]     Platform used for Video Visit: [employer]

## 2022-02-19 ENCOUNTER — OFFICE VISIT (OUTPATIENT)
Dept: URGENT CARE | Facility: URGENT CARE | Age: 32
End: 2022-02-19
Payer: COMMERCIAL

## 2022-02-19 VITALS
BODY MASS INDEX: 30.19 KG/M2 | OXYGEN SATURATION: 98 % | DIASTOLIC BLOOD PRESSURE: 70 MMHG | RESPIRATION RATE: 16 BRPM | WEIGHT: 154.6 LBS | TEMPERATURE: 98 F | HEART RATE: 91 BPM | SYSTOLIC BLOOD PRESSURE: 112 MMHG

## 2022-02-19 DIAGNOSIS — H66.90 EAR INFECTION: Primary | ICD-10-CM

## 2022-02-19 PROCEDURE — 99213 OFFICE O/P EST LOW 20 MIN: CPT | Performed by: FAMILY MEDICINE

## 2022-02-19 RX ORDER — MUPIROCIN 20 MG/G
OINTMENT TOPICAL 3 TIMES DAILY
Qty: 30 G | Refills: 0 | Status: SHIPPED | OUTPATIENT
Start: 2022-02-19 | End: 2022-02-26

## 2022-02-19 NOTE — PROGRESS NOTES
Assessment & Plan     Ear infection  Differential discussed in detail including right ear outer external canal skin infection.  Mupirocin ointment prescribed and recommended to avoid using Q-tips.  Follow-up as needed.  All questions answered.  - mupirocin (BACTROBAN) 2 % external ointment; Apply topically 3 times daily for 7 days      Dimitris Zambrano MD  [facility]    Doirs Rodgers is a 31 year old who presents for the following health issues     HPI     Chief Complaint   Patient presents with     Otalgia     3 week Patient has right ear pain and popping, fluid, feels swollen, 1 week worse pain even to touch the ear area like needles, left ear has skin is peeling inside.     Review of Systems   Constitutional, HEENT, cardiovascular, pulmonary, gi and gu systems are negative, except as otherwise noted.      Objective    /70 (BP Location: Right arm, Patient Position: Sitting, Cuff Size: Adult Regular)   Pulse 91   Temp 98  F (36.7  C) (Tympanic)   Resp 16   Wt 70.1 kg (154 lb 9.6 oz)   SpO2 98%   BMI 30.19 kg/m    Body mass index is 30.19 kg/m .  Physical Exam   GENERAL: healthy, alert and no distress  EYES: Eyes grossly normal to inspection, PERRL and conjunctivae and sclerae normal  HENT: normal cephalic/atraumatic, nose and mouth without ulcers or lesions, oropharynx clear, oral mucous membranes moist, small skin break involving right outer external canal with some yellowish crust, tympanic membrane normal bilaterally  MS: no gross musculoskeletal defects noted, no edema  NEURO: Normal strength and tone, mentation intact and speech normal  PSYCH: mentation appears normal, affect normal/bright

## 2022-03-24 ENCOUNTER — TELEPHONE (OUTPATIENT)
Dept: FAMILY MEDICINE | Facility: CLINIC | Age: 32
End: 2022-03-24
Payer: COMMERCIAL

## 2022-03-24 NOTE — TELEPHONE ENCOUNTER
Patient Quality Outreach    Patient is due for the following:   Asthma  -  ACT needed, Asthma follow-up visit and AAP  Depression  -  PHQ-9 Needed    NEXT STEPS:   Schedule a office visit for asthma follow up    Type of outreach:    Sent [employer] message.      Questions for provider review:    None     Bailee Kahler

## 2022-04-05 ENCOUNTER — MYC MEDICAL ADVICE (OUTPATIENT)
Dept: FAMILY MEDICINE | Facility: CLINIC | Age: 32
End: 2022-04-05
Payer: COMMERCIAL

## 2022-04-11 ENCOUNTER — OFFICE VISIT (OUTPATIENT)
Dept: URGENT CARE | Facility: URGENT CARE | Age: 32
End: 2022-04-11
Payer: COMMERCIAL

## 2022-04-11 ENCOUNTER — MYC MEDICAL ADVICE (OUTPATIENT)
Dept: FAMILY MEDICINE | Facility: CLINIC | Age: 32
End: 2022-04-11

## 2022-04-11 VITALS
SYSTOLIC BLOOD PRESSURE: 126 MMHG | HEART RATE: 97 BPM | OXYGEN SATURATION: 99 % | TEMPERATURE: 98.6 F | BODY MASS INDEX: 29.69 KG/M2 | RESPIRATION RATE: 16 BRPM | DIASTOLIC BLOOD PRESSURE: 78 MMHG | WEIGHT: 152 LBS

## 2022-04-11 DIAGNOSIS — J01.90 ACUTE SINUSITIS WITH SYMPTOMS > 10 DAYS: ICD-10-CM

## 2022-04-11 DIAGNOSIS — J06.9 VIRAL URI WITH COUGH: ICD-10-CM

## 2022-04-11 DIAGNOSIS — E55.9 VITAMIN D DEFICIENCY: ICD-10-CM

## 2022-04-11 DIAGNOSIS — T78.40XA ALLERGIC REACTION, INITIAL ENCOUNTER: ICD-10-CM

## 2022-04-11 DIAGNOSIS — H60.501 ACUTE OTITIS EXTERNA OF RIGHT EAR, UNSPECIFIED TYPE: Primary | ICD-10-CM

## 2022-04-11 PROCEDURE — 99213 OFFICE O/P EST LOW 20 MIN: CPT | Performed by: PHYSICIAN ASSISTANT

## 2022-04-11 RX ORDER — NEOMYCIN SULFATE, POLYMYXIN B SULFATE AND HYDROCORTISONE 10; 3.5; 1 MG/ML; MG/ML; [USP'U]/ML
3 SUSPENSION/ DROPS AURICULAR (OTIC) 4 TIMES DAILY
Qty: 5 ML | Refills: 0 | Status: SHIPPED | OUTPATIENT
Start: 2022-04-11 | End: 2022-04-18

## 2022-04-11 RX ORDER — EPINEPHRINE 0.3 MG/.3ML
0.3 INJECTION SUBCUTANEOUS PRN
Qty: 1 EACH | Refills: 1 | Status: SHIPPED | OUTPATIENT
Start: 2022-04-11 | End: 2023-06-21

## 2022-04-11 RX ORDER — ALBUTEROL SULFATE 90 UG/1
1-2 AEROSOL, METERED RESPIRATORY (INHALATION) EVERY 6 HOURS PRN
Qty: 16 G | Refills: 1 | Status: SHIPPED | OUTPATIENT
Start: 2022-04-11 | End: 2023-03-31

## 2022-04-11 NOTE — TELEPHONE ENCOUNTER
"Requested Prescriptions   Pending Prescriptions Disp Refills     albuterol (PROAIR HFA/PROVENTIL HFA/VENTOLIN HFA) 108 (90 Base) MCG/ACT inhaler       Sig: Inhale 1-2 puffs into the lungs every 6 hours as needed for shortness of breath / dyspnea or wheezing       Asthma Maintenance Inhalers - Anticholinergics Failed - 4/11/2022  3:16 PM        Failed - Asthma control assessment score within normal limits in last 6 months     Please review ACT score.           Passed - Patient is age 12 years or older        Passed - Medication is active on med list        Passed - Recent (6 mo) or future (30 days) visit within the authorizing provider's specialty     Patient had office visit in the last 6 months or has a visit in the next 30 days with authorizing provider or within the authorizing provider's specialty.  See \"Patient Info\" tab in inbasket, or \"Choose Columns\" in Meds & Orders section of the refill encounter.           Short-Acting Beta Agonist Inhalers Protocol  Failed - 4/11/2022  3:16 PM        Failed - Asthma control assessment score within normal limits in last 6 months     Please review ACT score.           Passed - Patient is age 12 or older        Passed - Medication is active on med list        Passed - Recent (6 mo) or future (30 days) visit within the authorizing provider's specialty     Patient had office visit in the last 6 months or has a visit in the next 30 days with authorizing provider or within the authorizing provider's specialty.  See \"Patient Info\" tab in inbasket, or \"Choose Columns\" in Meds & Orders section of the refill encounter.               EPINEPHrine (ANY BX GENERIC EQUIV) 0.3 MG/0.3ML injection 2-pack 1 each 1     Sig: Inject 0.3 mLs (0.3 mg) into the muscle as needed for anaphylaxis       Anaphylaxis Kits Protocol Passed - 4/11/2022  3:16 PM        Passed - Recent (12 mo) or future (30 days) visit witin the authorizing provider's specialty     Patient has had an office visit with the " "authorizing provider or a provider within the authorizing providers department within the previous 12 mos or has a future within next 30 days. See \"Patient Info\" tab in inbasket, or \"Choose Columns\" in Meds & Orders section of the refill encounter.              Passed - Patient is age 5 or older        Passed - Medication is active on med list             "

## 2022-04-11 NOTE — LETTER
[facility] CARE [place]  [address] [address]  Phone: [phone]  Fax: [phone]    April 11, 2022        Ana Maria Espinoza  [address] [place] [id number]          To whom it may concern:    RE: Ana Maria Espinoza    Patient was seen and treated today at our clinic and missed work 4/11/22 and 4/12/22    Please contact me for questions or concerns.      Sincerely,        Maribel Landry PA-C

## 2022-04-11 NOTE — PROGRESS NOTES
Assessment & Plan     Acute otitis externa of right ear, unspecified type  Will treat with neomycin-polymyxin-hydrocortisone (CORTISPORIN) 3.5-42880-1 otic suspension; Place 3 drops into the right ear 4 times daily for 7 days    Acute sinusitis with symptoms > 10 days  Will treat with amoxicillin-clavulanate (AUGMENTIN) 875-125 MG tablet; Take 1 tablet by mouth in the morning and 1 tablet in the evening. Do all this for 10 days. Continue with supportive care. Return to clinic if symptoms worsen or do not improve; otherwise follow up as needed               BMI:   Estimated body mass index is 29.69 kg/m  as calculated from the following:    Height as of 2/10/22: 1.524 m (5').    Weight as of this encounter: 68.9 kg (152 lb).           Return in about 1 week (around 4/18/2022), or if symptoms worsen or fail to improve.    DANE Deng [facility]              Subjective   Chief Complaint   Patient presents with     Otalgia     Right ear pain and sinus congestion x1 week--declined covid test       HPI     URI Adult    Onset of symptoms was 1+ week(s) ago.  Course of illness is same.    Severity moderate  Current and Associated symptoms: right ear pain and congestion   Treatment measures tried include Tylenol/Ibuprofen.  Predisposing factors include None.              Review of Systems   Constitutional, HEENT, cardiovascular, pulmonary, gi and gu systems are negative, except as otherwise noted.      Objective    /78   Pulse 97   Temp 98.6  F (37  C)   Resp 16   Wt 68.9 kg (152 lb)   SpO2 99%   BMI 29.69 kg/m    Body mass index is 29.69 kg/m .  Physical Exam  Constitutional:       Appearance: She is well-developed.   HENT:      Head: Normocephalic.      Right Ear: Tympanic membrane normal.      Left Ear: Tympanic membrane and ear canal normal.      Ears:      Comments: Right canal is injected with small amount of drainage   Eyes:      Conjunctiva/sclera: Conjunctivae  normal.   Cardiovascular:      Rate and Rhythm: Normal rate.      Heart sounds: Normal heart sounds.   Pulmonary:      Effort: Pulmonary effort is normal.      Breath sounds: Normal breath sounds.   Skin:     General: Skin is warm and dry.      Findings: No rash.   Psychiatric:         Behavior: Behavior normal.

## 2022-04-11 NOTE — TELEPHONE ENCOUNTER
Hi there per patients request she would like you to send a prescription over for the albuterol inhaler.  When you get a moment could you please send that over.        Thank you    Heaven Hess

## 2022-04-12 RX ORDER — CHOLECALCIFEROL (VITAMIN D3) 1250 MCG
CAPSULE ORAL
Qty: 12 CAPSULE | Refills: 0 | OUTPATIENT
Start: 2022-04-12

## 2022-04-12 NOTE — TELEPHONE ENCOUNTER
Spoke with patient. Rheumatology recently refused refilling vit D as she should have completed the prescription strength and transitioned to otc 1000 units daily. Patient states nephrology told her to continue the 50,000 units weekly. She has 2 weeks worth left and will discuss with Susana Mccollum during upcoming appt on 04/22/22.  Keiry KIM RN

## 2022-04-13 ENCOUNTER — TELEPHONE (OUTPATIENT)
Dept: FAMILY MEDICINE | Facility: CLINIC | Age: 32
End: 2022-04-13
Payer: COMMERCIAL

## 2022-04-13 NOTE — TELEPHONE ENCOUNTER
Patient Quality Outreach    Patient is due for the following:   Depression  -  PHQ-9 Needed  (ACT, phq9, diane, csa and utox due)  NEXT STEPS:   patient has an appointment, will do the above at visit)    Type of outreach:    Chart review performed, no outreach needed.    Next Steps:  Reach out within 90 days via [employer].    Max number of attempts reached: No. Will try again in 90 days if patient still on fail list.    Questions for provider review:    None     Keiry Fonseca, CARINA  Chart routed to Care Team.

## 2022-04-15 ENCOUNTER — LAB (OUTPATIENT)
Dept: LAB | Facility: CLINIC | Age: 32
End: 2022-04-15
Payer: COMMERCIAL

## 2022-04-15 DIAGNOSIS — E55.9 VITAMIN D DEFICIENCY: ICD-10-CM

## 2022-04-15 DIAGNOSIS — R76.8 ANA POSITIVE: ICD-10-CM

## 2022-04-15 DIAGNOSIS — R31.29 MICROSCOPIC HEMATURIA: ICD-10-CM

## 2022-04-15 LAB
ALBUMIN SERPL-MCNC: 3.6 G/DL (ref 3.4–5)
ALBUMIN UR-MCNC: NEGATIVE MG/DL
ALT SERPL W P-5'-P-CCNC: 20 U/L (ref 0–50)
APPEARANCE UR: CLEAR
AST SERPL W P-5'-P-CCNC: 13 U/L (ref 0–45)
BASOPHILS # BLD AUTO: 0 10E3/UL (ref 0–0.2)
BASOPHILS NFR BLD AUTO: 0 %
BILIRUB UR QL STRIP: NEGATIVE
CALCIUM SERPL-MCNC: 8.8 MG/DL (ref 8.5–10.1)
COLOR UR AUTO: YELLOW
CREAT SERPL-MCNC: 1.09 MG/DL (ref 0.52–1.04)
CREAT UR-MCNC: 145 MG/DL
CREAT UR-MCNC: 145 MG/DL
CRP SERPL-MCNC: 3.5 MG/L (ref 0–8)
EOSINOPHIL # BLD AUTO: 0.2 10E3/UL (ref 0–0.7)
EOSINOPHIL NFR BLD AUTO: 3 %
ERYTHROCYTE [DISTWIDTH] IN BLOOD BY AUTOMATED COUNT: 13.1 % (ref 10–15)
ERYTHROCYTE [SEDIMENTATION RATE] IN BLOOD BY WESTERGREN METHOD: 8 MM/HR (ref 0–20)
GFR SERPL CREATININE-BSD FRML MDRD: 68 ML/MIN/1.73M2
GLUCOSE UR STRIP-MCNC: NEGATIVE MG/DL
HCT VFR BLD AUTO: 42.4 % (ref 35–47)
HGB BLD-MCNC: 13.9 G/DL (ref 11.7–15.7)
HGB UR QL STRIP: ABNORMAL
KETONES UR STRIP-MCNC: NEGATIVE MG/DL
LEUKOCYTE ESTERASE UR QL STRIP: NEGATIVE
LYMPHOCYTES # BLD AUTO: 3.3 10E3/UL (ref 0.8–5.3)
LYMPHOCYTES NFR BLD AUTO: 42 %
MCH RBC QN AUTO: 32.8 PG (ref 26.5–33)
MCHC RBC AUTO-ENTMCNC: 32.8 G/DL (ref 31.5–36.5)
MCV RBC AUTO: 100 FL (ref 78–100)
MICROALBUMIN UR-MCNC: 7 MG/L
MICROALBUMIN/CREAT UR: 4.83 MG/G CR (ref 0–25)
MONOCYTES # BLD AUTO: 0.3 10E3/UL (ref 0–1.3)
MONOCYTES NFR BLD AUTO: 4 %
NEUTROPHILS # BLD AUTO: 4 10E3/UL (ref 1.6–8.3)
NEUTROPHILS NFR BLD AUTO: 51 %
NITRATE UR QL: NEGATIVE
PH UR STRIP: 6 [PH] (ref 5–7)
PLATELET # BLD AUTO: 319 10E3/UL (ref 150–450)
PROT UR-MCNC: 0.13 G/L
PROT/CREAT 24H UR: 0.09 G/G CR (ref 0–0.2)
RBC # BLD AUTO: 4.24 10E6/UL (ref 3.8–5.2)
RBC #/AREA URNS AUTO: ABNORMAL /HPF
SP GR UR STRIP: 1.02 (ref 1–1.03)
SQUAMOUS #/AREA URNS AUTO: ABNORMAL /LPF
UROBILINOGEN UR STRIP-ACNC: 0.2 E.U./DL
WBC # BLD AUTO: 7.9 10E3/UL (ref 4–11)
WBC #/AREA URNS AUTO: ABNORMAL /HPF
YEAST #/AREA URNS HPF: ABNORMAL /HPF

## 2022-04-15 PROCEDURE — 86225 DNA ANTIBODY NATIVE: CPT

## 2022-04-15 PROCEDURE — 86140 C-REACTIVE PROTEIN: CPT

## 2022-04-15 PROCEDURE — 86160 COMPLEMENT ANTIGEN: CPT | Mod: 59

## 2022-04-15 PROCEDURE — 84156 ASSAY OF PROTEIN URINE: CPT

## 2022-04-15 PROCEDURE — 84450 TRANSFERASE (AST) (SGOT): CPT

## 2022-04-15 PROCEDURE — 82306 VITAMIN D 25 HYDROXY: CPT

## 2022-04-15 PROCEDURE — 82310 ASSAY OF CALCIUM: CPT

## 2022-04-15 PROCEDURE — 82565 ASSAY OF CREATININE: CPT

## 2022-04-15 PROCEDURE — 82040 ASSAY OF SERUM ALBUMIN: CPT

## 2022-04-15 PROCEDURE — 86160 COMPLEMENT ANTIGEN: CPT

## 2022-04-15 PROCEDURE — 85652 RBC SED RATE AUTOMATED: CPT

## 2022-04-15 PROCEDURE — 36415 COLL VENOUS BLD VENIPUNCTURE: CPT

## 2022-04-15 PROCEDURE — 85025 COMPLETE CBC W/AUTO DIFF WBC: CPT

## 2022-04-15 PROCEDURE — 84460 ALANINE AMINO (ALT) (SGPT): CPT

## 2022-04-15 PROCEDURE — 82043 UR ALBUMIN QUANTITATIVE: CPT

## 2022-04-15 PROCEDURE — 81001 URINALYSIS AUTO W/SCOPE: CPT

## 2022-04-16 LAB — DEPRECATED CALCIDIOL+CALCIFEROL SERPL-MC: 66 UG/L (ref 20–75)

## 2022-04-17 LAB — DSDNA AB SER-ACNC: <0.6 IU/ML

## 2022-04-19 LAB
C3 SERPL-MCNC: 118 MG/DL (ref 81–157)
C4 SERPL-MCNC: 22 MG/DL (ref 13–39)

## 2022-04-22 ENCOUNTER — OFFICE VISIT (OUTPATIENT)
Dept: FAMILY MEDICINE | Facility: CLINIC | Age: 32
End: 2022-04-22
Payer: COMMERCIAL

## 2022-04-22 VITALS
WEIGHT: 152 LBS | HEART RATE: 78 BPM | RESPIRATION RATE: 18 BRPM | SYSTOLIC BLOOD PRESSURE: 121 MMHG | BODY MASS INDEX: 29.84 KG/M2 | HEIGHT: 60 IN | OXYGEN SATURATION: 100 % | TEMPERATURE: 98.5 F | DIASTOLIC BLOOD PRESSURE: 68 MMHG

## 2022-04-22 DIAGNOSIS — R91.8 PULMONARY NODULES: Primary | ICD-10-CM

## 2022-04-22 DIAGNOSIS — Z02.89 PAIN MEDICATION AGREEMENT: ICD-10-CM

## 2022-04-22 DIAGNOSIS — N92.6 MISSED MENSES: ICD-10-CM

## 2022-04-22 LAB — HCG UR QL: NEGATIVE

## 2022-04-22 PROCEDURE — 80306 DRUG TEST PRSMV INSTRMNT: CPT | Performed by: NURSE PRACTITIONER

## 2022-04-22 PROCEDURE — 81025 URINE PREGNANCY TEST: CPT | Performed by: NURSE PRACTITIONER

## 2022-04-22 PROCEDURE — 99213 OFFICE O/P EST LOW 20 MIN: CPT | Performed by: NURSE PRACTITIONER

## 2022-04-22 ASSESSMENT — ANXIETY QUESTIONNAIRES
2. NOT BEING ABLE TO STOP OR CONTROL WORRYING: NOT AT ALL
7. FEELING AFRAID AS IF SOMETHING AWFUL MIGHT HAPPEN: NOT AT ALL
GAD7 TOTAL SCORE: 0
7. FEELING AFRAID AS IF SOMETHING AWFUL MIGHT HAPPEN: NOT AT ALL
6. BECOMING EASILY ANNOYED OR IRRITABLE: NOT AT ALL
GAD7 TOTAL SCORE: 0
4. TROUBLE RELAXING: NOT AT ALL
5. BEING SO RESTLESS THAT IT IS HARD TO SIT STILL: NOT AT ALL
3. WORRYING TOO MUCH ABOUT DIFFERENT THINGS: NOT AT ALL
1. FEELING NERVOUS, ANXIOUS, OR ON EDGE: NOT AT ALL
GAD7 TOTAL SCORE: 0

## 2022-04-22 ASSESSMENT — PATIENT HEALTH QUESTIONNAIRE - PHQ9
SUM OF ALL RESPONSES TO PHQ QUESTIONS 1-9: 0
10. IF YOU CHECKED OFF ANY PROBLEMS, HOW DIFFICULT HAVE THESE PROBLEMS MADE IT FOR YOU TO DO YOUR WORK, TAKE CARE OF THINGS AT HOME, OR GET ALONG WITH OTHER PEOPLE: NOT DIFFICULT AT ALL
SUM OF ALL RESPONSES TO PHQ QUESTIONS 1-9: 0

## 2022-04-22 ASSESSMENT — ASTHMA QUESTIONNAIRES: ACT_TOTALSCORE: 20

## 2022-04-22 ASSESSMENT — PAIN SCALES - GENERAL: PAINLEVEL: NO PAIN (0)

## 2022-04-22 NOTE — PROGRESS NOTES
Assessment & Plan     Pulmonary nodules  2 - 4 mm pulmonary nodules noted on abdominal CT scan done through [facility] for workup of renal infarct in the right upper and left lower lobes. Not noted on CT of chest/abdomen in December; however did not include chest. Patient is a current everyday smoker, does not have any current symptoms of cough, shortness of breath or chest pain/pressure. Due to small size and not at high-risk discussed with patient will repeat chest CT in 12 months, sooner if patient develops symptoms. Patient agreeable to plan.     Missed menses  Patient is 1 day late on menstrual cycle, is usually pretty regular. Did just have Nexplanon removed due to renal infarct. Urine pregnancy negative.   - HCG Qual, Urine ([id number])    Pain medication agreement  Urine drug screen done due to recent opioid use.   - Drug Abuse Screen Panel 13, Urine (Pain Care Package) - lab collect    Review of prior external note(s) from - CareEverywhere information from [facility] reviewed and result messages per patient's [employer] messages/voicemails  5 minutes spent on the date of the encounter doing chart review, history and exam, documentation and further activities per the note       BMI:   Estimated body mass index is 29.69 kg/m  as calculated from the following:    Height as of this encounter: 1.524 m (5').    Weight as of this encounter: 68.9 kg (152 lb).   Weight management plan: Discussed healthy diet and exercise guidelines    See Patient Instructions. After discussion with patient, patient verbalizes and agreeable to receive AVS instructions via My Chart, not printed today     Return in about 1 year (around 4/22/2023) for Physical Exam.    Susana Mccollum, ARTIS, APRN-CNP   [facility]     Ana Maria Espinoza is a 32 year old female who presents today for the following   health issues:    HPI    Pt presents today to review CT results from [facility]. She is also 1 day late having her  period and is requesting a pregnancy test.   Answers for HPI/ROS submitted by the patient on 4/22/2022  If you checked off any problems, how difficult have these problems made it for you to do your work, take care of things at home, or get along with other people?: Not difficult at all  PHQ9 TOTAL SCORE: 0  DEENA 7 TOTAL SCORE: 0  How many servings of fruits and vegetables do you eat daily?: 2-3  On average, how many sweetened beverages do you drink each day (Examples: soda, juice, sweet tea, etc.  Do NOT count diet or artificially sweetened beverages)?: 1  How many minutes a day do you exercise enough to make your heart beat faster?: 30 to 60  How many days a week do you exercise enough to make your heart beat faster?: 4  How many days per week do you miss taking your medication?: 0  What is the reason for your visit today?: Ct scan showed two masses in lung that vascular specialist wants a biopsy done  When did your symptoms begin?: More than a month  What are your symptoms?: Two nodules in lung found  How would you describe these symptoms?: Mild  Are your symptoms:: Staying the same  Have you had these symptoms before?: No          Review of Systems    Constitutional, HEENT, cardiovascular, pulmonary, gi and gu systems are negative, except as otherwise noted.    Objective   /68 (BP Location: Right arm, Patient Position: Sitting, Cuff Size: Adult Regular)   Pulse 78   Temp 98.5  F (36.9  C) (Tympanic)   Resp 18   Ht 1.524 m (5')   Wt 68.9 kg (152 lb)   LMP 03/24/2022 (Exact Date)   SpO2 100%   BMI 29.69 kg/m    Body mass index is 29.69 kg/m .    Physical Exam  GENERAL APPEARANCE: healthy, alert and no distress  RESP: lungs clear to auscultation - no rales, rhonchi or wheezes  CV: regular rates and rhythm, normal S1 S2, no S3 or S4 and no murmur, click or rub  ABDOMEN: soft, nontender, without hepatosplenomegaly or masses and bowel sounds normal  MS: extremities normal- no gross deformities noted    SKIN: no suspicious lesions or rashes  PSYCH: mentation appears normal and affect normal/bright    Diagnostic Test Results:  Results for orders placed or performed in visit on 04/22/22   Drug Abuse Screen Panel 13, Urine (Pain Care Package) - lab collect     Status: Abnormal   Result Value Ref Range    Cannabinoids (74-wvv-8-carboxy-9-THC) Detected (A) Not Detected, Indeterminate    Phencyclidine Not Detected Not Detected, Indeterminate    Cocaine (Benzoylecgonine) Not Detected Not Detected, Indeterminate    Methamphetamine (d-Methamphetamine) Not Detected Not Detected, Indeterminate    Opiates (Morphine) Not Detected Not Detected, Indeterminate    Amphetamine (d-Amphetamine) Not Detected Not Detected, Indeterminate    Benzodiazepines (Nordiazepam) Not Detected Not Detected, Indeterminate    Tricyclic Antidepressants (Desipramine) Not Detected Not Detected, Indeterminate    Methadone Not Detected Not Detected, Indeterminate    Barbiturates (Butalbital) Not Detected Not Detected, Indeterminate    Oxycodone Not Detected Not Detected, Indeterminate    Propoxyphene (Norpropoxyphene) Not Detected Not Detected, Indeterminate    Buprenorphine Not Detected Not Detected, Indeterminate   HCG Qual, Urine ([id number])     Status: Normal   Result Value Ref Range    hCG Urine Qualitative Negative Negative        Chart documentation with Dragon Voice recognition Software. Although reviewed after completion, some words and grammatical errors may remain.

## 2022-04-22 NOTE — LETTER
[facility]  [address] [place] [id number]  [phone]          April 22, 2022    Ana Maria Espinoza                                                                                                                     [address] [place] [id number]        To Whom it May Concern,      Ana Maria is under my general care and will need to be excused from work 4/18/2022 - 4/21/2022.      Sincerely,       Susana Mccollum, ARTIS, APRN-CNP

## 2022-04-23 ASSESSMENT — ANXIETY QUESTIONNAIRES: GAD7 TOTAL SCORE: 0

## 2022-04-25 LAB
AMPHETAMINES UR QL: NOT DETECTED
BARBITURATES UR QL SCN: NOT DETECTED
BENZODIAZ UR QL SCN: NOT DETECTED
BUPRENORPHINE UR QL: NOT DETECTED
CANNABINOIDS UR QL: DETECTED
COCAINE UR QL SCN: NOT DETECTED
D-METHAMPHET UR QL: NOT DETECTED
METHADONE UR QL SCN: NOT DETECTED
OPIATES UR QL SCN: NOT DETECTED
OXYCODONE UR QL SCN: NOT DETECTED
PCP UR QL SCN: NOT DETECTED
PROPOXYPH UR QL: NOT DETECTED
TRICYCLICS UR QL SCN: NOT DETECTED

## 2022-04-25 NOTE — PATIENT INSTRUCTIONS
Pulmonary nodules  2 - 4 mm pulmonary nodules noted on abdominal CT scan done through [facility] for workup of renal infarct in the right upper and left lower lobes. Not noted on CT of chest/abdomen in December; however did not include chest. Patient is a current everyday smoker, does not have any current symptoms of cough, shortness of breath or chest pain/pressure. Due to small size and not at high-risk discussed with patient will repeat chest CT in 12 months, sooner if patient develops symptoms. Patient agreeable to plan.     Missed menses  Patient is 1 day late on menstrual cycle, is usually pretty regular. Did just have Nexplanon removed due to renal infarct. Urine pregnancy negative.   - HCG Qual, Urine ([id number])    Pain medication agreement  Urine drug screen done due to recent opioid use.   - Drug Abuse Screen Panel 13, Urine (Pain Care Package) - lab collect

## 2022-04-28 ENCOUNTER — VIRTUAL VISIT (OUTPATIENT)
Dept: RHEUMATOLOGY | Facility: CLINIC | Age: 32
End: 2022-04-28
Payer: COMMERCIAL

## 2022-04-28 DIAGNOSIS — M24.9 HYPERMOBILE JOINTS: ICD-10-CM

## 2022-04-28 DIAGNOSIS — R79.89 ELEVATED SERUM CREATININE: ICD-10-CM

## 2022-04-28 DIAGNOSIS — R76.8 ANA POSITIVE: Primary | ICD-10-CM

## 2022-04-28 DIAGNOSIS — R31.29 MICROSCOPIC HEMATURIA: ICD-10-CM

## 2022-04-28 PROCEDURE — 99214 OFFICE O/P EST MOD 30 MIN: CPT | Mod: 95 | Performed by: INTERNAL MEDICINE

## 2022-04-28 NOTE — PATIENT INSTRUCTIONS
Summary of Your Rheumatology Visit    Next Appointment:  4 Months, in clinic.  Can cancel appointment with us scheduled for June 30, 2022    Medications:      Referrals:      Tests:     Please have labs performed a few days prior to next visit. Please make sure to only provide a urine sample when not experiencing menstrual period.    Injections:      Other:
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
## 2022-05-17 ENCOUNTER — OFFICE VISIT (OUTPATIENT)
Dept: URGENT CARE | Facility: URGENT CARE | Age: 32
End: 2022-05-17
Payer: COMMERCIAL

## 2022-05-17 ENCOUNTER — MYC MEDICAL ADVICE (OUTPATIENT)
Dept: FAMILY MEDICINE | Facility: CLINIC | Age: 32
End: 2022-05-17

## 2022-05-17 VITALS
OXYGEN SATURATION: 99 % | TEMPERATURE: 98.4 F | DIASTOLIC BLOOD PRESSURE: 60 MMHG | SYSTOLIC BLOOD PRESSURE: 112 MMHG | WEIGHT: 150 LBS | BODY MASS INDEX: 29.29 KG/M2 | HEART RATE: 69 BPM

## 2022-05-17 DIAGNOSIS — F17.200 NICOTINE DEPENDENCE, UNCOMPLICATED, UNSPECIFIED NICOTINE PRODUCT TYPE: Primary | ICD-10-CM

## 2022-05-17 DIAGNOSIS — J01.01 ACUTE RECURRENT MAXILLARY SINUSITIS: ICD-10-CM

## 2022-05-17 DIAGNOSIS — H60.93 RECURRENT OTITIS EXTERNA OF BOTH EARS: Primary | ICD-10-CM

## 2022-05-17 PROCEDURE — 87077 CULTURE AEROBIC IDENTIFY: CPT | Performed by: PHYSICIAN ASSISTANT

## 2022-05-17 PROCEDURE — 87070 CULTURE OTHR SPECIMN AEROBIC: CPT | Performed by: PHYSICIAN ASSISTANT

## 2022-05-17 PROCEDURE — 99213 OFFICE O/P EST LOW 20 MIN: CPT | Performed by: PHYSICIAN ASSISTANT

## 2022-05-17 PROCEDURE — 87186 SC STD MICRODIL/AGAR DIL: CPT | Performed by: PHYSICIAN ASSISTANT

## 2022-05-17 RX ORDER — DOXYCYCLINE 100 MG/1
100 CAPSULE ORAL 2 TIMES DAILY
Qty: 28 CAPSULE | Refills: 0 | Status: SHIPPED | OUTPATIENT
Start: 2022-05-17 | End: 2022-05-31

## 2022-05-17 RX ORDER — OFLOXACIN 3 MG/ML
5 SOLUTION AURICULAR (OTIC) DAILY
Qty: 2 ML | Refills: 0 | Status: SHIPPED | OUTPATIENT
Start: 2022-05-17 | End: 2022-05-24

## 2022-05-17 NOTE — PROGRESS NOTES
Assessment & Plan     Recurrent otitis externa of both ears    - ofloxacin (FLOXIN) 0.3 % otic solution; Place 5 drops into both ears daily for 7 days  - Respiratory Aerobic Bacterial Culture  - Otolaryngology Referral; Future    Acute recurrent maxillary sinusitis    - doxycycline monohydrate (MONODOX) 100 MG capsule; Take 1 capsule (100 mg) by mouth 2 times daily for 14 days             Tobacco Cessation:   reports that she has been smoking cigarettes. She started smoking about 13 years ago. She has a 2.50 pack-year smoking history. She has never used smokeless tobacco.          Return in about 1 week (around 5/24/2022), or if symptoms worsen or fail to improve.    DANE Deng [facility] [place]            Subjective   Chief Complaint   Patient presents with     Ear Problem     Has been seen twice in last couple of months started in February with ear infection that has not gone away, about 2.5 wks went to both ears. She has oozing out of the right ear, both ears itch and burn. Says medication is not helping.         HPI     Ear problem     Onset of symptoms was 1 month(s) ago.  Course of illness is same.    Severity moderate  Current and Associated symptoms: ear pain and drainage, sinus pain/pressure   Treatment measures tried include ear drops .  Predisposing factors include None.              Review of Systems   Constitutional, HEENT, cardiovascular, pulmonary, gi and gu systems are negative, except as otherwise noted.      Objective    /60   Pulse 69   Temp 98.4  F (36.9  C) (Tympanic)   Wt 68 kg (150 lb)   LMP 03/24/2022 (Exact Date)   SpO2 99%   BMI 29.29 kg/m    Body mass index is 29.29 kg/m .  Physical Exam  Constitutional:       Appearance: She is well-developed.   HENT:      Head: Normocephalic.      Comments: The opening of both ears have crustiness and tenderness. There is mild redness at the opening of left canal. Both TM's are gray and intact.       Right Ear: Tympanic membrane and ear canal normal.      Left Ear: Tympanic membrane and ear canal normal.   Eyes:      Conjunctiva/sclera: Conjunctivae normal.   Cardiovascular:      Rate and Rhythm: Normal rate.      Heart sounds: Normal heart sounds.   Pulmonary:      Effort: Pulmonary effort is normal.      Breath sounds: Normal breath sounds.   Skin:     General: Skin is warm and dry.      Findings: No rash.   Psychiatric:         Behavior: Behavior normal.

## 2022-05-17 NOTE — LETTER
[facility] [place]  [address] [address]  Phone: [phone]  Fax: [phone]    May 17, 2022        Ana Maria Espinoza  [address] [place] [id number]          To whom it may concern:    RE: Ana Maria Espinoza    Patient was seen and treated today at our clinic.     Please contact me for questions or concerns.      Sincerely,        Maribel Landry PA-C

## 2022-05-17 NOTE — LETTER
[facility]  [address] [place] [id number]  [phone]          May 18, 2022    Ana Maria WHARTON Alexis                                                                                                                     [address] [place] [id number]          To Whom it May Concern,    Ana Maria will need to be excused from work 5/17/2022-5/18/2022.      Sincerely,     Susana Mccollum, ARTIS, APRN-CNP

## 2022-05-18 ENCOUNTER — MYC MEDICAL ADVICE (OUTPATIENT)
Dept: FAMILY MEDICINE | Facility: CLINIC | Age: 32
End: 2022-05-18
Payer: COMMERCIAL

## 2022-05-18 NOTE — RESULT ENCOUNTER NOTE
[facility] Emergency Dept discharge antibiotic prescribed: Doxycycline 100 mg PO tablet, 1 tablet (100 mg) by mouth 2 times daily for 14 days AND Ofloxacin otic solution, 5 drops to both ears daily for 7 days  Recommendations in treatment per [facility] ED Lab Result culture protocol

## 2022-05-19 RX ORDER — NICOTINE 21 MG/24HR
1 PATCH, TRANSDERMAL 24 HOURS TRANSDERMAL EVERY 24 HOURS
Qty: 42 PATCH | Refills: 0 | Status: SHIPPED | OUTPATIENT
Start: 2022-05-19 | End: 2022-06-30

## 2022-05-19 RX ORDER — NICOTINE 21 MG/24HR
1 PATCH, TRANSDERMAL 24 HOURS TRANSDERMAL EVERY 24 HOURS
Qty: 28 PATCH | Refills: 0 | Status: SHIPPED | OUTPATIENT
Start: 2022-06-30 | End: 2022-07-25

## 2022-05-20 LAB
BACTERIA SPEC CULT: ABNORMAL

## 2022-05-20 NOTE — PATIENT INSTRUCTIONS
Nicotine Patch 21 mg  Uses  For quitting smoking.  Instructions  DO NOT take this medicine by mouth.  Avoid placing the patch near the breast.  Remove the patch after 24 hours.  Keep the medicine at room temperature. Avoid heat and direct light.  This patch should not be cut.  Wash your hands before and after handling this medicine.  Remove old patch before applying new one. Change the location of the new patch.  If you have vivid dreams or trouble sleeping, you may remove the patch before going to sleep.  Ask your doctor or pharmacist about locations on your body where this patch can be used.  Remove the plastic liner that protects the sticky side of the patch before applying to the skin.  Be sure the area of skin is clean and dry before putting on a new patch.  Apply the patch to a clean, dry, hairless area.  Press the patch firmly for a few seconds to make sure it stays in place.  After removing the patch, fold it together and discard it out of reach of children and pets.  Please ask your doctor or pharmacist how you can safely dispose of used patches.  If the skin under the patch becomes irritated, remove the patch. Do not apply a new patch to the area until the skin feels better.  To avoid irritating your skin, use a different location for a new patch.  Apply the patch only to normal looking skin. Avoid areas of the skin that are red, have scrapes, or damaged.  If the patch falls off, apply a new a patch on a different location of the body.  Please tell your doctor and pharmacist about all the medicines you take. Include both prescription and over-the-counter medicines. Also tell them about any vitamins, herbal medicines, or anything else you take for your health.  If you need to stop this medicine, your doctor may wish to gradually reduce the dosage before stopping.  Do not use more than 1 patch at any one time.  Cautions  Tell your doctor and pharmacist if you ever had an allergic reaction to a medicine.  Symptoms of an allergic reaction can include trouble breathing, skin rash, itching, swelling, or severe dizziness.  Do not use the medication any more than instructed.  Avoid smoking while on this medicine. Smoking may increase your risk for stroke, heart attack, blood clots, high blood pressure, and other diseases of the heart and blood vessels.  Tell the doctor or pharmacist if you are pregnant, planning to be pregnant, or breastfeeding.  Ask your pharmacist if this medicine can interact with any of your other medicines. Be sure to tell them about all the medicines you take.  Please tell all your doctors and dentists that you are on this medicine before they provide care.  Side Effects  The following is a list of some common side effects from this medicine. Please speak with your doctor about what you should do if you experience these or other side effects.    skin irritation where medicine is applied  If you have any of the following side effects, you may be getting too much medicine. Please contact your doctor to let them know about these side effects.    diarrhea    dizziness    nausea    rapid heartbeat    vomiting  A few people may have an allergic reactions to this medicine. Symptoms can include difficulty breathing, skin rash, itching, swelling, or severe dizziness. If you notice any of these symptoms, seek medical help quickly.  Extra  Please speak with your doctor, nurse, or pharmacist if you have any questions about this medicine.  [email or web address]  IMPORTANT NOTE: This document tells you briefly how to take your medicine, but it does not tell you all there is to know about it.Your doctor or pharmacist may give you other documents about your medicine. Please talk to them if you have any questions.Always follow their advice. There is a more complete description of this medicine available in English.Scan this code on your smartphone or tablet or use the web address below. You  "can also ask your pharmacist for a printout. If you have any questions, please ask your pharmacist.     2021 [employer]          Nicotine Gum (Nicorette, polacrilex nicotine gum)      Dosing:    >25 cigarettes per day Dose   Weeks 1-6 Chew 1 piece of 4 mg gum every 1-2 hours. Maximum of 24 pieces a day.   Weeks 7-9 Chew 1 piece of 4 mg gum every 2-4 hours. Maximum of 24 pieces a day.   Weeks 10-12 Chew 1 piece of 4 mg gum every 4-8 hours. Maximum of 24 pieces a day.   < 25 cigarettes per day    Weeks 1-6 Chew 1 piece of 2 mg gum every 1-2 hours. Maximum of 24 pieces a day.   Weeks 7-9 Chew 1 piece of 2 mg gum every 2-4 hours. Maximum of 24 pieces a day.   Weeks 10-12 Chew 1 piece of 2 mg gum every 4-8 hours. Maximum of 24 pieces a day.     How to use nicotine gum:    Chew the gum slowly until you notice a tingly feeling or peppery taste.     Then \"park\" the gum between your teeth and your cheek and let it sit there until you don't notice the tingly feeling or taste anymore.     Chew the gum slowly again until you notice the tingly feeling or peppery taste again and \"park\" the gum on the other side of your mouth.     Repeat this process for 30 minutes, then throw the gum away.     Especially at the beginning, use the gum whenever you would normally smoke a cigarette.    Some tips:    Do not smoke while you are using nicotine gum.     Do not swallow the gum.     Do not chew the gum like normal gum--you will swallow the nicotine instead of absorbing it. You are also more likely to get indigestion or nausea.     Do not drink anything, including water, while you are chewing gum.     Avoid acidic drinks like coffee and pop right before chewing the gum.     As your body becomes less dependent on nicotine, you will need to chew less gum.     Follow up with your health care provider if you have any questions and also to help taper your nicotine gum dose.    Side Effects:  Some people may experience some indigestion " or nausea. Using proper chewing technique may help. If you experience any other troublesome or unusual side effects, call your health care provider.    Nicotine Patch    Dosing:    >10 cigarettes per day Dose   Weeks 1-6 Use one 21 mg patch per day.   Weeks 7-8 Use one 14 mg patch per day.   Weeks 9-10 Use one 7 mg patch per day   <10 cigarettes per day  Weeks 1-6 Use one 14 mg patch per day   Weeks 7-8 Use one 7 mg patch per day       How to use the Nicotine Patch:    Apply a new patch to non-hairy, clean, dry skin on the upper body or upper outer arm.  Remove backing from patch and press on skin.  Hold for 10 seconds.    Apply patch around the same time every day.    Wash your hands after applying the patch.    Remove the patch at the end of the day before you go to bed.    Apply a new patch the next morning.    Do not apply the patch to an area where you have worn a patch in the last week.   This will help prevent or reduce skin irritation.    Some Tips:    Do not smoke while you are using the nicotine patch!    Do not cut the nicotine patch -it will be ineffective.    Remove the patch prior to receiving an MRI since the patch may contain some metal.    Do not put the patch on irritated, cut, or burned skin.    If the patch falls off and cannot be reapplied, put on a new patch.    Follow -up with your health care professional if you have any questions and also to help taper your nicotine patch dose.    Side Effects:  Some people experience some skin irritation where the patch was placed.  Moving around the site where you are putting the patch each day should help.  If you experience any other troublesome or unusual side effects, call your health care professional.

## 2022-05-23 ENCOUNTER — MYC MEDICAL ADVICE (OUTPATIENT)
Dept: FAMILY MEDICINE | Facility: CLINIC | Age: 32
End: 2022-05-23
Payer: COMMERCIAL

## 2022-06-01 ENCOUNTER — TELEPHONE (OUTPATIENT)
Dept: FAMILY MEDICINE | Facility: CLINIC | Age: 32
End: 2022-06-01
Payer: COMMERCIAL

## 2022-06-01 NOTE — TELEPHONE ENCOUNTER
Patient Quality Outreach    Patient is due for the following:   On pain list for CSA    NEXT STEPS:   none patient is not currently taking pain medications, CSA is not needed    Type of outreach:    Chart review performed, no outreach needed.    Next Steps:  Reach out within 90 days via Gojit.    Max number of attempts reached: No. Will try again in 90 days if patient still on fail list.    Questions for provider review:    None     Keiry Fonseca, [facility]  Chart routed to Care Team.

## 2022-06-23 ENCOUNTER — VIRTUAL VISIT (OUTPATIENT)
Dept: FAMILY MEDICINE | Facility: CLINIC | Age: 32
End: 2022-06-23
Payer: COMMERCIAL

## 2022-06-23 DIAGNOSIS — K52.9 GASTROENTERITIS: Primary | ICD-10-CM

## 2022-06-23 PROCEDURE — 99213 OFFICE O/P EST LOW 20 MIN: CPT | Mod: 95 | Performed by: PHYSICIAN ASSISTANT

## 2022-06-23 RX ORDER — ONDANSETRON 8 MG/1
8 TABLET, ORALLY DISINTEGRATING ORAL EVERY 8 HOURS PRN
Qty: 30 TABLET | Refills: 0 | Status: SHIPPED | OUTPATIENT
Start: 2022-06-23 | End: 2023-01-13

## 2022-06-23 NOTE — LETTER
June 23, 2022      Ana Maria Espinoza  [address] [place] [id number]        To Whom It May Concern:    Ana Maria Espinoza was seen in our clinic. Please excuse Ana Maria from work from 6/16/22-6/24/22. She may return to work without restrictions.      Sincerely,    Renny Aguiar PA-C    (Electronically signed 6/23/2022 10:57 AM)

## 2022-06-23 NOTE — PROGRESS NOTES
Ana Maria is a 32 year old who is being evaluated via a billable video visit.      How would you like to obtain your AVS? MyChart  If the video visit is dropped, the invitation should be resent by: Text to cell phone: [phone]  Will anyone else be joining your video visit? No  Assessment & Plan   Gastroenteritis  History is mostly consistent with a viral GI syndrome. Neg Covid x 2. Diarrhea has resolved. No fevers any longer. No red flag signs or symptoms. Will treat symptomatically at this time. Rx for zofran. Use Tylenol at home. Warning signs and symptoms discussed on when to return to clinic or go to the ER. Pt verbalized understanding.    - ondansetron (ZOFRAN ODT) 8 MG ODT tab; Take 1 tablet (8 mg) by mouth every 8 hours as needed for nausea     Tobacco Cessation:   reports that she has been smoking cigarettes. She started smoking about 13 years ago. She has a 2.50 pack-year smoking history. She has never used smokeless tobacco.    Return in about 2 weeks (around 7/7/2022), or if symptoms worsen or fail to improve, for In-Clinic Visit.    [facility]    Subjective   Ana Maria is a 32 year old, presenting for the following health issues:  chills , Vomiting, and Diarrhea    HPI   Concern - Cold chills, vomiting, diarrhea   Onset: About a week   Description: Says that she missed work last Thursday for vomiting.Her A/C has been out for about 2 weeks . Vomiting 1-2 times a day. Occasional cold sweats normal temperature when she checks . Occasional episodes of diarrhea. Has done 2 home covid tests and both have been negative( one Monday and one last night). Says that Tuesday not after vomiting she did cough up a small amount of blood.   Intensity: moderate to severe   Progression of Symptoms:  worsening  Accompanying Signs & Symptoms: none  Previous history of similar problem: na   Precipitating factors:        Worsened by: na   Alleviating factors:        Improved  by: na   Therapies tried and outcome: daily baby Asprin   No abdominal pain.   Diarrhea has resolved.  Not taking any medications to help with symptoms.     Review of Systems   See HPI       Objective       Vitals:  No vitals were obtained today due to virtual visit.    Physical Exam   GENERAL: Healthy, alert and no distress  EYES: Eyes grossly normal to inspection.  No discharge or erythema, or obvious scleral/conjunctival abnormalities.  RESP: No audible wheeze, cough, or visible cyanosis.  No visible retractions or increased work of breathing.    SKIN: Visible skin clear. No significant rash, abnormal pigmentation or lesions.  NEURO: Cranial nerves grossly intact.  Mentation and speech appropriate for age.  PSYCH: Mentation appears normal, affect normal/bright, judgement and insight intact, normal speech and appearance well-groomed.      Video-Visit Details    Video Start Time: 10:50 AM    Type of service:  Video Visit    Video End Time:10:57 AM    Originating Location (pt. Location): Home    Distant Location (provider location):  [facility]     Platform used for Video Visit: Raoul Azar

## 2022-06-23 NOTE — PATIENT INSTRUCTIONS
Start Zofran for nausea and vomiting.     Start Tylenol 1000 mg three times per day for fevers, body aches, chills.     Avoid NSAID/Ibuprofen use at this time, given the recent kidney issues.

## 2022-06-29 ENCOUNTER — TELEPHONE (OUTPATIENT)
Dept: FAMILY MEDICINE | Facility: CLINIC | Age: 32
End: 2022-06-29

## 2022-06-29 DIAGNOSIS — F11.90 CHRONIC, CONTINUOUS USE OF OPIOIDS: ICD-10-CM

## 2022-06-29 NOTE — TELEPHONE ENCOUNTER
Patient Quality Outreach    Patient is due for the following:   Pain, F11.90 added    NEXT STEPS:   No follow up needed at this time.    Type of outreach:    Chart review performed, no outreach needed.    Next Steps:  Reach out within 90 days via Alderat.    Max number of attempts reached: No. Will try again in 90 days if patient still on fail list.    Questions for provider review:    None     Keiry Fonseca, [facility]  Chart routed to Care Team.

## 2022-07-25 ENCOUNTER — OFFICE VISIT (OUTPATIENT)
Dept: FAMILY MEDICINE | Facility: CLINIC | Age: 32
End: 2022-07-25
Payer: COMMERCIAL

## 2022-07-25 VITALS
WEIGHT: 142 LBS | TEMPERATURE: 97.9 F | HEIGHT: 60 IN | SYSTOLIC BLOOD PRESSURE: 122 MMHG | RESPIRATION RATE: 18 BRPM | OXYGEN SATURATION: 99 % | DIASTOLIC BLOOD PRESSURE: 60 MMHG | HEART RATE: 94 BPM | BODY MASS INDEX: 27.88 KG/M2

## 2022-07-25 DIAGNOSIS — R31.0 GROSS HEMATURIA: ICD-10-CM

## 2022-07-25 DIAGNOSIS — R39.89 URINARY PROBLEM: ICD-10-CM

## 2022-07-25 DIAGNOSIS — N30.01 ACUTE CYSTITIS WITH HEMATURIA: Primary | ICD-10-CM

## 2022-07-25 LAB
ALBUMIN UR-MCNC: 100 MG/DL
AMORPH CRY #/AREA URNS HPF: ABNORMAL /HPF
APPEARANCE UR: CLEAR
BACTERIA #/AREA URNS HPF: ABNORMAL /HPF
BILIRUB UR QL STRIP: NEGATIVE
CLUE CELLS: ABNORMAL
COLOR UR AUTO: YELLOW
GLUCOSE UR STRIP-MCNC: NEGATIVE MG/DL
HCG UR QL: NEGATIVE
HGB UR QL STRIP: ABNORMAL
KETONES UR STRIP-MCNC: NEGATIVE MG/DL
LEUKOCYTE ESTERASE UR QL STRIP: ABNORMAL
NITRATE UR QL: POSITIVE
PH UR STRIP: 7 [PH] (ref 5–7)
RBC #/AREA URNS AUTO: ABNORMAL /HPF
SP GR UR STRIP: 1.01 (ref 1–1.03)
SQUAMOUS #/AREA URNS AUTO: ABNORMAL /LPF
TRICHOMONAS, WET PREP: ABNORMAL
UROBILINOGEN UR STRIP-ACNC: 0.2 E.U./DL
WBC #/AREA URNS AUTO: ABNORMAL /HPF
WBC'S/HIGH POWER FIELD, WET PREP: ABNORMAL
YEAST, WET PREP: ABNORMAL

## 2022-07-25 PROCEDURE — 99213 OFFICE O/P EST LOW 20 MIN: CPT | Performed by: NURSE PRACTITIONER

## 2022-07-25 PROCEDURE — 87086 URINE CULTURE/COLONY COUNT: CPT | Performed by: NURSE PRACTITIONER

## 2022-07-25 PROCEDURE — 81025 URINE PREGNANCY TEST: CPT | Performed by: NURSE PRACTITIONER

## 2022-07-25 PROCEDURE — 81001 URINALYSIS AUTO W/SCOPE: CPT | Performed by: NURSE PRACTITIONER

## 2022-07-25 PROCEDURE — 87186 SC STD MICRODIL/AGAR DIL: CPT | Performed by: NURSE PRACTITIONER

## 2022-07-25 PROCEDURE — 87088 URINE BACTERIA CULTURE: CPT | Performed by: NURSE PRACTITIONER

## 2022-07-25 PROCEDURE — 87210 SMEAR WET MOUNT SALINE/INK: CPT | Performed by: NURSE PRACTITIONER

## 2022-07-25 RX ORDER — CEPHALEXIN 500 MG/1
500 CAPSULE ORAL 2 TIMES DAILY
Qty: 14 CAPSULE | Refills: 0 | Status: SHIPPED | OUTPATIENT
Start: 2022-07-25 | End: 2022-08-01

## 2022-07-25 ASSESSMENT — ANXIETY QUESTIONNAIRES
GAD7 TOTAL SCORE: 0
1. FEELING NERVOUS, ANXIOUS, OR ON EDGE: NOT AT ALL
8. IF YOU CHECKED OFF ANY PROBLEMS, HOW DIFFICULT HAVE THESE MADE IT FOR YOU TO DO YOUR WORK, TAKE CARE OF THINGS AT HOME, OR GET ALONG WITH OTHER PEOPLE?: NOT DIFFICULT AT ALL
5. BEING SO RESTLESS THAT IT IS HARD TO SIT STILL: NOT AT ALL
2. NOT BEING ABLE TO STOP OR CONTROL WORRYING: NOT AT ALL
7. FEELING AFRAID AS IF SOMETHING AWFUL MIGHT HAPPEN: NOT AT ALL
3. WORRYING TOO MUCH ABOUT DIFFERENT THINGS: NOT AT ALL
4. TROUBLE RELAXING: NOT AT ALL
IF YOU CHECKED OFF ANY PROBLEMS ON THIS QUESTIONNAIRE, HOW DIFFICULT HAVE THESE PROBLEMS MADE IT FOR YOU TO DO YOUR WORK, TAKE CARE OF THINGS AT HOME, OR GET ALONG WITH OTHER PEOPLE: NOT DIFFICULT AT ALL
GAD7 TOTAL SCORE: 0
6. BECOMING EASILY ANNOYED OR IRRITABLE: NOT AT ALL
7. FEELING AFRAID AS IF SOMETHING AWFUL MIGHT HAPPEN: NOT AT ALL

## 2022-07-25 ASSESSMENT — PAIN SCALES - GENERAL: PAINLEVEL: NO PAIN (0)

## 2022-07-25 NOTE — LETTER
My Depression Action Plan  Name: Ana Maria Espinoza   Date of Birth 1990  Date: 7/25/2022    My doctor: Susana Mccollum   My clinic: [facility] [id number] [place] [id number] [phone]          GREEN    ZONE   Good Control    What it looks like:     Things are going generally well. You have normal ups and downs. You may even feel depressed from time to time, but bad moods usually last less than a day.   What you need to do:  1. Continue to care for yourself (see self care plan)  2. Check your depression survival kit and update it as needed  3. Follow your physician s recommendations including any medication.  4. Do not stop taking medication unless you consult with your physician first.           YELLOW         ZONE Getting Worse    What it looks like:     Depression is starting to interfere with your life.     It may be hard to get out of bed; you may be starting to isolate yourself from others.    Symptoms of depression are starting to last most all day and this has happened for several days.     You may have suicidal thoughts but they are not constant.   What you need to do:     1. Call your care team. Your response to treatment will improve if you keep your care team informed of your progress. Yellow periods are signs an adjustment may need to be made.     2. Continue your self-care.  Just get dressed and ready for the day.  Don't give yourself time to talk yourself out of it.    3. Talk to someone in your support network.    4. Open up your Depression Self-Care Plan/Wellness Kit.           RED    ZONE Medical Alert - Get Help    What it looks like:     Depression is seriously interfering with your life.     You may experience these or other symptoms: You can t get out of bed most days, can t work or engage in other necessary activities, you have trouble taking care of basic hygiene, or basic responsibilities, thoughts of suicide or death that  will not go away, self-injurious behavior.     What you need to do:  1. Call your care team and request a same-day appointment. If they are not available (weekends or after hours) call your local crisis line, emergency room or 911.          Depression Self-Care Plan / Wellness Kit    Many people find that medication and therapy are helpful treatments for managing depression. In addition, making small changes to your everyday life can help to boost your mood and improve your wellbeing. Below are some tips for you to consider. Be sure to talk with your medical provider and/or behavioral health consultant if your symptoms are worsening or not improving.     Sleep   Sleep hygiene  means all of the habits that support good, restful sleep. It includes maintaining a consistent bedtime and wake time, using your bedroom only for sleeping or sex, and keeping the bedroom dark and free of distractions like a computer, smartphone, or television.     Develop a Healthy Routine  Maintain good hygiene. Get out of bed in the morning, make your bed, brush your teeth, take a shower, and get dressed. Don t spend too much time viewing media that makes you feel stressed. Find time to relax each day.    Exercise  Get some form of exercise every day. This will help reduce pain and release endorphins, the  feel good  chemicals in your brain. It can be as simple as just going for a walk or doing some gardening, anything that will get you moving.      Diet  Strive to eat healthy foods, including fruits and vegetables. Drink plenty of water. Avoid excessive sugar, caffeine, alcohol, and other mood-altering substances.     Stay Connected with Others  Stay in touch with friends and family members.    Manage Your Mood  Try deep breathing, massage therapy, biofeedback, or meditation. Take part in fun activities when you can. Try to find something to smile about each day.     Psychotherapy  Be open to working with a therapist if your provider  recommends it.     Medication  Be sure to take your medication as prescribed. Most anti-depressants need to be taken every day. It usually takes several weeks for medications to work. Not all medicines work for all people. It is important to follow-up with your provider to make sure you have a treatment plan that is working for you. Do not stop your medication abruptly without first discussing it with your provider.    Crisis Resources   These hotlines are for both adults and children. They and are open 24 hours a day, 7 days a week unless noted otherwise.      National Suicide Prevention Lifeline   988 or 0-154-266-OKMD (5582)      Crisis Text Line    www.crisistextline.org  Text HOME to 217579 from anywhere in the [place], anytime, about any type of crisis. A live, trained crisis counselor will receive the text and respond quickly.      [place] Lifeline for LGBTQ Youth  A national crisis intervention and suicide lifeline for LGBTQ youth under 25. Provides a safe place to talk without judgement. Call 1-621.245.1747; text START to 626445 or visit www.thetrevorproject.org to talk to a trained counselor.      For [facility] crisis numbers, visit the [facility] website at:  https://[place].gov/dhs/people-we-serve/adults/health-care/mental-health/resources/crisis-contacts.jsp

## 2022-07-25 NOTE — LETTER
My Asthma Action Plan    Name: Ana Maria Espinoza   YOB: 1990  Date: 7/25/2022   My doctor: SY Romo CNP   My clinic: [facility]        My Rescue Medicine:   Albuterol inhaler (Proair/Ventolin/Proventil HFA)  2-4 puffs EVERY 4 HOURS as needed. Use a spacer if recommended by your provider.   My Asthma Severity:   Intermittent / Exercise Induced  Know your asthma triggers: Patient is unaware of triggers             GREEN ZONE   Good Control    I feel good    No cough or wheeze    Can work, sleep and play without asthma symptoms       Take your asthma control medicine every day.     1. If exercise triggers your asthma, take your rescue medication    15 minutes before exercise or sports, and    During exercise if you have asthma symptoms  2. Spacer to use with inhaler: If you have a spacer, make sure to use it with your inhaler             YELLOW ZONE Getting Worse  I have ANY of these:    I do not feel good    Cough or wheeze    Chest feels tight    Wake up at night   1. Keep taking your Green Zone medications  2. Start taking your rescue medicine:    every 20 minutes for up to 1 hour. Then every 4 hours for 24-48 hours.  3. If you stay in the Yellow Zone for more than 12-24 hours, contact your doctor.  4. If you do not return to the Green Zone in 12-24 hours or you get worse, start taking your oral steroid medicine if prescribed by your provider.           RED ZONE Medical Alert - Get Help  I have ANY of these:    I feel awful    Medicine is not helping    Breathing getting harder    Trouble walking or talking    Nose opens wide to breathe       1. Take your rescue medicine NOW  2. If your provider has prescribed an oral steroid medicine, start taking it NOW  3. Call your doctor NOW  4. If you are still in the Red Zone after 20 minutes and you have not reached your doctor:    Take your rescue medicine again and    Call 911 or go to the emergency room right  away    See your regular doctor within 2 weeks of an Emergency Room or Urgent Care visit for follow-up treatment.          Annual Reminders:  Meet with Asthma Educator,  Flu Shot in the Fall, consider Pneumonia Vaccination for patients with asthma (aged 19 and older).    Pharmacy:    [facility], [place] - [address] [place] [address] PHARMACY [address] [place] [address] [address] [facility]    Electronically signed by SY Romo CNP   Date: 07/25/22                    Asthma Triggers  How To Control Things That Make Your Asthma Worse    Triggers are things that make your asthma worse.  Look at the list below to help you find your triggers and   what you can do about them. You can help prevent asthma flare-ups by staying away from your triggers.      Trigger                                                          What you can do   Cigarette Smoke  Tobacco smoke can make asthma worse. Do not allow smoking in your home, car or around you.  Be sure no one smokes at a child s day care or school.  If you smoke, ask your health care provider for ways to help you quit.  Ask family members to quit too.  Ask your health care provider for a referral to Quit Plan to help you quit smoking, or call 5-378-160-PLAN.     Colds, Flu, Bronchitis  These are common triggers of asthma. Wash your hands often.  Don t touch your eyes, nose or mouth.  Get a flu shot every year.     Dust Mites  These are tiny bugs that live in cloth or carpet. They are too small to see. Wash sheets and blankets in hot water every week.   Encase pillows and mattress in dust mite proof covers.  Avoid having carpet if you can. If you have carpet, vacuum weekly.   Use a dust mask and HEPA vacuum.   Pollen and Outdoor Mold  Some people are allergic to trees, grass, or weed pollen, or molds. Try to keep your windows closed.  Limit time out doors when pollen count is high.   Ask you health care provider about taking medicine  during allergy season.     Animal Dander  Some people are allergic to skin flakes, urine or saliva from pets with fur or feathers. Keep pets with fur or feathers out of your home.    If you can t keep the pet outdoors, then keep the pet out of your bedroom.  Keep the bedroom door closed.  Keep pets off cloth furniture and away from stuffed toys.     Mice, Rats, and Cockroaches  Some people are allergic to the waste from these pests.   Cover food and garbage.  Clean up spills and food crumbs.  Store grease in the refrigerator.   Keep food out of the bedroom.   Indoor Mold  This can be a trigger if your home has high moisture. Fix leaking faucets, pipes, or other sources of water.   Clean moldy surfaces.  Dehumidify basement if it is damp and smelly.   Smoke, Strong Odors, and Sprays  These can reduce air quality. Stay away from strong odors and sprays, such as perfume, powder, hair spray, paints, smoke incense, paint, cleaning products, candles and new carpet.   Exercise or Sports  Some people with asthma have this trigger. Be active!  Ask your doctor about taking medicine before sports or exercise to prevent symptoms.    Warm up for 5-10 minutes before and after sports or exercise.     Other Triggers of Asthma  Cold air:  Cover your nose and mouth with a scarf.  Sometimes laughing or crying can be a trigger.  Some medicines and food can trigger asthma.

## 2022-07-25 NOTE — PROGRESS NOTES
Assessment & Plan     (N30.01) Acute cystitis with hematuria  (primary encounter diagnosis)  Comment:   Plan: cephALEXin (KEFLEX) 500 MG capsule    (R31.0) Gross hematuria  Comment: Blood noted in the urine we will have patient  repeat urine once treatment completed  Plan: UA Macro with Reflex to Micro and Culture - lab        collect      (R39.89) Urinary problem  Comment:   Plan: UA macro with reflex to Microscopic and Culture        - Clinc Collect, HCG Qual, Urine ([id number]), Wet        prep - Clinic Collect, Urine Microscopic Exam,         Urine Culture          }     Nicotine/Tobacco Cessation:  She reports that she has been smoking cigarettes. She started smoking about 13 years ago. She has a 2.50 pack-year smoking history. She has never used smokeless tobacco.  Nicotine/Tobacco Cessation Plan:         No follow-ups on file.    SY Romo CNP  [facility]    Doris Rodgers is a 32 year old, presenting for the following health issues:  No chief complaint on file.      History of Present Illness       Reason for visit:  UTI or possibly pregnant  Symptom onset:  3-7 days ago  Symptoms include:  Fatigue, light pink discharge only when wiping. Yesterday possible uti and vomiting started  Symptom intensity:  Moderate  Symptom progression:  Worsening  Had these symptoms before:  No    She eats 2-3 servings of fruits and vegetables daily.She consumes 2 sweetened beverage(s) daily.She exercises with enough effort to increase her heart rate 30 to 60 minutes per day.  She exercises with enough effort to increase her heart rate 5 days per week.   She is taking medications regularly.  Today's DEENA-7 Score: 0       Genitourinary - Female  Onset/Duration: about one week  Description:   Painful urination (Dysuria): No           Frequency: YES  Blood in urine (Hematuria): YES- possibly  Delay in urine (Hesitency): No  Intensity: moderate  Progression of Symptoms:   worsening  Accompanying Signs & Symptoms:  Fever/chills: YES- chills  Flank pain: YES- some back pain  Nausea and vomiting: YES  Vaginal symptoms: spotting  Abdominal/Pelvic Pain: YES- pelvic pressure  History:   History of frequent UTI s: No  History of kidney stones: No  Sexually Active: YES  Possibility of pregnancy: Yes  Precipitating or alleviating factors: None  Therapies tried and outcome:  none     Review of Systems   Constitutional, HEENT, cardiovascular, pulmonary, gi and gu systems are negative, except as otherwise noted.      Objective    /60 (BP Location: Right arm, Cuff Size: Adult Regular)   Pulse 94   Temp 97.9  F (36.6  C) (Tympanic)   Resp 18   Ht 1.524 m (5')   Wt 64.4 kg (142 lb)   LMP 06/26/2022   SpO2 99%   BMI 27.73 kg/m    There is no height or weight on file to calculate BMI.  Physical Exam   GENERAL: healthy, alert and no distress  EYES: Eyes grossly normal to inspection, PERRL and conjunctivae and sclerae normal  HENT: ear canals and TM's normal, nose and mouth without ulcers or lesions  NECK: no adenopathy, no asymmetry, masses, or scars and thyroid normal to palpation  RESP: lungs clear to auscultation - no rales, rhonchi or wheezes  CV: regular rate and rhythm, normal S1 S2, no S3 or S4, no murmur, click or rub, no peripheral edema and peripheral pulses strong  ABDOMEN: soft, nontender, no hepatosplenomegaly, no masses and bowel sounds normal  MS: no gross musculoskeletal defects noted, no edema  SKIN: no suspicious lesions or rashes  NEURO: Normal strength and tone, mentation intact and speech normal  PSYCH: mentation appears normal, affect normal/bright    Results for orders placed or performed in visit on 07/25/22   HCG Qual, Urine ([id number])     Status: Normal   Result Value Ref Range    hCG Urine Qualitative Negative Negative   Wet prep - Clinic Collect     Status: Abnormal    Specimen: Vagina; Swab   Result Value Ref Range    Trichomonas Absent Absent    Yeast Absent  Absent    Clue Cells Absent Absent    WBCs/high power field 1+ (A) None

## 2022-07-28 LAB
BACTERIA UR CULT: ABNORMAL
BACTERIA UR CULT: ABNORMAL

## 2022-08-05 ENCOUNTER — LAB (OUTPATIENT)
Dept: LAB | Facility: CLINIC | Age: 32
End: 2022-08-05

## 2022-08-05 ENCOUNTER — VIRTUAL VISIT (OUTPATIENT)
Dept: FAMILY MEDICINE | Facility: CLINIC | Age: 32
End: 2022-08-05
Payer: COMMERCIAL

## 2022-08-05 ENCOUNTER — MYC MEDICAL ADVICE (OUTPATIENT)
Dept: FAMILY MEDICINE | Facility: CLINIC | Age: 32
End: 2022-08-05

## 2022-08-05 DIAGNOSIS — R10.9 FLANK PAIN: ICD-10-CM

## 2022-08-05 DIAGNOSIS — Z87.440 PERSONAL HISTORY OF URINARY TRACT INFECTION: ICD-10-CM

## 2022-08-05 DIAGNOSIS — R11.2 NON-INTRACTABLE VOMITING WITH NAUSEA, UNSPECIFIED VOMITING TYPE: ICD-10-CM

## 2022-08-05 DIAGNOSIS — N91.2 AMENORRHEA: Primary | ICD-10-CM

## 2022-08-05 DIAGNOSIS — N91.2 AMENORRHEA: ICD-10-CM

## 2022-08-05 LAB
ALBUMIN UR-MCNC: NEGATIVE MG/DL
APPEARANCE UR: CLEAR
B-HCG SERPL-ACNC: <1 IU/L (ref 0–5)
BACTERIA #/AREA URNS HPF: ABNORMAL /HPF
BILIRUB UR QL STRIP: NEGATIVE
CLUE CELLS: ABNORMAL
COLOR UR AUTO: YELLOW
GLUCOSE UR STRIP-MCNC: NEGATIVE MG/DL
HGB UR QL STRIP: ABNORMAL
KETONES UR STRIP-MCNC: NEGATIVE MG/DL
LEUKOCYTE ESTERASE UR QL STRIP: ABNORMAL
MUCOUS THREADS #/AREA URNS LPF: PRESENT /LPF
NITRATE UR QL: NEGATIVE
PH UR STRIP: 6 [PH] (ref 5–7)
RBC #/AREA URNS AUTO: ABNORMAL /HPF
SP GR UR STRIP: 1.02 (ref 1–1.03)
SQUAMOUS #/AREA URNS AUTO: ABNORMAL /LPF
TRICHOMONAS, WET PREP: ABNORMAL
UROBILINOGEN UR STRIP-ACNC: 0.2 E.U./DL
WBC #/AREA URNS AUTO: ABNORMAL /HPF
WBC'S/HIGH POWER FIELD, WET PREP: ABNORMAL
YEAST, WET PREP: PRESENT

## 2022-08-05 PROCEDURE — 81001 URINALYSIS AUTO W/SCOPE: CPT

## 2022-08-05 PROCEDURE — 99214 OFFICE O/P EST MOD 30 MIN: CPT | Mod: 95 | Performed by: NURSE PRACTITIONER

## 2022-08-05 PROCEDURE — 84702 CHORIONIC GONADOTROPIN TEST: CPT

## 2022-08-05 PROCEDURE — 36415 COLL VENOUS BLD VENIPUNCTURE: CPT

## 2022-08-05 PROCEDURE — 87210 SMEAR WET MOUNT SALINE/INK: CPT

## 2022-08-05 NOTE — PATIENT INSTRUCTIONS
Amenorrhea  Patient is currently 9 days late for her menstrual cycle, has usually been on time.  Amenorrhea accompanied by flank pain, nausea/vomiting, diarrhea, breast tenderness, and bilateral nipple discharge.  Patient was treated for urinary tract infection approximately week and a half ago and feels urinary symptoms have gone away.  Patient also notes previous pregnancies have not been picked up by urine hCG until approximately 8 weeks gestation.  Recommend a pelvic ultrasound to further evaluate, patient can call [phone] to schedule.  Would also like patient to come into the clinic for a wet prep, repeat blood pregnancy and repeat urinalysis.  Will call patient with results and any further recommendations.  - US Pelvic Complete with Transvaginal; Future  - Wet prep - lab collect; Future  - HCG Quantitative Pregnancy, Blood ([id number]); Future    Non-intractable vomiting with nausea, unspecified vomiting type  Nausea vomiting accompanied by symptoms as above.  CT of abdomen pelvis last week was normal, however patient did state that she did pass a kidney stone while in the ER prior to CT scan.  No signs or symptoms of constipation or blockage.  Recommend work-up as above.  - US Pelvic Complete with Transvaginal; Future  - HCG Quantitative Pregnancy, Blood ([id number]); Future    Flank pain  Flank pain accompanied by symptoms as above.  Recent history of urinary tract infection, treated.  Patient does note urinary symptoms have resolved.  To note, patient did have a renal infarct, which is mostly resolved and following nephrology.  Previous urinary tract infection as above, will repeat today as well as a pelvic ultrasound as above.  - US Pelvic Complete with Transvaginal; Future  - UA Macro with Reflex to Micro and Culture - lab collect; Future    Personal history of urinary tract infection  Recent history of urinary tract infection as above.  Recommend repeating.  - UA Macro with Reflex to Micro and Culture - lab  collect; Future

## 2022-08-05 NOTE — PROGRESS NOTES
Ana Maria is a 32 year old who is being evaluated via a billable telephone visit.      What phone number would you like to be contacted at? [phone]  How would you like to obtain your AVS? [facility]ana    3:26 PM - 3:45 PM     Assessment & Plan     Amenorrhea  Patient is currently 9 days late for her menstrual cycle, has usually been on time.  Amenorrhea accompanied by flank pain, nausea/vomiting, diarrhea, breast tenderness, and bilateral nipple discharge.  Patient was treated for urinary tract infection approximately week and a half ago and feels urinary symptoms have gone away.  Patient also notes previous pregnancies have not been picked up by urine hCG until approximately 8 weeks gestation.  Recommend a pelvic ultrasound to further evaluate, patient can call [phone] to schedule.  Would also like patient to come into the clinic for a wet prep, repeat blood pregnancy and repeat urinalysis.  Will call patient with results and any further recommendations.  - US Pelvic Complete with Transvaginal; Future  - Wet prep - lab collect; Future  - HCG Quantitative Pregnancy, Blood ([id number]); Future    Non-intractable vomiting with nausea, unspecified vomiting type  Nausea vomiting accompanied by symptoms as above.  CT of abdomen pelvis last week was normal, however patient did state that she did pass a kidney stone while in the ER prior to CT scan.  No signs or symptoms of constipation or blockage.  Recommend work-up as above.  - US Pelvic Complete with Transvaginal; Future  - HCG Quantitative Pregnancy, Blood ([id number]); Future    Flank pain  Flank pain accompanied by symptoms as above.  Recent history of urinary tract infection, treated.  Patient does note urinary symptoms have resolved.  To note, patient did have a renal infarct, which is mostly resolved and following nephrology.  Previous urinary tract infection as above, will repeat today as well as a pelvic ultrasound as above.  - US Pelvic Complete with Transvaginal;  Future  - UA Macro with Reflex to Micro and Culture - lab collect; Future    Personal history of urinary tract infection  Recent history of urinary tract infection as above.  Recommend repeating.  - UA Macro with Reflex to Micro and Culture - lab collect; Future             See Patient Instructions    Return in about 1 week (around 8/12/2022), or if symptoms worsen or fail to improve.    Susana Mccollum, DNP, APRN-CNP   [facility]    Doris Rodgers is a 32 year old, presenting for the following health issues:  Flank Pain      HPI     Concern - Flank pain  Onset: 2 weeks ago  Description: Right sided flank pain/back pain, diarrhea, nausea, vomiting(patient noticed bleeding after she wiped her bottom this morning) was seen last week in the [place] ER and passed a kidney stone.  No other causes were shown on CT later that night.  Patient had right sided kidney failure 12/26/2021.  She states that she was dealing with that for 3-4 months  Intensity: severe  Progression of Symptoms:  worsening  Accompanying Signs & Symptoms: see above  Previous history of similar problem: Patient only had flank pain and vomiting in December 2021.   Precipitating factors:        Worsened by: eating, bending, lifting, smells(patient has done 3 pregnancy tests in the last week that were negative.  Patient is 9 days late for her period currently.  Patient states that for her other pregnancies, she didn't have high enough levels of HcG to register pregnancy until like 2 months pregnant.  Patient states her left nipple has been leaking yellow-clear fluid for like the last month, breasts are more sensitive)  Alleviating factors:        Improved by: nothing really  Therapies tried and outcome: anti nausea meds    Treated for Urinary Tract Infection on 7/25 - feels this went away   Still having diarrhea and vomiting  Wakes up nauseous    Was supposed to get cycle on the 27th   Nipple discharge started on the  17th   A couple of days ago temperature 101 and yesterday/last evening low grade       Review of Systems   Constitutional, HEENT, cardiovascular, pulmonary, gi and gu systems are negative, except as otherwise noted.      Objective           Vitals:  No vitals were obtained today due to virtual visit.    Physical Exam   healthy, alert and no distress  PSYCH: Alert and oriented times 3; coherent speech, normal   rate and volume, able to articulate logical thoughts, able   to abstract reason, no tangential thoughts, no hallucinations   or delusions  Her affect is normal and pleasant  RESP: No cough, no audible wheezing, able to talk in full sentences  Remainder of exam unable to be completed due to telephone visits    Diagnostic Test Results:  Pending              Phone call duration: 19 minutes    .  ..     Chart documentation with Dragon Voice recognition Software. Although reviewed after completion, some words and grammatical errors may remain.

## 2022-08-06 ENCOUNTER — HOSPITAL ENCOUNTER (OUTPATIENT)
Dept: ULTRASOUND IMAGING | Facility: CLINIC | Age: 32
Discharge: HOME | End: 2022-08-06
Attending: NURSE PRACTITIONER | Admitting: NURSE PRACTITIONER
Payer: COMMERCIAL

## 2022-08-06 DIAGNOSIS — N91.2 AMENORRHEA: ICD-10-CM

## 2022-08-06 DIAGNOSIS — R11.2 NON-INTRACTABLE VOMITING WITH NAUSEA, UNSPECIFIED VOMITING TYPE: ICD-10-CM

## 2022-08-06 DIAGNOSIS — R10.9 FLANK PAIN: ICD-10-CM

## 2022-08-06 DIAGNOSIS — B37.31 YEAST VAGINITIS: Primary | ICD-10-CM

## 2022-08-06 PROCEDURE — 76830 TRANSVAGINAL US NON-OB: CPT

## 2022-08-06 RX ORDER — FLUCONAZOLE 150 MG/1
150 TABLET ORAL ONCE
Qty: 1 TABLET | Refills: 0 | Status: SHIPPED | OUTPATIENT
Start: 2022-08-06 | End: 2022-08-06

## 2022-08-10 DIAGNOSIS — N83.201 CYST OF RIGHT OVARY: Primary | ICD-10-CM

## 2022-08-11 ENCOUNTER — MYC MEDICAL ADVICE (OUTPATIENT)
Dept: FAMILY MEDICINE | Facility: CLINIC | Age: 32
End: 2022-08-11

## 2022-08-16 ENCOUNTER — TELEPHONE (OUTPATIENT)
Dept: FAMILY MEDICINE | Facility: CLINIC | Age: 32
End: 2022-08-16

## 2022-08-16 ENCOUNTER — NURSE TRIAGE (OUTPATIENT)
Dept: NURSING | Facility: CLINIC | Age: 32
End: 2022-08-16

## 2022-08-16 NOTE — TELEPHONE ENCOUNTER
"Triage call:     Patient is calling with a variety of symptoms that were addressed in virtual visit 8/5.   She states symptoms have persisted for 1 month.     Nauseous and vomiting from smells   Back, abdominal, flank pain   She had an ultrasound 8/6 but has still not had her period   \"Boobs are leaking\"   Missing work due to diarrhea and vomiting and pain in right side   Rates pain as severe  No fever.    Per MyChart advice from Susana Mccollum, patient was advised to go to urgent care or ER. Per protocol, patient was advised to go to ER now. She verbalizes understanding and agreement with this plan.     Paula Nava RN   08/16/22 12:05 PM  [facility] Nurse Advisor    Reason for Disposition    SEVERE abdominal pain (e.g., excruciating)    Additional Information    Negative: Passed out (i.e., fainted, collapsed and was not responding)    Negative: Shock suspected (e.g., cold/pale/clammy skin, too weak to stand, low BP, rapid pulse)    Negative: Sounds like a life-threatening emergency to the triager    Protocols used: ABDOMINAL PAIN - FEMALE-A-OH      "

## 2022-09-06 NOTE — PROGRESS NOTES
Outpatient Physical Therapy Discharge Note     Patient: Ana Maria Espinoza  : 1990    Evaluation date:  21     Referring Provider: Esteban    Therapy Diagnosis: shoulder pain    Client Self Report:   Current status is unknown since patient did not return for further PT visits.    Objective Measurements:  Current objective status is unknown since patient failed to complete treatment     Goals:  Goal Identifier Full ROM   Goal Description Pt will demonstrate 180 deg shoulder flex and abd to complete overhead tasks at work   Target Date 21   Date Met      Progress (detail required for progress note):       Goal Identifier Strength   Goal Description Pt will demonstrate 5/5 UE strength to be able to complete lifting tasks at work.    Target Date 21   Date Met      Progress (detail required for progress note):       Goal Identifier HEP   Goal Description Pt will be compliant and competent in HEP to allow them to achieve maximal strength and reduce pain    Target Date 22   Date Met      Progress (detail required for progress note):             Progress towards Goals:   Progress this reporting period: Pt has failed to schedule f/u visits within 30 days from last visit thus is being d/c from therapy at this time. Objective measures are all taken from last visit. Current status is unknown at this time.    Plan:  Discharge from therapy.    Discharge:    Reason for Discharge: Patient has failed to schedule further appointments.    Equipment Issued: none    Discharge Plan:  Not completed since patient failed to schedule further appointments.    Margo Capellan  Physical Therapist  [address] [address]  [email or web address]   Office: [phone] Fax: [phone]

## 2022-09-11 ENCOUNTER — HEALTH MAINTENANCE LETTER (OUTPATIENT)
Age: 32
End: 2022-09-11

## 2022-09-30 ENCOUNTER — VIRTUAL VISIT (OUTPATIENT)
Dept: FAMILY MEDICINE | Facility: CLINIC | Age: 32
End: 2022-09-30
Payer: COMMERCIAL

## 2022-09-30 DIAGNOSIS — R68.83 CHILLS: ICD-10-CM

## 2022-09-30 DIAGNOSIS — R19.7 DIARRHEA, UNSPECIFIED TYPE: Primary | ICD-10-CM

## 2022-09-30 DIAGNOSIS — R10.9 FLANK PAIN: ICD-10-CM

## 2022-09-30 DIAGNOSIS — R11.0 NAUSEA: ICD-10-CM

## 2022-09-30 PROCEDURE — 99214 OFFICE O/P EST MOD 30 MIN: CPT | Mod: 95 | Performed by: STUDENT IN AN ORGANIZED HEALTH CARE EDUCATION/TRAINING PROGRAM

## 2022-09-30 ASSESSMENT — ENCOUNTER SYMPTOMS
DIARRHEA: 1
VOMITING: 1

## 2022-09-30 ASSESSMENT — PATIENT HEALTH QUESTIONNAIRE - PHQ9
10. IF YOU CHECKED OFF ANY PROBLEMS, HOW DIFFICULT HAVE THESE PROBLEMS MADE IT FOR YOU TO DO YOUR WORK, TAKE CARE OF THINGS AT HOME, OR GET ALONG WITH OTHER PEOPLE: SOMEWHAT DIFFICULT
SUM OF ALL RESPONSES TO PHQ QUESTIONS 1-9: 2
SUM OF ALL RESPONSES TO PHQ QUESTIONS 1-9: 2

## 2022-09-30 ASSESSMENT — ANXIETY QUESTIONNAIRES
5. BEING SO RESTLESS THAT IT IS HARD TO SIT STILL: NOT AT ALL
7. FEELING AFRAID AS IF SOMETHING AWFUL MIGHT HAPPEN: NOT AT ALL
GAD7 TOTAL SCORE: 0
1. FEELING NERVOUS, ANXIOUS, OR ON EDGE: NOT AT ALL
3. WORRYING TOO MUCH ABOUT DIFFERENT THINGS: NOT AT ALL
4. TROUBLE RELAXING: NOT AT ALL
GAD7 TOTAL SCORE: 0
2. NOT BEING ABLE TO STOP OR CONTROL WORRYING: NOT AT ALL
6. BECOMING EASILY ANNOYED OR IRRITABLE: NOT AT ALL
7. FEELING AFRAID AS IF SOMETHING AWFUL MIGHT HAPPEN: NOT AT ALL
GAD7 TOTAL SCORE: 0

## 2022-09-30 NOTE — PATIENT INSTRUCTIONS
Emiliano Rodgers,    Thank you for allowing [facility] to manage your care.    I ordered some blood work, please go to the laboratory to get your laboratory studies.    Please go to the ED     For your convenience, test results are released as soon as they are available  Please allow 1-2 business days for me to send you a comment about your results.  If not done so, I encourage you to login into [employer] ([email or web address] to review your results in real time.     If you have any questions or concerns, please feel free to call us at [phone].    Sincerely,    Dr. Cruz    Did you know?      You can schedule a video visit for follow-up appointments as well as future appointments for certain conditions.  Please see the below link.     [email or web address]    If you have not already done so,  I encourage you to sign up for [employer] ([email or web address]).  This will allow you to review your results, securely communicate with a provider, and schedule virtual visits as well.

## 2022-09-30 NOTE — PROGRESS NOTES
Ana Maria is a 32 year old who is being evaluated via a billable telephone visit.      What phone number would you like to be contacted at? [phone]  How would you like to obtain your AVS? [facility]    Assessment & Plan     1. Diarrhea, unspecified type  Improving.I recommend hydration and Loperimide  - UA Macro with Reflex to Micro and Culture - lab collect; Future  - CBC with platelets; Future  - Basic metabolic panel  (Ca, Cl, CO2, Creat, Gluc, K, Na, BUN); Future  Work note given.  2. 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 She would go when her boyfriend returns from work.- UA Macro with Reflex to Micro and Culture - lab collect; Future  - CBC with platelets; Future  - Basic metabolic panel  (Ca, Cl, CO2, Creat, Gluc, K, Na, BUN); Future    3. Chills      4. Nausea  Resolved.      Return in about 3 days (around 10/3/2022) for Follow up, in person.    Karmen Cruz MD  [facility] Rodgers is a 32 year old, presenting for the following health issues:  Vomiting, Diarrhea, and Pain (Kidney pain)    Patient requesting forms for missed work due to illness    Vomiting   Associated symptoms include diarrhea.   Diarrhea  Associated symptoms include vomiting.   Pain  Associated symptoms include vomiting.   History of Present Illness       Reason for visit:  Missed work due to diarrhea and vomited, having chills and flank pain  Symptom onset:  Today  Symptoms include:  Diarrhea, light vomiting, chills and flank pain  Symptom intensity:  Moderate  Symptom progression:  Staying the same  Had these symptoms before:  Yes  Has tried/received treatment for these symptoms:  Yes  Previous treatment was successful:  Yes  Prior treatment description:  Anti nausea pills    She eats 2-3 servings of fruits and vegetables daily.She consumes 1 sweetened beverage(s) daily.She exercises with enough  effort to increase her heart rate 30 to 60 minutes per day.  She exercises with enough effort to increase her heart rate 4 days per week.   She is taking medications regularly.    Today's PHQ-9         PHQ-9 Total Score: 2    PHQ-9 Q9 Thoughts of better off dead/self-harm past 2 weeks :   Not at all    How difficult have these problems made it for you to do your work, take care of things at home, or get along with other people: Somewhat difficult  Today's DEENA-7 Score: 0         Review of Systems   Gastrointestinal: Positive for diarrhea and vomiting.            Objective           Vitals:  No vitals were obtained today due to virtual visit.    Physical Exam   healthy, alert and no distress  PSYCH: Alert and oriented times 3; coherent speech, normal   rate and volume, able to articulate logical thoughts, able   to abstract reason, no tangential thoughts, no hallucinations   or delusions  Her affect is normal  RESP: No cough, no audible wheezing, able to talk in full sentences  Remainder of exam unable to be completed due to telephone visits          Phone call duration: 12 minutes

## 2022-09-30 NOTE — LETTER
[facility]  [address] [place] [address]  Phone: [phone]    September 30, 2022        Ana Maria Espinoza  [address] [place] [id number]          To whom it may concern:    RE: Ana Maria Espinoza    Patient was seen and treated today at our clinic and missed work.    Please contact me for questions or concerns.      Sincerely,        Karmen Cruz MD

## 2022-10-14 ENCOUNTER — HOSPITAL ENCOUNTER (OUTPATIENT)
Dept: ULTRASOUND IMAGING | Facility: CLINIC | Age: 32
Discharge: HOME | End: 2022-10-14
Attending: NURSE PRACTITIONER | Admitting: NURSE PRACTITIONER
Payer: COMMERCIAL

## 2022-10-14 DIAGNOSIS — N83.201 CYST OF RIGHT OVARY: ICD-10-CM

## 2022-10-14 PROCEDURE — 76830 TRANSVAGINAL US NON-OB: CPT

## 2022-10-31 ENCOUNTER — NURSE TRIAGE (OUTPATIENT)
Dept: NURSING | Facility: CLINIC | Age: 32
End: 2022-10-31

## 2022-10-31 ENCOUNTER — E-VISIT (OUTPATIENT)
Dept: FAMILY MEDICINE | Facility: CLINIC | Age: 32
End: 2022-10-31
Payer: COMMERCIAL

## 2022-10-31 DIAGNOSIS — J06.9 VIRAL URI: ICD-10-CM

## 2022-10-31 DIAGNOSIS — R09.81 NASAL CONGESTION: ICD-10-CM

## 2022-10-31 PROCEDURE — 99421 OL DIG E/M SVC 5-10 MIN: CPT | Performed by: NURSE PRACTITIONER

## 2022-10-31 NOTE — TELEPHONE ENCOUNTER
Nurse Triage SBAR    Is this a 2nd Level Triage? NO    Situation: Patient calling with symptoms of upper respiratory infection and asking if she needs to go to urgent care or schedule an evisit since provider canceled her virtual visit for today.  Consent: not needed    Background: Started feeling sick Sat. - runny nose, sore throat  Covid test last night was negative    Assessment:   No fever  Congestion  No sinus pain or pressure  Moderate throat pain 4-5/10  No shortness of breath    Protocol Recommended Disposition:   Home care    Recommendation: Advised home care. Care advice given. Discussed e-visit as an option for documentation for missing work. Also advised retesting for Covid. Patient verbalized understanding and agreed with plan.     Joie Alonzo RN [facility] 10/31/2022 12:12 PM    Reason for Disposition    Colds with no complications    Additional Information    Negative: SEVERE difficulty breathing (e.g., struggling for each breath, speaks in single words)    Negative: Very weak (can't stand)    Negative: Sounds like a life-threatening emergency to the triager    Negative: Difficulty breathing and not from stuffy nose (e.g., not relieved by cleaning out the nose)    Negative: Runny nose is caused by pollen or other allergies    Negative: Cough is main symptom    Negative: Sore throat is main symptom    Negative: Patient sounds very sick or weak to the triager    Negative: Fever > 103 F (39.4 C)    Negative: Fever > 101 F (38.3 C) and over 60 years of age    Negative: Fever > 100.0 F (37.8 C) and has diabetes mellitus or a weak immune system (e.g., HIV positive, cancer chemotherapy, organ transplant, splenectomy, chronic steroids)    Negative: Fever > 100.0 F (37.8 C) and bedridden (e.g., nursing home patient, stroke, chronic illness, recovering from surgery)    Negative: Fever present > 3 days (72 hours)    Negative: Fever returns after gone for over 24 hours and symptoms worse or not  improved    Negative: Sinus pain (not just congestion) and fever    Negative: Earache    Negative: Sinus congestion (pressure, fullness) present > 10 days    Negative: Nasal discharge present > 10 days    Negative: Using nasal washes and pain medicine > 24 hours and sinus pain (lower forehead, cheekbone, or eye) persists    Negative: Patient wants to be seen    Negative: Sore throat present > 5 days    Protocols used: COMMON COLD-A-OH

## 2022-11-01 ENCOUNTER — MYC MEDICAL ADVICE (OUTPATIENT)
Dept: FAMILY MEDICINE | Facility: CLINIC | Age: 32
End: 2022-11-01

## 2022-11-01 RX ORDER — PSEUDOEPHEDRINE HCL 120 MG/1
120 TABLET, FILM COATED, EXTENDED RELEASE ORAL EVERY 12 HOURS
Qty: 15 TABLET | Refills: 0 | Status: SHIPPED | OUTPATIENT
Start: 2022-11-01 | End: 2023-01-13

## 2022-11-01 RX ORDER — FLUTICASONE PROPIONATE 50 MCG
2 SPRAY, SUSPENSION (ML) NASAL DAILY
Qty: 16 G | Refills: 1 | Status: SHIPPED | OUTPATIENT
Start: 2022-11-01 | End: 2023-03-14

## 2022-11-01 NOTE — PATIENT INSTRUCTIONS
The symptoms you describe suggest a viral cause, which is much more common than a bacterial cause. Antibiotics will treat bacterial infections, but have no effect on viral infections. If possible, especially if improving, start with symptom care for the first 7-10 days, then consider seeking further treatment or taking an antibiotic. Bacterial infections generally are more severe, including symptoms such as pus, fever over 101degrees F, or rapidly worsening. Start with Flonase and Sudafed - both sent through to the pharmacy.     The symptoms you describe also sound similar to COVID symptoms, I would like you to get tested. You should be receiving a phone call to schedule.     You may want to try warm salt water gargles or rinses to feel better or help prevent another bout in the future. Mix 1 teaspoon of salt in 8 ounces of water, gargle, and spit. Do this several times a day for several days. Do not swallow the mixture.      Viral Upper Respiratory Illness (Adult)    You have a viral upper respiratory illness (URI), which is another term for the common cold. This illness is contagious during the first few days. It is spread through the air by coughing and sneezing. It may also be spread by direct contact (touching the sick person and then touching your own eyes, nose, or mouth). Frequent handwashing will decrease risk of spread. Most viral illnesses go away within 7 to 10 days with rest and simple home remedies. Sometimes the illness may last for several weeks. Antibiotics will not kill a virus, and they are generally not prescribed for this condition.  Home care  If symptoms are severe, rest at home for the first 2 to 3 days. When you resume activity, don't let yourself get too tired.  Don't smoke. If you need help stopping, talk with your healthcare provider.  Avoid being exposed to cigarette smoke (yours or others ).  You may use acetaminophen or ibuprofen to control pain and fever, unless another medicine  was prescribed. If you have chronic liver or kidney disease, have ever had a stomach ulcer or gastrointestinal bleeding, or are taking blood-thinning medicines, talk with your healthcare provider before using these medicines. Aspirin should never be given to anyone under 18 years of age who is ill with a viral infection or fever. It may cause severe liver or brain damage.  Your appetite may be poor, so a light diet is fine. Stay well hydrated by drinking 6 to 8 glasses of fluids per day (water, soft drinks, juices, tea, or soup). Extra fluids will help loosen secretions in the nose and lungs.  Over-the-counter cold medicines will not shorten the length of time you re sick, but they may be helpful for the following symptoms: cough, sore throat, and nasal and sinus congestion. If you take prescription medicines, ask your healthcare provider or pharmacist which over-the-counter medicines are safe to use. (Note: Don't use decongestants if you have high blood pressure.)  Follow-up care  Follow up with your healthcare provider, or as advised.  When to seek medical advice  Call your healthcare provider right away if any of these occur:  Cough with lots of colored sputum (mucus)  Severe headache; face, neck, or ear pain  Difficulty swallowing due to throat pain  Fever of 100.4 F (38 C) or higher, or as directed by your healthcare provider  Call 911  Call 911 if any of these occur:  Chest pain, shortness of breath, wheezing, or difficulty breathing  Coughing up blood  Very severe pain with swallowing, especially if it goes along with a muffled voice   [employer] last reviewed this educational content on 6/1/2018 2000-2021 [employer]. All rights reserved. This information is not intended as a substitute for professional medical care. Always follow your healthcare professional's instructions.        Dear Ana Maria,      Based on your responses, you may have coronavirus (COVID-19).     Will I be tested for  COVID-19?  We would like to test you for COVID. I have placed orders for this test.     To schedule: go to your [employer] home page and scroll down to the section that says  You have an appointment that needs to be scheduled  and click the large green button that says  Schedule Now  and follow the steps to find the next available openings.    If you are unable to complete these [employer] scheduling steps, please call [phone] to schedule your testing.     These guidelines are for isolating before returning to work, school or .   For employers, schools and day cares: This is an official notice for this person s medical guidelines for returning in-person.   For health care sites: The CDC gives different isolation and quarantine guidelines for healthcare sites, please check with these sites before arriving.     How do I self-isolate?  You isolate when you have symptoms of COVID or a test shows you have COVID, even if you don t have symptoms.   If you DO have symptoms:  Stay home and away from others  For at least 5 days after your symptoms started, AND   You are fever free for 24 hours (with no medicine that reduces fever), AND  Your other symptoms are better.  Wear a mask for 10 full days any time you are around others.  If you DON T have symptoms:  Stay at home and away from others for at least 5 days after your positive test.  Wear a mask for 10 full days any time you are around others.    How can I take care of myself?  Over the counter medications may help with your symptoms such as runny or stuffy nose, cough, chills, or fever.  Talk to your care team about your options.     Some people are at high risk of severe illness (for example, you have a weak immune system, you re 65 years or older, or you have certain medical problems). If your risk is high and your symptoms started in the last 5 to 7 days, we strongly recommend for you to get COVID treatment as soon as possible. Paxlovid, Molnupiravir and the  monoclonal antibody treatments are proven safe and effective, make you feel better faster, and prevent hospitalization and death.       To schedule an appointment to discuss COVID treatment, request an appointment on [facility] (select  COVID-19 Treatment ) or call [employer] ([phone]), press 7.    Get lots of rest. Drink extra fluids (unless a doctor has told you not to)  Take Tylenol (acetaminophen) or ibuprofen for fever or pain. If you have liver or kidney problems, ask your family doctor if it's okay to take Tylenol or ibuprofen  Take over the counter medications for your symptoms, as directed by your doctor. You may also talk to your pharmacist.    If you have other health problems (like cancer, heart failure, an organ transplant or severe kidney disease): Call your specialty clinic if you don't feel better in the next 2 days.  Know when to call 911. Emergency warning signs include:  Trouble breathing or shortness of breath  Pain or pressure in the chest that doesn't go away  Feeling confused like you haven't felt before, or not being able to wake up  Bluish-colored lips or face    Where can I get more information?  [facility] - About COVID-19: [email or web address]   CDC - What to Do If You're Sick: https://www.cdc.gov/coronavirus/2019-ncov/if-you-are-sick/index.html   CDC - Quarantine & Isolation: https://www.cdc.gov/coronavirus/2019-ncov/your-health/quarantine-isolation.html   [facility] clinical trials (COVID-19 research studies): [email or web address]-clinical-trials  Below are the COVID-19 hotlines at the [facility] ([facility]). Interpreters are available.  For health questions: Call [phone] or [phone] (7 a.m. to 7 p.m.)  For questions about schools and childcare: Call 453-470-4087 or [phone] (7 a.m. to 7 p.m.)

## 2023-01-13 ENCOUNTER — VIRTUAL VISIT (OUTPATIENT)
Dept: FAMILY MEDICINE | Facility: CLINIC | Age: 33
End: 2023-01-13
Payer: COMMERCIAL

## 2023-01-13 DIAGNOSIS — A08.4 VIRAL GASTROENTERITIS: Primary | ICD-10-CM

## 2023-01-13 PROCEDURE — 99213 OFFICE O/P EST LOW 20 MIN: CPT | Mod: 93 | Performed by: STUDENT IN AN ORGANIZED HEALTH CARE EDUCATION/TRAINING PROGRAM

## 2023-01-13 RX ORDER — ONDANSETRON 8 MG/1
8 TABLET, ORALLY DISINTEGRATING ORAL EVERY 8 HOURS PRN
Qty: 30 TABLET | Refills: 1 | Status: SHIPPED | OUTPATIENT
Start: 2023-01-13 | End: 2023-03-16

## 2023-01-13 ASSESSMENT — ANXIETY QUESTIONNAIRES
4. TROUBLE RELAXING: SEVERAL DAYS
1. FEELING NERVOUS, ANXIOUS, OR ON EDGE: NOT AT ALL
5. BEING SO RESTLESS THAT IT IS HARD TO SIT STILL: NOT AT ALL
3. WORRYING TOO MUCH ABOUT DIFFERENT THINGS: NOT AT ALL
GAD7 TOTAL SCORE: 1
8. IF YOU CHECKED OFF ANY PROBLEMS, HOW DIFFICULT HAVE THESE MADE IT FOR YOU TO DO YOUR WORK, TAKE CARE OF THINGS AT HOME, OR GET ALONG WITH OTHER PEOPLE?: NOT DIFFICULT AT ALL
7. FEELING AFRAID AS IF SOMETHING AWFUL MIGHT HAPPEN: NOT AT ALL
GAD7 TOTAL SCORE: 1
6. BECOMING EASILY ANNOYED OR IRRITABLE: NOT AT ALL
2. NOT BEING ABLE TO STOP OR CONTROL WORRYING: NOT AT ALL
IF YOU CHECKED OFF ANY PROBLEMS ON THIS QUESTIONNAIRE, HOW DIFFICULT HAVE THESE PROBLEMS MADE IT FOR YOU TO DO YOUR WORK, TAKE CARE OF THINGS AT HOME, OR GET ALONG WITH OTHER PEOPLE: NOT DIFFICULT AT ALL
7. FEELING AFRAID AS IF SOMETHING AWFUL MIGHT HAPPEN: NOT AT ALL

## 2023-01-13 NOTE — PROGRESS NOTES
Tevin is a 32 year old who is being evaluated via a billable telephone visit.      What phone number would you like to be contacted at? [phone]  How would you like to obtain your AVS? Dyana    Distant Location (provider location):  On-site    Assessment & Plan     (A08.4) Viral gastroenteritis  (primary encounter diagnosis)  Comment: New onset. Likely viral in nature as known exposure occurred after patient ate at local food chain. Will send patient with Zofran to aid with nausea and patient instructed to monitor diarrhea for blood. Pt encouraged to increase oral intake- Gatorade. Use of Imodium also encouraged   Plan: Pending  Zofran     Work note also provided          BMI:   Estimated body mass index is 27.73 kg/m  as calculated from the following:    Height as of 7/25/22: 1.524 m (5').    Weight as of 7/25/22: 64.4 kg (142 lb).           No follow-ups on file.    TEVIN MCCORD MD  [facility] [place] [facility]   Tevin is a 32 year old accompanied by her self, presenting for the following health issues:  No chief complaint on file.      History of Present Illness       Reason for visit:  Diarrhea and occasional vomiting  Symptom onset:  1-3 days ago  Symptoms include:  Vomiting, diarrhea  Symptom intensity:  Moderate  Symptom progression:  Staying the same  Had these symptoms before:  Yes  Has tried/received treatment for these symptoms:  Yes  Previous treatment was successful:  Yes  Prior treatment description:  Anti nausea pills    She eats 4 or more servings of fruits and vegetables daily.She consumes 1 sweetened beverage(s) daily.She exercises with enough effort to increase her heart rate 60 or more minutes per day.  She exercises with enough effort to increase her heart rate 5 days per week.   She is taking medications regularly.  Today's DEENA-7 Score: 1       Patient reports that when she runs out of her nausea medication she experiences some diarrhea. She endorses  that she had [employer] and states she woke up with vomiting and diarrhea. She endorses 5x between today and yesterday and vomiting 3x. Patient endorses symptoms began 1 hour after eating her meal as well.   Patient reports that she has started taking the imodium and it has helped a bit as well             Review of Systems   CONSTITUTIONAL: NEGATIVE for fever, chills, change in weight  ENT/MOUTH: NEGATIVE for ear, mouth and throat problems  RESP: NEGATIVE for significant cough or SOB  CV: NEGATIVE for chest pain, palpitations or peripheral edema  GI: POSITIVE for diarrhea, nausea and vomiting      Objective           Vitals:  No vitals were obtained today due to virtual visit.    Physical Exam   healthy, alert and no distress  PSYCH: Alert and oriented times 3; coherent speech, normal   rate and volume, able to articulate logical thoughts, able   to abstract reason, no tangential thoughts, no hallucinations   or delusions  Her affect is normal  RESP: No cough, no audible wheezing, able to talk in full sentences  Remainder of exam unable to be completed due to telephone visits    No results found for any visits on 01/13/23.            Phone call duration:10 minutes

## 2023-01-13 NOTE — LETTER
January 13, 2023      Tevin Espinoza  [address] [place] [id number]        To Whom It May Concern:    Tevin Espinoza was seen in our clinic today 1/13/2023 and was prescribed medications. This note should serve as a Doctor's note for her absence yesterday and today. If patient is feeling better, she is cleared to return to work tomorrow      Sincerely,        TEVIN MCCORD MD          
Face Mask

## 2023-02-13 ENCOUNTER — LAB (OUTPATIENT)
Dept: FAMILY MEDICINE | Facility: CLINIC | Age: 33
End: 2023-02-13
Payer: COMMERCIAL

## 2023-02-13 ENCOUNTER — LAB (OUTPATIENT)
Dept: LAB | Facility: CLINIC | Age: 33
End: 2023-02-13
Payer: COMMERCIAL

## 2023-02-13 ENCOUNTER — VIRTUAL VISIT (OUTPATIENT)
Dept: FAMILY MEDICINE | Facility: CLINIC | Age: 33
End: 2023-02-13
Payer: COMMERCIAL

## 2023-02-13 DIAGNOSIS — R68.83 CHILLS: ICD-10-CM

## 2023-02-13 DIAGNOSIS — R11.0 NAUSEA: Primary | ICD-10-CM

## 2023-02-13 DIAGNOSIS — Z72.51 UNPROTECTED SEXUAL INTERCOURSE: ICD-10-CM

## 2023-02-13 DIAGNOSIS — R11.0 NAUSEA: ICD-10-CM

## 2023-02-13 DIAGNOSIS — N89.8 VAGINAL DISCHARGE: Primary | ICD-10-CM

## 2023-02-13 DIAGNOSIS — N89.8 VAGINAL DISCHARGE: ICD-10-CM

## 2023-02-13 DIAGNOSIS — M79.10 MYALGIA: ICD-10-CM

## 2023-02-13 LAB
ALBUMIN UR-MCNC: NEGATIVE MG/DL
AMORPH CRY #/AREA URNS HPF: ABNORMAL /HPF
APPEARANCE UR: CLEAR
BACTERIA #/AREA URNS HPF: ABNORMAL /HPF
BILIRUB UR QL STRIP: NEGATIVE
CLUE CELLS: ABNORMAL
COLOR UR AUTO: YELLOW
DEPRECATED S PYO AG THROAT QL EIA: NEGATIVE
FLUAV AG SPEC QL IA: NEGATIVE
FLUBV AG SPEC QL IA: NEGATIVE
GLUCOSE UR STRIP-MCNC: NEGATIVE MG/DL
GROUP A STREP BY PCR: NOT DETECTED
HCG UR QL: NEGATIVE
HGB UR QL STRIP: ABNORMAL
KETONES UR STRIP-MCNC: NEGATIVE MG/DL
LEUKOCYTE ESTERASE UR QL STRIP: NEGATIVE
NITRATE UR QL: NEGATIVE
PH UR STRIP: 5.5 [PH] (ref 5–7)
RBC #/AREA URNS AUTO: ABNORMAL /HPF
SP GR UR STRIP: 1.02 (ref 1–1.03)
SQUAMOUS #/AREA URNS AUTO: ABNORMAL /LPF
TRICHOMONAS, WET PREP: ABNORMAL
UROBILINOGEN UR STRIP-ACNC: 1 E.U./DL
WBC #/AREA URNS AUTO: ABNORMAL /HPF
WBC'S/HIGH POWER FIELD, WET PREP: ABNORMAL
YEAST, WET PREP: ABNORMAL

## 2023-02-13 PROCEDURE — 87651 STREP A DNA AMP PROBE: CPT

## 2023-02-13 PROCEDURE — 99214 OFFICE O/P EST MOD 30 MIN: CPT | Mod: CS | Performed by: FAMILY MEDICINE

## 2023-02-13 PROCEDURE — U0003 INFECTIOUS AGENT DETECTION BY NUCLEIC ACID (DNA OR RNA); SEVERE ACUTE RESPIRATORY SYNDROME CORONAVIRUS 2 (SARS-COV-2) (CORONAVIRUS DISEASE [COVID-19]), AMPLIFIED PROBE TECHNIQUE, MAKING USE OF HIGH THROUGHPUT TECHNOLOGIES AS DESCRIBED BY CMS-2020-01-R: HCPCS

## 2023-02-13 PROCEDURE — 87210 SMEAR WET MOUNT SALINE/INK: CPT

## 2023-02-13 PROCEDURE — 87591 N.GONORRHOEAE DNA AMP PROB: CPT

## 2023-02-13 PROCEDURE — 87491 CHLMYD TRACH DNA AMP PROBE: CPT

## 2023-02-13 PROCEDURE — U0005 INFEC AGEN DETEC AMPLI PROBE: HCPCS

## 2023-02-13 PROCEDURE — 81001 URINALYSIS AUTO W/SCOPE: CPT

## 2023-02-13 PROCEDURE — 87804 INFLUENZA ASSAY W/OPTIC: CPT

## 2023-02-13 PROCEDURE — 81025 URINE PREGNANCY TEST: CPT

## 2023-02-13 RX ORDER — ONDANSETRON 4 MG/1
4 TABLET, FILM COATED ORAL EVERY 8 HOURS PRN
Qty: 12 TABLET | Refills: 0 | Status: SHIPPED | OUTPATIENT
Start: 2023-02-13 | End: 2023-03-31

## 2023-02-13 ASSESSMENT — ANXIETY QUESTIONNAIRES
3. WORRYING TOO MUCH ABOUT DIFFERENT THINGS: NOT AT ALL
2. NOT BEING ABLE TO STOP OR CONTROL WORRYING: NOT AT ALL
1. FEELING NERVOUS, ANXIOUS, OR ON EDGE: NOT AT ALL
GAD7 TOTAL SCORE: 0
4. TROUBLE RELAXING: NOT AT ALL
IF YOU CHECKED OFF ANY PROBLEMS ON THIS QUESTIONNAIRE, HOW DIFFICULT HAVE THESE PROBLEMS MADE IT FOR YOU TO DO YOUR WORK, TAKE CARE OF THINGS AT HOME, OR GET ALONG WITH OTHER PEOPLE: NOT DIFFICULT AT ALL
8. IF YOU CHECKED OFF ANY PROBLEMS, HOW DIFFICULT HAVE THESE MADE IT FOR YOU TO DO YOUR WORK, TAKE CARE OF THINGS AT HOME, OR GET ALONG WITH OTHER PEOPLE?: NOT DIFFICULT AT ALL
7. FEELING AFRAID AS IF SOMETHING AWFUL MIGHT HAPPEN: NOT AT ALL
5. BEING SO RESTLESS THAT IT IS HARD TO SIT STILL: NOT AT ALL
GAD7 TOTAL SCORE: 0
6. BECOMING EASILY ANNOYED OR IRRITABLE: NOT AT ALL
7. FEELING AFRAID AS IF SOMETHING AWFUL MIGHT HAPPEN: NOT AT ALL

## 2023-02-13 NOTE — LETTER
February 13, 2023      Ana Maria Espinoza  [address] [place] [id number]        To Whom It May Concern:    Ana Maria Espinoza  was seen on 02/13/23.  Please excuse her from work until feeling better due to illness.        Sincerely,        Yvonne Hess MD

## 2023-02-13 NOTE — PROGRESS NOTES
Telemedicine Visit  Call time: 12:46 PM through 12:53 PM  Patient is at home in [place], [place]  Physician in clinic in [place], [place]      Clinical Decision Making:    At the end of the encounter, I discussed results, diagnosis, medications. Discussed red flags for immediate return to clinic/ER, as well as indications for follow up if no improvement. Patient understood and agreed to plan. Patient was stable for discharge.      ICD-10-CM    1. Nausea  R11.0 HCG qualitative urine     Symptomatic COVID-19 Virus (Coronavirus) by PCR Nose     Streptococcus A Rapid Screen w/Reflex to PCR - Clinic Collect     Influenza A & B Antigen - Clinic Collect     ondansetron (ZOFRAN) 4 MG tablet      2. Vaginal discharge  N89.8 UA macro with reflex to Microscopic and Culture - Clinc Collect     NEISSERIA GONORRHOEA PCR     CHLAMYDIA TRACHOMATIS PCR     Wet prep - Clinic Collect      3. Unprotected sexual intercourse  Z72.51 HCG qualitative urine     UA macro with reflex to Microscopic and Culture - Clinc Collect     NEISSERIA GONORRHOEA PCR     CHLAMYDIA TRACHOMATIS PCR     Wet prep - Clinic Collect      4. Chills  R68.83 HCG qualitative urine     UA macro with reflex to Microscopic and Culture - Clinc Collect     NEISSERIA GONORRHOEA PCR     CHLAMYDIA TRACHOMATIS PCR     Symptomatic COVID-19 Virus (Coronavirus) by PCR Nose     Streptococcus A Rapid Screen w/Reflex to PCR - Clinic Collect     Influenza A & B Antigen - Clinic Collect     ondansetron (ZOFRAN) 4 MG tablet      5. Myalgia  M79.10 HCG qualitative urine     UA macro with reflex to Microscopic and Culture - Clinc Collect     NEISSERIA GONORRHOEA PCR     CHLAMYDIA TRACHOMATIS PCR     Symptomatic COVID-19 Virus (Coronavirus) by PCR Nose     Streptococcus A Rapid Screen w/Reflex to PCR - Clinic Collect     Influenza A & B Antigen - Clinic Collect     ondansetron (ZOFRAN) 4 MG tablet        Many of her symptoms seem viral in nature and so we will check strep,  "flu and COVID.  With her vaginal discharge and unprotected intercourse we will check a wet prep, gonorrhea, chlamydia and a pregnancy test.  We will also check a UA as patient reports kidney issues the last time she had vaginal discharge.  Instructed patient to increase her fluid intake and that she should drink water and also could add Pedialyte or Gatorade.  Note done for work  She should follow-up for an in person visit if her symptoms become worse  Patient verbalized understanding      There are no Patient Instructions on file for this visit.   No follow-ups on file.      chief complaint    HPI:  Ana Maria Espinoza is a 32 year old female who presents today complaining of feeling sick since Friday, February 10.  She initially had vomiting but now just has nausea the last 2 days.  She has been having green stools but they are formed and not diarrhea.  She has been keeping fluids down yesterday and today.  She has had chills and myalgias.  She had a home COVID test that was negative.    She also reports that she completed her menstrual cycle on Wednesday and had unprotected sex once since then.  She has been having brown discharge with wiping.  She reports the last time she had discharge that she had trouble with her kidneys.  She recently had a herpes outbreak which has resolved.  She has had herpes for about 6 years.  She also reports that she is having symptoms of a \"autoimmune disease flare\".  She has had work-up for an autoimmune disease in the past but they have not yet come up with a firm diagnosis and the doctor that she was seeing no longer practices.    History obtained from chart review and the patient.    Problem List:  2022-06: Chronic, continuous use of opioids  2022-02: Microscopic hematuria  2022-01: Factor V Leiden mutation (H)  2022-01: Renal infarction (H)  2021-01: Moderate episode of recurrent major depressive disorder (H)  2021-01: Generalized anxiety disorder  2021-01: Nexplanon in " place  2020: Tobacco use  2020: Hip pain  2019: Contact dermatitis due to chemicals  2018: Pain in left hand  2017-10: H/O herpes simplex type 2 infection  2016: Dyslipidemia  2016: Knee strain  2016: Lumbar strain  2016: Wrist strain  2015: Mild persistent asthma without complication  2013: Pain medication agreement  2011: Tonsillar hypertrophy  2011: History of  section  2010: Patellar dislocation  2009: ADHD (attention deficit hyperactivity disorder)  2009: Vitamin D deficiency  2009: Reduced libido  2009: GERD (gastroesophageal reflux disease)  2005: Sciatica associated with disorder of lumbar spine  1993: Chronic low back pain  1992: History of chronic back pain      Past Medical History:   Diagnosis Date     Uncomplicated asthma        Social History     Tobacco Use     Smoking status: Every Day     Packs/day: 0.20     Years: 10.00     Pack years: 2.00     Types: Cigarettes     Start date: 3/19/2009     Last attempt to quit: 2021     Years since quittin.6     Smokeless tobacco: Never   Substance Use Topics     Alcohol use: No       Review of systems  negative except listed in HPI    There were no vitals filed for this visit.    Physical Exam  Telemedicine visit  In general she is alert, oriented and in no acute distress  She speaks in full sentences with no shortness of breath    Answers for HPI/ROS submitted by the patient on 2023  DEENA 7 TOTAL SCORE: 0  How many servings of fruits and vegetables do you eat daily?: 4 or more  On average, how many sweetened beverages do you drink each day (Examples: soda, juice, sweet tea, etc.  Do NOT count diet or artificially sweetened beverages)?: 2  How many minutes a day do you exercise enough to make your heart beat faster?: 30 to 60  How many days a week do you exercise enough to make your heart beat faster?: 4  How many days per week do you miss taking your medication?: 0  What is the  reason for your visit today?: Diarrhea, running low temp, chills, cough, occasional vomit, body aches  When did your symptoms begin?: 3-7 days ago  What are your symptoms?: Vomit, green poop, temperature, body aches, couch, chills, larthargic  How would you describe these symptoms?: Moderate  Are your symptoms:: Staying the same  Have you had these symptoms before?: Yes  Have you tried or received treatment for these symptoms before?: Yes  Did that treatment work? : No

## 2023-02-14 LAB
C TRACH DNA SPEC QL NAA+PROBE: NEGATIVE
N GONORRHOEA DNA SPEC QL NAA+PROBE: NEGATIVE
SARS-COV-2 RNA RESP QL NAA+PROBE: NEGATIVE

## 2023-03-08 DIAGNOSIS — L21.9 SEBORRHEIC DERMATITIS: ICD-10-CM

## 2023-03-08 RX ORDER — KETOCONAZOLE 20 MG/G
CREAM TOPICAL DAILY
Qty: 30 G | Refills: 3 | Status: ON HOLD | OUTPATIENT
Start: 2023-03-08 | End: 2023-09-07

## 2023-03-08 RX ORDER — KETOCONAZOLE 20 MG/ML
SHAMPOO TOPICAL
Qty: 120 ML | Refills: 4 | Status: ON HOLD | OUTPATIENT
Start: 2023-03-08 | End: 2023-09-07

## 2023-03-14 ENCOUNTER — OFFICE VISIT (OUTPATIENT)
Dept: URGENT CARE | Facility: URGENT CARE | Age: 33
End: 2023-03-14
Payer: COMMERCIAL

## 2023-03-14 VITALS
DIASTOLIC BLOOD PRESSURE: 72 MMHG | WEIGHT: 140 LBS | HEART RATE: 99 BPM | SYSTOLIC BLOOD PRESSURE: 114 MMHG | BODY MASS INDEX: 27.34 KG/M2 | TEMPERATURE: 98.1 F | OXYGEN SATURATION: 100 %

## 2023-03-14 DIAGNOSIS — N91.2 AMENORRHEA: Primary | ICD-10-CM

## 2023-03-14 LAB — HCG SERPL QL: POSITIVE

## 2023-03-14 PROCEDURE — 99213 OFFICE O/P EST LOW 20 MIN: CPT | Performed by: FAMILY MEDICINE

## 2023-03-14 PROCEDURE — 36415 COLL VENOUS BLD VENIPUNCTURE: CPT | Performed by: FAMILY MEDICINE

## 2023-03-14 PROCEDURE — 84703 CHORIONIC GONADOTROPIN ASSAY: CPT | Performed by: FAMILY MEDICINE

## 2023-03-14 NOTE — PROGRESS NOTES
(N91.2) Amenorrhea  (primary encounter diagnosis)  Comment:   Plan: HCG qualitative urine                CHIEF COMPLAINT    Confirm pregnancy.      HISTORY    32-year-old woman presents with request to confirm if pregnant.  She did a home pregnancy test from [facility] and it appeared positive this morning.    Her LMP is February 8.  She had a little brownish spotting in February.  When she had this before it was felt to be from implantation bleeding.    She has a history of factor V Leiden mutation.  Has a history of a blood clot affecting her kidneys and having renal failure.  Currently on a baby aspirin.    She does have a CT scan scheduled next month at [facility] and would not have this scan if pregnant.      REVIEW OF SYSTEMS    No nausea or vomiting.  No fever.  No abdominal pain.  No pelvic pain.      EXAM  /72   Pulse 99   Temp 98.1  F (36.7  C) (Tympanic)   Wt 63.5 kg (140 lb)   LMP 12/27/2022 (Exact Date)   SpO2 100%   BMI 27.34 kg/m        No vaginal bleeding or spotting.      Results for orders placed or performed in visit on 03/14/23   HCG qualitative     Status: Abnormal   Result Value Ref Range    hCG Serum Qualitative Positive (A) Negative       Result reviewed and patient notified through [employer].  Dr. Ramirez

## 2023-03-15 ENCOUNTER — TELEPHONE (OUTPATIENT)
Dept: FAMILY MEDICINE | Facility: CLINIC | Age: 33
End: 2023-03-15
Payer: COMMERCIAL

## 2023-03-15 NOTE — TELEPHONE ENCOUNTER
5w 0d based on LMP 2/8/23, pt not seen by this group    Per UC visit 3/14: She has a history of factor V Leiden mutation.  Has a history of a blood clot affecting her kidneys and having renal failure.  Currently on a baby aspirin.    Openings available today or tomorrow in [place] if pt would like to be seen and planning to deliver at [facility].    Unable to reach patient via phone. RN left a message and instructed patient to call the clinic.     Libia Gunn RN on 3/15/2023 at 10:36 AM

## 2023-03-15 NOTE — TELEPHONE ENCOUNTER
Pt has a positive preg test. She was told to see a OB-gyn dr right away because she has a blood clotting condition that needs to be addressed. Please call her back.

## 2023-03-16 ENCOUNTER — APPOINTMENT (OUTPATIENT)
Dept: OBGYN | Facility: CLINIC | Age: 33
End: 2023-03-16
Payer: COMMERCIAL

## 2023-03-16 ENCOUNTER — PRENATAL OFFICE VISIT (OUTPATIENT)
Dept: OBGYN | Facility: CLINIC | Age: 33
End: 2023-03-16

## 2023-03-16 DIAGNOSIS — Z34.80 PRENATAL CARE, SUBSEQUENT PREGNANCY: ICD-10-CM

## 2023-03-16 PROCEDURE — 99207 PR NO CHARGE NURSE ONLY: CPT | Performed by: OBSTETRICS & GYNECOLOGY

## 2023-03-16 RX ORDER — PRENATAL VIT/IRON FUM/FOLIC AC 27MG-0.8MG
1 TABLET ORAL DAILY
COMMUNITY
Start: 2023-03-08 | End: 2024-02-02
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
## 2023-03-20 ENCOUNTER — MYC MEDICAL ADVICE (OUTPATIENT)
Dept: FAMILY MEDICINE | Facility: CLINIC | Age: 33
End: 2023-03-20
Payer: COMMERCIAL

## 2023-03-20 DIAGNOSIS — Z72.0 TOBACCO USE: Primary | ICD-10-CM

## 2023-03-20 NOTE — TELEPHONE ENCOUNTER
Appointment scheduled for 4/5/2023.  Unable to reach patient.    Morenita TERRELL   Ob/Gyn Clinic

## 2023-03-22 NOTE — TELEPHONE ENCOUNTER
"S-(situation): Patient calling regarding her My Chart message from 03/20/23.     B-(background): 1st OB appt scheduled for 04/05/23    A-(assessment): Patient did not smoke yesterday secondary to nausea. Typically smokes 1 1/2-2 ppd. Cannot use nicotine lozenges because she has dentures which causes an absorption problem. She can't take chantix because when her 2nd child was an infant she tried it and  had an \"out of body vision of dropping my baby from a 3rd story balcony\". She immediately called a family member to stay with her baby for a couple of days.   She tried a 21 mg patch, her arm was tingly where the patch was applied and she had a weird taste in her mouth.    R-(recommendations): Pt requesting a prescription to [facility] pharmacy for a smaller dose if ok.  Keiry KIM RN      "

## 2023-03-23 ENCOUNTER — HOSPITAL ENCOUNTER (EMERGENCY)
Facility: CLINIC | Age: 33
Discharge: HOME | End: 2023-03-23
Attending: EMERGENCY MEDICINE | Admitting: EMERGENCY MEDICINE
Payer: COMMERCIAL

## 2023-03-23 ENCOUNTER — TELEPHONE (OUTPATIENT)
Dept: NURSING | Facility: CLINIC | Age: 33
End: 2023-03-23

## 2023-03-23 VITALS
HEART RATE: 88 BPM | TEMPERATURE: 98 F | OXYGEN SATURATION: 98 % | DIASTOLIC BLOOD PRESSURE: 78 MMHG | WEIGHT: 142 LBS | HEIGHT: 60 IN | BODY MASS INDEX: 27.88 KG/M2 | RESPIRATION RATE: 12 BRPM | SYSTOLIC BLOOD PRESSURE: 126 MMHG

## 2023-03-23 DIAGNOSIS — M79.10 MYALGIA: ICD-10-CM

## 2023-03-23 DIAGNOSIS — R68.83 CHILLS: ICD-10-CM

## 2023-03-23 DIAGNOSIS — O21.9 PERSISTENT HYPEREMESIS VOMITING, ARISING DURING PREGNANCY: ICD-10-CM

## 2023-03-23 DIAGNOSIS — R11.0 NAUSEA: ICD-10-CM

## 2023-03-23 LAB
ALBUMIN SERPL BCG-MCNC: 4.5 G/DL (ref 3.5–5.2)
ALP SERPL-CCNC: 98 U/L (ref 35–104)
ALT SERPL W P-5'-P-CCNC: 14 U/L (ref 10–35)
ANION GAP SERPL CALCULATED.3IONS-SCNC: 10 MMOL/L (ref 7–15)
AST SERPL W P-5'-P-CCNC: 19 U/L (ref 10–35)
BASOPHILS # BLD AUTO: 0 10E3/UL (ref 0–0.2)
BASOPHILS NFR BLD AUTO: 0 %
BILIRUB SERPL-MCNC: 0.6 MG/DL
BUN SERPL-MCNC: 11 MG/DL (ref 6–20)
CALCIUM SERPL-MCNC: 9.4 MG/DL (ref 8.6–10)
CHLORIDE SERPL-SCNC: 102 MMOL/L (ref 98–107)
CREAT SERPL-MCNC: 0.92 MG/DL (ref 0.51–0.95)
DEPRECATED HCO3 PLAS-SCNC: 24 MMOL/L (ref 22–29)
EOSINOPHIL # BLD AUTO: 0.1 10E3/UL (ref 0–0.7)
EOSINOPHIL NFR BLD AUTO: 1 %
ERYTHROCYTE [DISTWIDTH] IN BLOOD BY AUTOMATED COUNT: 12.4 % (ref 10–15)
GFR SERPL CREATININE-BSD FRML MDRD: 84 ML/MIN/1.73M2
GLUCOSE SERPL-MCNC: 86 MG/DL (ref 70–99)
HCT VFR BLD AUTO: 41.8 % (ref 35–47)
HGB BLD-MCNC: 14.5 G/DL (ref 11.7–15.7)
HOLD SPECIMEN: NORMAL
IMM GRANULOCYTES # BLD: 0 10E3/UL
IMM GRANULOCYTES NFR BLD: 0 %
LIPASE SERPL-CCNC: 34 U/L (ref 13–60)
LYMPHOCYTES # BLD AUTO: 3.2 10E3/UL (ref 0.8–5.3)
LYMPHOCYTES NFR BLD AUTO: 35 %
MCH RBC QN AUTO: 33.4 PG (ref 26.5–33)
MCHC RBC AUTO-ENTMCNC: 34.7 G/DL (ref 31.5–36.5)
MCV RBC AUTO: 96 FL (ref 78–100)
MONOCYTES # BLD AUTO: 0.4 10E3/UL (ref 0–1.3)
MONOCYTES NFR BLD AUTO: 5 %
NEUTROPHILS # BLD AUTO: 5.3 10E3/UL (ref 1.6–8.3)
NEUTROPHILS NFR BLD AUTO: 59 %
NRBC # BLD AUTO: 0 10E3/UL
NRBC BLD AUTO-RTO: 0 /100
PLATELET # BLD AUTO: 358 10E3/UL (ref 150–450)
POTASSIUM SERPL-SCNC: 4.1 MMOL/L (ref 3.4–5.3)
PROT SERPL-MCNC: 7.8 G/DL (ref 6.4–8.3)
RBC # BLD AUTO: 4.34 10E6/UL (ref 3.8–5.2)
SODIUM SERPL-SCNC: 136 MMOL/L (ref 136–145)
WBC # BLD AUTO: 9.1 10E3/UL (ref 4–11)

## 2023-03-23 PROCEDURE — 36415 COLL VENOUS BLD VENIPUNCTURE: CPT | Performed by: EMERGENCY MEDICINE

## 2023-03-23 PROCEDURE — 80053 COMPREHEN METABOLIC PANEL: CPT | Performed by: EMERGENCY MEDICINE

## 2023-03-23 PROCEDURE — 250N000011 HC RX IP 250 OP 636: Performed by: EMERGENCY MEDICINE

## 2023-03-23 PROCEDURE — 96366 THER/PROPH/DIAG IV INF ADDON: CPT | Performed by: EMERGENCY MEDICINE

## 2023-03-23 PROCEDURE — 85025 COMPLETE CBC W/AUTO DIFF WBC: CPT | Performed by: EMERGENCY MEDICINE

## 2023-03-23 PROCEDURE — 99284 EMERGENCY DEPT VISIT MOD MDM: CPT | Performed by: EMERGENCY MEDICINE

## 2023-03-23 PROCEDURE — 96375 TX/PRO/DX INJ NEW DRUG ADDON: CPT | Performed by: EMERGENCY MEDICINE

## 2023-03-23 PROCEDURE — 96365 THER/PROPH/DIAG IV INF INIT: CPT | Performed by: EMERGENCY MEDICINE

## 2023-03-23 PROCEDURE — 99284 EMERGENCY DEPT VISIT MOD MDM: CPT | Mod: 25 | Performed by: EMERGENCY MEDICINE

## 2023-03-23 PROCEDURE — 83690 ASSAY OF LIPASE: CPT | Performed by: EMERGENCY MEDICINE

## 2023-03-23 RX ORDER — ONDANSETRON 4 MG/1
4 TABLET, ORALLY DISINTEGRATING ORAL EVERY 8 HOURS PRN
Qty: 10 TABLET | Refills: 0 | Status: SHIPPED | OUTPATIENT
Start: 2023-03-23 | End: 2023-03-31 | Stop reason: ALTCHOICE

## 2023-03-23 RX ORDER — ONDANSETRON 4 MG/1
4 TABLET, FILM COATED ORAL EVERY 8 HOURS PRN
Qty: 12 TABLET | Refills: 0 | Status: CANCELLED | OUTPATIENT
Start: 2023-03-23

## 2023-03-23 RX ORDER — DEXTROSE, SODIUM CHLORIDE, SODIUM LACTATE, POTASSIUM CHLORIDE, AND CALCIUM CHLORIDE 5; .6; .31; .03; .02 G/100ML; G/100ML; G/100ML; G/100ML; G/100ML
1000 INJECTION, SOLUTION INTRAVENOUS ONCE
Status: COMPLETED | OUTPATIENT
Start: 2023-03-23 | End: 2023-03-23

## 2023-03-23 RX ORDER — ONDANSETRON 2 MG/ML
4 INJECTION INTRAMUSCULAR; INTRAVENOUS ONCE
Status: COMPLETED | OUTPATIENT
Start: 2023-03-23 | End: 2023-03-23

## 2023-03-23 RX ADMIN — ONDANSETRON 4 MG: 2 INJECTION INTRAMUSCULAR; INTRAVENOUS at 09:26

## 2023-03-23 RX ADMIN — SODIUM CHLORIDE, SODIUM LACTATE, POTASSIUM CHLORIDE, CALCIUM CHLORIDE AND DEXTROSE MONOHYDRATE 1000 ML: 5; 600; 310; 30; 20 INJECTION, SOLUTION INTRAVENOUS at 09:26

## 2023-03-23 ASSESSMENT — ACTIVITIES OF DAILY LIVING (ADL): ADLS_ACUITY_SCORE: 35

## 2023-03-23 NOTE — Clinical Note
Ana Maria Espinoza was seen and treated in our emergency department on 3/23/2023.  She may return to work on 03/25/2023.       If you have any questions or concerns, please don't hesitate to call.      German Sauceda MD

## 2023-03-23 NOTE — TELEPHONE ENCOUNTER
"Telephone Call for information and refill:     Pt calling to report that she is going to \"go in\" for IV fluids because she has been throwing up for three days. She states that she was seen on 3/13/2023 and was told that if she throws up for three days in a row that she would need IV fluids. She is declining triage of her sx and is only calling to ask if she would need to go to  ED for IV fluids or where to go. Pt reports that she will be going to the nearby ED for evaluation.     Pt also has an order for Zofran on file. Pt reports that she was never told if that was safe to take during pregnancy and also \"threw the rest of them away\"    Writer is routing a request to patient's PCP pool for a refill of her zofran if advised.     Toshia Caraballo RN  [facility] Nurse Advisor 8:06 AM 3/23/2023      "

## 2023-03-23 NOTE — DISCHARGE INSTRUCTIONS
If symptoms worsen or new symptoms develop.  Use Zofran as needed for nausea drink plenty of fluids with gradually advancing slowly your diet.  If unable to keep anything down decreased urine output abdominal pain vaginal discharge or other concerns please return for further evaluation and care

## 2023-03-23 NOTE — ED PROVIDER NOTES
History     Chief Complaint   Patient presents with     Emesis During Pregnancy     States about 8 weeks pregnant - c/o hyperemesis since   Ana Maria Espinoza is a 32 year old female with past medical history significant for factor V Leyden mutation, ADHD chronic lower back pain history of  mild persistent asthma who presents to the emergency department complaining of vomiting status post pregnancy patient is roughly 8 weeks pregnant and states over the last 3 days she has had several episodes of vomiting.  She cannot keep much down and is feeling a little weak and.  She denies any significant fevers or chills has not had a headache or visual changes denies neck pain has not had chest pain shortness of breath denies of any abdominal pain other than occasional cramping has not any vaginal bleeding has chronic back pain but this is unchanged denies any leg swelling calf pain or other problems.    Allergies:  Allergies   Allergen Reactions     Bee Venom Anaphylaxis     Has epipen     Aloe Hives     Dry skin as well      Codeine Rash     Other reaction(s): Intolerance-Can't Take       Problem List:    Patient Active Problem List    Diagnosis Date Noted     Prenatal care, subsequent pregnancy 2023     Priority: Medium     FOB- John Burth       Chronic, continuous use of opioids 2022     Priority: Medium     Microscopic hematuria 02/10/2022     Priority: Medium     Factor V Leiden mutation (H) 2022     Priority: Medium     Renal infarction (H) 2022     Priority: Medium     Nexplanon in place 2021     Priority: Medium     Placed in the left arm on 2021 by Dr. Dimitris Zambrano  Due to come out 2024       Tobacco use 2020     Priority: Medium     Hip pain 2020     Priority: Medium     Contact dermatitis due to chemicals 2019     Priority: Medium     Pain in left hand 2018     Priority: Medium     H/O herpes simplex type 2 infection 10/16/2017      Priority: Medium     Dyslipidemia 2016     Priority: Medium     Formatting of this note might be different from the original.  Formatting of this note might be different from the original.  : to quit smoking and work otitis media a more active lifestyle.  Last Lipids:  Chol: 2016 142   73  HDL: 2016 27   LDL CHOLESTEROL (mg/dL)   Date Value   2016 100  Formatting of this note is different from the original.  : to quit smoking and work otitis media a more active lifestyle.  Last Lipids:  Chol: 2016 142   73  HDL: 2016 27   LDL CHOLESTEROL (mg/dL)   Date Value   2016 100       Knee strain 2016     Priority: Medium     Lumbar strain 2016     Priority: Medium     Wrist strain 2016     Priority: Medium     Mild persistent asthma without complication 2015     Priority: Medium     Pain medication agreement 2013     Priority: Medium     Formatting of this note might be different from the original.  Prn judicious use of vicodin or percocet       Tonsillar hypertrophy 2011     Priority: Medium     History of  section 2011     Priority: Medium     Patellar dislocation 2010     Priority: Medium     ADHD (attention deficit hyperactivity disorder) 2009     Priority: Medium     Formatting of this note might be different from the original.  Updated by system to replace inactive record  Formatting of this note might be different from the original.  Updated by system to replace inactive record       Vitamin D deficiency 2009     Priority: Medium     Reduced libido 2009     Priority: Medium     GERD (gastroesophageal reflux disease) 2009     Priority: Medium     Sciatica associated with disorder of lumbar spine 06/15/2005     Priority: Medium     Chronic low back pain 1993     Priority: Medium     History of chronic back pain 1992     Priority: Medium        Past  Medical History:    Past Medical History:   Diagnosis Date     ADHD (attention deficit hyperactivity disorder)      Blood clot in bladder 2001     Chickenpox      Chlamydia      Factor V Leiden (H)      GERD (gastroesophageal reflux disease)      History of urinary tract infection      Postpartum depression 2009     Renal failure      Uncomplicated asthma 1996       Past Surgical History:    Past Surgical History:   Procedure Laterality Date     ABDOMEN SURGERY      2 c sections  @      ESOPHAGOSCOPY, GASTROSCOPY, DUODENOSCOPY (EGD), COMBINED N/A 2021    Procedure: ESOPHAGOGASTRODUODENOSCOPY, WITH BIOPSY;  Surgeon: German Flynn MD;  Location: [facility]     ORTHOPEDIC SURGERY      Had surgery on both big toes     TONSILLECTOMY         Family History:    Family History   Problem Relation Age of Onset     Depression Mother      Mental Illness Mother 56        suicide-     Obesity Mother      Connective Tissue Disorder Mother         EDS     Alcoholism Mother      Diabetes Father      Obesity Father      Deep Vein Thrombosis Father         double amputee below knee     Asthma Sister      Thyroid Disease Sister      Obesity Sister      Asthma Brother      Obesity Brother      Obesity Paternal Grandmother      Heart Defect Daughter          at 7 weeks after 3 heart surgeries     Genetic Disorder Daughter         Avascular necrosis and Legg calve perthes disease     Connective Tissue Disorder Maternal Cousin         EDS     Connective Tissue Disorder Maternal Cousin         EDS       Social History:  Marital Status:  Single [1]  Social History     Tobacco Use     Smoking status: Every Day     Packs/day: 0.20     Years: 10.00     Pack years: 2.00     Types: Cigarettes     Start date: 3/19/2009     Last attempt to quit: 2021     Years since quittin.7     Smokeless tobacco: Never     Tobacco comments:     Down to 2-3 cig/day with pregnancy   Vaping Use     Vaping Use: Never used   Substance  Use Topics     Alcohol use: No     Drug use: Not Currently     Types: Marijuana     Comment: quit with pregnancy        Medications:    albuterol (PROAIR HFA/PROVENTIL HFA/VENTOLIN HFA) 108 (90 Base) MCG/ACT inhaler  aspirin (ASA) 81 MG EC tablet  EPINEPHrine (ANY BX GENERIC EQUIV) 0.3 MG/0.3ML injection 2-pack  ketoconazole (NIZORAL) 2 % external cream  ketoconazole (NIZORAL) 2 % external shampoo  nystatin (MYCOSTATIN) 088681 UNIT/GM external powder  ondansetron (ZOFRAN) 4 MG tablet  Prenatal Vit-Fe Fumarate-FA (PRENATAL MULTIVITAMIN W/IRON) 27-0.8 MG tablet          Review of Systems  All systems reviewed and other than pertinent positives and negatives in HPI all other systems are negative.  Physical Exam   BP: 124/88  Pulse: 96  Temp: 98  F (36.7  C)  Resp: 12  Height: 152.4 cm (5')  Weight: 64.4 kg (142 lb)  SpO2: 99 %      Physical Exam  Vitals and nursing note reviewed.   Constitutional:       General: She is not in acute distress.     Appearance: Normal appearance. She is not ill-appearing, toxic-appearing or diaphoretic.   HENT:      Head: Normocephalic and atraumatic.      Nose: Nose normal.      Mouth/Throat:      Mouth: Mucous membranes are moist.      Pharynx: Oropharynx is clear.   Eyes:      Conjunctiva/sclera: Conjunctivae normal.   Cardiovascular:      Rate and Rhythm: Normal rate and regular rhythm.      Pulses: Normal pulses.      Heart sounds: Normal heart sounds. No murmur heard.  Pulmonary:      Effort: Pulmonary effort is normal.      Breath sounds: Normal breath sounds. No stridor. No wheezing or rhonchi.   Abdominal:      General: Abdomen is flat. Bowel sounds are normal. There is no distension.      Palpations: Abdomen is soft.      Tenderness: There is no abdominal tenderness. There is no right CVA tenderness or left CVA tenderness.   Musculoskeletal:         General: No swelling or tenderness. Normal range of motion.      Cervical back: Normal range of motion and neck supple.      Right  lower leg: No edema.      Left lower leg: No edema.   Skin:     General: Skin is warm and dry.      Findings: No rash.   Neurological:      General: No focal deficit present.      Mental Status: She is alert and oriented to person, place, and time.      Sensory: No sensory deficit.      Motor: No weakness.      Coordination: Coordination normal.   Psychiatric:         Mood and Affect: Mood normal.         ED Course                 Procedures              Critical Care time:  none               Results for orders placed or performed during the hospital encounter of 03/23/23 (from the past 24 hour(s))   [facility] Draw    Narrative    The following orders were created for panel order [facility] Draw.  Procedure                               Abnormality         Status                     ---------                               -----------         ------                     Extra Blue Top Tube[[id number]]                                                         Extra Red Top Tube[[id number]]                                                          Extra Green Top (Lithium...[[id number]]                                                 Extra Purple Top Tube[[id number]]                                                         Please view results for these tests on the individual orders.       Medications   dextrose 5% in lactated ringers infusion (1,000 mLs Intravenous $New Bag 3/23/23 0926)   ondansetron (ZOFRAN) injection 4 mg (4 mg Intravenous $Given 3/23/23 0926)       Assessments & Plan (with Medical Decision Making) records were reviewed including past medical history, meds and OB visit from [facility] and urgent care visit on 3/14/2023.  Labs were ordered.  Patient was given Zofran and D5LR.  White count was 9.1 hemoglobin 14.5 platelet count was 358 there was no left shift.  Comprehensive metabolic panel without significant abnormality lipase was 34.  Patient was observed for some time and had significant improvement of her symptoms.   On reevaluation her vital signs are stable she appears in no acute distress and is requesting discharge.  She is advised to follow-up with her primary care/OB early next week for recheck gradually advance diet as tolerated take Zofran as needed and return if symptoms worsen or new symptoms develop.  Patient is agreement this plan.     I have reviewed the nursing notes.    I have reviewed the findings, diagnosis, plan and need for follow up with the patient.           Medical Decision Making      Discharge Medication List as of 3/23/2023 11:39 AM      START taking these medications    Details   ondansetron (ZOFRAN ODT) 4 MG ODT tab Take 1 tablet (4 mg) by mouth every 8 hours as needed for nausea, Disp-10 tablet, R-0, Local Print             Final diagnoses:   Persistent hyperemesis vomiting, arising during pregnancy       3/23/2023   [facility] EMERGENCY DEPT     Komal, German Nieves MD  03/23/23 2010

## 2023-03-24 ENCOUNTER — PATIENT OUTREACH (OUTPATIENT)
Dept: FAMILY MEDICINE | Facility: CLINIC | Age: 33
End: 2023-03-24
Payer: COMMERCIAL

## 2023-03-24 RX ORDER — NICOTINE 21 MG/24HR
1 PATCH, TRANSDERMAL 24 HOURS TRANSDERMAL EVERY 24 HOURS
Qty: 14 PATCH | Refills: 0 | Status: SHIPPED | OUTPATIENT
Start: 2023-03-24 | End: 2023-07-19

## 2023-03-24 NOTE — TELEPHONE ENCOUNTER
"ED/Discharge Protocol    \"Hi, my name is Keiry Villarreal RN, a registered nurse, and I am calling on behalf of Susana Mccollum's office at [place].  I am calling to follow up and see how things are going for you after your recent visit.\"    \"I see that you were in the (ER/UC/IP) on 03/23/23.    How are you doing now that you are home?\" Still trying to keep everything down\".    Is patient experiencing symptoms that may require a hospital visit?  No    Discharge Instructions    \"Let's review your discharge instructions.  What is/are the follow-up recommendations?  Pt. Response: Patient will  her new prescription for Zofran today. She is aware of Susana Mccollum's My chart recommendation for trying vitamin B6.   \"Were you instructed to make a follow-up appointment?\"  Pt. Response: Yes, follow up with OB  Has appointment been made?   Yes      \"When you see the provider, I would recommend that you bring your discharge instructions with you.    Medications    \"How many new medications are you on since your hospitalization/ED visit?\"    0-1  \"How many of your current medicines changed (dose, timing, name, etc.) while you were in the hospital/ED visit?\"   0-1  \"Do you have questions about your medications?\"   Yes. She is aware to talk to OB prior to starting nicotine patches.   \"Were you newly diagnosed with heart failure, COPD, diabetes or did you have a heart attack?\"   No  For patients on insulin: \"Did you start on insulin in the hospital or did you have your insulin dose changed?\"   No  Post Discharge Medication Reconciliation Status: patient was not discharged from an inpatient facility or TCU.    Was MTM referral placed (*Make sure to put transitions as reason for referral)?   No    Call Summary    \"Do you have any questions or concerns about your condition or care plan at the moment?\"    Yes. Patient unsure if her 1st OB appointment includes an ultrasound. She would prefer to schedule in [place] if no " "ultrasound ordered. I see the appt is scheduled for 04/05/23 with Dr Goddard in OB procedure room 2. I do not know what that means. Will route telephone encounter to OB to clarify.  Patient also wondering if OB can advise regarding starting nicotine patches before her appt on 04/05.   Triage nurse advice given: Monitor for dehydration. Return to Er if no urine out in 8 hours, Vomiting continues and feels weak/dizzy.     Patient was in ER 3 in the past year (assess appropriateness of ER visits.)      \"If you have questions or things don't continue to improve, we encourage you contact us through the main clinic number,  [phone].  Even if the clinic is not open, triage nurses are available 24/7 to help you.     We would like you to know that our clinic has extended hours (provide information).  We also have urgent care (provide details on closest location and hours/contact info)\"      \"Thank you for your time and take care!\"  Keiry KIM RN          "

## 2023-03-24 NOTE — TELEPHONE ENCOUNTER
Patient returning call.  Patient advised that nicotine patch can be used in pregnancy to aid in smoking cessation.  Patient advised US is done with each first OB appointment.  Patient changed appointment to NB location with Dr. Lerner on Friday 3/31/2023.    Morenita TERRELL   Ob/Gyn Clinic

## 2023-03-24 NOTE — TELEPHONE ENCOUNTER
Return call to pt.  Unable to reach.  Left message for pt to return call to clinic.      Morenita Iqbal   Ob/Gyn Clinic  RN

## 2023-03-24 NOTE — TELEPHONE ENCOUNTER
Please notify patient 14 mg patches sent through to the pharmacy for 2-week supply.  Please advise patient to not smoke while nicotine patch is in place.  Would highly recommend she discuss with OB/GYN for further recommendations on smoking cessation during pregnancy.    Thanks,  Ssuana Mccollum, DNP, APRN-CNP

## 2023-03-30 LAB
ABO/RH(D): NORMAL
ANTIBODY SCREEN: NEGATIVE
SPECIMEN EXPIRATION DATE: NORMAL

## 2023-03-31 ENCOUNTER — PRENATAL OFFICE VISIT (OUTPATIENT)
Dept: OBGYN | Facility: CLINIC | Age: 33
End: 2023-03-31
Payer: COMMERCIAL

## 2023-03-31 VITALS
HEIGHT: 60 IN | HEART RATE: 92 BPM | TEMPERATURE: 98.8 F | WEIGHT: 136.8 LBS | RESPIRATION RATE: 16 BRPM | DIASTOLIC BLOOD PRESSURE: 82 MMHG | BODY MASS INDEX: 26.86 KG/M2 | SYSTOLIC BLOOD PRESSURE: 125 MMHG

## 2023-03-31 DIAGNOSIS — Z23 HIGH PRIORITY FOR 2019-NCOV VACCINE: ICD-10-CM

## 2023-03-31 DIAGNOSIS — M79.10 MYALGIA: ICD-10-CM

## 2023-03-31 DIAGNOSIS — Z34.81 PRENATAL CARE, SUBSEQUENT PREGNANCY, FIRST TRIMESTER: Primary | ICD-10-CM

## 2023-03-31 DIAGNOSIS — R11.0 NAUSEA: ICD-10-CM

## 2023-03-31 DIAGNOSIS — J06.9 VIRAL URI WITH COUGH: ICD-10-CM

## 2023-03-31 DIAGNOSIS — R68.83 CHILLS: ICD-10-CM

## 2023-03-31 LAB
ALBUMIN UR-MCNC: NEGATIVE MG/DL
APPEARANCE UR: CLEAR
BACTERIA #/AREA URNS HPF: ABNORMAL /HPF
BILIRUB UR QL STRIP: NEGATIVE
COLOR UR AUTO: YELLOW
ERYTHROCYTE [DISTWIDTH] IN BLOOD BY AUTOMATED COUNT: 12.3 % (ref 10–15)
GLUCOSE UR STRIP-MCNC: NEGATIVE MG/DL
HCT VFR BLD AUTO: 42.2 % (ref 35–47)
HGB BLD-MCNC: 14.3 G/DL (ref 11.7–15.7)
HGB UR QL STRIP: ABNORMAL
KETONES UR STRIP-MCNC: NEGATIVE MG/DL
LEUKOCYTE ESTERASE UR QL STRIP: NEGATIVE
MCH RBC QN AUTO: 32.9 PG (ref 26.5–33)
MCHC RBC AUTO-ENTMCNC: 33.9 G/DL (ref 31.5–36.5)
MCV RBC AUTO: 97 FL (ref 78–100)
NITRATE UR QL: NEGATIVE
PH UR STRIP: 5.5 [PH] (ref 5–7)
PLATELET # BLD AUTO: 303 10E3/UL (ref 150–450)
RBC # BLD AUTO: 4.35 10E6/UL (ref 3.8–5.2)
RBC #/AREA URNS AUTO: ABNORMAL /HPF
SP GR UR STRIP: 1.02 (ref 1–1.03)
SQUAMOUS #/AREA URNS AUTO: ABNORMAL /LPF
UROBILINOGEN UR STRIP-ACNC: 0.2 E.U./DL
WBC # BLD AUTO: 11 10E3/UL (ref 4–11)
WBC #/AREA URNS AUTO: ABNORMAL /HPF

## 2023-03-31 PROCEDURE — 86762 RUBELLA ANTIBODY: CPT | Performed by: OBSTETRICS & GYNECOLOGY

## 2023-03-31 PROCEDURE — 87340 HEPATITIS B SURFACE AG IA: CPT | Performed by: OBSTETRICS & GYNECOLOGY

## 2023-03-31 PROCEDURE — 87086 URINE CULTURE/COLONY COUNT: CPT | Performed by: OBSTETRICS & GYNECOLOGY

## 2023-03-31 PROCEDURE — 87491 CHLMYD TRACH DNA AMP PROBE: CPT | Performed by: OBSTETRICS & GYNECOLOGY

## 2023-03-31 PROCEDURE — 86850 RBC ANTIBODY SCREEN: CPT | Performed by: OBSTETRICS & GYNECOLOGY

## 2023-03-31 PROCEDURE — 86780 TREPONEMA PALLIDUM: CPT | Performed by: OBSTETRICS & GYNECOLOGY

## 2023-03-31 PROCEDURE — 86901 BLOOD TYPING SEROLOGIC RH(D): CPT | Performed by: OBSTETRICS & GYNECOLOGY

## 2023-03-31 PROCEDURE — 86803 HEPATITIS C AB TEST: CPT | Performed by: OBSTETRICS & GYNECOLOGY

## 2023-03-31 PROCEDURE — 99207 PR FIRST OB VISIT: CPT | Performed by: OBSTETRICS & GYNECOLOGY

## 2023-03-31 PROCEDURE — 0051A COVID-19 VACCINE 12+ (PFIZER): CPT | Performed by: OBSTETRICS & GYNECOLOGY

## 2023-03-31 PROCEDURE — 86900 BLOOD TYPING SEROLOGIC ABO: CPT | Performed by: OBSTETRICS & GYNECOLOGY

## 2023-03-31 PROCEDURE — 87591 N.GONORRHOEAE DNA AMP PROB: CPT | Performed by: OBSTETRICS & GYNECOLOGY

## 2023-03-31 PROCEDURE — 87389 HIV-1 AG W/HIV-1&-2 AB AG IA: CPT | Performed by: OBSTETRICS & GYNECOLOGY

## 2023-03-31 PROCEDURE — 81001 URINALYSIS AUTO W/SCOPE: CPT | Performed by: OBSTETRICS & GYNECOLOGY

## 2023-03-31 PROCEDURE — 91305 COVID-19 VACCINE 12+ (PFIZER): CPT | Performed by: OBSTETRICS & GYNECOLOGY

## 2023-03-31 PROCEDURE — 85027 COMPLETE CBC AUTOMATED: CPT | Performed by: OBSTETRICS & GYNECOLOGY

## 2023-03-31 PROCEDURE — 76817 TRANSVAGINAL US OBSTETRIC: CPT | Performed by: OBSTETRICS & GYNECOLOGY

## 2023-03-31 PROCEDURE — 36415 COLL VENOUS BLD VENIPUNCTURE: CPT | Performed by: OBSTETRICS & GYNECOLOGY

## 2023-03-31 RX ORDER — MECLIZINE HYDROCHLORIDE 25 MG/1
25 TABLET ORAL 3 TIMES DAILY PRN
Qty: 30 TABLET | Refills: 3 | Status: SHIPPED | OUTPATIENT
Start: 2023-03-31 | End: 2023-07-19

## 2023-03-31 RX ORDER — ONDANSETRON 4 MG/1
4-8 TABLET, FILM COATED ORAL EVERY 8 HOURS PRN
Qty: 30 TABLET | Refills: 3 | Status: ON HOLD | OUTPATIENT
Start: 2023-03-31 | End: 2023-09-07

## 2023-03-31 RX ORDER — ONDANSETRON 4 MG/1
4 TABLET, FILM COATED ORAL EVERY 8 HOURS PRN
Qty: 12 TABLET | Refills: 0 | Status: SHIPPED | OUTPATIENT
Start: 2023-03-31 | End: 2023-08-16

## 2023-03-31 RX ORDER — DOXYLAMINE SUCCINATE AND PYRIDOXINE HYDROCHLORIDE, DELAYED RELEASE TABLETS 10 MG/10 MG 10; 10 MG/1; MG/1
1 TABLET, DELAYED RELEASE ORAL 2 TIMES DAILY PRN
Qty: 30 TABLET | Refills: 3 | Status: SHIPPED | OUTPATIENT
Start: 2023-03-31 | End: 2023-08-16

## 2023-03-31 RX ORDER — METOCLOPRAMIDE 5 MG/1
5 TABLET ORAL
Qty: 30 TABLET | Refills: 3 | Status: SHIPPED | OUTPATIENT
Start: 2023-03-31 | End: 2023-07-19

## 2023-03-31 RX ORDER — ALBUTEROL SULFATE 90 UG/1
1-2 AEROSOL, METERED RESPIRATORY (INHALATION) EVERY 6 HOURS PRN
Qty: 16 G | Refills: 1 | Status: SHIPPED | OUTPATIENT
Start: 2023-03-31 | End: 2023-06-21

## 2023-03-31 NOTE — PROGRESS NOTES
[facility]   OB/GYN Clinic    CC: New OB     Subjective:    Ana Maria is a 32 year old  at ~8wks by Patient's last menstrual period was 2023. who presents for her initial OB visit. This is a planned pregnancy and her and her partner are excited. Having significant nausea and vomiting. Waking up every hour in the middle of the night starving, peeing and vomiting. Had to go the ER for IVF. Zofran alone not working. Has lost 6lbs since last doctor appt.   Irregular periods prior to getting pregnant.       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   Shahbaz - Perthes Disease (femur collapse); follows at [facility]    Past Medical History:   Diagnosis Date     ADHD (attention deficit hyperactivity disorder)      Blood clot in bladder 2001    right kidney involved and went into kidney failure     Chickenpox     x2     Chlamydia      Factor V Leiden (H)      GERD (gastroesophageal reflux disease)      History of urinary tract infection      Postpartum depression     loss of child     Renal failure     secondary to blood clot in      Uncomplicated asthma      Was previously on blood thinners for a kidney blood clot for 6 months. Now just on ASA    Past Surgical History:   Procedure Laterality Date     ABDOMEN SURGERY      2 c sections  @      ESOPHAGOSCOPY, GASTROSCOPY, DUODENOSCOPY (EGD), COMBINED N/A 2021    Procedure: ESOPHAGOGASTRODUODENOSCOPY, WITH BIOPSY;  Surgeon: German Flynn MD;  Location: [facility]     ORTHOPEDIC SURGERY       Had surgery on both big toes     TONSILLECTOMY         Current Outpatient Medications   Medication Sig Dispense Refill     aspirin (ASA) 81 MG EC tablet Take 81 mg by mouth       ondansetron (ZOFRAN ODT) 4 MG ODT tab Take 1 tablet (4 mg) by mouth every 8 hours as needed for nausea 10 tablet 0     Prenatal Vit-Fe Fumarate-FA (PRENATAL MULTIVITAMIN W/IRON) 27-0.8 MG tablet Take 1 tablet by mouth daily       albuterol (PROAIR HFA/PROVENTIL HFA/VENTOLIN HFA) 108 (90 Base) MCG/ACT inhaler Inhale 1-2 puffs into the lungs every 6 hours as needed for shortness of breath / dyspnea or wheezing (Patient not taking: Reported on 3/16/2023) 16 g 1     EPINEPHrine (ANY BX GENERIC EQUIV) 0.3 MG/0.3ML injection 2-pack Inject 0.3 mLs (0.3 mg) into the muscle as needed for anaphylaxis (Patient not taking: Reported on 3/31/2023) 1 each 1     ketoconazole (NIZORAL) 2 % external cream Apply topically daily (Patient not taking: Reported on 3/31/2023) 30 g 3     ketoconazole (NIZORAL) 2 % external shampoo Use shampoo daily for flaky skin (Patient not taking: Reported on 3/31/2023) 120 mL 4     nicotine (NICODERM CQ) 14 MG/24HR 24 hr patch Place 1 patch onto the skin every 24 hours (Patient not taking: Reported on 3/31/2023) 14 patch 0     nystatin (MYCOSTATIN) 354879 UNIT/GM external powder Apply topically 2 times daily (Patient not taking: Reported on 3/16/2023) 45 g 0     ondansetron (ZOFRAN) 4 MG tablet Take 1 tablet (4 mg) by mouth every 8 hours as needed for nausea (Patient not taking: Reported on 3/16/2023) 12 tablet 0       Family History   Problem Relation Age of Onset     Depression Mother      Mental Illness Mother 56        suicide-2011     Obesity Mother      Connective Tissue Disorder Mother         EDS     Alcoholism Mother      Diabetes Father      Obesity Father      Deep Vein Thrombosis Father         double amputee below knee     Asthma Sister      Thyroid Disease Sister      Obesity Sister      Asthma Brother       "Obesity Brother      Obesity Paternal Grandmother      Heart Defect Daughter          at 7 weeks after 3 heart surgeries     Genetic Disorder Daughter         Avascular necrosis and Legg calve perthes disease     Connective Tissue Disorder Maternal Cousin         EDS     Connective Tissue Disorder Maternal Cousin         EDS       Social History     Tobacco Use     Smoking status: Every Day     Packs/day: 0.20     Years: 10.00     Pack years: 2.00     Types: Cigarettes     Start date: 3/19/2009     Last attempt to quit: 2021     Years since quittin.7     Smokeless tobacco: Never     Tobacco comments:     Down to 2-3 cig/day with pregnancy   Substance Use Topics     Alcohol use: No   Is down to 1x cigarette a day.     ROS: A 10 pt ROS was completed and found to be negative unless mentioned in the HPI.     Objective:  /82 (BP Location: Right arm, Patient Position: Chair, Cuff Size: Adult Regular)   Pulse 92   Temp 98.8  F (37.1  C) (Tympanic)   Resp 16   Ht 1.518 m (4' 11.75\")   Wt 62.1 kg (136 lb 12.8 oz)   LMP 2023   BMI 26.94 kg/m      Estimated body mass index is 26.94 kg/m  as calculated from the following:    Height as of this encounter: 1.518 m (4' 11.75\").    Weight as of this encounter: 62.1 kg (136 lb 12.8 oz).    Physical Exam:  Gen: Pleasant, talkative female in no apparent distress   Respiratory: Lungs clear, breathing comfortably on room air   Cardiac: Regular rate and rhythm with no murmurs, gallops or rubs. Warm and well-perfused.   GI: Abd soft and non-tender  : External genitalia is free of lesion. Urethra and bartholin glands normal.  Vaginal mucosa is moist and pink without unusual discharge.  Cervix is without lesions or discharge.  Pap smear and endocervical sampling obtained without incident.  Bimanual exam reveals mobile anteverted uterus without cervical motion tenderness.  Adenexa are mobile and non-tender bilaterally. No appreciable adnexal enlargement. " Uterus is consistent with a 6 week gestation.   MSK: Grossly normal movement of all four extremities  Psych: mood and affect bright   Lower extremity: edema not present     Transvaginal US: [facility] IUP measuring 6w1d, +cardiac activity at 136 bpm          Assessment/Plan:   32 year old  at 6w1d by 6w1d US who presents for her initial OB visit.   1) Plan to draw new OB lab today (T&S, CBC, HIV, RPR, HepBsAg, Hep B antibody, rubella, GC/Chlam, UC)  2) Discussed routine prenatal care, group practice model at [facility], tertiary support and frequency of visits. Options for  testing for chromosomal and birth defects were discussed with the patient including nuchal translucency/blood marker testing in the first trimester, progenity and quad screening. She would like NIPT   3) Plan for dating/viability scan now - SUDHA 23  4) Concerns: n/v; sent zofran, reglan, diclegis, meclizine   5) PMH/OBHx problems:    - Hx of c/s x2; wants repeat   - Hx of Factor V Leiden and kidney clot; [facility] referral for ?anticoagluation management    - Hx of child with complex cardiac defect and death as an infant and perthes disease; MFM referral, genetics, L2, fetal ECHO   - tobacco use; is done to 1x cigarette a day    - hx of PPD; no meds currently   6) Medication review: no changes, continue prenatal vitamin   7) Reviewed ectopic and miscarriage precautions (present to ED or call clinic with abdominal pain, vaginal bleeding or fever)   8) Weight gain: discussed weight gain expectations (BMI 25-30: 15-25lbs)   9) PAP smear: TUD  10) Delivery plan: continue to discuss - repeat c/s, breastfeeding, pp contraception, pain management in labor - continue to discuss  11) Immunizations: flu shot - declines, Tdap at 28wks, COVID. Discussed risk of COVID in pregnancy including a doubled risk of needing ICU levels care, intubation or death. Recommended social distancing, mask-wearing and avoiding unnecessary contacts. I also  recommended the COVID in pregnancy per CDCs recommendations.  Ok with COVID shot today!    Return to clinic in 2 weeks to confirm viability.     Katharina Lerner MD  OB/GYN  3/31/2023

## 2023-03-31 NOTE — PROGRESS NOTES
"Initial /82 (BP Location: Right arm, Patient Position: Chair, Cuff Size: Adult Regular)   Pulse 92   Temp 98.8  F (37.1  C) (Tympanic)   Resp 16   Ht 1.518 m (4' 11.75\")   Wt 62.1 kg (136 lb 12.8 oz)   LMP 02/02/2023   BMI 26.94 kg/m   Estimated body mass index is 26.94 kg/m  as calculated from the following:    Height as of this encounter: 1.518 m (4' 11.75\").    Weight as of this encounter: 62.1 kg (136 lb 12.8 oz). .      "

## 2023-04-01 LAB
C TRACH DNA SPEC QL PROBE+SIG AMP: NEGATIVE
N GONORRHOEA DNA SPEC QL NAA+PROBE: NEGATIVE
T PALLIDUM AB SER QL: NONREACTIVE

## 2023-04-02 LAB — BACTERIA UR CULT: NORMAL

## 2023-04-03 LAB
HBV SURFACE AG SERPL QL IA: NONREACTIVE
HCV AB SERPL QL IA: NONREACTIVE
HIV 1+2 AB+HIV1 P24 AG SERPL QL IA: NONREACTIVE
RUBV IGG SERPL QL IA: 13.5 INDEX
RUBV IGG SERPL QL IA: POSITIVE

## 2023-04-05 DIAGNOSIS — D68.51 FACTOR V LEIDEN MUTATION (H): Primary | ICD-10-CM

## 2023-04-05 DIAGNOSIS — N28.0 RENAL INFARCTION (H): ICD-10-CM

## 2023-04-11 ENCOUNTER — OFFICE VISIT (OUTPATIENT)
Dept: FAMILY MEDICINE | Facility: CLINIC | Age: 33
End: 2023-04-11
Payer: COMMERCIAL

## 2023-04-11 ENCOUNTER — HOSPITAL ENCOUNTER (OUTPATIENT)
Dept: ULTRASOUND IMAGING | Facility: CLINIC | Age: 33
Discharge: HOME | End: 2023-04-11
Attending: STUDENT IN AN ORGANIZED HEALTH CARE EDUCATION/TRAINING PROGRAM | Admitting: STUDENT IN AN ORGANIZED HEALTH CARE EDUCATION/TRAINING PROGRAM
Payer: COMMERCIAL

## 2023-04-11 VITALS
HEART RATE: 88 BPM | TEMPERATURE: 98.3 F | WEIGHT: 137.2 LBS | SYSTOLIC BLOOD PRESSURE: 108 MMHG | BODY MASS INDEX: 26.93 KG/M2 | HEIGHT: 60 IN | DIASTOLIC BLOOD PRESSURE: 66 MMHG | OXYGEN SATURATION: 99 % | RESPIRATION RATE: 16 BRPM

## 2023-04-11 DIAGNOSIS — M79.662 PAIN OF LEFT LOWER LEG: Primary | ICD-10-CM

## 2023-04-11 DIAGNOSIS — M62.838 MUSCLE SPASM OF LEFT LOWER EXTREMITY: ICD-10-CM

## 2023-04-11 DIAGNOSIS — M79.662 PAIN OF LEFT LOWER LEG: ICD-10-CM

## 2023-04-11 DIAGNOSIS — Z86.718 HISTORY OF BLOOD CLOTS: ICD-10-CM

## 2023-04-11 LAB
ALBUMIN SERPL BCG-MCNC: 4.1 G/DL (ref 3.5–5.2)
ALP SERPL-CCNC: 70 U/L (ref 35–104)
ALT SERPL W P-5'-P-CCNC: 13 U/L (ref 10–35)
ANION GAP SERPL CALCULATED.3IONS-SCNC: 12 MMOL/L (ref 7–15)
AST SERPL W P-5'-P-CCNC: 17 U/L (ref 10–35)
BILIRUB SERPL-MCNC: <0.2 MG/DL
BUN SERPL-MCNC: 17.2 MG/DL (ref 6–20)
CALCIUM SERPL-MCNC: 9.5 MG/DL (ref 8.6–10)
CHLORIDE SERPL-SCNC: 103 MMOL/L (ref 98–107)
CREAT SERPL-MCNC: 0.8 MG/DL (ref 0.51–0.95)
DEPRECATED HCO3 PLAS-SCNC: 23 MMOL/L (ref 22–29)
GFR SERPL CREATININE-BSD FRML MDRD: >90 ML/MIN/1.73M2
GLUCOSE SERPL-MCNC: 83 MG/DL (ref 70–99)
HCG INTACT+B SERPL-ACNC: ABNORMAL MIU/ML
MAGNESIUM SERPL-MCNC: 2.2 MG/DL (ref 1.7–2.3)
PHOSPHATE SERPL-MCNC: 4.1 MG/DL (ref 2.5–4.5)
POTASSIUM SERPL-SCNC: 4.4 MMOL/L (ref 3.4–5.3)
PROT SERPL-MCNC: 6.9 G/DL (ref 6.4–8.3)
SODIUM SERPL-SCNC: 138 MMOL/L (ref 136–145)

## 2023-04-11 PROCEDURE — 93970 EXTREMITY STUDY: CPT

## 2023-04-11 PROCEDURE — 83735 ASSAY OF MAGNESIUM: CPT | Performed by: STUDENT IN AN ORGANIZED HEALTH CARE EDUCATION/TRAINING PROGRAM

## 2023-04-11 PROCEDURE — 84100 ASSAY OF PHOSPHORUS: CPT | Performed by: STUDENT IN AN ORGANIZED HEALTH CARE EDUCATION/TRAINING PROGRAM

## 2023-04-11 PROCEDURE — 80053 COMPREHEN METABOLIC PANEL: CPT | Performed by: STUDENT IN AN ORGANIZED HEALTH CARE EDUCATION/TRAINING PROGRAM

## 2023-04-11 PROCEDURE — 99215 OFFICE O/P EST HI 40 MIN: CPT | Performed by: STUDENT IN AN ORGANIZED HEALTH CARE EDUCATION/TRAINING PROGRAM

## 2023-04-11 PROCEDURE — 84702 CHORIONIC GONADOTROPIN TEST: CPT | Performed by: STUDENT IN AN ORGANIZED HEALTH CARE EDUCATION/TRAINING PROGRAM

## 2023-04-11 PROCEDURE — 36415 COLL VENOUS BLD VENIPUNCTURE: CPT | Performed by: STUDENT IN AN ORGANIZED HEALTH CARE EDUCATION/TRAINING PROGRAM

## 2023-04-11 ASSESSMENT — ANXIETY QUESTIONNAIRES
1. FEELING NERVOUS, ANXIOUS, OR ON EDGE: NOT AT ALL
7. FEELING AFRAID AS IF SOMETHING AWFUL MIGHT HAPPEN: NOT AT ALL
5. BEING SO RESTLESS THAT IT IS HARD TO SIT STILL: NOT AT ALL
IF YOU CHECKED OFF ANY PROBLEMS ON THIS QUESTIONNAIRE, HOW DIFFICULT HAVE THESE PROBLEMS MADE IT FOR YOU TO DO YOUR WORK, TAKE CARE OF THINGS AT HOME, OR GET ALONG WITH OTHER PEOPLE: NOT DIFFICULT AT ALL
6. BECOMING EASILY ANNOYED OR IRRITABLE: NOT AT ALL
7. FEELING AFRAID AS IF SOMETHING AWFUL MIGHT HAPPEN: NOT AT ALL
GAD7 TOTAL SCORE: 0
8. IF YOU CHECKED OFF ANY PROBLEMS, HOW DIFFICULT HAVE THESE MADE IT FOR YOU TO DO YOUR WORK, TAKE CARE OF THINGS AT HOME, OR GET ALONG WITH OTHER PEOPLE?: NOT DIFFICULT AT ALL
2. NOT BEING ABLE TO STOP OR CONTROL WORRYING: NOT AT ALL
GAD7 TOTAL SCORE: 0
4. TROUBLE RELAXING: NOT AT ALL
3. WORRYING TOO MUCH ABOUT DIFFERENT THINGS: NOT AT ALL
GAD7 TOTAL SCORE: 0

## 2023-04-11 ASSESSMENT — PATIENT HEALTH QUESTIONNAIRE - PHQ9
10. IF YOU CHECKED OFF ANY PROBLEMS, HOW DIFFICULT HAVE THESE PROBLEMS MADE IT FOR YOU TO DO YOUR WORK, TAKE CARE OF THINGS AT HOME, OR GET ALONG WITH OTHER PEOPLE: NOT DIFFICULT AT ALL
SUM OF ALL RESPONSES TO PHQ QUESTIONS 1-9: 2
SUM OF ALL RESPONSES TO PHQ QUESTIONS 1-9: 2

## 2023-04-11 ASSESSMENT — PAIN SCALES - GENERAL: PAINLEVEL: NO PAIN (0)

## 2023-04-11 NOTE — RESULT ENCOUNTER NOTE
Results discussed directly with patient while patient was present. Any further details documented in the note.   SILAS ROBERTSON MD

## 2023-04-11 NOTE — LETTER
April 11, 2023      Ana Maria Espinoza  [address] [place] [id number]        To Whom It May Concern:    Ana Maria Espinoza  was seen by me on 4/11/23.  Please excuse her  until 4/13/23 due to medical condition.      Sincerely,        SILAS ROBERTSON MD

## 2023-04-11 NOTE — PROGRESS NOTES
1. Pain of left lower leg  2. History of blood clots  3. Muscle spasm of left lower extremity  > attempted to call OB RN line, call went unanswered   > went upstairs to speak with providers who recommended transvaginal ultrasound (patient has an appointment for [facility] first trimester ultrasound this upcoming Friday)   > US Lower Extremity Venous Duplex Bilateral ruled out bilateral DVT   - hCG Quantitative Pregnancy to trend, although lower suspicion for ectopic pregnancy given ultrasound findings did show IUP   - US Pelvic Complete with Transvaginal; Future  - Comprehensive metabolic panel (BMP + Alb, Alk Phos, ALT, AST, Total. Bili, TP)  - Magnesium  - Phosphorus  - encourage hydration and gentle stretching   - hCG Quantitative Pregnancy  -Informed patient that it is possible for people to experience intermittent pain and cramping in early weeks of pregnancy, however strict ED/UC precautions were discussed    Given her history of having DVTs , current pregnancy, and factor V Leiden, I wonder if the patient would benefit from anticoagulation with Lovenox for the duration of her pregnancy and post partum. Will defer this decision to the expertise of [facility].     Erin Saini MD       Doris Rodgers is a 32 year old, presenting for the following health issues:  Musculoskeletal Problem (Leg spasms )        4/11/2023     3:00 PM   Additional Questions   Roomed by Cielo PRATT LPN   Accompanied by Boyfriend         4/11/2023     3:00 PM   Patient Reported Additional Medications   Patient reports taking the following new medications no new meds     Musculoskeletal Problem  This is a new (left leg spasms) problem. The current episode started in the past 7 days (2-3 days, worse at night). The problem occurs intermittently (more frequent in the evenings and over night). The problem has been unchanged (concerned it may be another blood clot). Associated symptoms comments: Not sure  . Nothing aggravates the symptoms. She has  "tried position changes and lying down (getting up, walking around - doesn't help) for the symptoms. The treatment provided no relief.   History of Present Illness       Reason for visit:   reoccurring pain in left ovary area  Symptom onset:  3-7 days ago  Symptoms include:  Reoccurring pain in left ovary area that comes and goes  Symptom intensity:  Mild  Symptom progression:  Staying the same (intermittently)  Had these symptoms before:  No  What makes it worse:  No  What makes it better:  No    She eats 4 or more servings of fruits and vegetables daily.She consumes 1 sweetened beverage(s) daily.She exercises with enough effort to increase her heart rate 30 to 60 minutes per day.  She exercises with enough effort to increase her heart rate 6 days per week.   She is taking medications regularly.    Today's PHQ-9         PHQ-9 Total Score: 2    PHQ-9 Q9 Thoughts of better off dead/self-harm past 2 weeks :   Not at all    How difficult have these problems made it for you to do your work, take care of things at home, or get along with other people: Not difficult at all  Today's DEENA-7 Score: 0     GA will be 8 weeks this Thursday      Denies any vaginal bleeding   Occasional cramping, but not frequently   Denies any shortness of breath, dyspnea   Over the past two weeks has been more easily tired     Had a patellar dislocation in her right leg, but no trauma to the left leg   Starting to get muscle spasms daily     Medications:   On ASA, prenatals, and antinausea medications       Review of Systems   As above       Objective    /66 (BP Location: Right arm, Patient Position: Sitting, Cuff Size: Adult Regular)   Pulse 88   Temp 98.3  F (36.8  C) (Tympanic)   Resp 16   Ht 1.518 m (4' 11.75\")   Wt 62.2 kg (137 lb 3.2 oz)   LMP 2023   SpO2 99%   BMI 27.02 kg/m    Body mass index is 27.02 kg/m .  Physical Exam  Constitutional:       General: She is not in acute distress.     Appearance: Normal " appearance. She is not ill-appearing, toxic-appearing or diaphoretic.   HENT:      Head: Normocephalic and atraumatic.      Right Ear: External ear normal.      Left Ear: External ear normal.   Eyes:      General: No scleral icterus.        Right eye: No discharge.         Left eye: No discharge.      Extraocular Movements: Extraocular movements intact.      Conjunctiva/sclera: Conjunctivae normal.   Pulmonary:      Effort: Pulmonary effort is normal. No respiratory distress.   Musculoskeletal:         General: Normal range of motion.      Cervical back: Normal range of motion and neck supple. No rigidity.      Comments: Right leg was more enlarged than the left, although patient did have tenderness to palpation along the posterior medial aspect of the distal left lower extremity    Skin:     General: Skin is warm.      Findings: No rash.      Comments: No rash on exposed skin   Neurological:      General: No focal deficit present.      Mental Status: She is alert and oriented to person, place, and time.   Psychiatric:         Mood and Affect: Mood normal.         Behavior: Behavior normal.

## 2023-04-12 NOTE — RESULT ENCOUNTER NOTE
Ajit Espinoza,     It is a pleasure providing you with medical care. I have received and reviewed your lab results, and have the following recommendations:     Your electrolytes are within normal limits including your magnesium and phosphorous levels. Please make sure you are getting adequate levels of fluids (at least 64oz daily) and have close follow up with your OBGYN with your transvaginal ultrasound. Additionally, please keep in mind our discussions regarding strict Emergency Room and/or Urgent Care precautions.       Sincerely,     Erin Saini MD

## 2023-04-13 ENCOUNTER — HOSPITAL ENCOUNTER (OUTPATIENT)
Dept: ULTRASOUND IMAGING | Facility: HOSPITAL | Age: 33
Discharge: HOME | End: 2023-04-13
Attending: STUDENT IN AN ORGANIZED HEALTH CARE EDUCATION/TRAINING PROGRAM | Admitting: STUDENT IN AN ORGANIZED HEALTH CARE EDUCATION/TRAINING PROGRAM
Payer: COMMERCIAL

## 2023-04-13 ENCOUNTER — TELEPHONE (OUTPATIENT)
Dept: FAMILY MEDICINE | Facility: CLINIC | Age: 33
End: 2023-04-13
Payer: COMMERCIAL

## 2023-04-13 ENCOUNTER — TELEPHONE (OUTPATIENT)
Dept: FAMILY MEDICINE | Facility: CLINIC | Age: 33
End: 2023-04-13

## 2023-04-13 ENCOUNTER — PRE VISIT (OUTPATIENT)
Dept: MATERNAL FETAL MEDICINE | Facility: CLINIC | Age: 33
End: 2023-04-13
Payer: COMMERCIAL

## 2023-04-13 DIAGNOSIS — R10.31 RLQ ABDOMINAL PAIN: ICD-10-CM

## 2023-04-13 DIAGNOSIS — M62.838 MUSCLE SPASM OF LEFT LOWER EXTREMITY: ICD-10-CM

## 2023-04-13 PROCEDURE — 76801 OB US < 14 WKS SINGLE FETUS: CPT

## 2023-04-13 NOTE — LETTER
April 13, 2023      Ana Maria Espinoza  [address] [place] [id number]        To Whom It May Concern:    Ana Maria Espinoza  was seen on 4/11/23 .  Please excuse her from work today 4/13/23 for medical procedure.        Sincerely,        SILAS ROBERTSON MD

## 2023-04-13 NOTE — TELEPHONE ENCOUNTER
Dr Parveen Mccain from [facility] calls. Unsure what the ultrasound for today is doing. She states she cannot charge a pregnant pt for a transvaginal us so would have to change it to OB less than 14 weeks with transvag which is what pt is getting done tomorrow.   Please clarify order  Kalyn is at [phone]  appt is at 130 today      Uriel Segovia RN

## 2023-04-13 NOTE — TELEPHONE ENCOUNTER
The patient called to request letter to excuse her from work today 4-13-23 for US. The US is scheduled today.  Letter pended.    Thank you    Ana Maria GIBSON RN

## 2023-04-17 NOTE — PROGRESS NOTES
Maternal-Fetal Medicine Consultation    Ana Maria Espinoza  : 1990  MRN: [id number]    Rerferral:  Ana Maria Espinoza is a 33 year old sent by Dr. Lerner from [facility] for MFM consultation.    HPI:  Ana Maria Espinoza is a 33 year old  at 9w4d by LMP consistent with 8w6d US here for MFM consultation regarding heterozygous Factor V Leiden with history of renal clot due to fibromuscular dysplasia with subsequent renal failure in , as well as prior child with complex cardiac defect and death at 7 weeks of life. She is here with her partner, John.    She reports that in , she had right back pain that prompted her to take an ambulance to the ED at [facility]. She had a non-contrast CT scan and was discharged home. She reports she felt that she was labeled as a pain medication seeking. She then went to the ED at [place] the next day, where she had a CT scan with contrast that revealed a blood clot in her renal artery with renal failure on the right. She was on Lovenox, then Apixaban. She     She saw vascular surgery and was supposed to have a repeat CT, but then became pregnant. She states that she was going to see them again in April, but was advised to return after pregnancy when she can have a repeat CT.    She states that she had a Nexplanon in place, but it was recommended to be removed due to her risk of thombosis. This was an unplanned pregnancy, because she was told it was not safe to use hormonal contraception.    Prenatal Care:  Primary OB care this pregnancy has been with Dr. Lerner  from [facility].    OB History    Para Term  AB Living   3 2 1 1 0 1   SAB IAB Ectopic Multiple Live Births   0 0 0 0 2      # Outcome Date GA Lbr Eliot/2nd Weight Sex Delivery Anes PTL Lv   3 Current            2 Term 11 39w4d  3.402 kg (7 lb 8 oz) F CS-Unspec   NOEL      Name: Shahbaz Young  09 33w6d  3.232 kg (7 lb 2 oz) F CS-Unspec   DEC      Birth Comments: congenital  heart defect      Name: Kaylie       Past Medical History:  Past Medical History:   Diagnosis Date     ADHD (attention deficit hyperactivity disorder)      Blood clot in bladder 12/26/2001    right kidney involved and went into kidney failure     Chickenpox     x2     Chlamydia      Factor V Leiden (H)      GERD (gastroesophageal reflux disease)      History of urinary tract infection      Postpartum depression 2009    loss of child     Renal failure     secondary to blood clot in 2001     Uncomplicated asthma 1996     Past Surgical History:  Past Surgical History:   Procedure Laterality Date     ABDOMEN SURGERY      2 c sections 2009 @ 2011     ESOPHAGOSCOPY, GASTROSCOPY, DUODENOSCOPY (EGD), COMBINED N/A 07/20/2021    Procedure: ESOPHAGOGASTRODUODENOSCOPY, WITH BIOPSY;  Surgeon: German Flynn MD;  Location: [facility]     ORTHOPEDIC SURGERY      Had surgery on both big toes     TONSILLECTOMY       Current Medications:  Prior to Admission medications    Medication Sig Last Dose Taking? Auth Provider Long Term End Date   albuterol (PROAIR HFA/PROVENTIL HFA/VENTOLIN HFA) 108 (90 Base) MCG/ACT inhaler Inhale 1-2 puffs into the lungs every 6 hours as needed for shortness of breath or wheezing   Katharina Lerner MD Yes    aspirin (ASA) 81 MG EC tablet Take 81 mg by mouth   Reported, Patient     Doxylamine-Pyridoxine 10-10 MG TBEC Take 1 tablet by mouth 2 times daily as needed (nausea)   Katharina Lerner MD     EPINEPHrine (ANY BX GENERIC EQUIV) 0.3 MG/0.3ML injection 2-pack Inject 0.3 mLs (0.3 mg) into the muscle as needed for anaphylaxis   Susana Mccollum APRN CNP     ketoconazole (NIZORAL) 2 % external cream Apply topically daily   Susana Mccollum APRN CNP     ketoconazole (NIZORAL) 2 % external shampoo Use shampoo daily for flaky skin   Susana Mccollum APRN CNP     meclizine (ANTIVERT) 25 MG tablet Take 1 tablet (25 mg) by mouth 3 times daily as needed for dizziness  Patient taking  "differently: Take 25 mg by mouth 3 times daily as needed for dizziness or nausea   Katharina Lerner MD     metoclopramide (REGLAN) 5 MG tablet Take 1 tablet (5 mg) by mouth 4 times daily (before meals and nightly)   Katharina Lerner MD     nicotine (NICODERM CQ) 14 MG/24HR 24 hr patch Place 1 patch onto the skin every 24 hours   Susana Mccollum APRN CNP     nystatin (MYCOSTATIN) 718468 UNIT/GM external powder Apply topically 2 times daily   Vianey Zamudio APRN CNP     ondansetron (ZOFRAN) 4 MG tablet Take 1 tablet (4 mg) by mouth every 8 hours as needed for nausea   Katharina Lerner MD     ondansetron (ZOFRAN) 4 MG tablet Take 1-2 tablets (4-8 mg) by mouth every 8 hours as needed for nausea   Katharina Lerner MD     Prenatal Vit-Fe Fumarate-FA (PRENATAL MULTIVITAMIN W/IRON) 27-0.8 MG tablet Take 1 tablet by mouth daily   Reported, Patient       Allergies:  Bee venom, Aloe, and Codeine    Social History:   Denies use of alcohol, drugs or smoking.    Family History:  Denies history of genetic disorders, preeclampsia, bleeding disorders, developmental delay.    ROS:  10-point ROS negative except as in HPI     Physical Exam:  /75 (BP Location: Left arm)   Pulse 98   Resp 20   LMP 2023   SpO2 99%     Ultrasound:  See today's ultrasound report under the \"imaging\" tab.    Other Imaging:   LE Doppler 23 - IMPRESSION: No deep venous thrombosis in the visualized bilateral lower extremities.    Assessment/Plan:  Ana Maria Espinoza is a 33 year old  at 9w4d by LMP consistent with 8w6d US here for M consultation regarding:     Factor V Leiden (heterozygous)  Factor V Leiden (FVL) is the most common of the thrombophilic mutations.  The heterozygous state confers a three to eight-fold increased risk of venous thromboembolism whereas homozgyotes have a 25-fold increased risk. A personal or strong family history of venous thromboembolism (VTE) in the setting " of a FVL mutation increases the risk of VTE in pregnancy. For example, heterozygotes of the FVL mutation with a personal or strong family history of VTE have a 10% risk of VTE during pregnancy compared to heterozygotes without a personal or family history (<0.3%).      The role of FVL and other thrombophilias in causing adverse pregnancy outcomes, such as preeclampsia, early and late fetal loss, placental abruption, and fetal growth restriction is controversial. Currently, there is insufficient evidence to conclude that inherited thrombophilias are associated with an increased risk of preeclampsia. Since Ms. 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      We also recommend that patients with thrombotic conditions refrain from estrogen containing oral contraceptive use. However, progesterone only methods are safe for use in women with blood clots, cardiac, or renal disease. Briefly reviewed that in fact the risk for complications is much higher in pregnancy than it would have been with a Nexplanon. She is disappointed that the Nexplanon was advised to be removed since she planned for no further pregnancies. She is interested in a salpingectomy at the time of her repeat  section.    In the postpartum period, we recommend prophylactic anticoagulation with subcutaneous LMWH, usually enoxaparin 40 mg every 24 hours.  Anticoagulation should be initiated 6-12 hours after delivery, depending on her bleeding, and should be continued for six weeks postpartum. If regional anesthesia is used, 4 hours should lapse after epidural catheter removal prior to administering anticoagulation. Specific risks with heparin include osteopenia, thrombocytopenia, and bleeding, though the risk of osteopenia is higher with prolonged use and use of unfractionated heparin.  While on  anticoagulation, the patient should consume the recommended amount of dietary calcium or can consider supplementation.  Additionally, she should have a CBC checked within one week of starting anticoagulation to rule out heparin-induced thrombocytopenia.  Heparin use is compatible with breastfeeding.    History of fetal cardiac defect  Congenital heart disease occurs in about 5 to 8 per 1000 newborns. In a mother with a previous child affected by congenital heart disease, the risk of recurrence is 2-3%.  Fifty percent of the time, the inherited lesion is the same.  Little is known about inheritance risk in patients with affected relatives that are further removed, although the suspicion is that the risk is similar to that of the general population.    Recommendations:    Close observation for evidence of maternal thrombosis.    Follow up with vascular surgery postpartum as previously advised.    Prophylactic anticoagulation with 40mg of Lovenox daily now and through the postpartum period, beginning 6-12 hours after delivery (see above dosage) and continuing for 6 weeks postpartum.    CBC one week after initiation of anticoagulation to rule out heparin-induced thrombocytopenia.    Early anatomy for 2/3 complete at 16-18 weeks.    Comprehensive anatomic survey at 18-20 weeks.    Fetal echocardiogram at 22 weeks given history of complex cardiac defect in a prior child.    The patient was seen and evaluated with Dr. Martins.    Thank you for allowing us to participate in the care of your patient. Please do not hesitate to contact us if you have further questions regarding the management of your patient.     Mandi Escamilla MD  Maternal Fetal Medicine Fellow    [facility] Attending Attestation    I have seen and evaluated the patient myself with the fellow. I spent a total of 25 minutes on the day of the encounter including counseling, coordination of care, review of records and documentation.    Zoya Martins MD  Maternal Fetal  Medicine

## 2023-04-18 ENCOUNTER — HOSPITAL ENCOUNTER (OUTPATIENT)
Dept: ULTRASOUND IMAGING | Facility: CLINIC | Age: 33
Discharge: HOME | End: 2023-04-18
Attending: OBSTETRICS & GYNECOLOGY
Payer: COMMERCIAL

## 2023-04-18 ENCOUNTER — OFFICE VISIT (OUTPATIENT)
Dept: MATERNAL FETAL MEDICINE | Facility: CLINIC | Age: 33
End: 2023-04-18
Attending: OBSTETRICS & GYNECOLOGY
Payer: COMMERCIAL

## 2023-04-18 VITALS
DIASTOLIC BLOOD PRESSURE: 75 MMHG | OXYGEN SATURATION: 99 % | SYSTOLIC BLOOD PRESSURE: 112 MMHG | RESPIRATION RATE: 20 BRPM | HEART RATE: 98 BPM

## 2023-04-18 DIAGNOSIS — Z51.81 ANTICOAGULATION MANAGEMENT ENCOUNTER: Primary | ICD-10-CM

## 2023-04-18 DIAGNOSIS — N28.0 RENAL INFARCTION (H): ICD-10-CM

## 2023-04-18 DIAGNOSIS — D68.51 FACTOR V LEIDEN MUTATION (H): ICD-10-CM

## 2023-04-18 DIAGNOSIS — Z79.01 ANTICOAGULATION MANAGEMENT ENCOUNTER: Primary | ICD-10-CM

## 2023-04-18 DIAGNOSIS — D68.59 THROMBOPHILIA AFFECTING PREGNANCY IN FIRST TRIMESTER, ANTEPARTUM (H): Primary | ICD-10-CM

## 2023-04-18 DIAGNOSIS — O99.111 THROMBOPHILIA AFFECTING PREGNANCY IN FIRST TRIMESTER, ANTEPARTUM (H): Primary | ICD-10-CM

## 2023-04-18 PROCEDURE — 76801 OB US < 14 WKS SINGLE FETUS: CPT

## 2023-04-18 PROCEDURE — 99203 OFFICE O/P NEW LOW 30 MIN: CPT | Mod: 25 | Performed by: OBSTETRICS & GYNECOLOGY

## 2023-04-18 PROCEDURE — G0463 HOSPITAL OUTPT CLINIC VISIT: HCPCS | Mod: 25 | Performed by: OBSTETRICS & GYNECOLOGY

## 2023-04-18 PROCEDURE — 76801 OB US < 14 WKS SINGLE FETUS: CPT | Mod: 26 | Performed by: OBSTETRICS & GYNECOLOGY

## 2023-04-18 RX ORDER — ENOXAPARIN SODIUM 100 MG/ML
40 INJECTION SUBCUTANEOUS DAILY
Qty: 36 ML | Refills: 3 | Status: SHIPPED | OUTPATIENT
Start: 2023-04-18 | End: 2023-07-17

## 2023-04-18 ASSESSMENT — PAIN SCALES - GENERAL: PAINLEVEL: NO PAIN (0)

## 2023-04-18 NOTE — NURSING NOTE
Ana Maria, accompanied by her SO, here in clinic for a MFM consult and 1st Tri US. Ranjan Martins and Andi met with pt, see consult note. Plan for 2/3 US.  Samina Jeffries RN

## 2023-04-22 NOTE — PROGRESS NOTES
Outpatient Physical Therapy Discharge Note     Patient: Ana Maria Espinoza  : 1990    Beginning/End Dates of Reporting Period:  18 to 2018    Referring Provider: Andre Barker Diagnosis: low back pain secondary to sacroiliac dysfunction     Client Self Report: Has been biking a lot. Starting new job tomorrow. Overall tailbone feeling but still aggravated with sitting more then a half hour.     Objective Measurements:  Objective Measure: Niesha  Details: low    Goals:  Goal Identifier 1   Goal Description Patient will be able to sit > 1 hour without increased pain   Target Date 18   Date Met      Progress:     Goal Identifier 2   Goal Description Patient will be able to lift bag of groceries from the floor without pain   Target Date 18   Date Met      Progress:     Goal Identifier 3   Goal Description Patient will be independant with HEP for long term self management   Target Date 18   Date Met      Progress:         Progress Toward Goals:   Progress limited due to Patient did not return for follow up treatments as directed. Above objective/subjective information from previous session.Current Goal status and current objective information is therefore unknown.  Discharge from PT services at this time for this episode of treatment. Please see attached documentation under this episode of care for further information including dates of service, start of care date, referring physician, Dx, treatment plan, treatments, etc.    Plan:  Discharge from therapy.    Discharge:    Reason for Discharge: Patient chooses to discontinue therapy.  Patient has failed to schedule further appointments.    Equipment Issued: na    Discharge Plan: Patient to continue home program.    Please contact me with any questions or concerns.    Thank you for your referral.    Kaylynn Serrano, PT, DPT  Physical Therapist   [facility]  [phone]     Initial (On Arrival)

## 2023-04-28 ENCOUNTER — PRENATAL OFFICE VISIT (OUTPATIENT)
Dept: OBGYN | Facility: CLINIC | Age: 33
End: 2023-04-28
Payer: COMMERCIAL

## 2023-04-28 VITALS
SYSTOLIC BLOOD PRESSURE: 106 MMHG | RESPIRATION RATE: 16 BRPM | WEIGHT: 142 LBS | DIASTOLIC BLOOD PRESSURE: 59 MMHG | HEART RATE: 93 BPM | TEMPERATURE: 98.5 F | HEIGHT: 60 IN | BODY MASS INDEX: 27.88 KG/M2

## 2023-04-28 DIAGNOSIS — Z34.81 PRENATAL CARE, SUBSEQUENT PREGNANCY, FIRST TRIMESTER: Primary | ICD-10-CM

## 2023-04-28 PROCEDURE — 36415 COLL VENOUS BLD VENIPUNCTURE: CPT | Performed by: OBSTETRICS & GYNECOLOGY

## 2023-04-28 PROCEDURE — 99207 PR PRENATAL VISIT: CPT | Performed by: OBSTETRICS & GYNECOLOGY

## 2023-04-28 NOTE — PROGRESS NOTES
"[facility]   OB/GYN Clinic     CC: F/U OB      Subjective:     Ana Maria is a 32 year old  at 11w0d for return OB. Feeling better. N/V resolved. No VB. Compliant with lovenox.       Objective:  /59 (BP Location: Left arm, Patient Position: Chair, Cuff Size: Adult Regular)   Pulse 93   Temp 98.5  F (36.9  C) (Tympanic)   Resp 16   Ht 1.518 m (4' 11.75\")   Wt 64.4 kg (142 lb)   LMP 2023   BMI 27.97 kg/m       Physical Exam:  Gen: Pleasant, talkative female in no apparent distress   Respiratory: breathing comfortably on room air   Cardiac: Regular rate, arm and well-perfused.   GI: Abd soft and non-tender  MSK: Grossly normal movement of all four extremities  Psych: mood and affect bright   Lower extremity: edema not present      See OB flowsheet     Assessment/Plan:   32 year old  at 11w0d by 6w1d US who presents for F/U OB visit.   1) Normal labs  2) NIPT - sex over over MyCHart  3) Plan for dating/viability scan now - SUDHA 23  4) Concerns: n/v; resolved  5) PMH/OBHx problems:                - Hx of c/s x2; wants repeat               - Hx of Factor V Leiden and kidney clot; s/p MFM, on lovenox                - Hx of child with complex cardiac defect and death as an infant and perthes disease; [facility] referral, genetics, L2, fetal ECHO               - tobacco use; is done to 1x cigarette a day                - hx of PPD; no meds currently   6)  Immunizations: flu shot - declines, Tdap at 28wks, COVID. Discussed risk of COVID in pregnancy including a doubled risk of needing ICU levels care, intubation or death. Recommended social distancing, mask-wearing and avoiding unnecessary contacts. I also recommended the COVID in pregnancy per CDCs recommendations.  Ok with COVID shot today!     Return to clinic in 4 weeks     Katharina Lerner MD  OB/GYN  2023    "

## 2023-04-28 NOTE — NURSING NOTE
"Initial /59 (BP Location: Left arm, Patient Position: Chair, Cuff Size: Adult Regular)   Pulse 93   Temp 98.5  F (36.9  C) (Tympanic)   Resp 16   Ht 1.518 m (4' 11.75\")   Wt 64.4 kg (142 lb)   LMP 02/02/2023   BMI 27.97 kg/m   Estimated body mass index is 27.97 kg/m  as calculated from the following:    Height as of this encounter: 1.518 m (4' 11.75\").    Weight as of this encounter: 64.4 kg (142 lb). .      "

## 2023-05-01 ENCOUNTER — MYC MEDICAL ADVICE (OUTPATIENT)
Dept: OBGYN | Facility: CLINIC | Age: 33
End: 2023-05-01
Payer: COMMERCIAL

## 2023-05-01 NOTE — TELEPHONE ENCOUNTER
Dr. Lerner OK with generating a letter for work excuse today for 5/1/2023.  Asked patient for a fax number and person whom the letter should be addressed to.  Awaiting response.    Morenita TERRELL   Ob/Gyn Clinic

## 2023-05-01 NOTE — TELEPHONE ENCOUNTER
Letter generated for ptSheron Cheng   Clinic Station    [facility]  [phone]

## 2023-05-01 NOTE — LETTER
May 1, 2023      Ana Maria WHARTON Alexis  [address] [place] [id number]        To Whom It May Concern:    Ana Maria Espinoza   (1990) Please excuse her from work today 23 due to illness.        Sincerely,        Katharina Lerner MD

## 2023-05-01 NOTE — TELEPHONE ENCOUNTER
S-(situation): patient requesting work note excuse for today     B-(background):  at 11w3d  Estimated Date of Delivery: 2023  Last office visit 2023  Next office visit     A-(assessment): patient believes she ate some bad takeout food last evening.   Vomited x1 last night, this morning, hourly diarrhea.   Her employer asked she not come in.  Patient is a  and cannot be away from register for frequent bathroom break.    R-(recommendations): please review and advise.  OK to generate work letter excuse for today?    Morenita TERRELL   Ob/Gyn Clinic

## 2023-05-01 NOTE — TELEPHONE ENCOUNTER
Patient will print letter from [employer].     Please generate for patient.     Thanks!     NIDHI Lima  Ob/Gyn Clinic

## 2023-05-04 LAB — SCANNED LAB RESULT: NORMAL

## 2023-05-14 ENCOUNTER — TELEPHONE (OUTPATIENT)
Dept: OBGYN | Facility: CLINIC | Age: 33
End: 2023-05-14
Payer: COMMERCIAL

## 2023-05-14 NOTE — TELEPHONE ENCOUNTER
Prior Authorization Retail Medication Request    Medication/Dose: Enoxaparin Sodium 40mg/0.4ml  ICD code (if different than what is on RX):    Previously Tried and Failed:    Rationale:     Insurance Name:  [facility]  Insurance ID:  [id number]      Pharmacy Information (if different than what is on RX)  Name:    Phone:        Thank you,  Hortencia Lutz, Pharmacy Technician  [facility]       Patent

## 2023-05-15 NOTE — TELEPHONE ENCOUNTER
Prior Authorization Approval    Authorization Effective Date: 2/14/2023  Authorization Expiration Date: 5/15/2024  Medication: Enoxaparin Sodium 40MG/0.4ML syringes  Approved Dose/Quantity:   Reference #: [id number]   Insurance Company: [employer] - Phone [phone] Fax [phone]  Which Pharmacy is filling the prescription (Not needed for infusion/clinic administered): [place], [place] - [address] [place] [address]  Pharmacy Notified: Yes  Patient Notified: Yes

## 2023-05-15 NOTE — TELEPHONE ENCOUNTER
PA Initiation    Medication: Enoxaparin Sodium 40MG/0.4ML syringes  Insurance Company: [employer] - Phone [phone] Fax [phone]  Pharmacy Filling the Rx: [place], [place] - [address] [place] [address]  Filling Pharmacy Phone: [phone]  Filling Pharmacy Fax: [phone]  Start Date: 5/15/2023

## 2023-05-24 ENCOUNTER — PRENATAL OFFICE VISIT (OUTPATIENT)
Dept: OBGYN | Facility: CLINIC | Age: 33
End: 2023-05-24
Payer: COMMERCIAL

## 2023-05-24 VITALS
DIASTOLIC BLOOD PRESSURE: 81 MMHG | BODY MASS INDEX: 30.08 KG/M2 | RESPIRATION RATE: 16 BRPM | HEIGHT: 60 IN | SYSTOLIC BLOOD PRESSURE: 131 MMHG | TEMPERATURE: 98 F | HEART RATE: 102 BPM | WEIGHT: 153.2 LBS

## 2023-05-24 DIAGNOSIS — Z3A.14 14 WEEKS GESTATION OF PREGNANCY: Primary | ICD-10-CM

## 2023-05-24 PROCEDURE — 99207 PR PRENATAL VISIT: CPT | Performed by: OBSTETRICS & GYNECOLOGY

## 2023-05-24 NOTE — LETTER
May 24, 2023      Ana Maria Espinoza  [address] [place] [address]        To Whom It May Concern:    Ana Maria Espinoza  was seen on 5/24/23.  Please excuse her from work on 5/22/23 to 5/24/23 due to illness.        Sincerely,        Genna Miguel MD

## 2023-05-24 NOTE — PATIENT INSTRUCTIONS
Healthy Eating Habits During Pregnancy  It s important to develop healthy eating habits while you are pregnant, for you as well as for your baby. Here are some ways to stay healthy.   Aim for a healthy weight  A slow, steady rate of weight gain is often best. After the first trimester, you may gain about a pound a week. If you were overweight before pregnancy, you need to gain fewer pounds. Your healthcare provider can give you a healthy weight goal for your pregnancy.   Don t diet  Now is not the time to diet. You may not get enough of the nutrients you and your baby need. Instead, learn how to be a healthy eater. Start by doing it for your baby. Soon, you may do it for yourself.   Vitamins and supplements  Talk with your healthcare provider about taking these and other prenatal vitamins and supplements.     Iron makes the extra blood you need now.    Calcium and vitamin D help build and keep strong bones.    Folic acid helps prevent certain birth defects.    Iodine helps the thyroid work right.    Some vitamins may not be safe to take. Your healthcare provider will tell you which ones to avoid.  Fluids    Drink at least 8 to 10 cups of fluid daily. Your baby needs fluids. Fluids also decrease constipation, flush out toxins and waste, limit swelling, and help prevent bladder infections. Water is best. Other good choices are:     Water or seltzer water with a slice of lemon or lime. (These can also help ease an upset stomach.)    Clear soups that are low in salt    Low-fat or fat-free milk, soy or rice milk with calcium added    Popsicles or gelatin  Things to avoid  Some things might harm your growing baby. Don t eat or drink:     Alcohol    Unpasteurized dairy foods and juices    Raw or undercooked meat, poultry, fish, or eggs    Unwashed fruits and vegetables    Prepared meats, like deli meats or hot dogs, unless heated until steaming hot    Fish that are high in mercury, like shark, swordfish, césar mackerel,  tilefish, and albacore tuna  Things to limit  Ask your healthcare provider whether it s safe to eat or drink:     Caffeine    Artificial sweeteners    Organ meats    Certain types of fish    Fish and shellfish that contain mercury in lower amounts, like shrimp, canned light tuna, salmon, pollock, and catfish  [employer] last reviewed this educational content on 7/1/2021 2000-2022 [employer]. All rights reserved. This information is not intended as a substitute for professional medical care. Always follow your healthcare professional's instructions.          Pregnancy: Planning Your Exercise Routine  While you re pregnant, an exercise routine helps both your mind and your body feel good. It tones your muscles and makes them stronger. It also gives you and your baby more oxygen.   The right exercise for you    Overall conditioning is best for you and your baby. Try walking, swimming, or riding a stationary bike. Always warm up, cool down, and drink enough fluids. Keep a snack close by in case your blood sugar gets low. Discuss exercise choices with your healthcare provider. Talk about the following:     If you already exercise, find out how to adapt your routine while you re pregnant. Keep the intensity of the exercise moderate. As your pregnancy progresses, your center of gravity will change. Be careful to keep your balance.    Ask if there are any local prenatal exercise classes, such as yoga or water aerobics. Find out which prenatal exercise videos are good choices.    If you were not exercising before your pregnancy, find out the best way to start. Now is not the time to begin a new workout on your own. Start slowly. Listen to your body.    Ask which forms of exercise you should avoid. These may include risky activities like hot yoga, horseback riding, scuba diving, skiing, skating, and contact sports.  Pelvic tilts  These help strengthen your stomach muscles and low back. You can do pelvic tilts  instead of sit-ups.     Do this exercise on your hands and knees.    Relax the back of your neck. Pull your stomach in until your low back flattens.    Hold for 30 seconds. Release. Repeat 10 times. Do this twice a day.  Kegel exercises  Kegel exercises strengthen the pelvic muscles. Doing Kegels daily helps prepare these muscles for delivery. Kegels also help ease your recovery. You exercise these muscles by tightening, holding, then relaxing them. To do 1 type of Kegel exercise, contract as if you were stopping your urine stream (but do it when you re not urinating). Hold for 10 seconds, then repeat 10 times, a few times a day.   Tips to add activity  Here are some tips to follow:    Park the car farther from a store and walk.    If you can, do errands on foot instead of driving.    Walk across the office to talk to someone in person instead of calling.    While waiting for appointments, go up and down stairs or around the block.  Tips to stay active  Here are some tips to follow:    Maintain your routine. But exercise less intensely if you feel tired.    Base your workout on how you feel, not your heart rate. Heart rates aren t a good way to measure effort during pregnancy.    Don't exercise on your back after week 16.  What are the warning signs that I should stop exercising?  Stop exercising and call your healthcare provider if you have any of these symptoms:    Vaginal bleeding     Dizziness or feeling faint     Increased shortness of breath     Chest pain     Headache     Muscle weakness     Calf (back of the leg) pain or swelling      Uterine contractions or  labor     Decreased fetal movement     Fluid leaking (or gushing) from your vagina  [employer] last reviewed this educational content on 2021-2022 [employer]. All rights reserved. This information is not intended as a substitute for professional medical care. Always follow your healthcare professional's  instructions.          Nutrition During Pregnancy   Having a healthy baby depends mostly on you. What you eat matters to your baby and your health. During pregnancy, you will likely need about 300 more calories per day than before you became pregnant. Each day, try to eat the number of servings listed here for each food group. In addition, cut down on salt and caffeine. Limit the amount of sweets and high-fat foods you eat. Don t smoke or drink alcohol.     Important: See your healthcare provider as often as requested. If you have any questions, be sure to ask them.   Fruits Vegetables Grains & Cereals* Fats & Oils   2 cups  Examples of 1-cup servings:   1 medium apple  1 medium orange  1 medium banana  1 cup chopped fruit  1 cup 100% fruit juice (pasteurized)   1/2 cup dried fruit 2-1/2 to 3 cups   Examples of 1 servin cups raw, leafy greens   1 cup raw or cooked cut-up vegetables   1 cup 100% vegetable juice (pasteurized)  6 to 8 ounces  Examples of 1-ounce servings:   1 slice bread  1/2 cup cooked rice  1/2 cup cooked cereal   1/2 cup pasta  1 ounce cold cereal 6 to 8 teaspoons   Dairy** Protein--- Fluids      3 cups  Examples of 1-cup servings:   1 cup milk  1 cup yogurt  1-1/2 ounces natural cheese   2 ounces processed cheese  5 to 6-1/2 ounces  Examples of 1-ounce servings:   1 egg  1 ounce of lean meat, poultry, or fish   1/4 cup cooked beans   1 tablespoon peanut butter   1/2 ounce nuts 8 or more 8-ounce glasses   Examples:  Water  Mineral water  Clear soups, broth      *Note: Choose whole grains whenever possible.   ** Note: Try to choose low-fat options; stay away from soft cheeses and unpasteurized milk.   --- Notes: Don't eat raw or undercooked meats, eggs, seafood, fish, and shellfish. Also, some types of fish, such as shark, swordfish, and césar mackerel, should not be eaten during pregnancy. Don't eat hot dogs, lunch meats, or cold cuts unless heated to steaming just before being served. Ask your  healthcare provider about safe choices.   Prenatal supplements  A prenatal supplement is a pill that you take daily during pregnancy. It helps make sure you re getting the right amount of certain nutrients that are important to your baby. Ask your healthcare provider to help you choose the best one for you. Important nutrients during pregnancy include:     Folic acid. It's best to start taking this supplement 1 month before you start trying to get pregnant. Folic acid helps prevent certain problems in your baby. During pregnancy, you need to take 400 micrograms (mcg) of folic acid every day for the first 2 to 3 months after conception. After that, 600 mcg is needed for a growing baby and placenta.    Iron, calcium, and vitamin D. You may also be advised to take these supplements during pregnancy. They help keep you and your baby healthy. Take them at different times because calcium makes it hard for the body to absorb iron. Taking iron with orange juice helps to increase its absorption.  [email or web address] last reviewed this educational content on 8/1/2020 2000-2022 [employer]. All rights reserved. This information is not intended as a substitute for professional medical care. Always follow your healthcare professional's instructions.          Pregnancy: Your Weight  Being a healthy weight is important for both you and your baby. The weight you gain now is not just extra fat. It is also the weight of your baby. And it is the increased blood and fluids to support the baby. A slow, steady rate of gain is best. How much you should gain depends on your weight before getting pregnant. Check with your healthcare provider to find out what is right for you.      Your weight will be checked regularly by your healthcare provider.     Talk to your healthcare provider if you have any questions.   If you gain too much  Gaining too much weight might cause you to feel tired, or you could have a harder pregnancy or birth.  If you and your healthcare provider decide you re gaining too much:     Eat fewer fats and sugars. Instead, eat fruit, vegetables, and whole-grain foods.    Drink plenty of water between meals.    Get at least 20 minutes of light exercise, like walking, each day.    Don t diet. You might not get enough of the nutrients you and your baby need.    Keep a food diary to help you gauge what and how much you are eating.  If you're not gaining enough  If you don t gain enough, your baby could be too small or have health problems. Women tend to gain most of their weight in the second and third trimesters. For now:     Eat many types of foods. Make sure you get enough calcium, protein, and carbohydrates.    Don t skip meals.    Eat healthy snacks.    Pick nutrient-dense, high-calorie healthy food like trail mix or protein shakes.    See a dietitian for help.    Talk to your healthcare provider if you have had an eating disorder or problems with certain foods.  The following are ways to get more calories:    Eat breakfast every day. Peanut butter or a slice of cheese on toast can give you an extra protein boost.    Snack between meals. Yogurt and dried fruits can provide protein, calcium, and minerals.    Try to eat more foods that are high in good fats, like nuts, fatty fish, avocados, and olive oil.    Drink juices made from real fruit that are high in vitamin C or beta-carotene, like grapefruit juice, orange juice, papaya nectar, apricot nectar, and carrot juice.    Don't eat junk food, like foods high in sugar.  [employer] last reviewed this educational content on 7/1/2021 2000-2022 [employer]. All rights reserved. This information is not intended as a substitute for professional medical care. Always follow your healthcare professional's instructions.        You have been provided the CDC Warning Signs in Pregnancy document.    Additional copies can be found here: [email or web address]

## 2023-05-24 NOTE — NURSING NOTE
"Initial /81 (BP Location: Right arm, Patient Position: Sitting, Cuff Size: Adult Regular)   Pulse 102   Temp 98  F (36.7  C) (Tympanic)   Resp 16   Ht 1.518 m (4' 11.76\")   Wt 69.5 kg (153 lb 3.2 oz)   LMP 02/02/2023   BMI 30.16 kg/m   Estimated body mass index is 30.16 kg/m  as calculated from the following:    Height as of this encounter: 1.518 m (4' 11.76\").    Weight as of this encounter: 69.5 kg (153 lb 3.2 oz). .      "

## 2023-05-24 NOTE — PROGRESS NOTES
"[facility]    Return OB Note    CC: Return OB     Subjective:  Ana Maria is a 33 year old  at 14w5d   Denies vaginal bleeding, loss of fluid, or pelvic cramping. no fetal movement.  Complaints today: heartburn causing nausea and vomiting.  Using Tums every 6 hours.  Missed two days of work due to vomiting.    Objective:  /81 (BP Location: Right arm, Patient Position: Sitting, Cuff Size: Adult Regular)   Pulse 102   Temp 98  F (36.7  C) (Tympanic)   Resp 16   Ht 1.518 m (4' 11.76\")   Wt 69.5 kg (153 lb 3.2 oz)   LMP 2023   BMI 30.16 kg/m      See flowsheet    Assessment/Plan:     33 year old  at 14w5d   Encounter Diagnosis   Name Primary?     14 weeks gestation of pregnancy Yes        1) Normal labs  2) NIPT - low risk, boy  3) PMH/OBHx problems:                - Hx of c/s x2; wants repeat               - Hx of Factor V Leiden and kidney clot; s/p MFM, on lovenox                - Hx of child with complex cardiac defect and death as an infant and perthes disease; MFM referral, genetics, L2, fetal ECHO               - tobacco use; is done to 1x cigarette a day                - hx of PPD; no meds currently     4) Factor V Leiden with kidney clot:  MFM consult done.  Since Ms. 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  In the postpartum period, we recommend prophylactic anticoagulation with subcutaneous LMWH, usually enoxaparin 40 mg every 24 hours.  Anticoagulation should be initiated 6-12 hours after delivery, depending on her bleeding, and should be continued for six weeks postpartum. If regional anesthesia is used, 4 hours should lapse after epidural catheter removal prior to administering anticoagulation.   5) advised trying pepcid or prilosec with tums for nausea.  Work note written.      RTC 4 " chase Miguel MD[facility]   [facility]N Clinic

## 2023-06-14 ENCOUNTER — ANCILLARY PROCEDURE (OUTPATIENT)
Dept: ULTRASOUND IMAGING | Facility: HOSPITAL | Age: 33
End: 2023-06-14
Attending: OBSTETRICS & GYNECOLOGY
Payer: COMMERCIAL

## 2023-06-14 ENCOUNTER — OFFICE VISIT (OUTPATIENT)
Dept: MATERNAL FETAL MEDICINE | Facility: HOSPITAL | Age: 33
End: 2023-06-14
Attending: OBSTETRICS & GYNECOLOGY
Payer: COMMERCIAL

## 2023-06-14 DIAGNOSIS — O35.8XX0 FAMILY HISTORIC RISK OF CONGENITAL ABNORMALITY, NOT APPLICABLE OR UNSPECIFIED FETUS: Primary | ICD-10-CM

## 2023-06-14 DIAGNOSIS — N28.0 RENAL INFARCTION (H): ICD-10-CM

## 2023-06-14 DIAGNOSIS — D68.51 FACTOR V LEIDEN MUTATION (H): ICD-10-CM

## 2023-06-14 PROCEDURE — 99213 OFFICE O/P EST LOW 20 MIN: CPT | Mod: 25 | Performed by: OBSTETRICS & GYNECOLOGY

## 2023-06-14 PROCEDURE — 76811 OB US DETAILED SNGL FETUS: CPT

## 2023-06-14 PROCEDURE — 76811 OB US DETAILED SNGL FETUS: CPT | Mod: 26 | Performed by: OBSTETRICS & GYNECOLOGY

## 2023-06-14 PROCEDURE — G0463 HOSPITAL OUTPT CLINIC VISIT: HCPCS | Mod: 25 | Performed by: OBSTETRICS & GYNECOLOGY

## 2023-06-14 PROCEDURE — 76805 OB US >/= 14 WKS SNGL FETUS: CPT

## 2023-06-14 NOTE — PROGRESS NOTES
Please see the imaging tab for details of the ultrasound performed today.    Cielo Thorpe MD  Specialist in Maternal-Fetal Medicine

## 2023-06-14 NOTE — NURSING NOTE
Ana Maria Espinoza is a  at 17w5d who presents to [facility] for 2/3 complete ultrasound. SBAR report to  Dr. Thorpe, see note in Epic.     Domenica Bang RN

## 2023-06-20 ENCOUNTER — HOSPITAL ENCOUNTER (EMERGENCY)
Facility: CLINIC | Age: 33
Discharge: HOME | End: 2023-06-20
Attending: EMERGENCY MEDICINE | Admitting: EMERGENCY MEDICINE
Payer: COMMERCIAL

## 2023-06-20 VITALS
BODY MASS INDEX: 29.45 KG/M2 | HEART RATE: 78 BPM | HEIGHT: 60 IN | OXYGEN SATURATION: 99 % | TEMPERATURE: 96.4 F | WEIGHT: 150 LBS | SYSTOLIC BLOOD PRESSURE: 106 MMHG | DIASTOLIC BLOOD PRESSURE: 75 MMHG | RESPIRATION RATE: 18 BRPM

## 2023-06-20 DIAGNOSIS — R11.2 NAUSEA AND VOMITING, UNSPECIFIED VOMITING TYPE: ICD-10-CM

## 2023-06-20 DIAGNOSIS — J01.90 ACUTE SINUSITIS, RECURRENCE NOT SPECIFIED, UNSPECIFIED LOCATION: ICD-10-CM

## 2023-06-20 PROCEDURE — 99284 EMERGENCY DEPT VISIT MOD MDM: CPT | Performed by: EMERGENCY MEDICINE

## 2023-06-20 PROCEDURE — 250N000013 HC RX MED GY IP 250 OP 250 PS 637: Performed by: EMERGENCY MEDICINE

## 2023-06-20 PROCEDURE — 250N000011 HC RX IP 250 OP 636: Performed by: EMERGENCY MEDICINE

## 2023-06-20 RX ORDER — ONDANSETRON 4 MG/1
4 TABLET, ORALLY DISINTEGRATING ORAL ONCE
Status: COMPLETED | OUTPATIENT
Start: 2023-06-20 | End: 2023-06-20

## 2023-06-20 RX ORDER — ONDANSETRON 2 MG/ML
4 INJECTION INTRAMUSCULAR; INTRAVENOUS EVERY 30 MIN PRN
Status: DISCONTINUED | OUTPATIENT
Start: 2023-06-20 | End: 2023-06-20

## 2023-06-20 RX ORDER — ONDANSETRON 4 MG/1
4 TABLET, ORALLY DISINTEGRATING ORAL EVERY 8 HOURS PRN
Qty: 10 TABLET | Refills: 0 | Status: SHIPPED | OUTPATIENT
Start: 2023-06-20 | End: 2023-08-16

## 2023-06-20 RX ORDER — SODIUM CHLORIDE 9 MG/ML
INJECTION, SOLUTION INTRAVENOUS CONTINUOUS
Status: DISCONTINUED | OUTPATIENT
Start: 2023-06-20 | End: 2023-06-20

## 2023-06-20 RX ADMIN — ONDANSETRON 4 MG: 4 TABLET, ORALLY DISINTEGRATING ORAL at 02:57

## 2023-06-20 RX ADMIN — AMOXICILLIN AND CLAVULANATE POTASSIUM 1 TABLET: 875; 125 TABLET, FILM COATED ORAL at 02:57

## 2023-06-20 ASSESSMENT — ACTIVITIES OF DAILY LIVING (ADL): ADLS_ACUITY_SCORE: 35

## 2023-06-20 NOTE — DISCHARGE INSTRUCTIONS
Follow-up with primary care providers.    Antibiotics as prescribed.    Zofran as needed for nausea, or vomiting.

## 2023-06-20 NOTE — ED PROVIDER NOTES
History     Chief Complaint   Patient presents with     Sinus Problem     HPI  Ana Maria Espinoza is a 33 year old female who is pregnant at 18 weeks gestation, presenting the emergency department primarily with concerns regarding episodes of nausea, and vomiting that began within the last 2 hours after awakening.  She is currently 18 weeks pregnant, and has had issues during and throughout her entire pregnancy with nausea, and vomiting.  Over the past few days, patient has felt generally unwell, and also with sinus pressure, coughing green phlegm, in addition to greenish nasal drainage.  No fevers at home.  No recent changes in medications.  He is anticoagulated for her history of factor V Leiden.    Allergies:  Allergies   Allergen Reactions     Bee Venom Anaphylaxis     Has epipen     Aloe Hives     Dry skin as well      Codeine Rash     Other reaction(s): Intolerance-Can't Take       Problem List:    Patient Active Problem List    Diagnosis Date Noted     Prenatal care, subsequent pregnancy 03/16/2023     Priority: Medium     FOB- John Burth       Chronic, continuous use of opioids 06/29/2022     Priority: Medium     Microscopic hematuria 02/10/2022     Priority: Medium     Factor V Leiden mutation (H) 01/04/2022     Priority: Medium     Renal infarction (H) 01/04/2022     Priority: Medium     Nexplanon in place 01/08/2021     Priority: Medium     Placed in the left arm on 1/8/2021 by Dr. Dimitris Zambrano  Due to come out 1/8/2024       Tobacco use 01/29/2020     Priority: Medium     Hip pain 01/18/2020     Priority: Medium     Contact dermatitis due to chemicals 08/06/2019     Priority: Medium     Pain in left hand 12/11/2018     Priority: Medium     H/O herpes simplex type 2 infection 10/16/2017     Priority: Medium     Dyslipidemia 11/28/2016     Priority: Medium     Formatting of this note might be different from the original.  Formatting of this note might be different from the original.  11/16: to quit smoking  and work otitis media a more active lifestyle.  Last Lipids:  Chol: 2016 142   73  HDL: 2016 27   LDL CHOLESTEROL (mg/dL)   Date Value   2016 100  Formatting of this note is different from the original.  : to quit smoking and work otitis media a more active lifestyle.  Last Lipids:  Chol: 2016 142   73  HDL: 2016 27   LDL CHOLESTEROL (mg/dL)   Date Value   2016 100       Knee strain 2016     Priority: Medium     Lumbar strain 2016     Priority: Medium     Wrist strain 2016     Priority: Medium     Mild persistent asthma without complication 2015     Priority: Medium     Pain medication agreement 2013     Priority: Medium     Formatting of this note might be different from the original.  Prn judicious use of vicodin or percocet       Tonsillar hypertrophy 2011     Priority: Medium     History of  section 2011     Priority: Medium     Patellar dislocation 2010     Priority: Medium     ADHD (attention deficit hyperactivity disorder) 2009     Priority: Medium     Formatting of this note might be different from the original.  Updated by system to replace inactive record  Formatting of this note might be different from the original.  Updated by system to replace inactive record       Vitamin D deficiency 2009     Priority: Medium     Reduced libido 2009     Priority: Medium     GERD (gastroesophageal reflux disease) 2009     Priority: Medium     Sciatica associated with disorder of lumbar spine 06/15/2005     Priority: Medium     Chronic low back pain 1993     Priority: Medium     History of chronic back pain 1992     Priority: Medium        Past Medical History:    Past Medical History:   Diagnosis Date     ADHD (attention deficit hyperactivity disorder)      Blood clot in bladder 2001     Chickenpox      Chlamydia      Factor V Leiden (H)      GERD  (gastroesophageal reflux disease)      History of urinary tract infection      Postpartum depression 2009     Renal failure      Uncomplicated asthma 1996       Past Surgical History:    Past Surgical History:   Procedure Laterality Date     ABDOMEN SURGERY      2 c sections  @      ESOPHAGOSCOPY, GASTROSCOPY, DUODENOSCOPY (EGD), COMBINED N/A 2021    Procedure: ESOPHAGOGASTRODUODENOSCOPY, WITH BIOPSY;  Surgeon: German Flynn MD;  Location: [facility]     ORTHOPEDIC SURGERY      Had surgery on both big toes     TONSILLECTOMY         Family History:    Family History   Problem Relation Age of Onset     Depression Mother      Mental Illness Mother 56        suicide-     Obesity Mother      Connective Tissue Disorder Mother         EDS     Alcoholism Mother      Diabetes Father      Obesity Father      Deep Vein Thrombosis Father         double amputee below knee     Asthma Sister      Thyroid Disease Sister      Obesity Sister      Asthma Brother      Obesity Brother      Obesity Paternal Grandmother      Heart Defect Daughter          at 7 weeks after 3 heart surgeries     Genetic Disorder Daughter         Avascular necrosis and Legg calve perthes disease     Connective Tissue Disorder Maternal Cousin         EDS     Connective Tissue Disorder Maternal Cousin         EDS       Social History:  Marital Status:  Single [1]  Social History     Tobacco Use     Smoking status: Every Day     Packs/day: 0.20     Years: 10.00     Pack years: 2.00     Types: Cigarettes     Start date: 3/19/2009     Last attempt to quit: 2021     Years since quittin.0     Smokeless tobacco: Never     Tobacco comments:     Down to 2-3 cig/day with pregnancy   Vaping Use     Vaping status: Never Used   Substance Use Topics     Alcohol use: No     Drug use: Not Currently     Types: Marijuana     Comment: quit with pregnancy        Medications:    amoxicillin-clavulanate (AUGMENTIN) 875-125 MG tablet  ondansetron (ZOFRAN  ODT) 4 MG ODT tab  albuterol (PROAIR HFA/PROVENTIL HFA/VENTOLIN HFA) 108 (90 Base) MCG/ACT inhaler  Doxylamine-Pyridoxine 10-10 MG TBEC  enoxaparin ANTICOAGULANT (LOVENOX) 40 MG/0.4ML syringe  EPINEPHrine (ANY BX GENERIC EQUIV) 0.3 MG/0.3ML injection 2-pack  ketoconazole (NIZORAL) 2 % external cream  ketoconazole (NIZORAL) 2 % external shampoo  meclizine (ANTIVERT) 25 MG tablet  metoclopramide (REGLAN) 5 MG tablet  nicotine (NICODERM CQ) 14 MG/24HR 24 hr patch  nystatin (MYCOSTATIN) 179490 UNIT/GM external powder  ondansetron (ZOFRAN) 4 MG tablet  ondansetron (ZOFRAN) 4 MG tablet  Prenatal Vit-Fe Fumarate-FA (PRENATAL MULTIVITAMIN W/IRON) 27-0.8 MG tablet          Review of Systems  See HPI  Physical Exam   BP: 106/75  Pulse: 78  Temp: (!) 96.4  F (35.8  C)  Resp: 18  Height: 152.4 cm (5')  Weight: 68 kg (150 lb)  SpO2: 99 %      Physical Exam  /75   Pulse 78   Temp (!) 96.4  F (35.8  C) (Tympanic)   Resp 18   Ht 1.524 m (5')   Wt 68 kg (150 lb)   LMP 02/02/2023   SpO2 99%   BMI 29.29 kg/m    General: alert and in no acute distress  Head: atraumatic, normocephalic  Abd: gravid  Musculoskel/Extremities: normal extremities, no apparent edema, and full AROM of major joints  Skin: no rashes, no diaphoresis and skin color normal  Neuro: Patient awake, alert, oriented, speech is fluent, gait is normal  Psychiatric: affect/mood normal, cooperative, normal judgement/insight and memory intact      ED Course                 Procedures              Critical Care time:  none               No results found for this or any previous visit (from the past 24 hour(s)).    Medications   amoxicillin-clavulanate (AUGMENTIN) 875-125 MG per tablet 1 tablet (1 tablet Oral $Given 6/20/23 0257)   ondansetron (ZOFRAN ODT) ODT tab 4 mg (4 mg Oral $Given 6/20/23 0257)       Assessments & Plan (with Medical Decision Making)  33 year old female presenting to the emergency department with concerns regarding sinus pressure, in  addition to cough, nausea, vomiting.  Symptoms present over the past 2 to 3 days.  Given her pregnancy status, with other complex medical issues, decision made for antibiotic treatment for sinusitis, which should cover for any potential bacterial pneumonia related causes, however patient is in no acute respiratory distress, and I did not appreciate any cough while in the emergency department.  She has had issues with nausea, and vomiting throughout her entire pregnancy, however does not have Zofran at home.  I will give dose of Zofran currently in the emergency department, and prescribed Zofran for home.  Follow-up recommended in clinic for routine cares.  Return instructions discussed if new or worsening symptoms develop.     I have reviewed the nursing notes.    I have reviewed the findings, diagnosis, plan and need for follow up with the patient.               Discharge Medication List as of 6/20/2023  3:30 AM      START taking these medications    Details   amoxicillin-clavulanate (AUGMENTIN) 875-125 MG tablet Take 1 tablet by mouth 2 times daily, Disp-20 tablet, R-0, [facility]      ondansetron (ZOFRAN ODT) 4 MG ODT tab Take 1 tablet (4 mg) by mouth every 8 hours as needed for nausea, Disp-10 tablet, R-0, [facility]             Final diagnoses:   Nausea and vomiting, unspecified vomiting type   Acute sinusitis, recurrence not specified, unspecified location       6/20/2023   [facility] EMERGENCY DEPT     Sherman, German Villafana MD  06/20/23 4630

## 2023-06-20 NOTE — Clinical Note
Ana Maria Espinoza was seen and treated in our emergency department on 6/20/2023.  She may return to work on 06/21/2023.       If you have any questions or concerns, please don't hesitate to call.      German Whitaker MD

## 2023-06-21 ENCOUNTER — PRENATAL OFFICE VISIT (OUTPATIENT)
Dept: OBGYN | Facility: CLINIC | Age: 33
End: 2023-06-21
Payer: COMMERCIAL

## 2023-06-21 ENCOUNTER — PATIENT OUTREACH (OUTPATIENT)
Dept: CARE COORDINATION | Facility: CLINIC | Age: 33
End: 2023-06-21

## 2023-06-21 VITALS
DIASTOLIC BLOOD PRESSURE: 72 MMHG | SYSTOLIC BLOOD PRESSURE: 121 MMHG | WEIGHT: 157.9 LBS | RESPIRATION RATE: 16 BRPM | TEMPERATURE: 98.4 F | HEART RATE: 96 BPM | HEIGHT: 60 IN | BODY MASS INDEX: 31 KG/M2

## 2023-06-21 DIAGNOSIS — K21.00 GASTROESOPHAGEAL REFLUX DISEASE WITH ESOPHAGITIS WITHOUT HEMORRHAGE: ICD-10-CM

## 2023-06-21 DIAGNOSIS — T78.40XA ALLERGIC REACTION, INITIAL ENCOUNTER: ICD-10-CM

## 2023-06-21 DIAGNOSIS — Z34.82 ENCOUNTER FOR SUPERVISION OF OTHER NORMAL PREGNANCY IN SECOND TRIMESTER: Primary | ICD-10-CM

## 2023-06-21 DIAGNOSIS — J06.9 VIRAL URI WITH COUGH: ICD-10-CM

## 2023-06-21 PROCEDURE — 99207 PR PRENATAL VISIT: CPT | Performed by: OBSTETRICS & GYNECOLOGY

## 2023-06-21 RX ORDER — NICOTINE POLACRILEX 4 MG/1
20 GUM, CHEWING ORAL DAILY
COMMUNITY
End: 2023-06-21

## 2023-06-21 RX ORDER — ALBUTEROL SULFATE 90 UG/1
1-2 AEROSOL, METERED RESPIRATORY (INHALATION) EVERY 6 HOURS PRN
Qty: 16 G | Refills: 1 | Status: SHIPPED | OUTPATIENT
Start: 2023-06-21

## 2023-06-21 RX ORDER — NICOTINE POLACRILEX 4 MG/1
20 GUM, CHEWING ORAL DAILY
Qty: 30 TABLET | Refills: 1 | Status: ON HOLD | OUTPATIENT
Start: 2023-06-21 | End: 2023-09-07

## 2023-06-21 RX ORDER — EPINEPHRINE 0.3 MG/.3ML
0.3 INJECTION SUBCUTANEOUS PRN
Qty: 1 EACH | Refills: 1 | Status: ON HOLD | OUTPATIENT
Start: 2023-06-21 | End: 2023-09-07

## 2023-06-21 NOTE — LETTER
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 I wanted to thank you for spending the time to talk with me.  Below is a description of clinic care coordination and how I can further assist you.       The clinic care coordination team is made up of a registered nurse, , financial resource worker and community health worker who understand the health care system. The goal of clinic care coordination is to help you manage your health and improve access to the health care system. Our team works alongside your provider to assist you in determining your health and social needs. We can help you obtain health care and community resources, providing you with necessary information and education. We can work with you through any barriers and develop a care plan that helps coordinate and strengthen the communication between you and your care team.  Our services are voluntary and are offered without charge to you personally.    If you wish to connect with the Clinic Care Coordination Team, please let your [facility] Primary Care Provider or Clinic Care Team know and they can place a referral. The Clinic Care Coordination team will then reach out by phone to further support you.    We are focused on providing you with the highest-quality healthcare experience possible.    Sincerely,   Your care team with [facility]

## 2023-06-21 NOTE — PROGRESS NOTES
Clinic Care Coordination Contact  Community Health Worker Initial Outreach    CHW Initial Information Gathering:  Referral Source: ED Follow-Up  Preferred Hospital: [place], [place]  [phone]  Preferred Urgent Care: [facility], [phone]  No PCP office visit in Past Year: No       Patient accepts CC: No, due to the patient stating that at this time there are no concerns for CCC. Patient will be sent Care Coordination introduction letter for future reference.     MONICA Rouse  [phone]  Connected Care Resource Center  [facility]

## 2023-06-21 NOTE — PATIENT INSTRUCTIONS
Pregnancy: Your Second Trimester Changes  Each day, you and your baby are changing and growing together. Here s a quick look at what s happening to both of you.  How you are changing  Even when you don t notice it, your body is adapting to meet the needs of your growing baby. The changes in your body might also affect your moods.  Your body  Your uterus expands as your baby grows. As the weeks go by, you will feel more pressure on your bladder, stomach, and other organs. You may notice some skin color changes on your forehead, nose, or cheeks. Freckles may darken, and moles may grow. You may notice a darker line on your abdomen between your belly button and pubic bone in the midline.  Your moods  The second trimester is often easier than the first. Still, be prepared for mood swings. These are from the increase in hormones made by your body. Hormones are chemicals that affect the way organs work. These mood swings are a normal part of pregnancy.  How your baby is growing    Month 4  Your baby s heartbeat may be heard with a Doppler (handheld ultrasound device) by 9 to 10 weeks. Eyebrows, eyelashes, and fingernails begin to form.    Month 5  You may feel your baby move. After a growth spurt, your baby nears 10 inches.    Month 6  Your baby s fingerprints have formed. Your baby weighs about 1 to 2 pounds and is about 12 inches long.    [employer] last reviewed this educational content on 7/1/2021 2000-2022 [employer]. All rights reserved. This information is not intended as a substitute for professional medical care. Always follow your healthcare professional's instructions.          Adapting to Pregnancy: Second Trimester  Keep up the healthy habits you started in your first trimester. You might be a little more tired than normal. So plan your day wisely. Look at the tips below and choose the ones that suit your lifestyle.   Note  If you have any questions, talk with your healthcare provider.   If  you work  If you can, adjust your work with your employer to fit your needs. Try these tips:     If you stand for long periods, find ways to do some tasks while sitting. Also, try to stand with 1 foot resting on a low stool or ledge. Shift your weight from foot to foot often. Wear low-heeled shoes.    If you sit, keep your knees level with your hips. Rest your feet on a firm surface. Sit tall with support for your low back.    If you work long hours, ask about adjusting your schedule. Try taking shorter breaks more often.  When you travel  The second trimester may be the best time for any travel. Talk to your healthcare provider about any special plans you may need to make. Always:     Wear a seat belt. Fasten the lap part under your belly. Wear the shoulder part also.    Take breaks often during long trips by car or plane. Move around to stretch your legs.    Drink plenty of fluids on flights. The air in plane cabins is very dry.    Stay out of hot climates or high altitudes if you are not used to them.    Stay away from places where the food and water might make you sick.    Make sure you are up-to-date on all vaccines, including the flu vaccine. This is especially important when traveling overseas.  Taking time to relax  Find time to rest and relax at work or at home:     Take short time-outs daily. Do relaxation exercises.    Breathe deeply during stressful times.    Try not to take on too much. Plan tasks for times when you have the most energy.    Take naps when you can. Or just sit and relax.    After week 16, don't lie on your back for more than a few minutes. Instead, lie on your side. Switch sides often.    Having sex  Unless your healthcare provider tells you otherwise, there is no reason to stop having sex now. Blood supply increases to the pelvic area in the second trimester. Because of this, sex might be more enjoyable. Try different positions and see what s best. Also talk with your partner about any  changes in desire. Spotting may happen after sex. Let your healthcare provider know if there is heavy bleeding.   Keeping your environment safe  You can still clean your house and use scented products. Just take some simple precautions:     Wear gloves when using cleaning fluids.    Open windows to let in fresh air. Use a fan if you paint.    Stay away from secondhand smoke.    Don t breathe fumes from nail polish, hair spray, cleansers, or other chemicals.  How daily issues affect your health  Many things in your daily life impact your health. This can include transportation, money problems, housing, access to food, and . If you can t get to medical appointments, you may not receive the care you need. When money is tight, it may be difficult to pay for medicines. And living far from a grocery store can make it hard to buy healthy food.   If you have concerns in any of these or other areas, talk with your healthcare team. They may know of local resources to assist you. Or they may have a staff person who can help.   [employer] last reviewed this educational content on 6/1/2021 2000-2022 [employer]. All rights reserved. This information is not intended as a substitute for professional medical care. Always follow your healthcare professional's instructions.

## 2023-06-21 NOTE — PROGRESS NOTES
Concerns today: MFM  eval normal to date  ECHO scheduled for fetus  Doing well.  No concerns today.  No nausea/vomiting. No heartburn.  No vaginal bleeding, no contractions/severe cramping, no leakage of fluid.  No vaginal discharge. No dysuria  No headache, vision changes, lower extremity swelling, upper abdominal pain, chest pain, shortness of breath.   Reportable signs and symptoms discussed.  RTC 4 weeks      Mauri Hurt MD

## 2023-07-01 ENCOUNTER — OFFICE VISIT (OUTPATIENT)
Dept: URGENT CARE | Facility: URGENT CARE | Age: 33
End: 2023-07-01
Payer: COMMERCIAL

## 2023-07-01 VITALS
OXYGEN SATURATION: 98 % | BODY MASS INDEX: 31.83 KG/M2 | TEMPERATURE: 97.2 F | HEART RATE: 99 BPM | SYSTOLIC BLOOD PRESSURE: 123 MMHG | WEIGHT: 163 LBS | DIASTOLIC BLOOD PRESSURE: 72 MMHG

## 2023-07-01 DIAGNOSIS — J32.9 OTHER SINUSITIS, UNSPECIFIED CHRONICITY: Primary | ICD-10-CM

## 2023-07-01 PROCEDURE — 99213 OFFICE O/P EST LOW 20 MIN: CPT | Performed by: EMERGENCY MEDICINE

## 2023-07-01 RX ORDER — ONDANSETRON 4 MG/1
4 TABLET, ORALLY DISINTEGRATING ORAL EVERY 8 HOURS PRN
Qty: 6 TABLET | Refills: 0 | Status: SHIPPED | OUTPATIENT
Start: 2023-07-01 | End: 2023-08-16

## 2023-07-01 RX ORDER — CEFDINIR 300 MG/1
300 CAPSULE ORAL 2 TIMES DAILY
Qty: 14 CAPSULE | Refills: 0 | Status: SHIPPED | OUTPATIENT
Start: 2023-07-01 | End: 2023-07-08

## 2023-07-01 NOTE — PROGRESS NOTES
CHIEF COMPLAINT: Sinus infection, vomiting      HPI: Patient is a 33-year-old female is seen today because she did not complete course of antibiotic.  Medications excellently from a work..  She had intermittent vomiting last week or so she believes due to phlegm.  Patient's 5 months pregnant.        ROS: See HPI otherwise negative    Allergies   Allergen Reactions     Bee Venom Anaphylaxis     Has epipen     Aloe Hives     Dry skin as well      Codeine Rash     Other reaction(s): Intolerance-Can't Take      Current Outpatient Medications   Medication Sig Dispense Refill     albuterol (PROAIR HFA/PROVENTIL HFA/VENTOLIN HFA) 108 (90 Base) MCG/ACT inhaler Inhale 1-2 puffs into the lungs every 6 hours as needed for shortness of breath or wheezing 16 g 1     cefdinir (OMNICEF) 300 MG capsule Take 1 capsule (300 mg) by mouth 2 times daily for 7 days 14 capsule 0     enoxaparin ANTICOAGULANT (LOVENOX) 40 MG/0.4ML syringe Inject 0.4 mLs (40 mg) Subcutaneous daily for 90 days 36 mL 3     ketoconazole (NIZORAL) 2 % external cream Apply topically daily 30 g 3     ketoconazole (NIZORAL) 2 % external shampoo Use shampoo daily for flaky skin 120 mL 4     nystatin (MYCOSTATIN) 162663 UNIT/GM external powder Apply topically 2 times daily 45 g 0     omeprazole 20 MG tablet Take 1 tablet (20 mg) by mouth daily 30 tablet 1     ondansetron (ZOFRAN ODT) 4 MG ODT tab Take 1 tablet (4 mg) by mouth every 8 hours as needed for nausea 6 tablet 0     Prenatal Vit-Fe Fumarate-FA (PRENATAL MULTIVITAMIN W/IRON) 27-0.8 MG tablet Take 1 tablet by mouth daily       amoxicillin-clavulanate (AUGMENTIN) 875-125 MG tablet Take 1 tablet by mouth 2 times daily (Patient not taking: Reported on 7/1/2023) 20 tablet 0     Doxylamine-Pyridoxine 10-10 MG TBEC Take 1 tablet by mouth 2 times daily as needed (nausea) (Patient not taking: Reported on 4/28/2023) 30 tablet 3     EPINEPHrine (ANY BX GENERIC EQUIV) 0.3 MG/0.3ML injection 2-pack Inject 0.3 mLs (0.3 mg)  into the muscle as needed for anaphylaxis (Patient not taking: Reported on 7/1/2023) 1 each 1     meclizine (ANTIVERT) 25 MG tablet Take 1 tablet (25 mg) by mouth 3 times daily as needed for dizziness (Patient not taking: Reported on 4/28/2023) 30 tablet 3     metoclopramide (REGLAN) 5 MG tablet Take 1 tablet (5 mg) by mouth 4 times daily (before meals and nightly) (Patient not taking: Reported on 4/28/2023) 30 tablet 3     nicotine (NICODERM CQ) 14 MG/24HR 24 hr patch Place 1 patch onto the skin every 24 hours (Patient not taking: Reported on 4/18/2023) 14 patch 0     ondansetron (ZOFRAN ODT) 4 MG ODT tab Take 1 tablet (4 mg) by mouth every 8 hours as needed for nausea (Patient not taking: Reported on 7/1/2023) 10 tablet 0     ondansetron (ZOFRAN) 4 MG tablet Take 1 tablet (4 mg) by mouth every 8 hours as needed for nausea (Patient not taking: Reported on 4/28/2023) 12 tablet 0     ondansetron (ZOFRAN) 4 MG tablet Take 1-2 tablets (4-8 mg) by mouth every 8 hours as needed for nausea (Patient not taking: Reported on 4/18/2023) 30 tablet 3         PE: No acute distress.  Afebrile.  Well-hydrated.  Face reveals maxillary sinus tenderness.  Ears normal TMs.  Throat is not injected.        TREATMENT: None.  Sinusitis with vomiting      ASSESSMENT sinusitis with vomiting    DIAGNOSIS sinusitis.  Vomiting    PLAN:Cefdinir, Zofran. See AVS

## 2023-07-01 NOTE — LETTER
July 1, 2023      Ana Maria Espinoza  [address] [address]        To Whom It May Concern:    Ana Maria Espinoza was seen in our clinic. She may return to work on 7/2/23 without restrictions.      Sincerely,        Romulo Hoang MD

## 2023-07-05 ENCOUNTER — TELEPHONE (OUTPATIENT)
Dept: FAMILY MEDICINE | Facility: CLINIC | Age: 33
End: 2023-07-05

## 2023-07-05 NOTE — TELEPHONE ENCOUNTER
Proof of pregnancy forms received. Faxed to Dr. Hurt's office for completion. Patient was notified.

## 2023-07-06 ENCOUNTER — TELEPHONE (OUTPATIENT)
Dept: OBGYN | Facility: CLINIC | Age: 33
End: 2023-07-06
Payer: COMMERCIAL

## 2023-07-06 ENCOUNTER — TELEPHONE (OUTPATIENT)
Dept: OBGYN | Facility: CLINIC | Age: 33
End: 2023-07-06

## 2023-07-06 DIAGNOSIS — O21.9 NAUSEA AND VOMITING DURING PREGNANCY: Primary | ICD-10-CM

## 2023-07-06 RX ORDER — ONDANSETRON 4 MG/1
4 TABLET, ORALLY DISINTEGRATING ORAL EVERY 8 HOURS PRN
Qty: 20 TABLET | Refills: 0 | Status: SHIPPED | OUTPATIENT
Start: 2023-07-06 | End: 2023-08-16

## 2023-07-06 NOTE — TELEPHONE ENCOUNTER
Prior Authorization Retail Medication Request    Medication/Dose: Ondansetron 4MG Tablet  ICD code (if different than what is on RX):    Previously Tried and Failed:    Rationale:      Insurance Name: KALE LARSEN  Insurance ID: [id number]      Pharmacy Information (if different than what is on RX)  Name: [facility]   Phone: [phone]          Thank you,  Farzaneh Altman Pharm Tech  [facility]

## 2023-07-06 NOTE — TELEPHONE ENCOUNTER
Reason for Call:  Other call back    Detailed comments: Pt states she will be 21 weeks pregnant tomorrow.  Sinus infection almost over - almost done with rx.  Still throwing up vile - yellow stuff every morning. Also states she throws up 1-2 again throughout the day.  Has a really bad migraine - states she has this daily for the last couple of weeks, thought it was part of the sinus infection.   Was seen for sinus infection - had been taking Tylenol, presure behind her eyes,nose.  [facility] provider thought the migraine was part of her sinus infection.    Phone Number Patient can be reached at: Home number on file [phone] (home)    Best Time:     Can we leave a detailed message on this number? YES    Call taken on 7/6/2023 at 9:08 AM by Ashlie Shelley

## 2023-07-06 NOTE — TELEPHONE ENCOUNTER
Prescription sent by Dr. Anand.  Patient will try Rx and call to follow up with no improvement.    Morenita TERRELL   Ob/Gyn Clinic

## 2023-07-06 NOTE — TELEPHONE ENCOUNTER
Completed paperwork was given to Dr. Hurt to sign.    -Ashlie KIM [facility]  Clinic Station

## 2023-07-06 NOTE — TELEPHONE ENCOUNTER
S-(situation): nausea and vomiting in pregnancy    B-(background):  at 20w6d    A-(assessment): Patient reports struggling with nausea and vomiting over the past few weeks. Patient reports she has GERD and notices burning sensation in her throat which makes her feel nauseated. Patient takes Omeprazole and Tums. Patient reports she is able to eat and push fluids. Some days more stays down then at other times. Patient reports daily migraine headache which is tolerable to her. Patient on antibiotics for a sinus infection, antibiotics almost complete.    R-(recommendations): Reviewed some tips for GERD. Reviewed migraine headache treatment options ( Excedrin, caffeine, rest, staying well hydrated, avoiding triggers etc ). Urgent Care for migraine headache.    Patient would like to try Zofran and would like a prescription for this.  Please send if able.    Reviewed with patient stool softeners with Zofran as constipation can be a side effect.    Morenita TERRELL   Ob/Gyn Clinic

## 2023-07-07 NOTE — TELEPHONE ENCOUNTER
Paperwork completed and signed by Dr. Hurt faxed to [phone] per pt request. Also notified pt papers were ready

## 2023-07-10 NOTE — TELEPHONE ENCOUNTER
Central Prior Authorization Team   Phone: [phone]    PA Initiation    Medication: Ondansetron 4MG Tablet  Insurance Company: [employer] [facility] - Phone [phone] Fax [phone]  Pharmacy Filling the Rx: [place], [place] - [address] [place] [address]  Filling Pharmacy Phone: [phone]  Filling Pharmacy Fax:    Start Date: 7/10/2023

## 2023-07-12 NOTE — TELEPHONE ENCOUNTER
Prior Authorization Approval    Authorization Effective Date: 4/12/2023  Authorization Expiration Date: 7/11/2024  Medication: Ondansetron 4MG Tablet-PA APPROVED   Approved Dose/Quantity: UP TO 90 TABLETS PER 30 DAYS   Reference #:     Insurance Company: [employer] - Phone [phone] Fax [phone]  Expected CoPay:       CoPay Card Available:      Foundation Assistance Needed:    Which Pharmacy is filling the prescription (Not needed for infusion/clinic administered): [facility] [place], [place] - [address] [place] [address]  Pharmacy Notified: Yes  Patient Notified: No

## 2023-07-18 NOTE — PATIENT INSTRUCTIONS
Understanding Round Ligament Pain in Pregnancy  Round ligament pain is a common problem in pregnancy. Ligaments are strong tissues that connect bones, muscles, and organs. There are two round ligaments. There is one on each side of the uterus. The top part of each ligament attaches to the upper side of the uterus. The bottom of each ligament attaches down in the pubic area. These ligaments help keep the uterus in place as you move around.     What causes round ligament pain in pregnancy?   As your uterus grows during pregnancy, the round ligaments are stretched and work harder when you move around. They may stretch too quickly when you stand up or bend or laugh. Nearby nerves may be irritated, or the ligaments may have a painful spasm.   Symptoms of ligament pain in pregnancy  The symptoms are sharp pains that last a few seconds. The pain may happen most often on the right side of the belly. It may happen in the hip, the lower belly, or even deep down in your pubic area. The pain may happen when you:     Move suddenly    Stand up    Walk    Roll over in bed    Laugh    Cough    Sneeze  Diagnosing round ligament pain in pregnancy   Your healthcare provider will ask about your symptoms and give you a physical exam. They may give you tests to check for other problems that can cause pain, such as an ovarian cyst or enlarged vein (varicocele). They will also check for signs of  labor or other pregnancy problems.   Treatment for round ligament pain in pregnancy   To help prevent pain:    Move slowly when you stand up, roll over, turn, or bend.    Don t stand for long periods of time.    Don t lift heavy objects.    Do gentle daily stretches of your hip joints.  When to call your healthcare provider  Call your healthcare provider right away if you have any of these:     Fever of 100.4 F (38 C) or higher    Pains that last more than a few minutes    Pain that gets worse    Bleeding, nausea, vomiting, or other new  symptoms  [employer] last reviewed this educational content on 11/1/2022 2000-2023 [employer]. All rights reserved. This information is not intended as a substitute for professional medical care. Always follow your healthcare professional's instructions.          Relieving Back Pain During Pregnancy: Wall Stretch, Body Bend   Before trying these exercises, talk to your healthcare provider to make sure they are safe for you. Ask your healthcare provider how many times to do each exercise.   Wall stretch  This strengthens and loosens the muscles in your upper back:   1. Lean against a wall with a firm pillow or rolled towel under your shoulder blades. Your feet should be about 12 inches from the wall and shoulder-width apart. Point your chin down.  2. Breathe in. Push your shoulders, neck, and head against the wall. You will feel a stretch in your shoulders.  3. Hold for 5 counts. Then breathe out, and relax your shoulders and neck.   Body bend  This strengthens your back and buttocks muscles:   1. Stand with your legs shoulder-width apart. Put your hands on your upper thighs and bend your knees slightly.  2. Slowly bend forward at the hips. Push your hips back and keep your shoulders up. Make sure your back is straight. You ll feel a stretch in your upper thighs. You ll also feel your back muscles holding you in position.  3. Hold for 5 counts, then straighten.   [employer] last reviewed this educational content on 7/1/2021 2000-2023 [employer]. All rights reserved. This information is not intended as a substitute for professional medical care. Always follow your healthcare professional's instructions.          Pregnancy: Planning Your Exercise Routine  While you re pregnant, an exercise routine helps both your mind and your body feel good. It tones your muscles and makes them stronger. It also gives you and your baby more oxygen.   The right exercise for you    Overall conditioning is  best for you and your baby. Try walking, swimming, or riding a stationary bike. Always warm up, cool down, and drink enough fluids. Keep a snack close by in case your blood sugar gets low. Discuss exercise choices with your healthcare provider. Talk about the following:     If you already exercise, find out how to adapt your routine while you re pregnant. Keep the intensity of the exercise moderate. As your pregnancy progresses, your center of gravity will change. Be careful to keep your balance.    Ask if there are any local prenatal exercise classes, such as yoga or water aerobics. Find out which prenatal exercise videos are good choices.    If you were not exercising before your pregnancy, find out the best way to start. Now is not the time to begin a new workout on your own. Start slowly. Listen to your body.    Ask which forms of exercise you should avoid. These may include risky activities like hot yoga, horseback riding, scuba diving, skiing, skating, and contact sports.  Pelvic tilts  These help strengthen your stomach muscles and low back. You can do pelvic tilts instead of sit-ups.     Do this exercise on your hands and knees.    Relax the back of your neck. Pull your stomach in until your low back flattens.    Hold for 30 seconds. Release. Repeat 10 times. Do this twice a day.  Kegel exercises  Kegel exercises strengthen the pelvic muscles. Doing Kegels daily helps prepare these muscles for delivery. Kegels also help ease your recovery. You exercise these muscles by tightening, holding, then relaxing them. To do 1 type of Kegel exercise, contract as if you were stopping your urine stream (but do it when you re not urinating). Hold for 10 seconds, then repeat 10 times, a few times a day.   Tips to add activity  Here are some tips to follow:    Park the car farther from a store and walk.    If you can, do errands on foot instead of driving.    Walk across the office to talk to someone in person instead of  calling.    While waiting for appointments, go up and down stairs or around the block.  Tips to stay active  Here are some tips to follow:    Maintain your routine. But exercise less intensely if you feel tired.    Base your workout on how you feel, not your heart rate. Heart rates aren t a good way to measure effort during pregnancy.    Don't exercise on your back after week 16.  What are the warning signs that I should stop exercising?  Stop exercising and call your healthcare provider if you have any of these symptoms:    Vaginal bleeding     Dizziness or feeling faint     Increased shortness of breath     Chest pain     Headache     Muscle weakness     Calf (back of the leg) pain or swelling      Uterine contractions or  labor     Decreased fetal movement     Fluid leaking (or gushing) from your vagina  [email or web address] last reviewed this educational content on 2021-2023 [employer]. All rights reserved. This information is not intended as a substitute for professional medical care. Always follow your healthcare professional's instructions.

## 2023-07-19 ENCOUNTER — PRENATAL OFFICE VISIT (OUTPATIENT)
Dept: OBGYN | Facility: CLINIC | Age: 33
End: 2023-07-19
Payer: COMMERCIAL

## 2023-07-19 VITALS
BODY MASS INDEX: 33.4 KG/M2 | HEIGHT: 60 IN | DIASTOLIC BLOOD PRESSURE: 66 MMHG | SYSTOLIC BLOOD PRESSURE: 122 MMHG | RESPIRATION RATE: 16 BRPM | HEART RATE: 96 BPM | WEIGHT: 170.1 LBS | TEMPERATURE: 98.3 F

## 2023-07-19 DIAGNOSIS — Z34.82 ENCOUNTER FOR SUPERVISION OF OTHER NORMAL PREGNANCY IN SECOND TRIMESTER: Primary | ICD-10-CM

## 2023-07-19 PROCEDURE — 99207 PR PRENATAL VISIT: CPT | Performed by: OBSTETRICS & GYNECOLOGY

## 2023-07-19 NOTE — PROGRESS NOTES
Concerns today: she has a fetal echo in [place] in 4 weeks    No nausea/vomiting. No heartburn.  No vaginal bleeding, no contractions/severe cramping, no leakage of fluid.  No vaginal discharge. No dysuria  No headache, vision changes, lower extremity swelling, upper abdominal pain, chest pain, shortness of breath.   Reportable signs and symptoms discussed.      Mauri Hurt MD

## 2023-08-01 ENCOUNTER — PRENATAL OFFICE VISIT (OUTPATIENT)
Dept: OBGYN | Facility: CLINIC | Age: 33
End: 2023-08-01
Payer: COMMERCIAL

## 2023-08-01 ENCOUNTER — TELEPHONE (OUTPATIENT)
Dept: OBGYN | Facility: CLINIC | Age: 33
End: 2023-08-01

## 2023-08-01 VITALS
WEIGHT: 175 LBS | TEMPERATURE: 98 F | BODY MASS INDEX: 34.36 KG/M2 | SYSTOLIC BLOOD PRESSURE: 121 MMHG | DIASTOLIC BLOOD PRESSURE: 70 MMHG | HEART RATE: 96 BPM | HEIGHT: 60 IN | RESPIRATION RATE: 16 BRPM

## 2023-08-01 DIAGNOSIS — Z34.82 PRENATAL CARE, SUBSEQUENT PREGNANCY IN SECOND TRIMESTER: Primary | ICD-10-CM

## 2023-08-01 LAB
ERYTHROCYTE [DISTWIDTH] IN BLOOD BY AUTOMATED COUNT: 12.8 % (ref 10–15)
GLUCOSE 1H P 50 G GLC PO SERPL-MCNC: 136 MG/DL (ref 70–129)
HCT VFR BLD AUTO: 35.2 % (ref 35–47)
HGB BLD-MCNC: 11.9 G/DL (ref 11.7–15.7)
MCH RBC QN AUTO: 34 PG (ref 26.5–33)
MCHC RBC AUTO-ENTMCNC: 33.8 G/DL (ref 31.5–36.5)
MCV RBC AUTO: 101 FL (ref 78–100)
PLATELET # BLD AUTO: 248 10E3/UL (ref 150–450)
RBC # BLD AUTO: 3.5 10E6/UL (ref 3.8–5.2)
WBC # BLD AUTO: 12.2 10E3/UL (ref 4–11)

## 2023-08-01 PROCEDURE — 99207 PR PRENATAL VISIT: CPT | Performed by: OBSTETRICS & GYNECOLOGY

## 2023-08-01 PROCEDURE — 85027 COMPLETE CBC AUTOMATED: CPT | Performed by: OBSTETRICS & GYNECOLOGY

## 2023-08-01 PROCEDURE — 82950 GLUCOSE TEST: CPT | Performed by: OBSTETRICS & GYNECOLOGY

## 2023-08-01 PROCEDURE — 86780 TREPONEMA PALLIDUM: CPT | Performed by: OBSTETRICS & GYNECOLOGY

## 2023-08-01 PROCEDURE — 36415 COLL VENOUS BLD VENIPUNCTURE: CPT | Performed by: OBSTETRICS & GYNECOLOGY

## 2023-08-01 NOTE — TELEPHONE ENCOUNTER
Left message for Ana Maria to call us back.  Her 1 hour gct was elevated and she will need to schedule the 3 hour glucose test.  She will need to fast 12 hours prior to this test and plan to be at the lab for 3 hours.

## 2023-08-01 NOTE — LETTER
[facility] [place]  [address] [facility] [address]  Phone: [phone]      August 1, 2023      Ana Maria Espinoza  [address] [place] [address]              To whom it may concern:    Ana Maria Espinoza is being seen in our clinic for prenatal care.  Her Estimated Date of Delivery: Nov 17, 2023.  Please excuse her from work on 8/1/23 and 8/2/23 due to complications.  Sincerely,    Mauri Hurt MD

## 2023-08-01 NOTE — TELEPHONE ENCOUNTER
Patient returned call, wondering if she can actually redo the 1 hour before the 3 hour. She states she was not prepared to have her glucose done today and did have a can of pop prior to the appointment, not sure if that may have effected the levels. Please advise, thank you.

## 2023-08-01 NOTE — LETTER
[facility] [place]  [address] [facility] [address]  Phone: [phone]      August 1, 2023      Ana Maria Espinoza  [address] [place] [id number]              To whom it may concern:    Ana Maria Espinoza is being seen in our clinic for prenatal care.  Her Estimated Date of Delivery: Nov 17, 2023.  Please excuse her from work today due to complications.       Sincerely,    Mauri Hurt MD

## 2023-08-01 NOTE — PATIENT INSTRUCTIONS
You have been provided the My Labor and Birth Wishes document.  Please review at home and bring to your next prenatal visit. Bring this sheet to the hospital for your birth. Give copies to your care team members and support person.   Additional copies can be found here:  [email or web address]

## 2023-08-01 NOTE — PROGRESS NOTES
Concerns: she wants to have a PPTL  Doing well.  No concerns today.  No vaginal bleeding, no contractions, no leakage of fluid  No nausea/vomiting. No heartburn  No vaginal discharge. No dysuria.   No headache, vision changes, lower extremity swelling, upper abdominal pain, chest pain, shortness of breath  Reportable signs and symptoms discussed.  Tdap planned next visit  Discussed PTL, PROM, and when to call or come in.  Normal anatomy ultrasound.  RTC 4 weeks.  GTT and labs today       Mauri Hurt MD

## 2023-08-02 LAB — T PALLIDUM AB SER QL: NONREACTIVE

## 2023-08-02 NOTE — TELEPHONE ENCOUNTER
Left message for patient to call back.  Dr Hurt said she is not able to redo the 1 hour test.  She will need to do the 3 hour test.

## 2023-08-07 ENCOUNTER — TELEPHONE (OUTPATIENT)
Dept: OBGYN | Facility: CLINIC | Age: 33
End: 2023-08-07
Payer: COMMERCIAL

## 2023-08-07 NOTE — TELEPHONE ENCOUNTER
Pt called to return missed call   Pt reports that she drank a Mountain Dew before 1hr GCT and wanted to know if she could just repeat that instead of 3hr GTT  Note from Dr. Hurt indicates that 3hr GTT is necessary - pt informed and she verbalizes understanding.     Future lab order already placed by MD.  Reviewed testing guidelines and expectations.  Pt verbalizes understanding.  Appt scheduled for 8/16/23 at 0745 before OB appt    NIDHI Lima  Ob/Gyn Clinic

## 2023-08-08 ENCOUNTER — TELEPHONE (OUTPATIENT)
Dept: FAMILY MEDICINE | Facility: CLINIC | Age: 33
End: 2023-08-08
Payer: COMMERCIAL

## 2023-08-08 NOTE — TELEPHONE ENCOUNTER
Patient Quality Outreach    Patient is due for the following:   Asthma  -  ACT needed  Chronic Opioid Use -  Treatment Agreement (CSA) and Urine Drug Screen not currently filiberto edward    Next Steps:   Patient was assigned appropriate questionnaire to complete    Type of outreach:    Sent [employer] message.    Next Steps:  Reach out within 90 days via [employer].    Max number of attempts reached: No. Will try again in 90 days if patient still on fail list.    Questions for provider review:    None           Keiry Fonseca, [facility]  Chart routed to Care Team.

## 2023-08-10 ENCOUNTER — ANCILLARY PROCEDURE (OUTPATIENT)
Dept: ULTRASOUND IMAGING | Facility: HOSPITAL | Age: 33
End: 2023-08-10
Attending: OBSTETRICS & GYNECOLOGY
Payer: COMMERCIAL

## 2023-08-10 DIAGNOSIS — O35.8XX0 FAMILY HISTORIC RISK OF CONGENITAL ABNORMALITY, NOT APPLICABLE OR UNSPECIFIED FETUS: ICD-10-CM

## 2023-08-10 PROCEDURE — 76825 ECHO EXAM OF FETAL HEART: CPT | Mod: 26 | Performed by: PEDIATRICS

## 2023-08-10 PROCEDURE — 93325 DOPPLER ECHO COLOR FLOW MAPG: CPT

## 2023-08-10 PROCEDURE — 93325 DOPPLER ECHO COLOR FLOW MAPG: CPT | Mod: 26 | Performed by: PEDIATRICS

## 2023-08-10 PROCEDURE — 76825 ECHO EXAM OF FETAL HEART: CPT

## 2023-08-10 PROCEDURE — 76827 ECHO EXAM OF FETAL HEART: CPT | Mod: 26 | Performed by: PEDIATRICS

## 2023-08-15 ENCOUNTER — NURSE TRIAGE (OUTPATIENT)
Dept: NURSING | Facility: CLINIC | Age: 33
End: 2023-08-15
Payer: COMMERCIAL

## 2023-08-16 ENCOUNTER — LAB (OUTPATIENT)
Dept: LAB | Facility: CLINIC | Age: 33
End: 2023-08-16
Payer: COMMERCIAL

## 2023-08-16 ENCOUNTER — PRENATAL OFFICE VISIT (OUTPATIENT)
Dept: OBGYN | Facility: CLINIC | Age: 33
End: 2023-08-16
Payer: COMMERCIAL

## 2023-08-16 VITALS
HEIGHT: 60 IN | WEIGHT: 176.2 LBS | TEMPERATURE: 97.8 F | DIASTOLIC BLOOD PRESSURE: 71 MMHG | SYSTOLIC BLOOD PRESSURE: 128 MMHG | BODY MASS INDEX: 34.59 KG/M2 | HEART RATE: 100 BPM | RESPIRATION RATE: 16 BRPM

## 2023-08-16 DIAGNOSIS — K21.9 GASTROESOPHAGEAL REFLUX DISEASE WITHOUT ESOPHAGITIS: Primary | ICD-10-CM

## 2023-08-16 DIAGNOSIS — Z34.82 PRENATAL CARE, SUBSEQUENT PREGNANCY IN SECOND TRIMESTER: ICD-10-CM

## 2023-08-16 LAB
GESTATIONAL GTT 1 HR POST DOSE: 136 MG/DL (ref 60–179)
GESTATIONAL GTT 2 HR POST DOSE: 138 MG/DL (ref 60–154)
GESTATIONAL GTT 3 HR POST DOSE: 101 MG/DL (ref 60–139)
GLUCOSE P FAST SERPL-MCNC: 100 MG/DL (ref 60–94)

## 2023-08-16 PROCEDURE — 82952 GTT-ADDED SAMPLES: CPT

## 2023-08-16 PROCEDURE — 82951 GLUCOSE TOLERANCE TEST (GTT): CPT

## 2023-08-16 PROCEDURE — 36415 COLL VENOUS BLD VENIPUNCTURE: CPT

## 2023-08-16 PROCEDURE — 99207 PR PRENATAL VISIT: CPT | Performed by: OBSTETRICS & GYNECOLOGY

## 2023-08-16 RX ORDER — ENOXAPARIN SODIUM 100 MG/ML
40 INJECTION SUBCUTANEOUS EVERY 24 HOURS
Status: ON HOLD | COMMUNITY
Start: 2023-08-14 | End: 2023-11-15

## 2023-08-16 RX ORDER — OMEPRAZOLE 40 MG/1
40 CAPSULE, DELAYED RELEASE ORAL DAILY
Qty: 90 CAPSULE | Refills: 4 | Status: SHIPPED | OUTPATIENT
Start: 2023-08-16 | End: 2024-06-14

## 2023-08-16 RX ORDER — ONDANSETRON 4 MG/1
4 TABLET, FILM COATED ORAL EVERY 8 HOURS PRN
Qty: 30 TABLET | Refills: 12 | Status: ON HOLD | OUTPATIENT
Start: 2023-08-16 | End: 2023-11-15

## 2023-08-16 NOTE — PROGRESS NOTES
Initial /71 (BP Location: Right arm, Patient Position: Chair, Cuff Size: Adult Regular)   Pulse 100   Temp 97.8  F (36.6  C) (Tympanic)   Resp 16   Ht 1.524 m (5')   Wt 79.9 kg (176 lb 3.2 oz)   LMP 02/02/2023   BMI 34.41 kg/m   Estimated body mass index is 34.41 kg/m  as calculated from the following:    Height as of this encounter: 1.524 m (5').    Weight as of this encounter: 79.9 kg (176 lb 3.2 oz). .

## 2023-08-16 NOTE — PATIENT INSTRUCTIONS
You have been provided the My Labor and Birth Wishes document.  Please review at home and bring to your next prenatal visit. Bring this sheet to the hospital for your birth. Give copies to your care team members and support person.   Additional copies can be found here:  [email or web address]

## 2023-08-16 NOTE — PROGRESS NOTES
[facility]     Return OB Note     CC: Return OB      Subjective:  Ana Maria is a 33 year old  at 26w5d   Denies vaginal bleeding, loss of fluid, or pelvic cramping.      Objective:  /71 (BP Location: Right arm, Patient Position: Chair, Cuff Size: Adult Regular)   Pulse 100   Temp 97.8  F (36.6  C) (Tympanic)   Resp 16   Ht 1.524 m (5')   Wt 79.9 kg (176 lb 3.2 oz)   LMP 2023   BMI 34.41 kg/m       See flowsheet     Assessment/Plan:      33 year old  at 26w5d        Encounter Diagnosis   Name Primary?    14 weeks gestation of pregnancy Yes          1) Normal labs  2) NIPT - low risk, boy  3) PMH/OBHx problems:                - Hx of c/s x2; wants repeat               - Hx of Factor V Leiden and kidney clot; s/p MFM, on lovenox                - Hx of child with complex cardiac defect and death as an infant and perthes disease; MFM referral, genetics, L2, fetal ECHO               - tobacco use; is done to 1x cigarette a day                - hx of PPD; no meds currently      4) Factor V Leiden with kidney clot:  MFM consult done.  Since Ms. 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  In the postpartum period, we recommend prophylactic anticoagulation with subcutaneous LMWH, usually enoxaparin 40 mg every 24 hours.  Anticoagulation should be initiated 6-12 hours after delivery, depending on her bleeding, and should be continued for six weeks postpartum. If regional anesthesia is used, 4 hours should lapse after epidural catheter removal prior to administering anticoagulation.   5) increased omeprazole for GERD and then zofrna for nausea        RTC 2 weeks, precautions reviewed     Katharina Lerner MD  OB/GYN

## 2023-08-16 NOTE — TELEPHONE ENCOUNTER
Patient called.  She states there is some confusion on the glucose tolerance test instructions.    Patient states she failed the first test and is scheduled to have the 3 hour test tomorrow.  Patient states she was told to fast but on the instructions on mychart it states she should fast unless she is pregnant.    I explained to patient she needs to fast.  Would recommend at least not eating or drinking anything for eat least 8 hours.  I explained if she has medications she can take sips of water for those.    Patient understands and plans to fast as recommended.    Domenica Goff RN   08/15/23 7:59 PM  [facility] Nurse Advisor  Reason for Disposition   Question about upcoming scheduled test, no triage required and triager able to answer question    Additional Information   Negative: [1] Caller is not with the adult (patient) AND [2] reporting urgent symptoms   Negative: Lab result questions   Negative: Medication questions   Negative: Caller can't be reached by phone   Negative: Caller has already spoken to PCP or another triager   Negative: RN needs further essential information from caller in order to complete triage   Negative: Requesting regular office appointment   Negative: [1] Caller requesting NON-URGENT health information AND [2] PCP's office is the best resource   Negative: Health Information question, no triage required and triager able to answer question   Negative: General information question, no triage required and triager able to answer question    Protocols used: Information Only Call - No T[facility]

## 2023-08-23 DIAGNOSIS — R73.01 ELEVATED FASTING BLOOD SUGAR: Primary | ICD-10-CM

## 2023-08-28 ENCOUNTER — VIRTUAL VISIT (OUTPATIENT)
Dept: EDUCATION SERVICES | Facility: CLINIC | Age: 33
End: 2023-08-28
Payer: COMMERCIAL

## 2023-08-28 DIAGNOSIS — O24.419 GESTATIONAL DIABETES: Primary | ICD-10-CM

## 2023-08-28 PROCEDURE — G0108 DIAB MANAGE TRN  PER INDIV: HCPCS | Mod: 95 | Performed by: DIETITIAN, REGISTERED

## 2023-08-28 NOTE — PROGRESS NOTES
Diabetes Self-Management Education & Support  Type of service:  Video Visit    If the video visit is dropped, the video visit invitation should be resent by: Text to cell phone: [phone]    Originating Location (pt. Location): Home  Distant Location (provider location): Offsite  Mode of Communication:  Video Conference via [employer]    Video Start Time:  9:57 AM  Video End Time (time video stopped): 10:27 AM    How would patient like to obtain AVS? MyChart    SUBJECTIVE/OBJECTIVE:  Presents for education related to gestational diabetes.    Accompanied by: Self  Diabetes management related comments/concerns: checking BG for 2 weeks - only failed the 1st fasting tests - per MD.  Gestational weeks: 28w3d  Next OB Visit Date: 08/30/23  Number of previous pregnancies: 2  Had any babies over 9 lbs: No  Previously had Gestational Diabetes: No  Have you ever had thyroid problems or taken thyroid medication?: No  Heart disease, mitral valve prolapse or rheumatic fever?: No  Hypertension : No  High Cholesterol: No  High Triglycerides: No    Cultural Influences/Ethnic Background:  Not  or     Estimated Date of Delivery: Nov 17, 2023    1 hour OGTT  Lab Results   Component Value Date    GLU1 136 (H) 08/01/2023         3 hour OGTT    Fasting  Lab Results   Component Value Date    GTTGF 100 (H) 08/16/2023       1 hour  Lab Results   Component Value Date    GTTG1 136 08/16/2023       2 hour  Lab Results   Component Value Date    GTTG2 138 08/16/2023       3 hour  Lab Results   Component Value Date    GTTG3 101 08/16/2023       Lifestyle and Health Behaviors:  Exercise:: Yes (walks a few blocks to see her friends - trying to walk more with kids)  Days per week of moderate to strenuous exercise (like a brisk walk): 4  On average, minutes per day of exercise at this level: 20  Exercise Minutes per Week: 80  Meal planning/habits: Avoiding sweets  Meals include: Breakfast, Lunch, Dinner, Morning Snack, Afternoon  Snack, Evening Snack  Breakfast: Toast and eggs with milk or water  Lunch: burger on the grill with a hamburger OR eats out sandwich  Dinner: hamburger helper or a homemade meals  Snacks: Bananas, apples, fruit and vegetables - tomatoes  Other: bedtime snack - gelatin - has heartburn  Beverages: Water, Milk, Soda (8 ounce can of Mt. dew - once a day)  Biggest challenges to healthy eating: None  Pre-franc vitamin?: Yes  Supplements?: No    Healthy Coping:  Emotional response to diabetes: Ready to learn  Informal Support system:: Significant other  Stage of change: ACTION (Actively working towards change)    Current Management:  Taking medications for gestational diabetes?: No  Difficulty affording diabetes medication?: No  Difficulty affording diabetes testing supplies?: No    ASSESSMENT:  Discussed carbohydrate sources and impact on blood glucose. Reviewed basics of healthy eating and incorporating a variety of foods into meal plan. Instructed on carbohydrate counting and label reading and recommended patient consume 2 CHO for breakfast, 3-4 CHO for lunch and dinner and 1-2 CHO for each snack, 3 snacks a day.  Encouraged protein with bedtime snack.      Discussed importance of not going too low in carbohydrates since that may cause liver to produce excess glucose and contribute to elevated blood glucose readings and ketone formation.  To also help prevent against ketone formation, protein was encouraged with meals and snacks, especially with the night snack. Reviewed benefits of exercising to help lower blood glucose and walking after meals, as tolerated and per MD approval, if seeing elevated blood glucose after a meal. Pt verbalized understanding of concepts discussed and recommendations provided.      Patient reports that she wasn't truly fasting for her OGTT test and she plans to check BG for 2 weeks to determine if she needs to continue checking BG. She called her insurance and they are mailing her a  glucometer. Advised her to message me at the end of the week if she has not received it yet.    INTERVENTION:  Reviewed BG goals. Demonstrated use of glucose meter.    Educational topics covered today:  GDM diagnosis, pathophysiology, Risks and Complications of GDM, Means of controlling GDM, Using a Blood Glucose Monitor, Blood Glucose Goals, Logging and Interpreting Glucose Results, Ketone Testing, When to Call a Diabetes Educator or OB Provider, Healthy Eating During Pregnancy, Counting Carbohydrates, Meal Planning for GDM, and Physical Activity    Educational materials provided today:   [place] Understanding Gestational Diabetes  GDM Log Book  Sharps Disposal    Pt verbalized understanding of concepts discussed and recommendations provided today.     PLAN:  Check glucose 4 times daily, before breakfast and 1 hour after each meal.  Patient to ask her OBGYN for a note to work discussing the importance of taking a break 1 hr post meals to check BG.    If BG elevated will continue to check BG and order ketones at next visit    Check Ketones daily for one week, if negative, reduce testing to once a week.     Physical activity recommended: move 10 minutes after each meal.    Meal plan: 2 carbs at breakfast, 4 carbs at lunch, 4 carbs at supper, 1-2 carbs at 3 snacks a day.  Follow consistent CHO meal plan, eat CHO and protein/fat at all meals/snacks.    Call/e-mail/[employer] message diabetes educator if 3 or more blood sugars are above the goal in 1 week, if ketones are positive, or with questions/concerns.      Time Spent: 30 minutes  Encounter Type: Individual    Any diabetes medication dose changes were made via the CDE Protocol and Collaborative Practice Agreement with the patient's referring provider. A copy of this encounter was shared with the provider.

## 2023-08-28 NOTE — LETTER
8/28/2023         RE: Ana Maria Espinoza  [address] [place] [address]        Dear Colleague,    Thank you for referring your patient, Ana Maria Espinoza, to the [facility]. Please see a copy of my visit note below.    Diabetes Self-Management Education & Support  Type of service:  Video Visit    If the video visit is dropped, the video visit invitation should be resent by: Text to cell phone: [phone]    Originating Location (pt. Location): Home  Distant Location (provider location): Offsite  Mode of Communication:  Video Conference via [employer]    Video Start Time:  9:57 AM  Video End Time (time video stopped): 10:27 AM    How would patient like to obtain AVS? MyChart    SUBJECTIVE/OBJECTIVE:  Presents for education related to gestational diabetes.    Accompanied by: Self  Diabetes management related comments/concerns: checking BG for 2 weeks - only failed the 1st fasting tests - per MD.  Gestational weeks: 28w3d  Next OB Visit Date: 08/30/23  Number of previous pregnancies: 2  Had any babies over 9 lbs: No  Previously had Gestational Diabetes: No  Have you ever had thyroid problems or taken thyroid medication?: No  Heart disease, mitral valve prolapse or rheumatic fever?: No  Hypertension : No  High Cholesterol: No  High Triglycerides: No    Cultural Influences/Ethnic Background:  Not  or     Estimated Date of Delivery: Nov 17, 2023    1 hour OGTT  Lab Results   Component Value Date    GLU1 136 (H) 08/01/2023         3 hour OGTT    Fasting  Lab Results   Component Value Date    GTTGF 100 (H) 08/16/2023       1 hour  Lab Results   Component Value Date    GTTG1 136 08/16/2023       2 hour  Lab Results   Component Value Date    GTTG2 138 08/16/2023       3 hour  Lab Results   Component Value Date    GTTG3 101 08/16/2023       Lifestyle and Health Behaviors:  Exercise:: Yes (walks a few blocks to see her friends - trying to walk more with kids)  Days per week of  moderate to strenuous exercise (like a brisk walk): 4  On average, minutes per day of exercise at this level: 20  Exercise Minutes per Week: 80  Meal planning/habits: Avoiding sweets  Meals include: Breakfast, Lunch, Dinner, Morning Snack, Afternoon Snack, Evening Snack  Breakfast: Toast and eggs with milk or water  Lunch: burger on the grill with a hamburger OR eats out sandwich  Dinner: hamburger helper or a homemade meals  Snacks: Bananas, apples, fruit and vegetables - tomatoes  Other: bedtime snack - gelatin - has heartburn  Beverages: Water, Milk, Soda (8 ounce can of MtSheron dew - once a day)  Biggest challenges to healthy eating: None  Pre-franc vitamin?: Yes  Supplements?: No    Healthy Coping:  Emotional response to diabetes: Ready to learn  Informal Support system:: Significant other  Stage of change: ACTION (Actively working towards change)    Current Management:  Taking medications for gestational diabetes?: No  Difficulty affording diabetes medication?: No  Difficulty affording diabetes testing supplies?: No    ASSESSMENT:  Discussed carbohydrate sources and impact on blood glucose. Reviewed basics of healthy eating and incorporating a variety of foods into meal plan. Instructed on carbohydrate counting and label reading and recommended patient consume 2 CHO for breakfast, 3-4 CHO for lunch and dinner and 1-2 CHO for each snack, 3 snacks a day.  Encouraged protein with bedtime snack.      Discussed importance of not going too low in carbohydrates since that may cause liver to produce excess glucose and contribute to elevated blood glucose readings and ketone formation.  To also help prevent against ketone formation, protein was encouraged with meals and snacks, especially with the night snack. Reviewed benefits of exercising to help lower blood glucose and walking after meals, as tolerated and per MD approval, if seeing elevated blood glucose after a meal. Pt verbalized understanding of concepts discussed  and recommendations provided.      Patient reports that she wasn't truly fasting for her OGTT test and she plans to check BG for 2 weeks to determine if she needs to continue checking BG. She called her insurance and they are mailing her a glucometer. Advised her to message me at the end of the week if she has not received it yet.    INTERVENTION:  Reviewed BG goals. Demonstrated use of glucose meter.    Educational topics covered today:  GDM diagnosis, pathophysiology, Risks and Complications of GDM, Means of controlling GDM, Using a Blood Glucose Monitor, Blood Glucose Goals, Logging and Interpreting Glucose Results, Ketone Testing, When to Call a Diabetes Educator or OB Provider, Healthy Eating During Pregnancy, Counting Carbohydrates, Meal Planning for GDM, and Physical Activity    Educational materials provided today:   [place] Understanding Gestational Diabetes  GDM Log Book  Sharps Disposal    Pt verbalized understanding of concepts discussed and recommendations provided today.     PLAN:  Check glucose 4 times daily, before breakfast and 1 hour after each meal.  Patient to ask her OBGYN for a note to work discussing the importance of taking a break 1 hr post meals to check BG.    If BG elevated will continue to check BG and order ketones at next visit    Check Ketones daily for one week, if negative, reduce testing to once a week.     Physical activity recommended: move 10 minutes after each meal.    Meal plan: 2 carbs at breakfast, 4 carbs at lunch, 4 carbs at supper, 1-2 carbs at 3 snacks a day.  Follow consistent CHO meal plan, eat CHO and protein/fat at all meals/snacks.    Call/e-mail/MyChart message diabetes educator if 3 or more blood sugars are above the goal in 1 week, if ketones are positive, or with questions/concerns.      Time Spent: 30 minutes  Encounter Type: Individual    Any diabetes medication dose changes were made via the CDE Protocol and Collaborative Practice Agreement with the  patient's referring provider. A copy of this encounter was shared with the provider.

## 2023-08-28 NOTE — PATIENT INSTRUCTIONS
Goals for Gestational Diabetes Care:    1. Eat balanced meals (3 meals + 3 snacks daily)    Breakfast: 30 grams carbohydrate + Protein  Snack: 15-30 grams carbohydrate + protein  Lunch: 45-60 grams carbohydrate + protein/vegetables  Snack: 15-30 grams Carbohydrate + protein  Dinner: 45-60 grams carbohydrate + protein/vegetables  Bedtime Snack: 15-30 grams + protein    ----Make sure you include protein source with each meal and at bedtime - this has been shown to help with blood glucose elevations    2. Each Morning Check Ketones (small/mod/high - call Diabetes Care Line)  Your goal is negative or trace ketones. If you have ketones in your urine it means you are not eating enough before you go to bed. Eat a larger bedtime snack and include protein.     3. Aim to get at least 30 minutes of activity each day. Activity really helps improve blood sugars.     4. Blood Glucose Targets:   1. Fasting Less than 95 mg/dL   2. 1 hours after a meal target is less than 140 mg/dL  ----Always remember to bring meter and log book to all appointments.      Follow up with your Diabetic Educator to assess BG targets in 1-2 weeks  If Blood glucose levels are above normal 3 times or more in one week and you cannot explain them or if you develop small, moderate or high ketones call Diabetes Care at [phone]    Call with any questions.    Thank you,  Mercy Yoo RDN, LIO, [facility]   Certified Diabetes Care & Education SpecialistTriage [phone] or Scheduling [phone]

## 2023-08-30 ENCOUNTER — PRENATAL OFFICE VISIT (OUTPATIENT)
Dept: OBGYN | Facility: CLINIC | Age: 33
End: 2023-08-30
Payer: COMMERCIAL

## 2023-08-30 VITALS
SYSTOLIC BLOOD PRESSURE: 130 MMHG | WEIGHT: 180.7 LBS | HEART RATE: 102 BPM | DIASTOLIC BLOOD PRESSURE: 67 MMHG | BODY MASS INDEX: 35.47 KG/M2 | HEIGHT: 60 IN | TEMPERATURE: 98.5 F | RESPIRATION RATE: 16 BRPM

## 2023-08-30 DIAGNOSIS — Z34.83 PRENATAL CARE, SUBSEQUENT PREGNANCY IN THIRD TRIMESTER: Primary | ICD-10-CM

## 2023-08-30 PROCEDURE — 90715 TDAP VACCINE 7 YRS/> IM: CPT | Performed by: OBSTETRICS & GYNECOLOGY

## 2023-08-30 PROCEDURE — 90471 IMMUNIZATION ADMIN: CPT | Performed by: OBSTETRICS & GYNECOLOGY

## 2023-08-30 PROCEDURE — 99207 PR PRENATAL VISIT: CPT | Performed by: OBSTETRICS & GYNECOLOGY

## 2023-08-30 NOTE — PROGRESS NOTES
[facility]     Return OB Note     CC: Return OB      Subjective:  Ana Maria is a 33 year old  at 28w5d   Denies vaginal bleeding, loss of fluid, or pelvic cramping.   Has glucose meter in the mail.      Objective:  /67 (BP Location: Right arm, Patient Position: Chair, Cuff Size: Adult Regular)   Pulse 102   Temp 98.5  F (36.9  C) (Tympanic)   Resp 16   Ht 1.524 m (5')   Wt 82 kg (180 lb 11.2 oz)   LMP 2023   BMI 35.29 kg/m       See flowsheet     Assessment/Plan:      33 year old  at 28w5d    1) Normal labs  2) NIPT - low risk, boy  3) PMH/OBHx problems:                - Hx of c/s x2; wants repeat               - Hx of Factor V Leiden and kidney clot; s/p MFM, on lovenox                - Hx of child with complex cardiac defect and death as an infant and perthes disease; MFM referral, genetics, L2, fetal ECHO               - tobacco use; is done to 1x cigarette a day                - hx of PPD; no meds currently    - failed GCT, passed GTT but with very high fasting blood sugar; plan to check blood sugars QID for four week.    - requests disability parking pass      4) Factor V Leiden with kidney clot:  MFM consult done.  Since Ms. 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  In the postpartum period, we recommend prophylactic anticoagulation with subcutaneous LMWH, usually enoxaparin 40 mg every 24 hours.  Anticoagulation should be initiated 6-12 hours after delivery, depending on her bleeding, and should be continued for six weeks postpartum. If regional anesthesia is used, 4 hours should lapse after epidural catheter removal prior to administering anticoagulation.     5) increased omeprazole for GERD and then zofran for nausea  6) discussed [place] law with disability parking pass and pregnancy does nbot  qualify, letter written to allow her to park closer to front door at work.       RTC 2 weeks, precautions reviewed      Katharina Lerner MD  OB/GYN

## 2023-08-30 NOTE — PROGRESS NOTES
Initial /67 (BP Location: Right arm, Patient Position: Chair, Cuff Size: Adult Regular)   Pulse 102   Temp 98.5  F (36.9  C) (Tympanic)   Resp 16   Ht 1.524 m (5')   Wt 82 kg (180 lb 11.2 oz)   LMP 02/02/2023   BMI 35.29 kg/m   Estimated body mass index is 35.29 kg/m  as calculated from the following:    Height as of this encounter: 1.524 m (5').    Weight as of this encounter: 82 kg (180 lb 11.2 oz). .    Prior to immunization administration, verified patients identity using patient s name and date of birth. Please see Immunization Activity for additional information.     Screening Questionnaire for Adult Immunization    Are you sick today?   No   Do you have allergies to medications, food, a vaccine component or latex?   Yes   Have you ever had a serious reaction after receiving a vaccination?   No   Do you have a long-term health problem with heart, lung, kidney, or metabolic disease (e.g., diabetes), asthma, a blood disorder, no spleen, complement component deficiency, a cochlear implant, or a spinal fluid leak?  Are you on long-term aspirin therapy?   No   Do you have cancer, leukemia, HIV/AIDS, or any other immune system problem?   No   Do you have a parent, brother, or sister with an immune system problem?   No   In the past 3 months, have you taken medications that affect  your immune system, such as prednisone, other steroids, or anticancer drugs; drugs for the treatment of rheumatoid arthritis, Crohn s disease, or psoriasis; or have you had radiation treatments?   No   Have you had a seizure, or a brain or other nervous system problem?   No   During the past year, have you received a transfusion of blood or blood    products, or been given immune (gamma) globulin or antiviral drug?   No   For women: Are you pregnant or is there a chance you could become       pregnant during the next month?   Yes   Have you received any vaccinations in the past 4 weeks?   No       Patient instructed to remain  in clinic for 15 minutes afterwards, and to report any adverse reactions.     Screening performed by Hedy Summers LPN on 8/30/2023 at 4:07 PM.

## 2023-08-31 ENCOUNTER — MYC MEDICAL ADVICE (OUTPATIENT)
Dept: OBGYN | Facility: CLINIC | Age: 33
End: 2023-08-31
Payer: COMMERCIAL

## 2023-08-31 ENCOUNTER — TELEPHONE (OUTPATIENT)
Dept: OBGYN | Facility: CLINIC | Age: 33
End: 2023-08-31
Payer: COMMERCIAL

## 2023-08-31 DIAGNOSIS — R73.01 ELEVATED FASTING BLOOD SUGAR: Primary | ICD-10-CM

## 2023-08-31 RX ORDER — LANCETS
EACH MISCELLANEOUS
Qty: 1 EACH | Refills: 3 | Status: SHIPPED | OUTPATIENT
Start: 2023-08-31 | End: 2023-10-13

## 2023-08-31 RX ORDER — GLUCOSAMINE HCL/CHONDROITIN SU 500-400 MG
CAPSULE ORAL
Qty: 100 EACH | Refills: 3 | Status: SHIPPED | OUTPATIENT
Start: 2023-08-31 | End: 2023-10-13

## 2023-08-31 NOTE — TELEPHONE ENCOUNTER
[facility] Call Center    Phone Message    May a detailed message be left on voicemail: yes     Reason for Call: Other: Pt calling stating she got her prescription in the mail for her diabetes. Pt states she is missing the testing strips and finger poker. Pt is requesting Yanne send those prescriptions to   [facility] [address]. Pt says make and model for testing strips are Contour next gen. Please advise and call pt         Action Taken: Message routed to:  Other: [facility]    Travel Screening: Not Applicable

## 2023-08-31 NOTE — TELEPHONE ENCOUNTER
Return call to pt.  Unable to reach.  Left message for pt to return call to clinic.    Will  have Dr. Lerner send prescription tomorrow when she returns to clinic.    Morenita Iqbal   Ob/Gyn Clinic  RN

## 2023-09-07 ENCOUNTER — TELEPHONE (OUTPATIENT)
Dept: OBGYN | Facility: CLINIC | Age: 33
End: 2023-09-07
Payer: COMMERCIAL

## 2023-09-07 ENCOUNTER — HOSPITAL ENCOUNTER (EMERGENCY)
Facility: CLINIC | Age: 33
Discharge: STILL ADMITTED OR ADMINISTRATIVE | End: 2023-09-07
Payer: COMMERCIAL

## 2023-09-07 ENCOUNTER — HOSPITAL ENCOUNTER (OUTPATIENT)
Facility: CLINIC | Age: 33
Discharge: HOME | End: 2023-09-07
Attending: OBSTETRICS & GYNECOLOGY | Admitting: OBSTETRICS & GYNECOLOGY
Payer: COMMERCIAL

## 2023-09-07 VITALS
TEMPERATURE: 98.3 F | HEIGHT: 60 IN | HEART RATE: 87 BPM | SYSTOLIC BLOOD PRESSURE: 125 MMHG | BODY MASS INDEX: 35.34 KG/M2 | WEIGHT: 180 LBS | RESPIRATION RATE: 16 BRPM | DIASTOLIC BLOOD PRESSURE: 89 MMHG

## 2023-09-07 DIAGNOSIS — K64.4 EXTERNAL HEMORRHOIDS: Primary | ICD-10-CM

## 2023-09-07 LAB
ALBUMIN UR-MCNC: NEGATIVE MG/DL
APPEARANCE UR: ABNORMAL
BILIRUB UR QL STRIP: NEGATIVE
COLOR UR AUTO: YELLOW
GLUCOSE UR STRIP-MCNC: NEGATIVE MG/DL
HGB UR QL STRIP: NEGATIVE
KETONES UR STRIP-MCNC: NEGATIVE MG/DL
LEUKOCYTE ESTERASE UR QL STRIP: NEGATIVE
MUCOUS THREADS #/AREA URNS LPF: PRESENT /LPF
NITRATE UR QL: NEGATIVE
PH UR STRIP: 6 [PH] (ref 5–7)
RBC URINE: 0 /HPF
SP GR UR STRIP: 1.01 (ref 1–1.03)
SQUAMOUS EPITHELIAL: 4 /HPF
UROBILINOGEN UR STRIP-MCNC: NORMAL MG/DL
WBC URINE: <1 /HPF

## 2023-09-07 PROCEDURE — 81001 URINALYSIS AUTO W/SCOPE: CPT | Performed by: OBSTETRICS & GYNECOLOGY

## 2023-09-07 PROCEDURE — G0463 HOSPITAL OUTPT CLINIC VISIT: HCPCS

## 2023-09-07 PROCEDURE — 99213 OFFICE O/P EST LOW 20 MIN: CPT | Performed by: OBSTETRICS & GYNECOLOGY

## 2023-09-07 RX ORDER — ACETAMINOPHEN 325 MG/1
975 TABLET ORAL EVERY 4 HOURS PRN
Status: DISCONTINUED | OUTPATIENT
Start: 2023-09-07 | End: 2023-09-08 | Stop reason: HOSPADM

## 2023-09-07 RX ORDER — HYDROCORTISONE 25 MG/G
CREAM TOPICAL 3 TIMES DAILY PRN
Qty: 30 G | Refills: 1 | Status: SHIPPED | OUTPATIENT
Start: 2023-09-07 | End: 2023-11-07

## 2023-09-07 ASSESSMENT — ACTIVITIES OF DAILY LIVING (ADL)
ADLS_ACUITY_SCORE: 35
ADLS_ACUITY_SCORE: 18

## 2023-09-07 NOTE — LETTER
[facility]  [address] [address]  Phone: [phone]  Fax: [phone]    September 7, 2023        Ana Maria Espinoza  [address] [place] [id number]          To whom it may concern:    RE: Ana Maria Espinoza    Patient was seen and evaluated at our facility today. Please excuse from all work related activities on Friday, September 8, 2023.        Sincerely,        Stefanie Brooks DO

## 2023-09-07 NOTE — TELEPHONE ENCOUNTER
Reason for call:  Patient reporting a symptom    Symptom or request: Pt states last night when she used the bathroom - has a hemorrhoid and it is really big.  Has had them with last pregnancy but not has big - says its kind of like a skin tag.    Also states she stands at her job, dealing with swollen feet.  Has tried to elevate feet when she gets home.  Her feet have been swollen -pretty bad last couple of days - can't even wear her flipflops.  Turning a little bit purplish.  Today has stayed in bed with feet elevated.  Pt is almost 30 weeks pregnant.    Duration (how long have symptoms been present): Swelling - a couple of weeks but it usually goes away after elevated.  But this time they have stayed swollen for 3 days.  Today they are a little bit better because she has been elevating her feet most of the morning.    Have you been treated for this before? No    Additional comments:     Phone Number patient can be reached at:  Home number on file [phone] (home)    Best Time:      Can we leave a detailed message on this number:  YES    Call taken on 9/7/2023 at 1:04 PM by Ashlie Shelley

## 2023-09-08 ENCOUNTER — PATIENT OUTREACH (OUTPATIENT)
Dept: FAMILY MEDICINE | Facility: CLINIC | Age: 33
End: 2023-09-08
Payer: COMMERCIAL

## 2023-09-08 NOTE — TELEPHONE ENCOUNTER
"ED/Discharge Protocol    \"Hi, my name is Keiry Villarreal RN, a registered nurse, and I am calling on behalf of Susana Mccollum's office at [place].  I am calling to follow up and see how things are going for you after your recent visit.\"    \"I see that you were in the (ER/UC/IP) on 09/07/23.    How are you doing now that you are home?\" Ok, she states she will feel better once her boyfriend can  her new prescription tonight. She states \"they are still really big\" referring to her hemorrhoids. No bleeding present.    Is patient experiencing symptoms that may require a hospital visit?  no    Discharge Instructions    \"Let's review your discharge instructions.  What is/are the follow-up recommendations?  Pt. Response: she is currently using Witch Hazel pads for the itching.     \"Were you instructed to make a follow-up appointment?\"  Pt. Response: No, already has OB appt scheduled for 09/15/23 with Dr Lerner.     \"When you see the provider, I would recommend that you bring your discharge instructions with you.    Medications    \"How many new medications are you on since your hospitalization/ED visit?\"    0-1  \"How many of your current medicines changed (dose, timing, name, etc.) while you were in the hospital/ED visit?\"   0-1  \"Do you have questions about your medications?\"   No  \"Were you newly diagnosed with heart failure, COPD, diabetes or did you have a heart attack?\"   No  For patients on insulin: \"Did you start on insulin in the hospital or did you have your insulin dose changed?\"   No  Post Discharge Medication Reconciliation Status: patient was not discharged from an inpatient facility or TCU.    Was MTM referral placed (*Make sure to put transitions as reason for referral)?   No    Call Summary    \"Do you have any questions or concerns about your condition or care plan at the moment?\"    Yes. Dr Brooks wrote the 2nd note to be off Monday 09/11/23 but she needs it to be off Sat and Sun 09/9 & 09/10. Will " "route encounter to [facility] to address .  Triage nurse advice given: N/A    Patient was in ER 3 in the past year (assess appropriateness of ER visits.)      \"If you have questions or things don't continue to improve, we encourage you contact us through the main clinic number,  [phone].  Even if the clinic is not open, triage nurses are available 24/7 to help you.     We would like you to know that our clinic has extended hours (provide information).  We also have urgent care (provide details on closest location and hours/contact info)\"      \"Thank you for your time and take care!\"  Keiry KIM RN        "

## 2023-09-08 NOTE — TELEPHONE ENCOUNTER
ED / Discharge Outreach Protocol    Patient Contact    Attempt # 1    Was call answered?  No.  Left message on unidentified voicemail with information to call me back.  Keiry KIM RN

## 2023-09-08 NOTE — TELEPHONE ENCOUNTER
Pt was in hospital yesterday.  Dr Brooks wrote the 2nd note to be off Monday 09/11/23 but she needs it to be off Sat and Sun 09/9 & 09/10. Will route encounter to [facility] to address .   Patient needs note to be off work through [place] 09/10/23. She is not scheduled to work 09/11/23.       Letter typed and given to Katharina Lerner MD to see if she will sign for pt since Stefanie Brooks DO is out of the office today.    -Ashlie Shelley  Clinic Station

## 2023-09-08 NOTE — PROGRESS NOTES
S:Patient presents due to  cramping and hemorrhoids.  B:29w6d   Allergies: Bee venom, Aloe, and Codeine  A: moderate variablility, + accels, no decels, Category I  no uterine activity  deferred    Dr. RAMON Brooks paged and orders received.  Plan includes: UA & hemorrhoid treatment plan.  Reviewed with patient and she agrees with plan.   Bill of Rights given & questions discussed?: Yes  HC Your Rights handout : Informed and given.    Oriented to room and call light.

## 2023-09-08 NOTE — DISCHARGE INSTRUCTIONS
Discharge Instructions for Undelivered Patients  [facility] [phone]    Diet:  Drink 8 to 12 glasses of water every day.  You may eat meals and snacks as before    Activity     Rest often as needed.  Count fetal kicks every day. (See handout.)  Call your doctor if your baby is moving less than usual.    Medicines:  My care team has reviewed my medicines with me.  My care team has given me a list of my medicines.  My care team has prescribed a new medicine. They have either sent it home with me or ordered it from the pharmacy.    Call your provider if you notice:  Swelling in your face or increased swelling in your hands or legs.  Headaches that are not relieved by Tylenol (acetaminophen).  Changes in your vision (blurring; seeing spots or stars).  Nausea (sick to your stomach) and vomiting (throwing up).  Weight gain of 5 pounds per week.  Heartburn that doesn't go away.  Signs of bladder infection: pain when you urinate (use the toilet), needing to go more often or more urgently.  The bag of baca (membranes) breaks, or you notice leaking in your underwear.  Bright red blood in your underwear.  Abdominal (lower belly) or stomach pain.  For first baby: Contractions (tightenings) less than 5 minutes apart for one hour or more.  For Second (plus) baby: Contractions (tightenings) less than 10 minutes apart and getting stronger.  Increase or change in vaginal discharge (note the color and amount).  New Today:  Discussed use of tucks pads, hemorrhoid creams and suppositories. Rx for Anusol sent, also given tucks. Advised to not use donut pillows, avoid constipation.       Follow up with your provider as scheduled.

## 2023-09-08 NOTE — PROGRESS NOTES
S: Discharge from triage  A: A:moderate variablility, + accels, no decels, Category I  no uterine activity  Strip reviewed by ELIZABETH Dobbins RN.  not examined  Admission on 09/07/2023   Component Date Value    Color Urine 09/07/2023 Yellow     Appearance Urine 09/07/2023 Slightly Cloudy (A)     Glucose Urine 09/07/2023 Negative     Bilirubin Urine 09/07/2023 Negative     Ketones Urine 09/07/2023 Negative     Specific Gravity Urine 09/07/2023 1.015     Blood Urine 09/07/2023 Negative     pH Urine 09/07/2023 6.0     Protein Albumin Urine 09/07/2023 Negative     Urobilinogen Urine 09/07/2023 Normal     Nitrite Urine 09/07/2023 Negative     Leukocyte Esterase Urine 09/07/2023 Negative     Mucus Urine 09/07/2023 Present (A)     RBC Urine 09/07/2023 0     WBC Urine 09/07/2023 <1     Squamous Epithelials Uri* 09/07/2023 4 (H)      Dr. RAMON Brooks informed of above and discharge order received.   R: Plan includes: Fetal kick count instructions given. Discharge Medications: sent in to pharmacy.   Patient instructed to report any recurrence of above concerns to her primary care provider during clinic hours or [facility] at any other time. Patient verbalized understanding of After Visit Summary, education and agreement with plan. Agrees to call for any problems, questions or concerns.  Discharged undelivered via ambulatory  in stable condition with all belongings. Accompanied by self .

## 2023-09-08 NOTE — LETTER
[facility]   [address] [place] [address]  [phone]    September 7, 2023      RE:Ana Maria Espinoza                                                                                                                                [address] [place] [id number]            To whom it may concern:     RE: Ana Maria Espinoza     Patient was seen and evaluated at our facility today 9/7/23. Please excuse her from all work related activities through Monday 9/11/2023.      Thank you-      Katharina Lerner MD

## 2023-09-08 NOTE — PROGRESS NOTES
[facility] OB/GYN Department    OB Triage Note    Ana Maria Espinoza  1990  [id number]    HPI: Ana Maria Espinoza is a 33 year old  at 29w6d who presents with complaint of hemorrhoids. Reports a hemorrhoid starting last night which has increased in size significantly over the last 24 hours. Is causing her a lot of pain, difficult to sit or stand. No bleeding from the area. She is not constipated, no straining with bowel movements. Reports good fetal movement. Was having some cramping earlier today but this has resolved.      Pregnancy notable for:  History of factor V Leiden with history of arterial clot, on Lovenox  History of child with complex heart defect ()    OBHX:   OB History    Para Term  AB Living   3 2 1 1 0 1   SAB IAB Ectopic Multiple Live Births   0 0 0 0 2      # Outcome Date GA Lbr Eliot/2nd Weight Sex Delivery Anes PTL Lv   3 Current            2 Term 11 39w4d  3.402 kg (7 lb 8 oz) F CS-Unspec   NOEL      Name: Shahbaz   1  09 33w6d  3.232 kg (7 lb 2 oz) F CS-Unspec   DEC      Birth Comments: congenital heart defect      Name: Kaylie       MedicalHX:   Past Medical History:   Diagnosis Date    ADHD (attention deficit hyperactivity disorder)     Blood clot in bladder 2001    right kidney involved and went into kidney failure    Chickenpox     x2    Chlamydia     Factor V Leiden (H)     GERD (gastroesophageal reflux disease)     History of urinary tract infection     Postpartum depression     loss of child    Renal failure     secondary to blood clot in     Uncomplicated asthma        SurgicalHX:   Past Surgical History:   Procedure Laterality Date    ABDOMEN SURGERY      2 c sections  @     ESOPHAGOSCOPY, GASTROSCOPY, DUODENOSCOPY (EGD), COMBINED N/A 2021    Procedure: ESOPHAGOGASTRODUODENOSCOPY, WITH BIOPSY;  Surgeon: German Flynn MD;  Location: [facility]    ORTHOPEDIC SURGERY      Had surgery on both big toes     TONSILLECTOMY         Medications:   No current facility-administered medications on file prior to encounter.  Prenatal Vit-Fe Fumarate-FA (PRENATAL MULTIVITAMIN W/IRON) 27-0.8 MG tablet, Take 1 tablet by mouth daily        Allergies:  Allergies   Allergen Reactions    Bee Venom Anaphylaxis     Has epipen    Aloe Hives     Dry skin as well     Codeine Rash     Other reaction(s): Intolerance-Can't Take       FamilyHX:    Family History   Problem Relation Age of Onset    Depression Mother     Mental Illness Mother 56        suicide-2011    Obesity Mother     Connective Tissue Disorder Mother         EDS    Alcoholism Mother     Diabetes Father     Obesity Father     Deep Vein Thrombosis Father         double amputee below knee    Asthma Sister     Thyroid Disease Sister     Obesity Sister     Asthma Brother     Obesity Brother     Obesity Paternal Grandmother     Heart Defect Daughter          at 7 weeks after 3 heart surgeries    Genetic Disorder Daughter         Avascular necrosis and Legg calve perthes disease    Connective Tissue Disorder Maternal Cousin         EDS    Connective Tissue Disorder Maternal Cousin         EDS       SocialHX:   Social History     Socioeconomic History    Marital status: Single   Tobacco Use    Smoking status: Every Day     Packs/day: 0.20     Years: 10.00     Pack years: 2.00     Types: Cigarettes     Start date: 3/19/2009     Last attempt to quit: 2021     Years since quittin.2    Smokeless tobacco: Never    Tobacco comments:     Down to 2-3 cig/day with pregnancy   Vaping Use    Vaping Use: Never used   Substance and Sexual Activity    Alcohol use: No    Drug use: Not Currently     Types: Marijuana     Comment: quit with pregnancy    Sexual activity: Yes     Partners: Male   Other Topics Concern    Parent/sibling w/ CABG, MI or angioplasty before 65F 55M? No       ROS: 10-point ROS negative except as in HPI     Physical Exam:  Vitals:    23 2056   BP: 125/89   BP  Location: Right arm   Pulse: 87   Resp: 16   Temp: 98.3  F (36.8  C)   TempSrc: Oral   Weight: 81.6 kg (180 lb)   Height: 1.524 m (5')     GEN: resting comfortably in bed, NAD   CV: No heaves or thrills. No peripheral varicosities  PULM: Equal expansion bilaterally, no accessory muscle use  EXT: no edema, non-tender to palpation  RECTUM: 1.5cm enlarged external hemorrhoid, no signs of thrombosis, no bleeding, +tender to palpation        Labs:     UA RESULTS:  Recent Labs   Lab Test 23  2211 23  1441   COLOR Yellow Yellow   APPEARANCE Slightly Cloudy* Clear   URINEGLC Negative Negative   URINEBILI Negative Negative   URINEKETONE Negative Negative   SG 1.015 1.025   UBLD Negative Trace*   URINEPH 6.0 5.5   PROTEIN Negative Negative   UROBILINOGEN  --  0.2   NITRITE Negative Negative   LEUKEST Negative Negative   RBCU 0 0-2   WBCU <1 0-5           A/P: Ana Maria Espinoza is a 33 year old female  at 29w6d, here with external hemorrhoids. Not thrombosed on exam tonight. Recommend conservative management. Discussed use of tucks pads, hemorrhoid creams and suppositories. Rx for Anusol sent, also given tucks. Advised to not use donut pillows, avoid constipation. Continue to monitor symptoms. Follow up as scheduled.       Stefanie Brooks DO

## 2023-09-12 ENCOUNTER — VIRTUAL VISIT (OUTPATIENT)
Dept: EDUCATION SERVICES | Facility: CLINIC | Age: 33
End: 2023-09-12
Payer: COMMERCIAL

## 2023-09-12 DIAGNOSIS — O24.419 GESTATIONAL DIABETES: Primary | ICD-10-CM

## 2023-09-12 PROCEDURE — 98966 PH1 ASSMT&MGMT NQHP 5-10: CPT | Mod: 95 | Performed by: DIETITIAN, REGISTERED

## 2023-09-12 NOTE — TELEPHONE ENCOUNTER
Letter created waiting for call back from pt on where to send.    Juanis Cheng   Cl[facility] [place]   [facility]  [phone]

## 2023-09-12 NOTE — LETTER
9/12/2023         RE: Ana Maria Espinoza  [address] [facility] [address]        Dear Colleague,    Thank you for referring your patient, Ana Maria Espinoza, to the [facility]. Please see a copy of my visit note below.    Diabetes Self-Management Education & Support  Type of service:  Video Visit    If the video visit is dropped, the video visit invitation should be resent by: Text to cell phone: [phone]    Originating Location (pt. Location): Home  Distant Location (provider location): Offsite  Mode of Communication:  Video Conference via [email or web address]    Video Start Time:  8:59 AM  Video End Time (time video stopped): 9:05 AM    How would patient like to obtain AVS? MyChart    SUBJECTIVE/OBJECTIVE:  Presents for education related to gestational diabetes.    Accompanied by: Self  Diabetes management related comments/concerns: checking BG for 2 weeks - only failed the 1st fasting tests - per MD.  Gestational weeks: 30w4d  Next OB Visit Date: 09/15/23  Number of previous pregnancies: 2  Had any babies over 9 lbs: No  Previously had Gestational Diabetes: No  Have you ever had thyroid problems or taken thyroid medication?: No  Heart disease, mitral valve prolapse or rheumatic fever?: No  Hypertension : No  High Cholesterol: No  High Triglycerides: No    Cultural Influences/Ethnic Background:  Not  or     LMP 02/02/2023       Estimated Date of Delivery: Nov 17, 2023    Blood Glucose/Ketone Log:   DATE Ketones Fasting BG (mg/dL) 1 hour Post Breakfast BG (mg/dL) 1 hour Post Lunch BG (mg/dL) 1 hour post Dinner BG (mg/dL)   9/12 - 83 - - -   9/11 - 62 115 130 136   9/10 - 71 109 121 105   9/9 - 72 117 109 112   9/8 - 81 110 119 125   9/7 - 82 106 122 116   9/6 - 116-4 am ate a snack  NOT FASTING 93 117 107   9/5 - 84 92 120 96       Lifestyle and Health Behaviors:  Exercise:: Yes (walks a few blocks to see her friends - trying to walk more with kids)  Days per week of moderate to  strenuous exercise (like a brisk walk): 4  On average, minutes per day of exercise at this level: 20  Exercise Minutes per Week: 80  Meal planning/habits: Avoiding sweets  Meals include: Breakfast, Lunch, Dinner, Morning Snack, Afternoon Snack, Evening Snack  Breakfast: Toast and eggs with milk or water  Lunch: burger on the grill with a hamburger OR eats out sandwich  Dinner: hamburger helper or a homemade meals  Snacks: Bananas, apples, fruit and vegetables - tomatoes  Other: bedtime snack - gelatin - has heartburn  Beverages: Water, Milk, Soda (8 ounce can of Cloudvuw - once a day)  Biggest challenges to healthy eating: None  Pre-franc vitamin?: Yes  Supplements?: No    Healthy Coping:  Emotional response to diabetes: Ready to learn  Informal Support system:: Significant other  Stage of change: ACTION (Actively working towards change)    Current Management:  Taking medications for gestational diabetes?: No  Difficulty affording diabetes medication?: No  Difficulty affording diabetes testing supplies?: No    ASSESSMENT:  Ketones: not checking.   Fasting blood glucoses: 100% in target.  After breakfast: 100% in target.  After lunch: 100% in target.  After dinner: 100% in target.    Patient's numbers have all been under good control. She was advised by her OBGYN to check BG for 2 weeks to determine need to continue checking. She did not fast as long as needed for the OGTT and she failed the fasting part, so they are having her check BG.     She is going to [facility] to inform if she plans to continue checking.    INTERVENTION:  Educational topics covered today:  What to expect after delivery, Future testing for Type 2 diabetes (2 hour OGTT at 6 week post-partum check-up and annual fasting blood glucose level), Risk of GDM and planning ahead for future pregnancies, Recommended lifestyle interventions for reducing the risk of Type 2 Diabetes, When to Call a Diabetes Educator or OB Provider    Educational Materials  provided today:  [employer] Preventing Diabetes    PLAN:  Check glucose 4 times daily.  Check ketones once a week when readings are consistently negative.  Continue with recommended physical activity.  Continue to follow recommended meal plan: 2 carbs at breakfast, 4 carbs at lunch, 4 carbs at supper, 1-2 carbs at snacks.  Follow consistent CHO meal plan, eat CHO and protein/fat at all meals/snacks.    Call/e-mail/MyChart message diabetes educator if 3 or more blood sugars are above the goal in 1 week or if ketones are positive.      Time Spent: 6 minutes  Encounter Type: Individual    Any diabetes medication dose changes were made via the CDE Protocol and Collaborative Practice Agreement with the patient's OB/GYN provider. A copy of this encounter was shared with the provider.

## 2023-09-12 NOTE — PROGRESS NOTES
Diabetes Self-Management Education & Support  Type of service:  Video Visit    If the video visit is dropped, the video visit invitation should be resent by: Text to cell phone: [phone]    Originating Location (pt. Location): Home  Distant Location (provider location): Offsite  Mode of Communication:  Video Conference via [employer]    Video Start Time:  8:59 AM  Video End Time (time video stopped): 9:05 AM    How would patient like to obtain AVS? MyChart    SUBJECTIVE/OBJECTIVE:  Presents for education related to gestational diabetes.    Accompanied by: Self  Diabetes management related comments/concerns: checking BG for 2 weeks - only failed the 1st fasting tests - per MD.  Gestational weeks: 30w4d  Next OB Visit Date: 09/15/23  Number of previous pregnancies: 2  Had any babies over 9 lbs: No  Previously had Gestational Diabetes: No  Have you ever had thyroid problems or taken thyroid medication?: No  Heart disease, mitral valve prolapse or rheumatic fever?: No  Hypertension : No  High Cholesterol: No  High Triglycerides: No    Cultural Influences/Ethnic Background:  Not  or     LMP 02/02/2023       Estimated Date of Delivery: Nov 17, 2023    Blood Glucose/Ketone Log:   DATE Ketones Fasting BG (mg/dL) 1 hour Post Breakfast BG (mg/dL) 1 hour Post Lunch BG (mg/dL) 1 hour post Dinner BG (mg/dL)   9/12 - 83 - - -   9/11 - 62 115 130 136   9/10 - 71 109 121 105   9/9 - 72 117 109 112   9/8 - 81 110 119 125   9/7 - 82 106 122 116   9/6 - 116-4 am ate a snack  NOT FASTING 93 117 107   9/5 - 84 92 120 96       Lifestyle and Health Behaviors:  Exercise:: Yes (walks a few blocks to see her friends - trying to walk more with kids)  Days per week of moderate to strenuous exercise (like a brisk walk): 4  On average, minutes per day of exercise at this level: 20  Exercise Minutes per Week: 80  Meal planning/habits: Avoiding sweets  Meals include: Breakfast, Lunch, Dinner, Morning Snack, Afternoon Snack,  Evening Snack  Breakfast: Toast and eggs with milk or water  Lunch: burger on the grill with a hamburger OR eats out sandwich  Dinner: hamburger helper or a homemade meals  Snacks: Bananas, apples, fruit and vegetables - tomatoes  Other: bedtime snack - gelatin - has heartburn  Beverages: Water, Milk, Soda (8 ounce can of Mt. dew - once a day)  Biggest challenges to healthy eating: None  Pre- vitamin?: Yes  Supplements?: No    Healthy Coping:  Emotional response to diabetes: Ready to learn  Informal Support system:: Significant other  Stage of change: ACTION (Actively working towards change)    Current Management:  Taking medications for gestational diabetes?: No  Difficulty affording diabetes medication?: No  Difficulty affording diabetes testing supplies?: No    ASSESSMENT:  Ketones: not checking.   Fasting blood glucoses: 100% in target.  After breakfast: 100% in target.  After lunch: 100% in target.  After dinner: 100% in target.    Patient's numbers have all been under good control. She was advised by her OBGYN to check BG for 2 weeks to determine need to continue checking. She did not fast as long as needed for the OGTT and she failed the fasting part, so they are having her check BG.     She is going to [facility] to inform if she plans to continue checking.    INTERVENTION:  Educational topics covered today:  What to expect after delivery, Future testing for Type 2 diabetes (2 hour OGTT at 6 week post-partum check-up and annual fasting blood glucose level), Risk of GDM and planning ahead for future pregnancies, Recommended lifestyle interventions for reducing the risk of Type 2 Diabetes, When to Call a Diabetes Educator or OB Provider    Educational Materials provided today:  [employer] Preventing Diabetes    PLAN:  Check glucose 4 times daily.  Check ketones once a week when readings are consistently negative.  Continue with recommended physical activity.  Continue to follow recommended meal plan: 2 carbs  at breakfast, 4 carbs at lunch, 4 carbs at supper, 1-2 carbs at snacks.  Follow consistent CHO meal plan, eat CHO and protein/fat at all meals/snacks.    Call/e-mail/MyChart message diabetes educator if 3 or more blood sugars are above the goal in 1 week or if ketones are positive.      Time Spent: 6 minutes  Encounter Type: Individual    Any diabetes medication dose changes were made via the CDE Protocol and Collaborative Practice Agreement with the patient's OB/GYN provider. A copy of this encounter was shared with the provider.

## 2023-09-14 NOTE — TELEPHONE ENCOUNTER
Pt has not responded.  Letter up front for her to  - she does have an appt tomorrow if she still needs the letter.    -Ashlie KIM [facility]  Clinic Station

## 2023-09-15 ENCOUNTER — TELEPHONE (OUTPATIENT)
Dept: OBGYN | Facility: CLINIC | Age: 33
End: 2023-09-15

## 2023-09-15 ENCOUNTER — PRENATAL OFFICE VISIT (OUTPATIENT)
Dept: OBGYN | Facility: CLINIC | Age: 33
End: 2023-09-15
Payer: COMMERCIAL

## 2023-09-15 ENCOUNTER — PREP FOR PROCEDURE (OUTPATIENT)
Dept: OBGYN | Facility: CLINIC | Age: 33
End: 2023-09-15

## 2023-09-15 VITALS
RESPIRATION RATE: 16 BRPM | SYSTOLIC BLOOD PRESSURE: 120 MMHG | TEMPERATURE: 97.9 F | HEIGHT: 60 IN | BODY MASS INDEX: 36.81 KG/M2 | WEIGHT: 187.5 LBS | DIASTOLIC BLOOD PRESSURE: 65 MMHG | HEART RATE: 95 BPM

## 2023-09-15 DIAGNOSIS — Z34.83 PRENATAL CARE, SUBSEQUENT PREGNANCY IN THIRD TRIMESTER: Primary | ICD-10-CM

## 2023-09-15 DIAGNOSIS — Z98.891 H/O CESAREAN SECTION: Primary | ICD-10-CM

## 2023-09-15 PROCEDURE — 99207 PR PRENATAL VISIT: CPT | Performed by: OBSTETRICS & GYNECOLOGY

## 2023-09-15 RX ORDER — CEFAZOLIN SODIUM/WATER 2 G/20 ML
2 SYRINGE (ML) INTRAVENOUS
Status: CANCELLED | OUTPATIENT
Start: 2023-09-15

## 2023-09-15 RX ORDER — METHYLERGONOVINE MALEATE 0.2 MG/ML
200 INJECTION INTRAVENOUS
Status: CANCELLED | OUTPATIENT
Start: 2023-09-15

## 2023-09-15 RX ORDER — TRANEXAMIC ACID 10 MG/ML
1 INJECTION, SOLUTION INTRAVENOUS EVERY 30 MIN PRN
Status: CANCELLED | OUTPATIENT
Start: 2023-09-15

## 2023-09-15 RX ORDER — SODIUM CHLORIDE, SODIUM LACTATE, POTASSIUM CHLORIDE, CALCIUM CHLORIDE 600; 310; 30; 20 MG/100ML; MG/100ML; MG/100ML; MG/100ML
INJECTION, SOLUTION INTRAVENOUS CONTINUOUS
Status: CANCELLED | OUTPATIENT
Start: 2023-09-15

## 2023-09-15 RX ORDER — OXYTOCIN/0.9 % SODIUM CHLORIDE 30/500 ML
100-340 PLASTIC BAG, INJECTION (ML) INTRAVENOUS CONTINUOUS PRN
Status: CANCELLED | OUTPATIENT
Start: 2023-09-15

## 2023-09-15 RX ORDER — ACETAMINOPHEN 325 MG/1
975 TABLET ORAL ONCE
Status: CANCELLED | OUTPATIENT
Start: 2023-09-15 | End: 2023-09-15

## 2023-09-15 RX ORDER — CEFAZOLIN SODIUM/WATER 2 G/20 ML
2 SYRINGE (ML) INTRAVENOUS SEE ADMIN INSTRUCTIONS
Status: CANCELLED | OUTPATIENT
Start: 2023-09-15

## 2023-09-15 RX ORDER — OXYTOCIN/0.9 % SODIUM CHLORIDE 30/500 ML
340 PLASTIC BAG, INJECTION (ML) INTRAVENOUS CONTINUOUS PRN
Status: CANCELLED | OUTPATIENT
Start: 2023-09-15

## 2023-09-15 RX ORDER — OXYTOCIN 10 [USP'U]/ML
10 INJECTION, SOLUTION INTRAMUSCULAR; INTRAVENOUS
Status: CANCELLED | OUTPATIENT
Start: 2023-09-15

## 2023-09-15 RX ORDER — MISOPROSTOL 200 UG/1
800 TABLET ORAL
Status: CANCELLED | OUTPATIENT
Start: 2023-09-15

## 2023-09-15 RX ORDER — CARBOPROST TROMETHAMINE 250 UG/ML
250 INJECTION, SOLUTION INTRAMUSCULAR
Status: CANCELLED | OUTPATIENT
Start: 2023-09-15

## 2023-09-15 RX ORDER — CITRIC ACID/SODIUM CITRATE 334-500MG
30 SOLUTION, ORAL ORAL
Status: CANCELLED | OUTPATIENT
Start: 2023-09-15

## 2023-09-15 RX ORDER — LIDOCAINE 40 MG/G
CREAM TOPICAL
Status: CANCELLED | OUTPATIENT
Start: 2023-09-15

## 2023-09-15 RX ORDER — MISOPROSTOL 200 UG/1
400 TABLET ORAL
Status: CANCELLED | OUTPATIENT
Start: 2023-09-15

## 2023-09-15 NOTE — PROGRESS NOTES
[facility]     Return OB Note     CC: Return OB      Subjective:  Ana Maria is a 33 year old  at 31w0d   Denies vaginal bleeding, loss of fluid, or pelvic cramping.   Stopped blood sugars checks as they were normal for two weeks.      Objective:  /65 (BP Location: Right arm, Patient Position: Chair, Cuff Size: Adult Regular)   Pulse 95   Temp 97.9  F (36.6  C) (Tympanic)   Resp 16   Ht 1.524 m (5')   Wt 85 kg (187 lb 8 oz)   LMP 2023   BMI 36.62 kg/m    See flowsheet     Assessment/Plan:      33 year old  at 31w0d    1) Normal labs  2) NIPT - low risk, boy  3) PMH/OBHx problems:                - Hx of c/s x2; wants repeat - scheduled repeat with tubal for  with Gelhaus                - Hx of Factor V Leiden and kidney clot; s/p MFM, on lovenox                - Hx of child with complex cardiac defect and death as an infant and perthes disease; MFM referral, genetics, L2, fetal ECHO               - tobacco use; is done to 1x cigarette a day                - hx of PPD; no meds currently                - failed GCT, passed GTT but with very high fasting blood sugar; plan to check blood sugars QID for four week.      4) Factor V Leiden with kidney clot:  MFM consult done.  Since Ms. 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  In the postpartum period, we recommend prophylactic anticoagulation with subcutaneous LMWH, usually enoxaparin 40 mg every 24 hours.  Anticoagulation should be initiated 6-12 hours after delivery, depending on her bleeding, and should be continued for six weeks postpartum. If regional anesthesia is used, 4 hours should lapse after epidural catheter removal prior to administering anticoagulation. Plan to hold lovenox the night before her c/s. Will also hold if she starts to  labor prior to scheduled repeat.    5) increased omeprazole for GERD and then zofran for nausea    RTC 2 weeks, precautions reviewed      Katharina Lerner MD  OB/GYN

## 2023-09-15 NOTE — PROGRESS NOTES
Initial /65 (BP Location: Right arm, Patient Position: Chair, Cuff Size: Adult Regular)   Pulse 95   Temp 97.9  F (36.6  C) (Tympanic)   Resp 16   Ht 1.524 m (5')   Wt 85 kg (187 lb 8 oz)   LMP 02/02/2023   BMI 36.62 kg/m   Estimated body mass index is 36.62 kg/m  as calculated from the following:    Height as of this encounter: 1.524 m (5').    Weight as of this encounter: 85 kg (187 lb 8 oz). .

## 2023-09-15 NOTE — TELEPHONE ENCOUNTER
"[id number]  Ana Maria WHARTON Alexis    You are now scheduled for surgery at [facility].  Below are the details for your surgery.  Please read the \"Preparing for Your Surgery\" instructions and let us know if you have any questions.    Type of surgery:  SECTION, WITH POSTPARTUM TUBAL LIGATION   Surgeon:  Katharina Lerner MD  Location of surgery: [facility] OR    Date of surgery: 23    Time: 7:30 am   Arrival Time: 6:00 am    Time can change, to be confirmed a couple of days prior by pre-op surgery nurse.    Pre-Op Appt Date: Patient to schedule with a PCP or Family Practice Provider within 30 days to the surgery. Will be done day of surgery.  Post-Op Appt Date: Pt  to call to schedule 6 weeks post-partum appt    Packet sent out: Yes  Pre-cert/Authorization completed:  TBD by Financial Securing Office.   MA Sterilization/Hysterectomy Acknowledgment Consent signed: Yes    [facility]  [phone]    Fax: [phone]  Same Day Surgery [phone]  Fax: [phone]  Birth Center [phone]    "

## 2023-09-15 NOTE — PATIENT INSTRUCTIONS
Weeks 26 to 30 of Your Pregnancy: Care Instructions  You're starting your last trimester. You'll probably feel your baby moving around more. Your back may ache as your body gets used to your baby's size and length. Take care of yourself, and pay attention to what your body needs.    Talk to your doctor about getting the Tdap shot. It will help protect your  against whooping cough (pertussis). Also ask your doctor about flu and COVID-19 shots if you haven't had them yet. If your blood type is Rh negative, you may be given a shot of Rh immune globulin (such as RhoGAM). It can help prevent problems for your baby.   You may have Irion-Mendiola contractions. They are single or several strong contractions without a pattern. These are practice contractions but not the start of labor.   Be kind to yourself.     Take breaks when you're tired.  Change positions often. Don't sit for too long or stand for too long.  At work, rest during breaks if you can. If you don't get breaks, talk to your doctor about writing a letter to your employer to request them.  Avoid fumes, chemicals, and tobacco smoke.  Be sexual if you want to.     You may be interested in sex, or you may not. Everyone is different.  Sex is okay unless your doctor tells you not to.  Your belly can make it hard to find good positions for sex. [place] and explore.  Watch for signs of  labor.    These signs include:   Menstrual-like cramps. Or you may have pain or pressure in your pelvis that happens in a pattern.  About 6 or more contractions in an hour (even after rest and a glass of water).  A low, dull backache that doesn't go away when you change positions.  An increase or change in vaginal discharge.  Light vaginal bleeding or spotting.  Your water breaking.  Know what to do if you think you are having contractions.     Drink 1 or 2 glasses of water.  Lie down on your left side for at least an hour.  While on your side, feel the top of your  "belly to see if it's tight.  Write down your contractions for an hour. Time how long it is from the start of one contraction to the start of the next.  Call your doctor if you have regular contractions.  Follow-up care is a key part of your treatment and safety. Be sure to make and go to all appointments, and call your doctor if you are having problems. It's also a good idea to know your test results and keep a list of the medicines you take.  Where can you learn more?  Go to https://www.[employer].net/patiented  Enter S999 in the search box to learn more about \"Weeks 26 to 30 of Your Pregnancy: Care Instructions.\"  Current as of: November 9, 2022               Content Version: 13.7    0176-6527 [employer].   Care instructions adapted under license by your healthcare professional. If you have questions about a medical condition or this instruction, always ask your healthcare professional. [employer] disclaims any warranty or liability for your use of this information.      Weeks 30 to 32 of Your Pregnancy: Care Instructions  Your baby is growing more every day. Its eyes can open and close, and it may have hair on its head. Your baby may sleep 20 to 45 minutes at a time and is more active at certain times.    You should feel your baby move several times every day. Your baby now turns less and kicks more.   This is a good time to tour your hospital or birthing center. You may also want to find childcare if needed.     To ease heartburn    Avoid foods that make your symptoms worse, such as chocolate, spicy foods, and caffeine.  Avoid bending over or lying down after meals.  Do not eat for 2 hours before bedtime.  Take antacids like Tums, but don't take ones that have sodium bicarbonate, magnesium trisilicate, or aspirin.    To care for large, swollen veins (varicose veins)    Try to avoid standing for long periods of time.  Sit with your feet propped up.  Wear support hose.  Get some " "exercise every day, like walking or swimming.  Counting your baby's kicks  Your doctor may ask you to count your baby's movements, such as kicks, flutters, or rolls.    Find a quiet place, and get comfortable. Write down your start time. Count your baby's movements (except hiccups). When your baby has moved 10 times, you can stop counting. Write down how many minutes it took.   If an hour goes by and you don't feel 10 movements, have something to eat or drink. Count for another hour. If you don't feel at least 10 movements in the 2-hour period, call your doctor.   Follow-up care is a key part of your treatment and safety. Be sure to make and go to all appointments, and call your doctor if you are having problems. It's also a good idea to know your test results and keep a list of the medicines you take.  Where can you learn more?  Go to https://www.[employer]/patiented  Enter X471 in the search box to learn more about \"Weeks 30 to 32 of Your Pregnancy: Care Instructions.\"  Current as of: November 9, 2022               Content Version: 13.7    9690-2634 [employer].   Care instructions adapted under license by your healthcare professional. If you have questions about a medical condition or this instruction, always ask your healthcare professional. [employer] disclaims any warranty or liability for your use of this information.      Learning About Birth Control After Childbirth  What is birth control?     Birth control (contraception) is any method used to prevent pregnancy.  Wait until you're healed before you have sexual intercourse. This takes about 4 to 6 weeks. If you have sex without birth control, there is a chance that you could get pregnant. This is true even if you haven't started having periods again. Even if you breastfeed, you can still get pregnant.  Most experts suggest waiting at least 18 months to get pregnant again. This can reduce risks for you and the baby. There may " "be reasons for you to get pregnant sooner, though. So talk with your doctor about the risks and benefits.  The only sure way to not get pregnant is to not have sex. But finding a good method of birth control that you're comfortable with can help you avoid an unplanned pregnancy. Your doctor can help you choose the birth control method that's right for you.  What are the types of birth control?  Long-acting reversible contraception (LARC). This is the most effective reversible method you can use to prevent pregnancy. LARCs are implants and intrauterine devices (IUDs). While they are being used, they usually prevent pregnancy for years. If you decide you want to get pregnant, you can have them removed.  Implants are placed under the skin of the arm. This can be done right after you give birth. They release the hormone progestin. The implant prevents pregnancy for up to 5 years. Talk to your doctor about how long you can use it.  IUDs are placed in the uterus by a doctor. This can be done right after you give birth, if you and your doctor discuss it beforehand. Or it can be done at a doctor visit later. There are two main types of IUDs--the copper IUD and the hormonal IUD. The hormonal IUD releases progestin. IUDs prevent pregnancy for 3 to 12 years, depending on the type. Talk to your doctor about how long you can use it.  Hormonal methods. They are very good at preventing pregnancy. Combination birth control pills (\"the pill\"), skin patches, and vaginal rings release the hormones estrogen and progestin. Depo-Provera is a shot you get every 3 months. Shots, mini-pills, IUDs, and implants release progestin only. It's best to use progestin-only options in the first few weeks after you give birth.  Barrier methods. These don't prevent pregnancy as well as implants, IUDs, or hormonal methods do. Barrier methods include condoms, diaphragms, and cervical caps. You must use them every time you have sex. If you had a " diaphragm or cervical cap before you got pregnant, talk to your doctor to see if you need a different size. Condoms can be used anytime after you give birth.  Natural family planning. This is also known as fertility awareness or the rhythm method. It can work if you and your partner are careful and you have a regular ovulation cycle. But it doesn't work better than other birth control methods. You will need to keep records so you know when you are most likely to become pregnant. And during those times, you will need to use a barrier method or not have sex.  Permanent birth control (sterilization). It gives you lasting protection against pregnancy. A man can have a vasectomy. A woman can have her tubes tied (tubal ligation). But this is only a good choice if you are sure that you don't want any more children.  Emergency contraception. This is a backup method to prevent pregnancy if you didn't use birth control or if a condom breaks. The most effective emergency contraception is an IUD (inserted by a doctor). You can also get emergency contraceptive pills. You can get them with a prescription from your doctor or without a prescription at most drugstores.  How can you get birth control?  You can buy:  Condoms and spermicides without a prescription. You can get them in drugstores, online, and in many grocery stores.  Some forms of emergency contraception without a prescription. You can get these at most drugstores.  You need to see a doctor or visit family planning clinic to:  Get a prescription for birth control pills and other methods that use hormones.  Have an implant or IUD inserted. This includes the type of IUD used for emergency contraception.  Get a hormone shot.  Get a prescription for a diaphragm or cervical cap.  Get a prescription for certain kinds of emergency contraception.  Follow-up care is a key part of your treatment and safety. Be sure to make and go to all appointments, and call your doctor if you  "are having problems. It's also a good idea to know your test results and keep a list of the medicines you take.  Where can you learn more?  Go to https://www.[employer].net/patiented  Enter X408 in the search box to learn more about \"Learning About Birth Control After Childbirth.\"  Current as of: August 2, 2022               Content Version: 13.7    2197-7803 [employer].   Care instructions adapted under license by your healthcare professional. If you have questions about a medical condition or this instruction, always ask your healthcare professional. [employer] disclaims any warranty or liability for your use of this information.      "

## 2023-09-24 ENCOUNTER — NURSE TRIAGE (OUTPATIENT)
Dept: NURSING | Facility: CLINIC | Age: 33
End: 2023-09-24
Payer: COMMERCIAL

## 2023-09-25 ENCOUNTER — HOSPITAL ENCOUNTER (EMERGENCY)
Facility: CLINIC | Age: 33
Discharge: HOME | End: 2023-09-25
Attending: FAMILY MEDICINE | Admitting: FAMILY MEDICINE
Payer: COMMERCIAL

## 2023-09-25 VITALS
DIASTOLIC BLOOD PRESSURE: 76 MMHG | OXYGEN SATURATION: 97 % | RESPIRATION RATE: 18 BRPM | HEIGHT: 60 IN | BODY MASS INDEX: 36.12 KG/M2 | TEMPERATURE: 97.5 F | SYSTOLIC BLOOD PRESSURE: 126 MMHG | HEART RATE: 84 BPM | WEIGHT: 184 LBS

## 2023-09-25 DIAGNOSIS — R19.7 NAUSEA VOMITING AND DIARRHEA: ICD-10-CM

## 2023-09-25 DIAGNOSIS — R11.2 NAUSEA VOMITING AND DIARRHEA: ICD-10-CM

## 2023-09-25 LAB
ANION GAP SERPL CALCULATED.3IONS-SCNC: 16 MMOL/L (ref 7–15)
BASOPHILS # BLD AUTO: 0 10E3/UL (ref 0–0.2)
BASOPHILS NFR BLD AUTO: 0 %
BUN SERPL-MCNC: 6.7 MG/DL (ref 6–20)
CALCIUM SERPL-MCNC: 9.1 MG/DL (ref 8.6–10)
CHLORIDE SERPL-SCNC: 104 MMOL/L (ref 98–107)
CREAT SERPL-MCNC: 0.79 MG/DL (ref 0.51–0.95)
DEPRECATED HCO3 PLAS-SCNC: 17 MMOL/L (ref 22–29)
EGFRCR SERPLBLD CKD-EPI 2021: >90 ML/MIN/1.73M2
EOSINOPHIL # BLD AUTO: 0.1 10E3/UL (ref 0–0.7)
EOSINOPHIL NFR BLD AUTO: 0 %
ERYTHROCYTE [DISTWIDTH] IN BLOOD BY AUTOMATED COUNT: 13 % (ref 10–15)
GLUCOSE SERPL-MCNC: 80 MG/DL (ref 70–99)
HCT VFR BLD AUTO: 36.5 % (ref 35–47)
HGB BLD-MCNC: 12.9 G/DL (ref 11.7–15.7)
IMM GRANULOCYTES # BLD: 0.2 10E3/UL
IMM GRANULOCYTES NFR BLD: 1 %
LYMPHOCYTES # BLD AUTO: 2.8 10E3/UL (ref 0.8–5.3)
LYMPHOCYTES NFR BLD AUTO: 20 %
MCH RBC QN AUTO: 34 PG (ref 26.5–33)
MCHC RBC AUTO-ENTMCNC: 35.3 G/DL (ref 31.5–36.5)
MCV RBC AUTO: 96 FL (ref 78–100)
MONOCYTES # BLD AUTO: 0.5 10E3/UL (ref 0–1.3)
MONOCYTES NFR BLD AUTO: 4 %
NEUTROPHILS # BLD AUTO: 10.5 10E3/UL (ref 1.6–8.3)
NEUTROPHILS NFR BLD AUTO: 75 %
NRBC # BLD AUTO: 0 10E3/UL
NRBC BLD AUTO-RTO: 0 /100
PLATELET # BLD AUTO: 202 10E3/UL (ref 150–450)
POTASSIUM SERPL-SCNC: 4.1 MMOL/L (ref 3.4–5.3)
RBC # BLD AUTO: 3.79 10E6/UL (ref 3.8–5.2)
SODIUM SERPL-SCNC: 137 MMOL/L (ref 136–145)
WBC # BLD AUTO: 14 10E3/UL (ref 4–11)

## 2023-09-25 PROCEDURE — 250N000011 HC RX IP 250 OP 636: Performed by: FAMILY MEDICINE

## 2023-09-25 PROCEDURE — 96374 THER/PROPH/DIAG INJ IV PUSH: CPT | Performed by: FAMILY MEDICINE

## 2023-09-25 PROCEDURE — 258N000003 HC RX IP 258 OP 636: Performed by: FAMILY MEDICINE

## 2023-09-25 PROCEDURE — 85025 COMPLETE CBC W/AUTO DIFF WBC: CPT | Performed by: FAMILY MEDICINE

## 2023-09-25 PROCEDURE — 36415 COLL VENOUS BLD VENIPUNCTURE: CPT | Performed by: FAMILY MEDICINE

## 2023-09-25 PROCEDURE — 99284 EMERGENCY DEPT VISIT MOD MDM: CPT | Performed by: FAMILY MEDICINE

## 2023-09-25 PROCEDURE — 99284 EMERGENCY DEPT VISIT MOD MDM: CPT | Mod: 25 | Performed by: FAMILY MEDICINE

## 2023-09-25 PROCEDURE — 96361 HYDRATE IV INFUSION ADD-ON: CPT | Performed by: FAMILY MEDICINE

## 2023-09-25 PROCEDURE — 80048 BASIC METABOLIC PNL TOTAL CA: CPT | Performed by: FAMILY MEDICINE

## 2023-09-25 RX ORDER — ONDANSETRON 4 MG/1
4-8 TABLET, ORALLY DISINTEGRATING ORAL EVERY 8 HOURS PRN
Qty: 12 TABLET | Refills: 0 | Status: SHIPPED | OUTPATIENT
Start: 2023-09-25 | End: 2023-11-07

## 2023-09-25 RX ORDER — DEXTROSE, SODIUM CHLORIDE, SODIUM LACTATE, POTASSIUM CHLORIDE, AND CALCIUM CHLORIDE 5; .6; .31; .03; .02 G/100ML; G/100ML; G/100ML; G/100ML; G/100ML
INJECTION, SOLUTION INTRAVENOUS CONTINUOUS
Status: DISCONTINUED | OUTPATIENT
Start: 2023-09-25 | End: 2023-09-25 | Stop reason: HOSPADM

## 2023-09-25 RX ORDER — ONDANSETRON 2 MG/ML
4 INJECTION INTRAMUSCULAR; INTRAVENOUS
Status: DISCONTINUED | OUTPATIENT
Start: 2023-09-25 | End: 2023-09-25 | Stop reason: HOSPADM

## 2023-09-25 RX ADMIN — ONDANSETRON 4 MG: 2 INJECTION INTRAMUSCULAR; INTRAVENOUS at 09:07

## 2023-09-25 RX ADMIN — SODIUM CHLORIDE, SODIUM LACTATE, POTASSIUM CHLORIDE, CALCIUM CHLORIDE AND DEXTROSE MONOHYDRATE: 5; 600; 310; 30; 20 INJECTION, SOLUTION INTRAVENOUS at 09:54

## 2023-09-25 RX ADMIN — SODIUM CHLORIDE, POTASSIUM CHLORIDE, SODIUM LACTATE AND CALCIUM CHLORIDE 1000 ML: 600; 310; 30; 20 INJECTION, SOLUTION INTRAVENOUS at 07:59

## 2023-09-25 ASSESSMENT — ACTIVITIES OF DAILY LIVING (ADL): ADLS_ACUITY_SCORE: 35

## 2023-09-25 NOTE — DISCHARGE INSTRUCTIONS
RETURN TO THE EMERGENCY ROOM FOR THE FOLLOWING:    Severely worsened nausea with vomiting, dehydration, fainting, new abdominal pain, or at anytime for any concern.    FOLLOW UP:    With your primary clinic/OB provider as needed.  If you are not improving over the next 5 days, consider returning to the ED for reevaluation.    TREATMENT RECOMMENDATIONS:    Zofran as needed for nausea.    Tylenol as needed for aches and pains.    NURSE ADVICE LINE:  [phone] or [phone]

## 2023-09-25 NOTE — ED TRIAGE NOTES
Started yesterday, N/V/D, unable to keep down fluids/food. 32 weeks pregnant, denies leaking of fluid/contractions, states normal fetal movement      Triage Assessment       Row Name 09/25/23 0746       Triage Assessment (Adult)    Airway WDL WDL       Respiratory WDL    Respiratory WDL WDL       Skin Circulation/Temperature WDL    Skin Circulation/Temperature WDL WDL       Cardiac WDL    Cardiac WDL WDL       Peripheral/Neurovascular WDL    Peripheral Neurovascular WDL WDL       Cognitive/Neuro/Behavioral WDL    Cognitive/Neuro/Behavioral WDL WDL

## 2023-09-25 NOTE — LETTER
September 25, 2023      To Whom It May Concern:      Ana Maria Espinoza was seen in our Emergency Department today, 09/25/23.  I expect her condition to improve over the next 2-3 days.  She may return to work/school when improved.    Sincerely,        Stevie Jones MD

## 2023-09-25 NOTE — ED PROVIDER NOTES
HPI   Patient is a 33-year-old female presenting with nausea, vomiting, and diarrhea.  She is known to be pregnant, approximately 32 weeks.  She has factor V Leiden and history of renal infarct.  She is taking Lovenox.  She has hyperlipidemia and history of reflux.  Smoker.  History of marijuana use, not currently.  No alcohol.  No drugs of abuse.    Patient with nausea, vomiting, and diarrhea since 2 days ago.  No hematochezia or melena.  No hematemesis or coffee-ground emesis.  No obvious sick contact with similar symptoms.  No recent travel.  No obviously tainted food.  No recent antibiotics.  No abdominal pain.  Subjective fever present.  Difficulty keeping anything down, hoping for fluids and IV hydration.      Allergies:  Allergies   Allergen Reactions    Bee Venom Anaphylaxis     Has epipen    Aloe Hives     Dry skin as well     Codeine Rash     Other reaction(s): Intolerance-Can't Take     Problem List:    Patient Active Problem List    Diagnosis Date Noted    Prenatal care, subsequent pregnancy 03/16/2023     Priority: Medium     FOB- John Burth      Chronic, continuous use of opioids 06/29/2022     Priority: Medium    Microscopic hematuria 02/10/2022     Priority: Medium    Factor V Leiden mutation (H) 01/04/2022     Priority: Medium    Renal infarction (H) 01/04/2022     Priority: Medium    Nexplanon in place 01/08/2021     Priority: Medium     Placed in the left arm on 1/8/2021 by Dr. Dimitris Zambrano  Due to come out 1/8/2024      Tobacco use 01/29/2020     Priority: Medium    Hip pain 01/18/2020     Priority: Medium    Contact dermatitis due to chemicals 08/06/2019     Priority: Medium    Pain in left hand 12/11/2018     Priority: Medium    H/O herpes simplex type 2 infection 10/16/2017     Priority: Medium    Dyslipidemia 11/28/2016     Priority: Medium     Formatting of this note might be different from the original.  Formatting of this note might be different from the original.  11/16: to quit smoking  and work otitis media a more active lifestyle.  Last Lipids:  Chol: 2016 142   73  HDL: 2016 27   LDL CHOLESTEROL (mg/dL)   Date Value   2016 100  Formatting of this note is different from the original.  : to quit smoking and work otitis media a more active lifestyle.  Last Lipids:  Chol: 2016 142   73  HDL: 2016 27   LDL CHOLESTEROL (mg/dL)   Date Value   2016 100      Knee strain 2016     Priority: Medium    Lumbar strain 2016     Priority: Medium    Wrist strain 2016     Priority: Medium    Mild persistent asthma without complication 2015     Priority: Medium    Pain medication agreement 2013     Priority: Medium     Formatting of this note might be different from the original.  Prn judicious use of vicodin or percocet      Tonsillar hypertrophy 2011     Priority: Medium    History of  section 2011     Priority: Medium    Patellar dislocation 2010     Priority: Medium    ADHD (attention deficit hyperactivity disorder) 2009     Priority: Medium     Formatting of this note might be different from the original.  Updated by system to replace inactive record  Formatting of this note might be different from the original.  Updated by system to replace inactive record      Vitamin D deficiency 2009     Priority: Medium    Reduced libido 2009     Priority: Medium    GERD (gastroesophageal reflux disease) 2009     Priority: Medium    Sciatica associated with disorder of lumbar spine 06/15/2005     Priority: Medium    Chronic low back pain 1993     Priority: Medium    History of chronic back pain 1992     Priority: Medium      Past Medical History:    Past Medical History:   Diagnosis Date    ADHD (attention deficit hyperactivity disorder)     Blood clot in bladder 2001    Chickenpox     Chlamydia     Factor V Leiden (H)     GERD (gastroesophageal reflux  disease)     History of urinary tract infection     Postpartum depression 2009    Renal failure     Uncomplicated asthma 1996     Past Surgical History:    Past Surgical History:   Procedure Laterality Date    ABDOMEN SURGERY      2 c sections  @     ESOPHAGOSCOPY, GASTROSCOPY, DUODENOSCOPY (EGD), COMBINED N/A 2021    Procedure: ESOPHAGOGASTRODUODENOSCOPY, WITH BIOPSY;  Surgeon: German Flynn MD;  Location: [facility]    ORTHOPEDIC SURGERY      Had surgery on both big toes    TONSILLECTOMY       Family History:    Family History   Problem Relation Age of Onset    Depression Mother     Mental Illness Mother 56        suicide-    Obesity Mother     Connective Tissue Disorder Mother         EDS    Alcoholism Mother     Diabetes Father     Obesity Father     Deep Vein Thrombosis Father         double amputee below knee    Asthma Sister     Thyroid Disease Sister     Obesity Sister     Asthma Brother     Obesity Brother     Obesity Paternal Grandmother     Heart Defect Daughter          at 7 weeks after 3 heart surgeries    Genetic Disorder Daughter         Avascular necrosis and Legg calve perthes disease    Connective Tissue Disorder Maternal Cousin         EDS    Connective Tissue Disorder Maternal Cousin         EDS     Social History:  Marital Status:  Single [1]  Social History     Tobacco Use    Smoking status: Every Day     Packs/day: 0.20     Years: 10.00     Pack years: 2.00     Types: Cigarettes     Start date: 3/19/2009     Last attempt to quit: 2021     Years since quittin.2    Smokeless tobacco: Never    Tobacco comments:     Down to 2-3 cig/day with pregnancy   Vaping Use    Vaping Use: Never used   Substance Use Topics    Alcohol use: No    Drug use: Not Currently     Types: Marijuana     Comment: quit with pregnancy      Medications:    ondansetron (ZOFRAN ODT) 4 MG ODT tab  albuterol (PROAIR HFA/PROVENTIL HFA/VENTOLIN HFA) 108 (90 Base) MCG/ACT inhaler  alcohol swab prep  pads  blood glucose (NO BRAND SPECIFIED) test strip  enoxaparin ANTICOAGULANT (LOVENOX) 40 MG/0.4ML syringe  hydrocortisone, Perianal, (HYDROCORTISONE) 2.5 % cream  omeprazole (PRILOSEC) 40 MG DR capsule  ondansetron (ZOFRAN) 4 MG tablet  Prenatal Vit-Fe Fumarate-FA (PRENATAL MULTIVITAMIN W/IRON) 27-0.8 MG tablet  thin (NO BRAND SPECIFIED) lancets      Review of Systems   All other systems reviewed and are negative.      PE   BP: 123/88  Pulse: 95  Temp: 97.5  F (36.4  C)  Resp: 20  Height: 152.4 cm (5')  Weight: 83.5 kg (184 lb)  SpO2: 96 %  Physical Exam  Vitals reviewed.   Constitutional:       Appearance: She is well-developed.      Comments: Tired appearing.   HENT:      Head: Normocephalic and atraumatic.      Right Ear: External ear normal.      Left Ear: External ear normal.      Nose: Nose normal.      Mouth/Throat:      Mouth: Mucous membranes are moist.      Pharynx: Oropharynx is clear.   Eyes:      Extraocular Movements: Extraocular movements intact.      Conjunctiva/sclera: Conjunctivae normal.      Pupils: Pupils are equal, round, and reactive to light.   Cardiovascular:      Rate and Rhythm: Normal rate and regular rhythm.   Pulmonary:      Effort: Pulmonary effort is normal.   Abdominal:      Palpations: Abdomen is soft.      Tenderness: There is no abdominal tenderness.   Musculoskeletal:         General: Normal range of motion.      Cervical back: Normal range of motion.   Skin:     General: Skin is warm and dry.   Neurological:      Mental Status: She is alert and oriented to person, place, and time.   Psychiatric:         Behavior: Behavior normal.         ED COURSE and MDM   0739.  Patient has symptoms and signs as described above.  No focal abdominal tenderness.  No abdominal pain.  Nausea with vomiting and diarrhea likely related to an infectious etiology, most likely a viral process.  LR, D5 LR, Zofran ordered.  Lab values pending.    1034.  Patient with excellent improvement.  She is  drinking apple juice without difficulty.  Smiling and conversational.  Mild acidosis suggested with low CO2 and gap, low concern for severe underlying pathology.  Given patient's improvement and lack of other concerning symptoms or examination findings, the patient will be discharged home with Zofran.  She agrees with this plan and is eager to leave.    Electronic medical chart reviewed, including medical problems, medications, medical allergies, social history.  Recent hospitalizations and surgical procedures reviewed.  Recent clinic visits and consultations reviewed.  Recent labs and test results reviewed.  Nursing notes reviewed.    The patient, their parent if applicable, and/or their medical decision maker(s) and I have reviewed all of the available historical information, applicable PMH, physical exam findings, and objective diagnostic data gathered during this ED visit.  We then discussed all work-up options and then together agreed upon the course taken during this visit.  The ultimate disposition and plan was a cooperative decision made between myself and the patient, their parent if applicable, and/or their legal decision maker(s).  The risks and benefits of all decisions made during this visit were discussed to the best of my abilities given the circumstances, and all parties are understanding of the pertinent ramifications of these decisions.      LABS  Labs Ordered and Resulted from Time of ED Arrival to Time of ED Departure   BASIC METABOLIC PANEL - Abnormal       Result Value    Sodium 137      Potassium 4.1      Chloride 104      Carbon Dioxide (CO2) 17 (*)     Anion Gap 16 (*)     Urea Nitrogen 6.7      Creatinine 0.79      Calcium 9.1      Glucose 80      GFR Estimate >90     CBC WITH PLATELETS AND DIFFERENTIAL - Abnormal    WBC Count 14.0 (*)     RBC Count 3.79 (*)     Hemoglobin 12.9      Hematocrit 36.5      MCV 96      MCH 34.0 (*)     MCHC 35.3      RDW 13.0      Platelet Count 202      %  Neutrophils 75      % Lymphocytes 20      % Monocytes 4      % Eosinophils 0      % Basophils 0      % Immature Granulocytes 1      NRBCs per 100 WBC 0      Absolute Neutrophils 10.5 (*)     Absolute Lymphocytes 2.8      Absolute Monocytes 0.5      Absolute Eosinophils 0.1      Absolute Basophils 0.0      Absolute Immature Granulocytes 0.2      Absolute NRBCs 0.0         IMAGING  Images reviewed by me.  Radiology report also reviewed.  No orders to display       Procedures    Medications   dextrose 5% in lactated ringers infusion (0 mLs Intravenous Stopped 9/25/23 1035)   ondansetron (ZOFRAN) injection 4 mg (4 mg Intravenous $Given 9/25/23 0907)   lactated ringers BOLUS 1,000 mL (0 mLs Intravenous Stopped 9/25/23 0951)         IMPRESSION       ICD-10-CM    1. Nausea vomiting and diarrhea  R11.2     R19.7                Medication List        Started      ondansetron 4 MG ODT tab  Commonly known as: ZOFRAN ODT  4-8 mg, Oral, EVERY 8 HOURS PRN                          Stevie Jones MD  09/25/23 1039

## 2023-09-25 NOTE — TELEPHONE ENCOUNTER
"Pt is 32w2d pregnant. Vomited once after sandwich and an apple, looser stools today \"pretty decent size, about seven\", thirty minutes ago farted and had stool incontinence. Kept down chicken noodle soup at 7 pm. TA temperature at time of calll 97.8. Pt reports she does have a weak immune system. Pt denies decreased fetal movement, vaginal bleeding or abdominal pain\". See full assessment below.    Writer reviewed home care and call back protocol per Care Advice with pt. Advised pt to follow up with ob/gyn tomorrow.     Pt verbalizes understanding and agrees to plan.        Reason for Disposition   Weak immune system (e.g., HIV positive, cancer chemo, splenectomy, organ transplant, chronic steroids)    Additional Information   Negative: Shock suspected (e.g., cold/pale/clammy skin, too weak to stand, low BP, rapid pulse)   Negative: Difficult to awaken or acting confused (e.g., disoriented, slurred speech)   Negative: Sounds like a life-threatening emergency to the triager   Negative: Vomiting also present and worse than the diarrhea   Negative: [1] Blood in stool AND [2] without diarrhea   Negative: Diarrhea in a cancer patient who is currently (or recently) receiving chemotherapy or radiation therapy, or cancer patient who has metastatic or end-stage cancer and is receiving palliative care   Negative: Diarrhea begins while taking an antibiotic by mouth (oral antibiotic)   Negative: [1] SEVERE abdominal pain (e.g., excruciating) AND [2] present > 1 hour   Negative: [1] SEVERE abdominal pain AND [2] age > 60 years   Negative: [1] Blood in the stool AND [2] moderate or large amount of blood   Negative: Black or tarry bowel movements  (Exception: Chronic-unchanged black-grey BMs AND is taking iron pills or Pepto-Bismol.)   Negative: [1] Drinking very little AND [2] dehydration suspected (e.g., no urine > 12 hours, very dry mouth, very lightheaded)   Negative: Patient sounds very sick or weak to the triager   Negative: " "[1] SEVERE diarrhea (e.g., 7 or more times / day more than normal) AND [2] age > 60 years   Negative: [1] Constant abdominal pain AND [2] present > 2 hours   Negative: [1] Fever > 103 F (39.4 C) AND [2] not able to get the fever down using Fever Care Advice   Negative: [1] SEVERE diarrhea (e.g., 7 or more times / day more than normal) AND [2] present > 24 hours (1 day)   Negative: [1] MODERATE diarrhea (e.g., 4-6 times / day more than normal) AND [2] present > 48 hours (2 days)   Negative: [1] MODERATE diarrhea (e.g., 4-6 times / day more than normal) AND [2] age > 70 years   Negative: Fever > 101 F (38.3 C)   Negative: Fever present > 3 days (72 hours)   Negative: Abdominal pain  (Exception: Pain clears with each passage of diarrhea stool.)   Negative: [1] Blood in the stool AND [2] small amount of blood  (Exception: Only on toilet paper. Reason: Diarrhea can cause rectal irritation with blood on wiping.)   Negative: [1] Mucus or pus in stool AND [2] present > 2 days AND [3] diarrhea is more than mild   Negative: [1] Recent antibiotic therapy (i.e., within last 2 months) AND [2] diarrhea present > 3 days since antibiotic was stopped   Negative: [1] Recent hospitalization AND [2] diarrhea present > 3 days    Answer Assessment - Initial Assessment Questions  1. DIARRHEA SEVERITY: \"How bad is the diarrhea?\" \"How many more stools have you had in the past 24 hours than normal?\"     - NO DIARRHEA (SCALE 0)    - MILD (SCALE 1-3): Few loose or mushy BMs; increase of 1-3 stools over normal daily number of stools; mild increase in ostomy output.    -  MODERATE (SCALE 4-7): Increase of 4-6 stools daily over normal; moderate increase in ostomy output.    -  SEVERE (SCALE 8-10; OR \"WORST POSSIBLE\"): Increase of 7 or more stools daily over normal; moderate increase in ostomy output; incontinence.      At least seven times today   2. ONSET: \"When did the diarrhea begin?\"       \"Probably 5 am\" today   3. BM CONSISTENCY: \"How loose " "or watery is the diarrhea?\"       \"Somewhat formed earlier but now pure liquid\"   4. VOMITING: \"Are you also vomiting?\" If Yes, ask: \"How many times in the past 24 hours?\"       Vomited around 5 pm   5. ABDOMEN PAIN: \"Are you having any abdomen pain?\" If Yes, ask: \"What does it feel like?\" (e.g., crampy, dull, intermittent, constant)       Pt denies  6. ABDOMEN PAIN SEVERITY: If present, ask: \"How bad is the pain?\"  (e.g., Scale 1-10; mild, moderate, or severe)    - MILD (1-3): doesn't interfere with normal activities, abdomen soft and not tender to touch     - MODERATE (4-7): interferes with normal activities or awakens from sleep, abdomen tender to touch     - SEVERE (8-10): excruciating pain, doubled over, unable to do any normal activities        Pt denies   7. ORAL INTAKE: If vomiting, \"Have you been able to drink liquids?\" \"How much liquids have you had in the past 24 hours?\"      Pt had soup, \"over 32 ounces water\"   8. HYDRATION: \"Any signs of dehydration?\" (e.g., dry mouth [not just dry lips], too weak to stand, dizziness, new weight loss) \"When did you last urinate?\"      Last urinated \"right before phone call 15 minutes ago\" pt denies signs of dehydration   9. EXPOSURE: \"Have you traveled to a foreign country recently?\" \"Have you been exposed to anyone with diarrhea?\" \"Could you have eaten any food that was spoiled?\"      Pt denies   10. ANTIBIOTIC USE: \"Are you taking antibiotics now or have you taken antibiotics in the past 2 months?\"        Pt denies   11. OTHER SYMPTOMS: \"Do you have any other symptoms?\" (e.g., fever, blood in stool)        Pt denies other symptoms except vomiting once today   12. PREGNANCY: \"Is there any chance you are pregnant?\" \"When was your last menstrual period?\"        32w2d pregnant    Protocols used: Diarrhea-A-AH    "

## 2023-09-28 NOTE — PATIENT INSTRUCTIONS
"Try unisom or benadryl at night to help you sleep.   Try some magnesium supplementation before bed to help with restless.     Weeks 32 to 34 of Your Pregnancy: Care Instructions    Decide whether you want to bank or donate your baby's umbilical cord blood. If you want to save this blood, you have to arrange for it ahead of time.   Decide about circumcision. Personal, Synagogue, or cultural beliefs may play a role in your decision. You get to decide what you want for your baby.     Learn how to ease hemorrhoids.    Get more liquids, fruits, vegetables, and fiber in your diet.  Avoid sitting for too long.  Clean yourself with moist toilet paper. Or try witch hazel pads.  Try ice packs or warm sitz baths for discomfort.  Use hydrocortisone cream for pain or itching.  Ask your doctor about stool softeners.    Consider the benefits of breastfeeding.    It reduces your baby's risk of sudden infant death syndrome (SIDS).   babies are less likely to get certain infections. And they're less likely to be obese or get diabetes later in life.  It can lower your risk of breast and ovarian cancers and osteoporosis.  It saves you money.  Follow-up care is a key part of your treatment and safety. Be sure to make and go to all appointments, and call your doctor if you are having problems. It's also a good idea to know your test results and keep a list of the medicines you take.  Where can you learn more?  Go to https://www.[employer].net/patiented  Enter X711 in the search box to learn more about \"Weeks 32 to 34 of Your Pregnancy: Care Instructions.\"  Current as of: November 9, 2022               Content Version: 13.7    [id number] [employer].   Care instructions adapted under license by your healthcare professional. If you have questions about a medical condition or this instruction, always ask your healthcare professional. [employer] disclaims any warranty or liability for your use of this " information.      You have been provided the Any Day Now: Early Labor at Home document.    Additional copies can be found here:  [email or web address]  You have been provided the What I'd Wish I'd Known About Giving Birth document.    Additional copies can be found here:  [email or web address]

## 2023-09-29 ENCOUNTER — PRENATAL OFFICE VISIT (OUTPATIENT)
Dept: OBGYN | Facility: CLINIC | Age: 33
End: 2023-09-29
Payer: COMMERCIAL

## 2023-09-29 VITALS
WEIGHT: 183 LBS | HEIGHT: 60 IN | HEART RATE: 96 BPM | SYSTOLIC BLOOD PRESSURE: 121 MMHG | RESPIRATION RATE: 18 BRPM | DIASTOLIC BLOOD PRESSURE: 77 MMHG | BODY MASS INDEX: 35.93 KG/M2 | TEMPERATURE: 97.8 F

## 2023-09-29 DIAGNOSIS — Z34.93 PRENATAL CARE IN THIRD TRIMESTER: Primary | ICD-10-CM

## 2023-09-29 PROCEDURE — 99207 PR PRENATAL VISIT: CPT | Performed by: OBSTETRICS & GYNECOLOGY

## 2023-09-29 NOTE — PROGRESS NOTES
[facility]     Return OB Note     CC: Return OB      Subjective:  Ana Maria is a 33 year old  at 33w0d   Denies vaginal bleeding, loss of fluid, or pelvic cramping.   Active baby.      Objective:  /77 (BP Location: Right arm, Patient Position: Chair, Cuff Size: Adult Regular)   Pulse 96   Temp 97.8  F (36.6  C) (Tympanic)   Resp 18   Ht 1.524 m (5')   Wt 83 kg (183 lb)   LMP 2023   BMI 35.74 kg/m      See flowsheet     Assessment/Plan:      33 year old  at 33w0d    1) Normal labs  2) NIPT - low risk, boy  3) PMH/OBHx problems:                - Hx of c/s x2; wants repeat - scheduled repeat with tubal for  with Yann                - Hx of Factor V Leiden and kidney clot; s/p MFM, on lovenox, discussed stopping the night before surgery and if she had labor symptoms. Discussed option of trantioning to heparin, but she would prefer not to do this. Plan lovenox ppx for 6 wks pp.                - Hx of child with complex cardiac defect and death as an infant and perthes disease; MFM referral, genetics, L2, fetal ECHO               - tobacco use; is done to 1x cigarette a day                - hx of PPD; no meds currently                - failed GCT, passed GTT but with very high fasting blood sugar; plan to check blood sugars QID for for two weeks and all were normal.   4) Factor V Leiden with kidney clot:  MFM consult done.  Since Ms. 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  In the postpartum period, we recommend prophylactic anticoagulation with subcutaneous LMWH, usually enoxaparin 40 mg every 24 hours.  Anticoagulation should be initiated 6-12 hours after delivery, depending on her bleeding, and should be continued for six weeks postpartum. If regional anesthesia is used, 4 hours should lapse  after epidural catheter removal prior to administering anticoagulation. Plan to hold lovenox the night before her c/s. Will also hold if she starts to labor prior to scheduled repeat.         RTC 2 weeks, precautions reviewed      Katharina Lerner MD  OB/GYN

## 2023-09-29 NOTE — NURSING NOTE
Initial /77 (BP Location: Right arm, Patient Position: Chair, Cuff Size: Adult Regular)   Pulse 96   Temp 97.8  F (36.6  C) (Tympanic)   Resp 18   Ht 1.524 m (5')   Wt 83 kg (183 lb)   LMP 02/02/2023   BMI 35.74 kg/m   Estimated body mass index is 35.74 kg/m  as calculated from the following:    Height as of this encounter: 1.524 m (5').    Weight as of this encounter: 83 kg (183 lb). .

## 2023-10-07 ENCOUNTER — HEALTH MAINTENANCE LETTER (OUTPATIENT)
Age: 33
End: 2023-10-07

## 2023-10-11 NOTE — PATIENT INSTRUCTIONS
Weeks 34 to 36 of Your Pregnancy: Care Instructions  Your belly has grown quite large. It's almost time to give birth! Your baby's lungs are almost ready to breathe air. The skull bones are firm enough to protect your baby's head. But they're soft enough to move down through the birth canal.    You might be wondering what to expect during labor. Because each birth is different, there's no way to know exactly what childbirth will be like for you. Talk to your doctor or midwife about any concerns you have.   You'll probably have a test for group B streptococcus (GBS). GBS is bacteria that can live in the vagina and rectum. GBS can make your baby sick after birth. If you test positive, you'll get antibiotics during labor.     Choose what type of pain relief you would like during labor.  You can choose from a few types, including medicine and non-medicine options. You may want to use several types of pain relief.     Know how medicines can help with pain during labor.  Some medicines lower anxiety and help with some of the pain. Others make your lower body numb so that you will feel less pain.     Tell your doctor about your pain medicine choice.  Do this before you start labor or very early in your labor. You may be able to change your mind during labor.     Learn about the stages of labor.    The first stage includes the early (latent) and active phases of labor. Contractions start in early labor. During active labor, contractions get stronger, last longer, and happen more often. And the cervix opens more rapidly.  The second stage starts when you're ready to push. During this stage, your baby is born.  During the third stage, you'll usually have a few more contractions to push out the placenta.   Follow-up care is a key part of your treatment and safety. Be sure to make and go to all appointments, and call your doctor if you are having problems. It's also a good idea to know your test results and keep a list of the  "medicines you take.  Where can you learn more?  Go to https://www.[employer].net/patiented  Enter B912 in the search box to learn more about \"Weeks 34 to 36 of Your Pregnancy: Care Instructions.\"  Current as of: 2022               Content Version: 13.7    7478-2358 [employer].   Care instructions adapted under license by your healthcare professional. If you have questions about a medical condition or this instruction, always ask your healthcare professional. [employer] disclaims any warranty or liability for your use of this information.      Group B Strep During Pregnancy: Care Instructions  Overview     Group B strep infection is caused by a type of bacteria. It's a different kind of bacteria than the kind that causes strep throat.  You may have this kind of bacteria in your body. Sometimes it may cause an infection, but most of the time it doesn't make you sick or cause symptoms. But if you pass the bacteria to your baby during the birth, it can cause serious health problems for your baby.  If you have this bacteria in your body, you will get antibiotics when you are in labor. Antibiotics help prevent problems for a  baby.  After birth, doctors will watch and may test your baby. If your baby tests positive for Group B strep, your baby will get antibiotics.  If you plan to breastfeed your baby, don't worry. It will be safe to breastfeed.  Follow-up care is a key part of your treatment and safety. Be sure to make and go to all appointments, and call your doctor if you are having problems. It's also a good idea to know your test results and keep a list of the medicines you take.  How can you care for yourself at home?  If your doctor has prescribed antibiotics, take them as directed. Do not stop taking them just because you feel better. You need to take the full course of antibiotics.  Tell your doctor if you are allergic to any antibiotic.  If you go into labor, or " "your water breaks, go to the hospital. Your doctor will give you antibiotics to help protect your baby from infection.  Tell the doctors and nurses if you have an allergy to penicillin.  Tell the doctors and nurses at the hospital that you tested positive for group B strep.  When should you call for help?   Call your doctor now or seek immediate medical care if:    You have symptoms of a urinary tract infection. These may include:  Pain or burning when you urinate.  A frequent need to urinate without being able to pass much urine.  Pain in the flank, which is just below the rib cage and above the waist on either side of the back.  Blood in your urine.  A fever.     You think you are in labor or your water has broken.     You have pain in your belly or pelvis.   Watch closely for changes in your health, and be sure to contact your doctor if you have any problems.  Where can you learn more?  Go to https://www.[employer]rat/patiented  Enter M001 in the search box to learn more about \"Group B Strep During Pregnancy: Care Instructions.\"  Current as of: October 31, 2022               Content Version: 13.7    7778-5469 [employer].   Care instructions adapted under license by your healthcare professional. If you have questions about a medical condition or this instruction, always ask your healthcare professional. [employer] disclaims any warranty or liability for your use of this information.      Circumcision in Infants: What to Expect at Home  Your Child's Recovery  After circumcision, your baby's penis may look red and swollen. It may have petroleum jelly and gauze on it. The gauze will likely come off when your baby urinates. Follow your doctor's directions about whether to put clean gauze back on your baby's penis or to leave the gauze off. If you need to remove gauze from the penis, use warm water to soak the gauze and gently loosen it.  The doctor may have used a Plastibell device to do " the circumcision. If so, your baby will have a plastic ring around the head of the penis. The ring should fall off by itself in 10 to 12 days.  A thin, yellow film may form over the area the day after the procedure. This is part of the normal healing process. It should go away in a few days.  Your baby may seem fussy while the area heals. It may hurt for your baby to urinate. This pain often gets better in 3 or 4 days. But it may last for up to 2 weeks.  Even though your baby's penis will likely start to feel better after 3 or 4 days, it may look worse. The penis often starts to look like it's getting better after about 7 to 10 days.  This care sheet gives you a general idea about how long it will take for your child to recover. But each child recovers at a different pace. Follow the steps below to help your child get better as quickly as possible.  How can you care for your child at home?  Activity    Let your baby rest as much as possible. Sleeping will help with recovery.     You can give your baby a sponge bath the day after surgery. Ask your doctor when it is okay to give your baby a bath.   Medicines    Your doctor will tell you if and when your child can restart any medicines. The doctor will also give you instructions about your child taking any new medicines.     Your doctor may recommend giving your baby acetaminophen (Tylenol) to help with pain after the procedure. Be safe with medicines. Give your child medicines exactly as prescribed. Call your doctor if you think your child is having a problem with a medicine.     Do not give your child two or more pain medicines at the same time unless the doctor told you to. Many pain medicines have acetaminophen, which is Tylenol. Too much acetaminophen (Tylenol) can be harmful.   Circumcision care    Always wash your hands before and after touching the circumcision area.     Gently wash your baby's penis with plain, warm water after each diaper change, and pat it  "dry. Do not use soap. Don't use hydrogen peroxide or alcohol. They can slow healing.     Do not try to remove the film that forms on the penis. The film will go away on its own.     Put plenty of petroleum jelly (such as Vaseline) on the circumcision area during each diaper change. This will prevent your baby's penis from sticking to the diaper while it heals.     Fasten your baby's diapers loosely so that there is less pressure on the penis while it heals.   Follow-up care is a key part of your child's treatment and safety. Be sure to make and go to all appointments, and call your doctor if your child is having problems. It's also a good idea to know your child's test results and keep a list of the medicines your child takes.  When should you call for help?   Call your doctor now or seek immediate medical care if:    Your baby has a fever over 100.4 F.     Your baby is extremely fussy or irritable, has a high-pitched cry, or refuses to eat.     Your baby does not have a wet diaper within 12 hours after the circumcision.     You find a spot of bleeding larger than a 2-inch Beaver from the incision.     Your baby has signs of infection. Signs may include severe swelling; redness; a red streak on the shaft of the penis; or a thick, yellow discharge.   Watch closely for changes in your child's health, and be sure to contact your doctor if:    A Plastibell device was used for the circumcision and the ring has not fallen off after 10 to 12 days.   Where can you learn more?  Go to https://www.[employer].net/patiented  Enter S255 in the search box to learn more about \"Circumcision in Infants: What to Expect at Home.\"  Current as of: March 1, 2023               Content Version: 13.7    1668-2322 [employer].   Care instructions adapted under license by your healthcare professional. If you have questions about a medical condition or this instruction, always ask your healthcare professional. [employer] disclaims any warranty or liability for your use of this information.

## 2023-10-13 ENCOUNTER — PRENATAL OFFICE VISIT (OUTPATIENT)
Dept: OBGYN | Facility: CLINIC | Age: 33
End: 2023-10-13
Payer: COMMERCIAL

## 2023-10-13 VITALS
DIASTOLIC BLOOD PRESSURE: 75 MMHG | BODY MASS INDEX: 37.58 KG/M2 | RESPIRATION RATE: 16 BRPM | WEIGHT: 191.4 LBS | SYSTOLIC BLOOD PRESSURE: 118 MMHG | HEART RATE: 93 BPM | HEIGHT: 60 IN

## 2023-10-13 DIAGNOSIS — R03.0 ELEVATED BLOOD PRESSURE READING WITHOUT DIAGNOSIS OF HYPERTENSION: ICD-10-CM

## 2023-10-13 DIAGNOSIS — Z34.83 PRENATAL CARE, SUBSEQUENT PREGNANCY IN THIRD TRIMESTER: Primary | ICD-10-CM

## 2023-10-13 LAB
ALBUMIN MFR UR ELPH: 24.4 MG/DL
ALT SERPL W P-5'-P-CCNC: 12 U/L (ref 0–50)
AST SERPL W P-5'-P-CCNC: 17 U/L (ref 0–45)
CREAT SERPL-MCNC: 0.82 MG/DL (ref 0.51–0.95)
CREAT UR-MCNC: 105.7 MG/DL
EGFRCR SERPLBLD CKD-EPI 2021: >90 ML/MIN/1.73M2
ERYTHROCYTE [DISTWIDTH] IN BLOOD BY AUTOMATED COUNT: 13 % (ref 10–15)
HCT VFR BLD AUTO: 35.5 % (ref 35–47)
HGB BLD-MCNC: 12.3 G/DL (ref 11.7–15.7)
MCH RBC QN AUTO: 34 PG (ref 26.5–33)
MCHC RBC AUTO-ENTMCNC: 34.6 G/DL (ref 31.5–36.5)
MCV RBC AUTO: 98 FL (ref 78–100)
PLATELET # BLD AUTO: 198 10E3/UL (ref 150–450)
PROT/CREAT 24H UR: 0.23 MG/MG CR (ref 0–0.2)
RBC # BLD AUTO: 3.62 10E6/UL (ref 3.8–5.2)
WBC # BLD AUTO: 13.8 10E3/UL (ref 4–11)

## 2023-10-13 PROCEDURE — 84156 ASSAY OF PROTEIN URINE: CPT | Performed by: OBSTETRICS & GYNECOLOGY

## 2023-10-13 PROCEDURE — 99207 PR PRENATAL VISIT: CPT | Performed by: OBSTETRICS & GYNECOLOGY

## 2023-10-13 PROCEDURE — 84450 TRANSFERASE (AST) (SGOT): CPT | Performed by: OBSTETRICS & GYNECOLOGY

## 2023-10-13 PROCEDURE — 87653 STREP B DNA AMP PROBE: CPT | Performed by: OBSTETRICS & GYNECOLOGY

## 2023-10-13 PROCEDURE — 36415 COLL VENOUS BLD VENIPUNCTURE: CPT | Performed by: OBSTETRICS & GYNECOLOGY

## 2023-10-13 PROCEDURE — 85027 COMPLETE CBC AUTOMATED: CPT | Performed by: OBSTETRICS & GYNECOLOGY

## 2023-10-13 PROCEDURE — 84460 ALANINE AMINO (ALT) (SGPT): CPT | Performed by: OBSTETRICS & GYNECOLOGY

## 2023-10-13 PROCEDURE — 82565 ASSAY OF CREATININE: CPT | Performed by: OBSTETRICS & GYNECOLOGY

## 2023-10-13 NOTE — PROGRESS NOTES
Initial /75 (BP Location: Right arm, Patient Position: Chair, Cuff Size: Adult Regular)   Pulse 93   Resp 16   Ht 1.524 m (5')   Wt 86.8 kg (191 lb 6.4 oz)   LMP 02/02/2023   BMI 37.38 kg/m   Estimated body mass index is 37.38 kg/m  as calculated from the following:    Height as of this encounter: 1.524 m (5').    Weight as of this encounter: 86.8 kg (191 lb 6.4 oz). .

## 2023-10-13 NOTE — PROGRESS NOTES
[facility]     Return OB Note     CC: Return OB      Subjective:  Ana Maria is a 33 year old  at 35w0d   Denies vaginal bleeding, loss of fluid, or pelvic cramping.   Active baby. Baby is pushing on bladder and bowel. No headaches. Significant LE swelling.      Objective:  /75 (BP Location: Right arm, Patient Position: Chair, Cuff Size: Adult Regular)   Pulse 93   Resp 16   Ht 1.524 m (5')   Wt 86.8 kg (191 lb 6.4 oz)   LMP 2023   BMI 37.38 kg/m       See flowsheet     Assessment/Plan:      33 year old  at 35w0d    1) Normal labs, GBS collected  2) NIPT - low risk, boy  3) PMH/OBHx problems:                - Hx of c/s x2; wants repeat - scheduled repeat with tubal for  with Yann                - Hx of Factor V Leiden and kidney clot; s/p MFM, on lovenox, discussed stopping the night before surgery and if she had labor symptoms. Discussed option of trantioning to heparin, but she would prefer not to do this. Plan lovenox ppx for 6 wks pp.                - Hx of child with complex cardiac defect and death as an infant and perthes disease; MFM referral, genetics, L2, fetal ECHO               - tobacco use; is done to 1x cigarette a day                - hx of PPD; no meds currently                - failed GCT, passed GTT but with very high fasting blood sugar; plan to check blood sugars QID for for two weeks and all were normal.   4) Factor V Leiden with kidney clot:  MFM consult done.  Since Ms. 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  In the postpartum period, we recommend prophylactic anticoagulation with subcutaneous LMWH, usually enoxaparin 40 mg every 24 hours.  Anticoagulation should be initiated 6-12 hours after delivery, depending on her bleeding, and should be continued for six weeks  postpartum. If regional anesthesia is used, 4 hours should lapse after epidural catheter removal prior to administering anticoagulation. Plan to hold lovenox the night before her c/s. Will also hold if she starts to labor prior to scheduled repeat.   5) Isolated elevated BP, repeat nl. Signifiacnt LE swelling. Plan HELLP labs today. Pre-e precautions reviewed. Plan repeat OB appt in one week to follow BPs closely.         RTC 1 weeks, precautions reviewed      Katharina Lerner MD  OB/GYN

## 2023-10-14 LAB — GP B STREP DNA SPEC QL NAA+PROBE: POSITIVE

## 2023-10-15 ENCOUNTER — NURSE TRIAGE (OUTPATIENT)
Dept: NURSING | Facility: CLINIC | Age: 33
End: 2023-10-15
Payer: COMMERCIAL

## 2023-10-15 ENCOUNTER — HOSPITAL ENCOUNTER (OUTPATIENT)
Facility: CLINIC | Age: 33
Discharge: HOME | End: 2023-10-15
Attending: OBSTETRICS & GYNECOLOGY | Admitting: OBSTETRICS & GYNECOLOGY
Payer: COMMERCIAL

## 2023-10-15 VITALS — DIASTOLIC BLOOD PRESSURE: 81 MMHG | TEMPERATURE: 98 F | RESPIRATION RATE: 16 BRPM | SYSTOLIC BLOOD PRESSURE: 123 MMHG

## 2023-10-15 PROBLEM — Z34.90 PREGNANCY: Status: ACTIVE | Noted: 2023-10-15

## 2023-10-15 PROBLEM — O14.90 PRE-ECLAMPSIA: Status: ACTIVE | Noted: 2023-10-15

## 2023-10-15 LAB
ALBUMIN MFR UR ELPH: 6.2 MG/DL
ALBUMIN SERPL BCG-MCNC: 3.2 G/DL (ref 3.5–5.2)
ALBUMIN UR-MCNC: NEGATIVE MG/DL
ALP SERPL-CCNC: 115 U/L (ref 35–104)
ALT SERPL W P-5'-P-CCNC: 10 U/L (ref 0–50)
ANION GAP SERPL CALCULATED.3IONS-SCNC: 13 MMOL/L (ref 7–15)
APPEARANCE UR: CLEAR
AST SERPL W P-5'-P-CCNC: 14 U/L (ref 0–45)
BACTERIA #/AREA URNS HPF: ABNORMAL /HPF
BILIRUB SERPL-MCNC: <0.2 MG/DL
BILIRUB UR QL STRIP: NEGATIVE
BUN SERPL-MCNC: 7.2 MG/DL (ref 6–20)
CALCIUM SERPL-MCNC: 9.1 MG/DL (ref 8.6–10)
CHLORIDE SERPL-SCNC: 103 MMOL/L (ref 98–107)
CLUE CELLS: NORMAL
COLOR UR AUTO: ABNORMAL
CREAT SERPL-MCNC: 0.91 MG/DL (ref 0.51–0.95)
CREAT UR-MCNC: 40.1 MG/DL
DEPRECATED HCO3 PLAS-SCNC: 19 MMOL/L (ref 22–29)
EGFRCR SERPLBLD CKD-EPI 2021: 84 ML/MIN/1.73M2
ERYTHROCYTE [DISTWIDTH] IN BLOOD BY AUTOMATED COUNT: 13.3 % (ref 10–15)
GLUCOSE SERPL-MCNC: 104 MG/DL (ref 70–99)
GLUCOSE UR STRIP-MCNC: NEGATIVE MG/DL
HCT VFR BLD AUTO: 35 % (ref 35–47)
HGB BLD-MCNC: 12.2 G/DL (ref 11.7–15.7)
HGB UR QL STRIP: NEGATIVE
HOLD SPECIMEN: NORMAL
HOLD SPECIMEN: NORMAL
KETONES UR STRIP-MCNC: NEGATIVE MG/DL
LEUKOCYTE ESTERASE UR QL STRIP: NEGATIVE
MCH RBC QN AUTO: 34.3 PG (ref 26.5–33)
MCHC RBC AUTO-ENTMCNC: 34.9 G/DL (ref 31.5–36.5)
MCV RBC AUTO: 98 FL (ref 78–100)
MUCOUS THREADS #/AREA URNS LPF: PRESENT /LPF
NITRATE UR QL: NEGATIVE
PH UR STRIP: 6 [PH] (ref 5–7)
PLATELET # BLD AUTO: 177 10E3/UL (ref 150–450)
POTASSIUM SERPL-SCNC: 4.4 MMOL/L (ref 3.4–5.3)
PROT SERPL-MCNC: 5.8 G/DL (ref 6.4–8.3)
PROT/CREAT 24H UR: 0.15 MG/MG CR (ref 0–0.2)
RBC # BLD AUTO: 3.56 10E6/UL (ref 3.8–5.2)
RBC URINE: 1 /HPF
SODIUM SERPL-SCNC: 135 MMOL/L (ref 135–145)
SP GR UR STRIP: 1.01 (ref 1–1.03)
SQUAMOUS EPITHELIAL: 7 /HPF
TRICHOMONAS, WET PREP: NORMAL
UROBILINOGEN UR STRIP-MCNC: NORMAL MG/DL
WBC # BLD AUTO: 14.4 10E3/UL (ref 4–11)
WBC URINE: 1 /HPF
WBC'S/HIGH POWER FIELD, WET PREP: NORMAL
YEAST, WET PREP: NORMAL

## 2023-10-15 PROCEDURE — 36415 COLL VENOUS BLD VENIPUNCTURE: CPT | Performed by: OBSTETRICS & GYNECOLOGY

## 2023-10-15 PROCEDURE — 80053 COMPREHEN METABOLIC PANEL: CPT | Performed by: OBSTETRICS & GYNECOLOGY

## 2023-10-15 PROCEDURE — 87210 SMEAR WET MOUNT SALINE/INK: CPT | Performed by: OBSTETRICS & GYNECOLOGY

## 2023-10-15 PROCEDURE — 120N000001 HC R&B MED SURG/OB

## 2023-10-15 PROCEDURE — 85027 COMPLETE CBC AUTOMATED: CPT | Performed by: OBSTETRICS & GYNECOLOGY

## 2023-10-15 PROCEDURE — 59025 FETAL NON-STRESS TEST: CPT

## 2023-10-15 PROCEDURE — 84156 ASSAY OF PROTEIN URINE: CPT | Performed by: OBSTETRICS & GYNECOLOGY

## 2023-10-15 PROCEDURE — 81001 URINALYSIS AUTO W/SCOPE: CPT | Performed by: OBSTETRICS & GYNECOLOGY

## 2023-10-15 PROCEDURE — G0463 HOSPITAL OUTPT CLINIC VISIT: HCPCS | Mod: 25

## 2023-10-15 RX ORDER — METOCLOPRAMIDE HYDROCHLORIDE 5 MG/ML
10 INJECTION INTRAMUSCULAR; INTRAVENOUS EVERY 6 HOURS PRN
Status: DISCONTINUED | OUTPATIENT
Start: 2023-10-15 | End: 2023-10-15 | Stop reason: HOSPADM

## 2023-10-15 RX ORDER — ONDANSETRON 4 MG/1
4 TABLET, ORALLY DISINTEGRATING ORAL EVERY 6 HOURS PRN
Status: DISCONTINUED | OUTPATIENT
Start: 2023-10-15 | End: 2023-10-15 | Stop reason: HOSPADM

## 2023-10-15 RX ORDER — ONDANSETRON 2 MG/ML
4 INJECTION INTRAMUSCULAR; INTRAVENOUS EVERY 6 HOURS PRN
Status: DISCONTINUED | OUTPATIENT
Start: 2023-10-15 | End: 2023-10-15 | Stop reason: HOSPADM

## 2023-10-15 RX ORDER — METOCLOPRAMIDE 10 MG/1
10 TABLET ORAL EVERY 6 HOURS PRN
Status: DISCONTINUED | OUTPATIENT
Start: 2023-10-15 | End: 2023-10-15 | Stop reason: HOSPADM

## 2023-10-15 RX ORDER — PROCHLORPERAZINE 25 MG
25 SUPPOSITORY, RECTAL RECTAL EVERY 12 HOURS PRN
Status: DISCONTINUED | OUTPATIENT
Start: 2023-10-15 | End: 2023-10-15 | Stop reason: HOSPADM

## 2023-10-15 RX ORDER — PROCHLORPERAZINE MALEATE 10 MG
10 TABLET ORAL EVERY 6 HOURS PRN
Status: DISCONTINUED | OUTPATIENT
Start: 2023-10-15 | End: 2023-10-15 | Stop reason: HOSPADM

## 2023-10-15 ASSESSMENT — ACTIVITIES OF DAILY LIVING (ADL): ADLS_ACUITY_SCORE: 35

## 2023-10-15 NOTE — TELEPHONE ENCOUNTER
"OB Triage Call      Is patient's OB/Midwife with the [facility] or [facility]? [facility]- Proceed with triage     Reason for call: Hand swelling    Assessment: Had OB appt on Friday in [place].  She was being tested for pre-eclampsia.  She is wondering what the lab results are meaning.  Her legs are still very swollen and now her hands are starting to swell.  For the last week or so is having more shortness of breath, assuming its from in later pregnancy.  It has not gotten worse and is intermittent.   She is also having some face swelling.    Plan: L&D triage    Patient plans to deliver at [facility]    Patient's primary OB Provider is Yann.      Per protocol recommendations Patient to be evaluated in L&D. Patient's primary OB is [facility].  Labor and delivery at [facility] ([phone]) notified of patient's pending arrival.  Report given to Domenica.         Is patient's delivering hospital on divert? No      35w2d    Estimated Date of Delivery: 2023        OB History    Para Term  AB Living   3 2 1 1 0 1   SAB IAB Ectopic Multiple Live Births   0 0 0 0 2      # Outcome Date GA Lbr Eliot/2nd Weight Sex Delivery Anes PTL Lv   3 Current            2 Term 11 39w4d  3.402 kg (7 lb 8 oz) F CS-Unspec   NOEL      Name: Shahbaz   1  09 33w6d  3.232 kg (7 lb 2 oz) F CS-Unspec   DEC      Birth Comments: congenital heart defect      Name: Kaylie       No results found for: \"GBS\"       Domenica Fry RN 10/15/23 2:38 PM  [facility] Nurse Advisor  Reason for Disposition   [1] Pregnant 20 or more weeks AND [2] new face swelling    Additional Information   Negative: SEVERE difficulty breathing (e.g., struggling for each breath, speaks in single words)   Negative: Sounds like a life-threatening emergency to the triager   Negative: Difficulty breathing at rest   Negative: Entire hand is cool or blue in comparison to other side   Negative: Looks like a " broken bone or dislocated joint (e.g., crooked or deformed)   Negative: [1] Difficulty breathing with exertion (e.g., walking) AND [2] new-onset or worsening   Negative: [1] Can't use hand or can barely use hand AND [2] new-onset   Negative: [1] Red area or streak AND [2] fever   Negative: [1] Swelling is painful to touch AND [2] fever   Negative: [1] Cast arm AND [2] now increased pain   Negative: [1] Postpartum (from 0 to 6 weeks after delivery) AND [2] new blurred vision or vision changes   Negative: [1] Postpartum (from 0 to 6 weeks after delivery) AND [2] face swelling   Negative: Patient sounds very sick or weak to the triager    Protocols used: Hand Swelling-A-AH

## 2023-10-15 NOTE — PLAN OF CARE
Goal Outcome Evaluation:       At 1710, pt was educated on the monitors again, gave visual instructions on what a contraction looks like on the screen. Cat 1 FHT's. Recommended to not smoke any products. To increase PO fluids, rest, elevate lower extremities. No pitting, trace edema in L/E's. No additional swelling in hands, arms or face. Dr Anand updated. After UA/UC results are in, if WNL's pt may be discharged home.

## 2023-10-15 NOTE — DISCHARGE INSTRUCTIONS
Discharge Instructions for Undelivered Patients  Birthplace [phone]    Diet:  Drink 8 to 12 glasses of water every day.  You may eat meals and snacks as before    Activity:  If you are experiencing  labor, rest the pelvic area. No sex. Do not stimulate breasts or nipples.  Rest often as needed.  Count fetal kicks every day. (See handout.)  Call your doctor if your baby is moving less than usual.    Medicines:  My care team has reviewed my medicines with me.  My care team has given me a list of my medicines.  My care team has prescribed a new medicine. They have either sent it home with me or ordered it from the pharmacy.    Call your provider if you notice:  Swelling in your face or increased swelling in your hands or legs.  Headaches that are not relieved by Tylenol (acetaminophen).  Changes in your vision (blurring; seeing spots or stars).  Nausea (sick to your stomach) and vomiting (throwing up).  Weight gain of 5 pounds per week.  Heartburn that doesn't go away.  Signs of bladder infection: pain when you urinate (use the toilet), needing to go more often or more urgently.  The bag of baca (membranes) breaks, or you notice leaking in your underwear.  Bright red blood in your underwear.  Abdominal (lower belly) or stomach pain.  For first baby: Contractions (tightenings) less than 5 minutes apart for one hour or more.  For Second (plus) baby: Contractions (tightenings) less than 10 minutes apart and getting stronger.  Increase or change in vaginal discharge (note the color and amount).    Follow up with your provider as scheduled. Dr Anand requests a clinic appointment this week

## 2023-10-15 NOTE — PLAN OF CARE
Goal Outcome Evaluation:       S:Patient presents due to  reported increased swelling .  B:35w2d   Allergies: Bee venom, Aloe, and Codeine  A:A:moderate variablility, + accels, no decels, Category I  irritability  Not performed    Dr. DIO Anand called and orders received.  Plan includes; Encourage po fluids, Monitor, observe and reevaluate, and Labs . Reviewed with patient and she agrees with plan.   HC Your Rights handout : Informed and given    Prenatal Breastfeeding Education Toolkit provided for patient to review,helping her to make an informed decision on a feeding choice for her baby. Patient accepted. Questions answered.    Oriented to room and call light.

## 2023-10-17 ENCOUNTER — PATIENT OUTREACH (OUTPATIENT)
Dept: CARE COORDINATION | Facility: CLINIC | Age: 33
End: 2023-10-17
Payer: COMMERCIAL

## 2023-10-17 NOTE — PROGRESS NOTES
[facility] Contact  RUST/Voicemail     Clinical Data: Post-Discharge Outreach     Outreach attempted x 2.  Left message on patient's voicemail, providing [facility]'s 24/7 scheduling and nurse triage phone number [phone] ([phone]) for questions/concerns and/or to schedule an appt with an [facility] provider, if they do not have a PCP.      Plan:  [facility] will do no further outreaches at this time.       Lalita Espinoza MA  [facility] [place], [facility]    *Connected Care Resource Team does NOT follow patient ongoing. Referrals are identified based on internal discharge reports and the outreach is to ensure patient has an understanding of their discharge instructions.

## 2023-10-24 ENCOUNTER — PRENATAL OFFICE VISIT (OUTPATIENT)
Dept: OBGYN | Facility: CLINIC | Age: 33
End: 2023-10-24
Payer: COMMERCIAL

## 2023-10-24 VITALS
SYSTOLIC BLOOD PRESSURE: 125 MMHG | HEIGHT: 60 IN | RESPIRATION RATE: 16 BRPM | TEMPERATURE: 98.5 F | WEIGHT: 195.1 LBS | BODY MASS INDEX: 38.31 KG/M2 | HEART RATE: 98 BPM | DIASTOLIC BLOOD PRESSURE: 67 MMHG

## 2023-10-24 DIAGNOSIS — Z34.83 ENCOUNTER FOR SUPERVISION OF OTHER NORMAL PREGNANCY IN THIRD TRIMESTER: Primary | ICD-10-CM

## 2023-10-24 PROCEDURE — 99207 PR PRENATAL VISIT: CPT | Performed by: OBSTETRICS & GYNECOLOGY

## 2023-10-24 PROCEDURE — 90471 IMMUNIZATION ADMIN: CPT | Performed by: OBSTETRICS & GYNECOLOGY

## 2023-10-24 PROCEDURE — 90678 RSV VACC PREF BIVALENT IM: CPT | Performed by: OBSTETRICS & GYNECOLOGY

## 2023-10-24 NOTE — PROGRESS NOTES
Concerns:   .  No vaginal bleeding, LOF, contractions.  No HA, RUQ pain, N/V, visual changes.  Discussed signs of labor and when to call or come in.  Reportable signs and symptoms discussed.  GBS done today.  Labor precautions discussed.  RTC 1 week.  Prenatal flowsheet information is reviewed.    Mauri Hurt MD

## 2023-10-24 NOTE — PATIENT INSTRUCTIONS
"Week 37 of Your Pregnancy: Care Instructions    Most babies are born between 37 and 40 weeks.   This is a good time to pack a bag to take with you to the birth. Then it will be ready to go when you are.     Learn about breastfeeding.  For example, find out about ways to hold your baby to make breastfeeding easier. And think about learning how to pump and store milk.     Know that crying is normal.  It's common for babies to cry 1 to 3 hours a day. Some cry more, and some cry less.     Learn why babies cry.  They may be hungry; have gas; need a diaper change; or feel cold, warm, tired, lonely, or tense. Sometimes they cry for unknown reasons.     Think about what will help you stay calm when your baby cries.  Taking slow, deep breaths can help. So can taking a break. It's okay to put your baby somewhere safe (like their crib) and walk away for a few minutes.     Learn about safe sleep for your baby.  Always put your baby to sleep on their back. Place them alone in a crib or bassinet with a firm, flat surface. Keep soft items like stuffed animals out of the crib.     Learn what to expect with  poop.  Your baby will have their own bowel patterns. Some babies have several bowel movements a day. Some have fewer.     Know that  babies will often have loose, yellow bowel movements.  Formula-fed babies have more formed stools. If your baby's poop looks like pellets, your baby is constipated.   Follow-up care is a key part of your treatment and safety. Be sure to make and go to all appointments, and call your doctor if you are having problems. It's also a good idea to know your test results and keep a list of the medicines you take.  Where can you learn more?  Go to https://www.[employer].net/patiented  Enter N257 in the search box to learn more about \"Week 37 of Your Pregnancy: Care Instructions.\"  Current as of: 2022               Content Version: 13.7    8585-4557 [employer]. "   Care instructions adapted under license by your healthcare professional. If you have questions about a medical condition or this instruction, always ask your healthcare professional. [employer] disclaims any warranty or liability for your use of this information.

## 2023-11-02 ENCOUNTER — PRENATAL OFFICE VISIT (OUTPATIENT)
Dept: OBGYN | Facility: CLINIC | Age: 33
End: 2023-11-02
Payer: COMMERCIAL

## 2023-11-02 VITALS
DIASTOLIC BLOOD PRESSURE: 74 MMHG | HEIGHT: 60 IN | SYSTOLIC BLOOD PRESSURE: 131 MMHG | BODY MASS INDEX: 39.85 KG/M2 | WEIGHT: 203 LBS | RESPIRATION RATE: 18 BRPM | HEART RATE: 96 BPM | TEMPERATURE: 98.5 F

## 2023-11-02 DIAGNOSIS — O12.03 LEG SWELLING IN PREGNANCY IN THIRD TRIMESTER: ICD-10-CM

## 2023-11-02 DIAGNOSIS — Z34.83 PRENATAL CARE, SUBSEQUENT PREGNANCY IN THIRD TRIMESTER: Primary | ICD-10-CM

## 2023-11-02 PROCEDURE — 99207 PR PRENATAL VISIT: CPT | Performed by: PHYSICIAN ASSISTANT

## 2023-11-02 NOTE — PATIENT INSTRUCTIONS
Week 38 of Your Pregnancy: Care Instructions  Believe it or not, your baby is almost here. You may notice how your baby responds to sounds, warmth, cold, and light. You may even know what kind of music your baby likes.    Even if you expect a vaginal birth, it's a good idea to learn about  section ().  is the delivery of a baby through a cut (incision) in your belly. It's a major surgery, so it has more risks than a vaginal birth.   During the first 2 weeks after the birth, limit visitors. Don't allow visitors who have colds or infections. Ask visitors to wash their hands before they touch your baby. And never let anyone smoke around your baby.     Know about unplanned C-sections.  Reasons for an unplanned  include labor that slows or stops, signs of distress in your baby, and high blood pressure or other problems for you.     Know about planned C-sections.  If your baby isn't in a head-down position for delivery (breech position), your doctor may plan a . Or you may have a planned  if you're having twins or more.     Get as much help as you can while you're in the hospital.  You can learn about feeding, diapering, and bathing your baby.     Talk to your doctor or midwife about how to care for the umbilical cord stump.  If your baby will be circumcised, also ask about how to care for that.     Ask friends or family for help, as you need it.  If you can, have another adult in your home for at least 2 or 3 days after the birth.     Try to nap when your baby naps.  This may be your best chance to get some sleep.     Watch for changes in your mental health.  For the first 1 to 2 weeks after birth, it's common to cry or feel sad or irritable. If these feelings last for more than 2 weeks, you may have postpartum depression. Ask your doctor for help. Postpartum depression can be treated.   Follow-up care is a key part of your treatment and safety. Be sure to make and go  "to all appointments, and call your doctor if you are having problems. It's also a good idea to know your test results and keep a list of the medicines you take.  Where can you learn more?  Go to https://www.[employer].net/patiented  Enter B044 in the search box to learn more about \"Week 38 of Your Pregnancy: Care Instructions.\"  Current as of: 2022               Content Version: 13.7    4981-0689 [employer].   Care instructions adapted under license by your healthcare professional. If you have questions about a medical condition or this instruction, always ask your healthcare professional. [employer] disclaims any warranty or liability for your use of this information.      Week 39 of Your Pregnancy: Care Instructions    Amorita babies can look different than what you see in pictures or movies. Their heads can be a strange shape right after birth. And they may have swollen eyes and red marks on their faces.   You can still get pregnant even if you are breastfeeding. If you don't want to get pregnant, talk to your doctor about birth control.   Tips for week 39 of pregnancy  If you plan to breastfeed, get prepared.     Continue to eat healthy foods.  Keep taking your prenatal vitamins during breastfeeding if your doctor recommends it.  Talk to your doctor before taking any medicines or supplements.  Choose the right birth control for you.     Intrauterine devices (IUDs) are placed in the uterus. Sometimes the IUD can be placed right after giving birth. They work for years.  Hormonal implants are placed under the skin of the arm. They also work for years.  Depo-Provera is a shot. You get it every 3 months.  Birth control pills can be used. They're taken every day.  Tubal ligation (tying your tubes) and vasectomy are surgeries. They're permanent.  Diaphragms, spermicide, and condoms must be used each time you have sex. If you used a diaphragm before, you should get refitted after " "the baby is born.  A birth control patch or ring can be used. These just can't be started until several weeks after you give birth.  Follow-up care is a key part of your treatment and safety. Be sure to make and go to all appointments, and call your doctor if you are having problems. It's also a good idea to know your test results and keep a list of the medicines you take.  Where can you learn more?  Go to https://www.bttn.net/patiented  Enter A811 in the search box to learn more about \"Week 39 of Your Pregnancy: Care Instructions.\"  Current as of: 2022               Content Version: 13.7    8631-3184 [employer].   Care instructions adapted under license by your healthcare professional. If you have questions about a medical condition or this instruction, always ask your healthcare professional. [employer] disclaims any warranty or liability for your use of this information.      Weeks 40 to 41 of Your Pregnancy: Care Instructions  By week 40, you've reached your due date. Your baby could be coming any day. Most babies born after their due dates are healthy. But you may have tests to make sure everything is okay. If you feel stressed, you could try a meditation exercise, such as guided imagery. It may help you relax.    If you are past 41 weeks, your doctor may measure the amount of fluid that surrounds your baby. They may also test your baby's movement and heart rate.   If you don't start labor on your own by 41 or 42 weeks, your doctor may want to start (induce) labor. If there are other concerns, your doctor may talk to you about a .   To start (induce) your labor, your doctor may do any of these things.    Place a narrow tube with a small balloon on the end (balloon catheter) into your cervix.  The doctor inflates the balloon. This helps your cervix open (dilate).     Sweep the membranes (separate the lining of the amniotic sac from the uterus).  This helps " "the uterus make a chemical that can start contractions.     \"Break your water\" (rupture the amniotic sac).  This may be done if your cervix has started to open.     Use medicines.  They may be used to soften the cervix or start contractions.   How to do guided imagery    Close your eyes, and take a few deep breaths. Picture a peaceful setting, such as a beach or a meadow. Add a winding path. Follow the path until you feel more and more relaxed.   Take a few minutes to breathe slowly and feel the calm. When you are ready, slowly take yourself back to the present. Count to 3, and open your eyes.   Follow-up care is a key part of your treatment and safety. Be sure to make and go to all appointments, and call your doctor if you are having problems. It's also a good idea to know your test results and keep a list of the medicines you take.  Where can you learn more?  Go to https://www.[employer].YeHive/patiented  Enter T922 in the search box to learn more about \"Weeks 40 to 41 of Your Pregnancy: Care Instructions.\"  Current as of: November 9, 2022               Content Version: 13.7    4132-6122 [employer].   Care instructions adapted under license by your healthcare professional. If you have questions about a medical condition or this instruction, always ask your healthcare professional. [employer] disclaims any warranty or liability for your use of this information.      Labor Induction  What You Need to Know  What is labor induction?  In most cases, it is best to go into labor naturally. When labor does not start on its own, we may use medicine or other methods to start (induce) labor. This is called induction, which:  Helps the uterus contract  Thins, softens and opens the cervix (opening of the uterus)  When is induction used?  There are two types of induction.  Medical induction may be done to protect the health of the parent or baby if:  There are medical concerns for you or your baby.  You " "haven't had your baby by 41 weeks.  Induction for non-medical reasons may be done at 39 weeks or later if:  You live far from the hospital.  You've been having contractions for many days.  You've given birth quickly in the past.   We will not perform an induction for non-medical reasons before 39 weeks. Studies show that babies born before 39 weeks may struggle with breathing, feeding, sleeping and staying warm. They are more likely to have health problems and may need to stay in the hospital longer. If we start your labor for medical reasons, the benefits will outweigh these risks. We will talk to you about your risks, benefits and alternatives (other options) before we start your labor.  How is induction done?  We may start your labor by doing one or more of the following:  We may need to help your cervix soften and open (sometimes called \"ripening\") to prepare it for labor. There are two ways to do this:  Medicines--these may be in the form of pills that you swallow or medicines that are put into the vagina next to the cervix.  Mechanical--using either a single or double balloon. These are small balloons that are attached to tubes that go up inside the cervix. The balloons are then filled with water to put pressure on the cervix and help the cervix soften and open. Depending on the type of balloon used, you may be allowed to go home after it is placed.  After your cervix is ready:  We may give you medicine through an IV (a small tube placed in your vein). This medicine is called Pitocin. It helps the muscles of your uterus to contract.  We may make a small opening in your bag of water (the sac around your baby). This is called an amniotomy. Sometimes called \"breaking the water\". It may help your uterus contract and your cervix open.  What might happen if my labor is induced?  Some of this depends on how your labor is started and how your body responds. Your labor may be more complicated. You and your baby may " need more medical treatments. In general:  You may not go into labor right away. If so, we may send you home with follow-up instructions.  It will be important to monitor you and your baby during the induction.  You may not be able to move around during labor. You will have two belts with monitors attached to your belly. These allow the nurse to watch your contractions and your baby's heart rate.  Your labor may take longer than if you went into labor on your own. It might take more than one day.  If you need cervical ripening, it is important to know that it may be many hours (even days) until delivery happens.  Cervical ripening can be slow and require several doses before your body is ready to labor.  A long labor may increase the risk of infection in mother and baby.  Your labor may not progress as planned. This could lead to a  birth.  Can I plan the date of my delivery?  After 39 weeks, you may ask about planning your delivery date. This is only an option if your body--and your baby--are ready. To find out, we will check your cervix and your baby's heart rate.   If you are ready to be induced, we will give you a date and time to come to the hospital. If many patients are in labor that day, we may need to start your labor at another time.  If you are not ready, we will not start your labor. It will be safer for your baby to come on its own.  What else do I need to know?  Before you have an induction, make sure you understand the reasons, risks and benefits. Ask your doctor or nurse-midwife:  Why do I need to be induced?  What are the risks to my baby?  How will you start my labor?  How will you know if my baby is ready to be born?  How will you know if my body is ready to give birth?  Where can I get more information?  To learn more about induction, you may visit these websites:  The American College of Nurse-Midwives:  www.mymidwife.org  The American College of Obstetricians and Gynecologists:  www.acog.org  Childbirth Connection:  www.childbirthconnection.org  March of Dimes: www.marchofdimes.com  Association of Women's Health, Obstetrics, and  Nursing  www.xxzuem2yyd.org/go-the-full-40&#047;  For informational purposes only. Not to replace the advice of your health care provider. Copyright   2008 [facility]. All rights reserved. Clinically reviewed by the  System Operations Leadership team. [employer] 085984 - REV .

## 2023-11-02 NOTE — NURSING NOTE
Initial /74 (BP Location: Right arm, Patient Position: Chair, Cuff Size: Adult Regular)   Pulse 96   Temp 98.5  F (36.9  C) (Tympanic)   Resp 18   Ht 1.524 m (5')   Wt 92.1 kg (203 lb)   LMP 02/02/2023   BMI 39.65 kg/m   Estimated body mass index is 39.65 kg/m  as calculated from the following:    Height as of this encounter: 1.524 m (5').    Weight as of this encounter: 92.1 kg (203 lb). .

## 2023-11-07 ENCOUNTER — PRENATAL OFFICE VISIT (OUTPATIENT)
Dept: OBGYN | Facility: CLINIC | Age: 33
End: 2023-11-07
Payer: COMMERCIAL

## 2023-11-07 VITALS
DIASTOLIC BLOOD PRESSURE: 75 MMHG | HEIGHT: 60 IN | TEMPERATURE: 98.7 F | WEIGHT: 203.8 LBS | HEART RATE: 91 BPM | BODY MASS INDEX: 40.01 KG/M2 | SYSTOLIC BLOOD PRESSURE: 133 MMHG | RESPIRATION RATE: 18 BRPM

## 2023-11-07 DIAGNOSIS — Z34.93 PRENATAL CARE IN THIRD TRIMESTER: Primary | ICD-10-CM

## 2023-11-07 PROCEDURE — 99207 PR PRENATAL VISIT: CPT | Performed by: OBSTETRICS & GYNECOLOGY

## 2023-11-07 NOTE — PROGRESS NOTES
Initial /75 (BP Location: Right arm, Patient Position: Chair, Cuff Size: Adult Regular)   Pulse 91   Temp 98.7  F (37.1  C) (Tympanic)   Resp 18   Ht 1.524 m (5')   Wt 92.4 kg (203 lb 12.8 oz)   LMP 2023   BMI 39.80 kg/m   Estimated body mass index is 39.8 kg/m  as calculated from the following:    Height as of this encounter: 1.524 m (5').    Weight as of this encounter: 92.4 kg (203 lb 12.8 oz). .    Return OB visit  35-39 weeks    Subjective:   Ana Maria is a 33 year old  female at 38w4d who returns for prenatal care. SUDHA 2023.  - Concerns today: none  - Patient reports no vaginal bleeding, no leakage of fluid. Contractions not felt. Fetal movement is present.   - No vaginal discharge. No dysuria.   - No headache, vision changes, upper abdominal pain, chest pain, shortness of breath.   - lower extremity swelling present but unchanged, no pain  - Mood has been nervous, excited.    Objective:   /75 (BP Location: Right arm, Patient Position: Chair, Cuff Size: Adult Regular)   Pulse 91   Temp 98.7  F (37.1  C) (Tympanic)   Resp 18   Ht 1.524 m (5')   Wt 92.4 kg (203 lb 12.8 oz)   LMP 2023   BMI 39.80 kg/m     Const: No distress  Abd: Gravid.   FHT: 120s    Prenatal labs:   Hemoglobin   Date Value Ref Range Status   10/15/2023 12.2 11.7 - 15.7 g/dL Final       Assessment & plan:   IUP at 38w4d weeks by LMP 2023 consistent with 9w4d ultrasound    - Pregnancy complicated by: Factor 5 Leiden, GBS positive  - 1h GCT value: 136 (failed)   - 3h GTT values: 136, 138, 101 (passed)   - Fetal survey showed no abnormalities requiring follow up ultrasound.   - Patient is measuring appropriately at 39.5 cm for gestational age. Pregnancy weight gain has been 28 kg (61 lb 12.8 oz) to date, which is adequate.   - Patient plans to try breast feeding and use tubal ligation for contraception after delivery.   - Discussed circumcision: Wants it  - Discussed  care & plan for  vaccinations.   - Provided counseling regarding labor signs, hospital readiness (transportation, bags packed), pain control options during labor.   - Patient will continue taking prenatal vitamins and avoiding cigarettes & alcohol.   - Tdap (27-36 weeks) immunization has been given 23  - RSV (32-36 weeks) immunization has been given 10/24/23  - Influenza immunization declined  - Has had 1 covid shot, no booster    -Breastfeeding education provided: - Supporting a non-medicated birth  - Skin to Skin  - Non-separation of mother and baby    -  scheduled 23 - labor and FM precautions reviewed, has OB triage #    - Specific concerns addressed today:   None    Options for treatment and follow-up care were reviewed with the patient and/or guardian. Ana Maria WHARTON Alexis and/or guardian engaged in the decision making process and verbalized understanding of the options discussed and agreed with the final plan.    Geraldo Hurd, MS3    I agree with the medical student's documentation above and have edited it for accuracy. I was present for and performed the physical exam and interview of the patient myself. All medical decision-making was performed by me. If a procedure was performed, that was performed by me. Additionally, if billing is based on time, I am only using the time that I personally spent with the patient in my time statement.     Katharina Lerner MD  OB/GYN     Katharina Lerner MD

## 2023-11-10 ENCOUNTER — ANESTHESIA EVENT (OUTPATIENT)
Dept: SURGERY | Facility: CLINIC | Age: 33
End: 2023-11-10
Payer: COMMERCIAL

## 2023-11-10 ASSESSMENT — LIFESTYLE VARIABLES: TOBACCO_USE: 1

## 2023-11-10 NOTE — ANESTHESIA PREPROCEDURE EVALUATION
Anesthesia Pre-Procedure Evaluation    Patient: Ana Maria Espinoza   MRN: [id number] : 1990        Procedure : Procedure(s):   SECTION, WITH POSTPARTUM TUBAL LIGATION          Past Medical History:   Diagnosis Date    ADHD (attention deficit hyperactivity disorder)     Blood clot in bladder 2001    right kidney involved and went into kidney failure    Chickenpox     x2    Chlamydia     Factor V Leiden (H24)     GERD (gastroesophageal reflux disease)     History of urinary tract infection     Postpartum depression     loss of child    Pre-eclampsia 10/15/2023    Renal failure     secondary to blood clot in     Uncomplicated asthma       Past Surgical History:   Procedure Laterality Date    ABDOMEN SURGERY      2 c sections  @     ESOPHAGOSCOPY, GASTROSCOPY, DUODENOSCOPY (EGD), COMBINED N/A 2021    Procedure: ESOPHAGOGASTRODUODENOSCOPY, WITH BIOPSY;  Surgeon: German Flynn MD;  Location: [facility]    ORTHOPEDIC SURGERY      Had surgery on both big toes    TONSILLECTOMY        Allergies   Allergen Reactions    Bee Venom Anaphylaxis     Has epipen    Aloe Hives     Dry skin as well     Codeine Rash     Other reaction(s): Intolerance-Can't Take      Social History     Tobacco Use    Smoking status: Every Day     Packs/day: 0.20     Years: 10.00     Additional pack years: 0.00     Total pack years: 2.00     Types: Cigarettes     Start date: 3/19/2009    Smokeless tobacco: Never    Tobacco comments:     Down to 2-3 cig/day with pregnancy   Substance Use Topics    Alcohol use: No      Wt Readings from Last 1 Encounters:   23 92.4 kg (203 lb 12.8 oz)        Anesthesia Evaluation            ROS/MED HX  ENT/Pulmonary:     (+)                tobacco use, Current use,   Mild Persistent, asthma                  Neurologic:       Cardiovascular:     (+)  hypertension- -   -  - -                                      METS/Exercise Tolerance:     Hematologic: Comments: Factor V Leiden   "  (+) History of blood clots,    pt is anticoagulated,           Musculoskeletal:       GI/Hepatic:     (+) GERD,                   Renal/Genitourinary:     (+) renal disease,             Endo:       Psychiatric/Substance Use:     (+) psychiatric history depression       Infectious Disease:       Malignancy:       Other:      (+)  , H/O Chronic Pain,         Physical Exam    Airway        Mallampati: I   TM distance: > 3 FB   Neck ROM: full     Respiratory Devices and Support         Dental           Cardiovascular   cardiovascular exam normal          Pulmonary   pulmonary exam normal                OUTSIDE LABS:  CBC:   Lab Results   Component Value Date    WBC 14.4 (H) 10/15/2023    WBC 13.8 (H) 10/13/2023    HGB 12.2 10/15/2023    HGB 12.3 10/13/2023    HCT 35.0 10/15/2023    HCT 35.5 10/13/2023     10/15/2023     10/13/2023     BMP:   Lab Results   Component Value Date     10/15/2023     09/25/2023    POTASSIUM 4.4 10/15/2023    POTASSIUM 4.1 09/25/2023    CHLORIDE 103 10/15/2023    CHLORIDE 104 09/25/2023    CO2 19 (L) 10/15/2023    CO2 17 (L) 09/25/2023    BUN 7.2 10/15/2023    BUN 6.7 09/25/2023    CR 0.91 10/15/2023    CR 0.82 10/13/2023     (H) 10/15/2023    GLC 80 09/25/2023     COAGS: No results found for: \"PTT\", \"INR\", \"FIBR\"  POC:   Lab Results   Component Value Date    HCG Negative 02/13/2023    HCGS Positive (A) 03/14/2023     HEPATIC:   Lab Results   Component Value Date    ALBUMIN 3.2 (L) 10/15/2023    PROTTOTAL 5.8 (L) 10/15/2023    ALT 10 10/15/2023    AST 14 10/15/2023    ALKPHOS 115 (H) 10/15/2023    BILITOTAL <0.2 10/15/2023     OTHER:   Lab Results   Component Value Date    FELICIA 9.1 10/15/2023    PHOS 4.1 04/11/2023    MAG 2.2 04/11/2023    LIPASE 34 03/23/2023    TSH 0.93 08/04/2021    CRP 3.5 04/15/2022    SED 8 04/15/2022       Anesthesia Plan    ASA Status:  3       Anesthesia Type: Spinal.              Consents    Anesthesia Plan(s) and associated risks, " benefits, and realistic alternatives discussed. Questions answered and patient/representative(s) expressed understanding.     - Discussed: Risks, Benefits and Alternatives for BOTH SEDATION and the PROCEDURE were discussed     - Discussed with:  Patient, Spouse      - Extended Intubation/Ventilatory Support Discussed: No.      - Patient is DNR/DNI Status: No     Use of blood products discussed: No .     Postoperative Care    Pain management: IV analgesics, Oral pain medications.   PONV prophylaxis: Ondansetron (or other 5HT-3), Dexamethasone or Solumedrol     Comments:              SY Renae CRNA

## 2023-11-12 NOTE — PROGRESS NOTES
Pre-procedure phone call done with patient. Reviewed what to expect for scheduled C/S and current visitor policy. Verified patient reviewed ERAs packet. Instructed patient to:   *Arrive at 0600 for 0730 scheduled  section  *Drink carbohydrate drink 2 hrs prior to OR time and no solids 8 hrs prior to procedure   *Shower night before and morning of procedure using soap provided to patient  Questions answered. Patient verbalized understanding of above.

## 2023-11-13 ENCOUNTER — ANESTHESIA (OUTPATIENT)
Dept: SURGERY | Facility: CLINIC | Age: 33
End: 2023-11-13
Payer: COMMERCIAL

## 2023-11-13 ENCOUNTER — HOSPITAL ENCOUNTER (INPATIENT)
Facility: CLINIC | Age: 33
LOS: 3 days | Discharge: HOME | End: 2023-11-16
Attending: OBSTETRICS & GYNECOLOGY | Admitting: OBSTETRICS & GYNECOLOGY
Payer: COMMERCIAL

## 2023-11-13 DIAGNOSIS — Z98.891 S/P REPEAT LOW TRANSVERSE C-SECTION: ICD-10-CM

## 2023-11-13 DIAGNOSIS — O13.3 GESTATIONAL HYPERTENSION, THIRD TRIMESTER: Primary | ICD-10-CM

## 2023-11-13 DIAGNOSIS — O14.90 PRE-ECLAMPSIA, ANTEPARTUM: ICD-10-CM

## 2023-11-13 PROBLEM — Z98.890 POSTOPERATIVE STATE: Status: ACTIVE | Noted: 2023-11-13

## 2023-11-13 LAB
ABO/RH(D): NORMAL
ALBUMIN SERPL BCG-MCNC: 3.1 G/DL (ref 3.5–5.2)
ALP SERPL-CCNC: 130 U/L (ref 35–104)
ALT SERPL W P-5'-P-CCNC: 9 U/L (ref 0–50)
ANION GAP SERPL CALCULATED.3IONS-SCNC: 10 MMOL/L (ref 7–15)
ANTIBODY SCREEN: NEGATIVE
AST SERPL W P-5'-P-CCNC: 14 U/L (ref 0–45)
BILIRUB SERPL-MCNC: 0.2 MG/DL
BUN SERPL-MCNC: 7.4 MG/DL (ref 6–20)
CALCIUM SERPL-MCNC: 8.7 MG/DL (ref 8.6–10)
CHLORIDE SERPL-SCNC: 107 MMOL/L (ref 98–107)
CREAT SERPL-MCNC: 0.89 MG/DL (ref 0.51–0.95)
DEPRECATED HCO3 PLAS-SCNC: 21 MMOL/L (ref 22–29)
EGFRCR SERPLBLD CKD-EPI 2021: 87 ML/MIN/1.73M2
ERYTHROCYTE [DISTWIDTH] IN BLOOD BY AUTOMATED COUNT: 13.6 % (ref 10–15)
GLUCOSE SERPL-MCNC: 79 MG/DL (ref 70–99)
HCT VFR BLD AUTO: 35.3 % (ref 35–47)
HGB BLD-MCNC: 12.1 G/DL (ref 11.7–15.7)
MCH RBC QN AUTO: 33.9 PG (ref 26.5–33)
MCHC RBC AUTO-ENTMCNC: 34.3 G/DL (ref 31.5–36.5)
MCV RBC AUTO: 99 FL (ref 78–100)
PLATELET # BLD AUTO: 167 10E3/UL (ref 150–450)
POTASSIUM SERPL-SCNC: 4.6 MMOL/L (ref 3.4–5.3)
PROT SERPL-MCNC: 5.2 G/DL (ref 6.4–8.3)
RBC # BLD AUTO: 3.57 10E6/UL (ref 3.8–5.2)
SODIUM SERPL-SCNC: 138 MMOL/L (ref 135–145)
SPECIMEN EXPIRATION DATE: NORMAL
T PALLIDUM AB SER QL: NONREACTIVE
WBC # BLD AUTO: 13.2 10E3/UL (ref 4–11)

## 2023-11-13 PROCEDURE — 86780 TREPONEMA PALLIDUM: CPT | Performed by: OBSTETRICS & GYNECOLOGY

## 2023-11-13 PROCEDURE — 250N000013 HC RX MED GY IP 250 OP 250 PS 637: Performed by: OBSTETRICS & GYNECOLOGY

## 2023-11-13 PROCEDURE — 58611 LIGATE OVIDUCT(S) ADD-ON: CPT | Performed by: OBSTETRICS & GYNECOLOGY

## 2023-11-13 PROCEDURE — 258N000003 HC RX IP 258 OP 636: Performed by: NURSE ANESTHETIST, CERTIFIED REGISTERED

## 2023-11-13 PROCEDURE — 88302 TISSUE EXAM BY PATHOLOGIST: CPT | Mod: 26 | Performed by: PATHOLOGY

## 2023-11-13 PROCEDURE — 250N000011 HC RX IP 250 OP 636: Performed by: OBSTETRICS & GYNECOLOGY

## 2023-11-13 PROCEDURE — 0UT70ZZ RESECTION OF BILATERAL FALLOPIAN TUBES, OPEN APPROACH: ICD-10-PCS | Performed by: OBSTETRICS & GYNECOLOGY

## 2023-11-13 PROCEDURE — 86901 BLOOD TYPING SEROLOGIC RH(D): CPT | Performed by: OBSTETRICS & GYNECOLOGY

## 2023-11-13 PROCEDURE — 250N000009 HC RX 250: Performed by: NURSE ANESTHETIST, CERTIFIED REGISTERED

## 2023-11-13 PROCEDURE — 59510 CESAREAN DELIVERY: CPT | Performed by: OBSTETRICS & GYNECOLOGY

## 2023-11-13 PROCEDURE — 36415 COLL VENOUS BLD VENIPUNCTURE: CPT | Performed by: OBSTETRICS & GYNECOLOGY

## 2023-11-13 PROCEDURE — 360N000076 HC SURGERY LEVEL 3, PER MIN: Performed by: OBSTETRICS & GYNECOLOGY

## 2023-11-13 PROCEDURE — 80053 COMPREHEN METABOLIC PANEL: CPT | Performed by: OBSTETRICS & GYNECOLOGY

## 2023-11-13 PROCEDURE — 272N000001 HC OR GENERAL SUPPLY STERILE: Performed by: OBSTETRICS & GYNECOLOGY

## 2023-11-13 PROCEDURE — 120N000001 HC R&B MED SURG/OB

## 2023-11-13 PROCEDURE — 85014 HEMATOCRIT: CPT | Performed by: OBSTETRICS & GYNECOLOGY

## 2023-11-13 PROCEDURE — 250N000011 HC RX IP 250 OP 636: Mod: JZ | Performed by: NURSE ANESTHETIST, CERTIFIED REGISTERED

## 2023-11-13 PROCEDURE — 88302 TISSUE EXAM BY PATHOLOGIST: CPT | Mod: TC | Performed by: OBSTETRICS & GYNECOLOGY

## 2023-11-13 PROCEDURE — 370N000017 HC ANESTHESIA TECHNICAL FEE, PER MIN: Performed by: OBSTETRICS & GYNECOLOGY

## 2023-11-13 PROCEDURE — 710N000010 HC RECOVERY PHASE 1, LEVEL 2, PER MIN: Performed by: OBSTETRICS & GYNECOLOGY

## 2023-11-13 PROCEDURE — 271N000001 HC OR GENERAL SUPPLY NON-STERILE: Performed by: OBSTETRICS & GYNECOLOGY

## 2023-11-13 RX ORDER — NALOXONE HYDROCHLORIDE 0.4 MG/ML
0.4 INJECTION, SOLUTION INTRAMUSCULAR; INTRAVENOUS; SUBCUTANEOUS
Status: DISCONTINUED | OUTPATIENT
Start: 2023-11-13 | End: 2023-11-16 | Stop reason: HOSPADM

## 2023-11-13 RX ORDER — ONDANSETRON 4 MG/1
4 TABLET, ORALLY DISINTEGRATING ORAL EVERY 30 MIN PRN
Status: CANCELLED | OUTPATIENT
Start: 2023-11-13

## 2023-11-13 RX ORDER — IBUPROFEN 800 MG/1
800 TABLET, FILM COATED ORAL EVERY 6 HOURS
Status: DISCONTINUED | OUTPATIENT
Start: 2023-11-14 | End: 2023-11-16 | Stop reason: HOSPADM

## 2023-11-13 RX ORDER — OXYTOCIN 10 [USP'U]/ML
10 INJECTION, SOLUTION INTRAMUSCULAR; INTRAVENOUS
Status: DISCONTINUED | OUTPATIENT
Start: 2023-11-13 | End: 2023-11-16 | Stop reason: HOSPADM

## 2023-11-13 RX ORDER — MISOPROSTOL 200 UG/1
400 TABLET ORAL
Status: DISCONTINUED | OUTPATIENT
Start: 2023-11-13 | End: 2023-11-13 | Stop reason: HOSPADM

## 2023-11-13 RX ORDER — KETOROLAC TROMETHAMINE 30 MG/ML
30 INJECTION, SOLUTION INTRAMUSCULAR; INTRAVENOUS EVERY 6 HOURS
Status: COMPLETED | OUTPATIENT
Start: 2023-11-13 | End: 2023-11-14

## 2023-11-13 RX ORDER — SIMETHICONE 80 MG
80 TABLET,CHEWABLE ORAL 4 TIMES DAILY PRN
Status: DISCONTINUED | OUTPATIENT
Start: 2023-11-13 | End: 2023-11-16 | Stop reason: HOSPADM

## 2023-11-13 RX ORDER — SODIUM CHLORIDE, SODIUM LACTATE, POTASSIUM CHLORIDE, CALCIUM CHLORIDE 600; 310; 30; 20 MG/100ML; MG/100ML; MG/100ML; MG/100ML
INJECTION, SOLUTION INTRAVENOUS CONTINUOUS
Status: DISCONTINUED | OUTPATIENT
Start: 2023-11-13 | End: 2023-11-13 | Stop reason: HOSPADM

## 2023-11-13 RX ORDER — HYDROMORPHONE HCL IN WATER/PF 6 MG/30 ML
0.4 PATIENT CONTROLLED ANALGESIA SYRINGE INTRAVENOUS EVERY 5 MIN PRN
Status: CANCELLED | OUTPATIENT
Start: 2023-11-13

## 2023-11-13 RX ORDER — SODIUM CHLORIDE, SODIUM LACTATE, POTASSIUM CHLORIDE, CALCIUM CHLORIDE 600; 310; 30; 20 MG/100ML; MG/100ML; MG/100ML; MG/100ML
INJECTION, SOLUTION INTRAVENOUS CONTINUOUS
Status: CANCELLED | OUTPATIENT
Start: 2023-11-13

## 2023-11-13 RX ORDER — OXYCODONE HYDROCHLORIDE 5 MG/1
10 TABLET ORAL
Status: CANCELLED | OUTPATIENT
Start: 2023-11-13

## 2023-11-13 RX ORDER — CITRIC ACID/SODIUM CITRATE 334-500MG
30 SOLUTION, ORAL ORAL
Status: COMPLETED | OUTPATIENT
Start: 2023-11-13 | End: 2023-11-13

## 2023-11-13 RX ORDER — ACETAMINOPHEN 325 MG/1
975 TABLET ORAL ONCE
Status: DISCONTINUED | OUTPATIENT
Start: 2023-11-13 | End: 2023-11-13

## 2023-11-13 RX ORDER — ONDANSETRON 2 MG/ML
4 INJECTION INTRAMUSCULAR; INTRAVENOUS EVERY 6 HOURS PRN
Status: DISCONTINUED | OUTPATIENT
Start: 2023-11-13 | End: 2023-11-16 | Stop reason: HOSPADM

## 2023-11-13 RX ORDER — MODIFIED LANOLIN
OINTMENT (GRAM) TOPICAL
Status: DISCONTINUED | OUTPATIENT
Start: 2023-11-13 | End: 2023-11-16 | Stop reason: HOSPADM

## 2023-11-13 RX ORDER — ACETAMINOPHEN 325 MG/1
975 TABLET ORAL EVERY 6 HOURS
Status: DISCONTINUED | OUTPATIENT
Start: 2023-11-13 | End: 2023-11-16 | Stop reason: HOSPADM

## 2023-11-13 RX ORDER — FENTANYL CITRATE 50 UG/ML
25 INJECTION, SOLUTION INTRAMUSCULAR; INTRAVENOUS EVERY 5 MIN PRN
Status: CANCELLED | OUTPATIENT
Start: 2023-11-13

## 2023-11-13 RX ORDER — ACETAMINOPHEN 325 MG/1
975 TABLET ORAL EVERY 6 HOURS
Status: DISCONTINUED | OUTPATIENT
Start: 2023-11-13 | End: 2023-11-13

## 2023-11-13 RX ORDER — LIDOCAINE 40 MG/G
CREAM TOPICAL
Status: DISCONTINUED | OUTPATIENT
Start: 2023-11-13 | End: 2023-11-13 | Stop reason: HOSPADM

## 2023-11-13 RX ORDER — HYDROMORPHONE HCL IN WATER/PF 6 MG/30 ML
0.2 PATIENT CONTROLLED ANALGESIA SYRINGE INTRAVENOUS EVERY 5 MIN PRN
Status: CANCELLED | OUTPATIENT
Start: 2023-11-13

## 2023-11-13 RX ORDER — OXYTOCIN 10 [USP'U]/ML
10 INJECTION, SOLUTION INTRAMUSCULAR; INTRAVENOUS
Status: COMPLETED | OUTPATIENT
Start: 2023-11-13 | End: 2023-11-13

## 2023-11-13 RX ORDER — CEFAZOLIN SODIUM/WATER 2 G/20 ML
2 SYRINGE (ML) INTRAVENOUS SEE ADMIN INSTRUCTIONS
Status: DISCONTINUED | OUTPATIENT
Start: 2023-11-13 | End: 2023-11-13 | Stop reason: HOSPADM

## 2023-11-13 RX ORDER — OXYCODONE HYDROCHLORIDE 5 MG/1
5 TABLET ORAL
Status: CANCELLED | OUTPATIENT
Start: 2023-11-13

## 2023-11-13 RX ORDER — ONDANSETRON 2 MG/ML
INJECTION INTRAMUSCULAR; INTRAVENOUS PRN
Status: DISCONTINUED | OUTPATIENT
Start: 2023-11-13 | End: 2023-11-13

## 2023-11-13 RX ORDER — CARBOPROST TROMETHAMINE 250 UG/ML
250 INJECTION, SOLUTION INTRAMUSCULAR
Status: DISCONTINUED | OUTPATIENT
Start: 2023-11-13 | End: 2023-11-13 | Stop reason: HOSPADM

## 2023-11-13 RX ORDER — KETOROLAC TROMETHAMINE 30 MG/ML
INJECTION, SOLUTION INTRAMUSCULAR; INTRAVENOUS PRN
Status: DISCONTINUED | OUTPATIENT
Start: 2023-11-13 | End: 2023-11-13

## 2023-11-13 RX ORDER — DEXAMETHASONE SODIUM PHOSPHATE 4 MG/ML
INJECTION, SOLUTION INTRA-ARTICULAR; INTRALESIONAL; INTRAMUSCULAR; INTRAVENOUS; SOFT TISSUE PRN
Status: DISCONTINUED | OUTPATIENT
Start: 2023-11-13 | End: 2023-11-13

## 2023-11-13 RX ORDER — TRANEXAMIC ACID 10 MG/ML
1 INJECTION, SOLUTION INTRAVENOUS EVERY 30 MIN PRN
Status: DISCONTINUED | OUTPATIENT
Start: 2023-11-13 | End: 2023-11-13 | Stop reason: HOSPADM

## 2023-11-13 RX ORDER — FENTANYL CITRATE 50 UG/ML
50 INJECTION, SOLUTION INTRAMUSCULAR; INTRAVENOUS EVERY 5 MIN PRN
Status: CANCELLED | OUTPATIENT
Start: 2023-11-13

## 2023-11-13 RX ORDER — PROCHLORPERAZINE 25 MG
25 SUPPOSITORY, RECTAL RECTAL EVERY 12 HOURS PRN
Status: DISCONTINUED | OUTPATIENT
Start: 2023-11-13 | End: 2023-11-16 | Stop reason: HOSPADM

## 2023-11-13 RX ORDER — METOCLOPRAMIDE HYDROCHLORIDE 5 MG/ML
10 INJECTION INTRAMUSCULAR; INTRAVENOUS EVERY 6 HOURS PRN
Status: DISCONTINUED | OUTPATIENT
Start: 2023-11-13 | End: 2023-11-16 | Stop reason: HOSPADM

## 2023-11-13 RX ORDER — OXYTOCIN/0.9 % SODIUM CHLORIDE 30/500 ML
PLASTIC BAG, INJECTION (ML) INTRAVENOUS CONTINUOUS PRN
Status: DISCONTINUED | OUTPATIENT
Start: 2023-11-13 | End: 2023-11-13

## 2023-11-13 RX ORDER — AMOXICILLIN 250 MG
2 CAPSULE ORAL 2 TIMES DAILY
Status: DISCONTINUED | OUTPATIENT
Start: 2023-11-13 | End: 2023-11-16 | Stop reason: HOSPADM

## 2023-11-13 RX ORDER — AMOXICILLIN 250 MG
1 CAPSULE ORAL 2 TIMES DAILY
Status: DISCONTINUED | OUTPATIENT
Start: 2023-11-13 | End: 2023-11-16 | Stop reason: HOSPADM

## 2023-11-13 RX ORDER — SODIUM CHLORIDE, SODIUM LACTATE, POTASSIUM CHLORIDE, CALCIUM CHLORIDE 600; 310; 30; 20 MG/100ML; MG/100ML; MG/100ML; MG/100ML
INJECTION, SOLUTION INTRAVENOUS CONTINUOUS PRN
Status: DISCONTINUED | OUTPATIENT
Start: 2023-11-13 | End: 2023-11-13

## 2023-11-13 RX ORDER — METHYLERGONOVINE MALEATE 0.2 MG/ML
200 INJECTION INTRAVENOUS
Status: DISCONTINUED | OUTPATIENT
Start: 2023-11-13 | End: 2023-11-16 | Stop reason: HOSPADM

## 2023-11-13 RX ORDER — HYDROCORTISONE 25 MG/G
CREAM TOPICAL 3 TIMES DAILY PRN
Status: DISCONTINUED | OUTPATIENT
Start: 2023-11-13 | End: 2023-11-16 | Stop reason: HOSPADM

## 2023-11-13 RX ORDER — MISOPROSTOL 200 UG/1
800 TABLET ORAL
Status: DISCONTINUED | OUTPATIENT
Start: 2023-11-13 | End: 2023-11-13 | Stop reason: HOSPADM

## 2023-11-13 RX ORDER — CEFAZOLIN SODIUM/WATER 2 G/20 ML
2 SYRINGE (ML) INTRAVENOUS
Status: COMPLETED | OUTPATIENT
Start: 2023-11-13 | End: 2023-11-13

## 2023-11-13 RX ORDER — OXYTOCIN/0.9 % SODIUM CHLORIDE 30/500 ML
340 PLASTIC BAG, INJECTION (ML) INTRAVENOUS CONTINUOUS PRN
Status: DISCONTINUED | OUTPATIENT
Start: 2023-11-13 | End: 2023-11-13 | Stop reason: HOSPADM

## 2023-11-13 RX ORDER — OXYCODONE HYDROCHLORIDE 5 MG/1
5 TABLET ORAL EVERY 4 HOURS PRN
Status: DISCONTINUED | OUTPATIENT
Start: 2023-11-13 | End: 2023-11-16 | Stop reason: HOSPADM

## 2023-11-13 RX ORDER — MISOPROSTOL 200 UG/1
400 TABLET ORAL
Status: DISCONTINUED | OUTPATIENT
Start: 2023-11-13 | End: 2023-11-16 | Stop reason: HOSPADM

## 2023-11-13 RX ORDER — PROPOFOL 10 MG/ML
INJECTION, EMULSION INTRAVENOUS PRN
Status: DISCONTINUED | OUTPATIENT
Start: 2023-11-13 | End: 2023-11-13

## 2023-11-13 RX ORDER — ONDANSETRON 4 MG/1
4 TABLET, ORALLY DISINTEGRATING ORAL EVERY 6 HOURS PRN
Status: DISCONTINUED | OUTPATIENT
Start: 2023-11-13 | End: 2023-11-16 | Stop reason: HOSPADM

## 2023-11-13 RX ORDER — OXYTOCIN 10 [USP'U]/ML
10 INJECTION, SOLUTION INTRAMUSCULAR; INTRAVENOUS
Status: DISCONTINUED | OUTPATIENT
Start: 2023-11-13 | End: 2023-11-13 | Stop reason: HOSPADM

## 2023-11-13 RX ORDER — ENOXAPARIN SODIUM 100 MG/ML
40 INJECTION SUBCUTANEOUS EVERY 24 HOURS
Status: DISCONTINUED | OUTPATIENT
Start: 2023-11-13 | End: 2023-11-16 | Stop reason: HOSPADM

## 2023-11-13 RX ORDER — NALOXONE HYDROCHLORIDE 0.4 MG/ML
0.2 INJECTION, SOLUTION INTRAMUSCULAR; INTRAVENOUS; SUBCUTANEOUS
Status: DISCONTINUED | OUTPATIENT
Start: 2023-11-13 | End: 2023-11-16 | Stop reason: HOSPADM

## 2023-11-13 RX ORDER — PROCHLORPERAZINE MALEATE 10 MG
10 TABLET ORAL EVERY 6 HOURS PRN
Status: DISCONTINUED | OUTPATIENT
Start: 2023-11-13 | End: 2023-11-16 | Stop reason: HOSPADM

## 2023-11-13 RX ORDER — BISACODYL 10 MG
10 SUPPOSITORY, RECTAL RECTAL DAILY PRN
Status: DISCONTINUED | OUTPATIENT
Start: 2023-11-15 | End: 2023-11-16 | Stop reason: HOSPADM

## 2023-11-13 RX ORDER — METOCLOPRAMIDE 10 MG/1
10 TABLET ORAL EVERY 6 HOURS PRN
Status: DISCONTINUED | OUTPATIENT
Start: 2023-11-13 | End: 2023-11-16 | Stop reason: HOSPADM

## 2023-11-13 RX ORDER — PANTOPRAZOLE SODIUM 40 MG/1
40 TABLET, DELAYED RELEASE ORAL
Status: DISCONTINUED | OUTPATIENT
Start: 2023-11-13 | End: 2023-11-16 | Stop reason: HOSPADM

## 2023-11-13 RX ORDER — LIDOCAINE 40 MG/G
CREAM TOPICAL
Status: DISCONTINUED | OUTPATIENT
Start: 2023-11-13 | End: 2023-11-16 | Stop reason: HOSPADM

## 2023-11-13 RX ORDER — TRANEXAMIC ACID 10 MG/ML
1 INJECTION, SOLUTION INTRAVENOUS EVERY 30 MIN PRN
Status: DISCONTINUED | OUTPATIENT
Start: 2023-11-13 | End: 2023-11-16 | Stop reason: HOSPADM

## 2023-11-13 RX ORDER — CARBOPROST TROMETHAMINE 250 UG/ML
250 INJECTION, SOLUTION INTRAMUSCULAR
Status: DISCONTINUED | OUTPATIENT
Start: 2023-11-13 | End: 2023-11-16 | Stop reason: HOSPADM

## 2023-11-13 RX ORDER — OXYTOCIN/0.9 % SODIUM CHLORIDE 30/500 ML
340 PLASTIC BAG, INJECTION (ML) INTRAVENOUS CONTINUOUS PRN
Status: DISCONTINUED | OUTPATIENT
Start: 2023-11-13 | End: 2023-11-16 | Stop reason: HOSPADM

## 2023-11-13 RX ORDER — ACETAMINOPHEN 325 MG/1
975 TABLET ORAL ONCE
Status: COMPLETED | OUTPATIENT
Start: 2023-11-13 | End: 2023-11-13

## 2023-11-13 RX ORDER — MISOPROSTOL 200 UG/1
800 TABLET ORAL
Status: DISCONTINUED | OUTPATIENT
Start: 2023-11-13 | End: 2023-11-16 | Stop reason: HOSPADM

## 2023-11-13 RX ORDER — ONDANSETRON 2 MG/ML
4 INJECTION INTRAMUSCULAR; INTRAVENOUS EVERY 30 MIN PRN
Status: CANCELLED | OUTPATIENT
Start: 2023-11-13

## 2023-11-13 RX ORDER — ALBUTEROL SULFATE 90 UG/1
1-2 AEROSOL, METERED RESPIRATORY (INHALATION) EVERY 6 HOURS PRN
Status: DISCONTINUED | OUTPATIENT
Start: 2023-11-13 | End: 2023-11-16 | Stop reason: HOSPADM

## 2023-11-13 RX ORDER — METHYLERGONOVINE MALEATE 0.2 MG/ML
200 INJECTION INTRAVENOUS
Status: DISCONTINUED | OUTPATIENT
Start: 2023-11-13 | End: 2023-11-13 | Stop reason: HOSPADM

## 2023-11-13 RX ORDER — MORPHINE SULFATE 1 MG/ML
INJECTION, SOLUTION EPIDURAL; INTRATHECAL; INTRAVENOUS
Status: COMPLETED | OUTPATIENT
Start: 2023-11-13 | End: 2023-11-13

## 2023-11-13 RX ORDER — BUPIVACAINE HYDROCHLORIDE 7.5 MG/ML
INJECTION, SOLUTION INTRASPINAL
Status: COMPLETED | OUTPATIENT
Start: 2023-11-13 | End: 2023-11-13

## 2023-11-13 RX ORDER — OXYTOCIN/0.9 % SODIUM CHLORIDE 30/500 ML
100-340 PLASTIC BAG, INJECTION (ML) INTRAVENOUS CONTINUOUS PRN
Status: DISCONTINUED | OUTPATIENT
Start: 2023-11-13 | End: 2023-11-16 | Stop reason: HOSPADM

## 2023-11-13 RX ORDER — DEXTROSE, SODIUM CHLORIDE, SODIUM LACTATE, POTASSIUM CHLORIDE, AND CALCIUM CHLORIDE 5; .6; .31; .03; .02 G/100ML; G/100ML; G/100ML; G/100ML; G/100ML
INJECTION, SOLUTION INTRAVENOUS CONTINUOUS
Status: DISCONTINUED | OUTPATIENT
Start: 2023-11-13 | End: 2023-11-16 | Stop reason: HOSPADM

## 2023-11-13 RX ADMIN — SENNOSIDES AND DOCUSATE SODIUM 1 TABLET: 50; 8.6 TABLET ORAL at 21:44

## 2023-11-13 RX ADMIN — PHENYLEPHRINE HYDROCHLORIDE 40 MCG/MIN: 10 INJECTION INTRAVENOUS at 08:02

## 2023-11-13 RX ADMIN — ACETAMINOPHEN 975 MG: 325 TABLET, FILM COATED ORAL at 07:30

## 2023-11-13 RX ADMIN — ACETAMINOPHEN 975 MG: 325 TABLET, FILM COATED ORAL at 21:43

## 2023-11-13 RX ADMIN — SODIUM CHLORIDE, POTASSIUM CHLORIDE, SODIUM LACTATE AND CALCIUM CHLORIDE: 600; 310; 30; 20 INJECTION, SOLUTION INTRAVENOUS at 07:51

## 2023-11-13 RX ADMIN — SODIUM CHLORIDE, SODIUM LACTATE, POTASSIUM CHLORIDE, CALCIUM CHLORIDE AND DEXTROSE MONOHYDRATE: 5; 600; 310; 30; 20 INJECTION, SOLUTION INTRAVENOUS at 10:57

## 2023-11-13 RX ADMIN — KETOROLAC TROMETHAMINE 30 MG: 30 INJECTION INTRAMUSCULAR; INTRAVENOUS at 16:13

## 2023-11-13 RX ADMIN — SODIUM CITRATE AND CITRIC ACID MONOHYDRATE 30 ML: 500; 334 SOLUTION ORAL at 07:30

## 2023-11-13 RX ADMIN — OXYCODONE HYDROCHLORIDE 5 MG: 5 TABLET ORAL at 18:29

## 2023-11-13 RX ADMIN — KETOROLAC TROMETHAMINE 15 MG: 30 INJECTION, SOLUTION INTRAMUSCULAR at 08:57

## 2023-11-13 RX ADMIN — PROPOFOL 20 MG: 10 INJECTION, EMULSION INTRAVENOUS at 08:48

## 2023-11-13 RX ADMIN — OXYTOCIN 2.5 UNITS: 10 INJECTION, SOLUTION INTRAMUSCULAR; INTRAVENOUS at 08:30

## 2023-11-13 RX ADMIN — Medication 300 ML/HR: at 08:26

## 2023-11-13 RX ADMIN — ACETAMINOPHEN 975 MG: 325 TABLET, FILM COATED ORAL at 14:31

## 2023-11-13 RX ADMIN — ENOXAPARIN SODIUM 40 MG: 100 INJECTION SUBCUTANEOUS at 21:43

## 2023-11-13 RX ADMIN — Medication 2 G: at 07:52

## 2023-11-13 RX ADMIN — SENNOSIDES AND DOCUSATE SODIUM 1 TABLET: 50; 8.6 TABLET ORAL at 10:57

## 2023-11-13 RX ADMIN — ONDANSETRON 4 MG: 2 INJECTION INTRAMUSCULAR; INTRAVENOUS at 07:51

## 2023-11-13 RX ADMIN — PROPOFOL 20 MG: 10 INJECTION, EMULSION INTRAVENOUS at 08:44

## 2023-11-13 RX ADMIN — KETOROLAC TROMETHAMINE 30 MG: 30 INJECTION INTRAMUSCULAR; INTRAVENOUS at 21:43

## 2023-11-13 RX ADMIN — OXYTOCIN 2.5 UNITS: 10 INJECTION, SOLUTION INTRAMUSCULAR; INTRAVENOUS at 08:27

## 2023-11-13 RX ADMIN — BUPIVACAINE HYDROCHLORIDE IN DEXTROSE 1.8 ML: 7.5 INJECTION, SOLUTION SUBARACHNOID at 08:00

## 2023-11-13 RX ADMIN — MORPHINE SULFATE 0.2 MG: 1 INJECTION EPIDURAL; INTRATHECAL; INTRAVENOUS at 08:00

## 2023-11-13 RX ADMIN — PANTOPRAZOLE SODIUM 40 MG: 40 TABLET, DELAYED RELEASE ORAL at 16:14

## 2023-11-13 RX ADMIN — DEXAMETHASONE SODIUM PHOSPHATE 4 MG: 4 INJECTION, SOLUTION INTRA-ARTICULAR; INTRALESIONAL; INTRAMUSCULAR; INTRAVENOUS; SOFT TISSUE at 08:31

## 2023-11-13 ASSESSMENT — ACTIVITIES OF DAILY LIVING (ADL)
ADLS_ACUITY_SCORE: 18

## 2023-11-13 NOTE — MEDICATION SCRIBE - ADMISSION MEDICATION HISTORY
Medication Scribe Admission Medication History    Admission medication history is complete. The information provided in this note is only as accurate as the sources available at the time of the update.    Information Source(s): Patient and CareEverywhere/SureScripts via  room phone with patient.    Pertinent Information: Hasn't taken any medicines at home for a couple days.    Changes made to PTA medication list:  Added: None  Deleted: None  Changed: dose and frequency for Enoxaprain.    Medication Affordability:  Not including over the counter (OTC) medications, was there a time in the past 3 months when you did not take your medications as prescribed because of cost?: No    Allergies reviewed with patient and updates made in EHR: yes, no change.    Medication History Completed By: Christiana Vargas 11/13/2023 4:58 PM    PTA Med List   Medication Sig Last Dose    albuterol (PROAIR HFA/PROVENTIL HFA/VENTOLIN HFA) 108 (90 Base) MCG/ACT inhaler Inhale 1-2 puffs into the lungs every 6 hours as needed for shortness of breath or wheezing Past Week at Unknown    enoxaparin ANTICOAGULANT (LOVENOX) 40 MG/0.4ML syringe Inject 40 mg Subcutaneous every 24 hours 11/11/2023 at 1800    omeprazole (PRILOSEC) 40 MG DR capsule Take 1 capsule (40 mg) by mouth daily Past Week at pm    ondansetron (ZOFRAN) 4 MG tablet Take 1 tablet (4 mg) by mouth every 8 hours as needed for nausea or vomiting Past Week at am daily    Prenatal Vit-Fe Fumarate-FA (PRENATAL MULTIVITAMIN W/IRON) 27-0.8 MG tablet Take 1 tablet by mouth daily Past Week at am

## 2023-11-13 NOTE — PROGRESS NOTES
S:Delivery  B:Augmented Labor; 39w3d    GBS + with antibiotic treatment less than 4 hours prior to delivery.  A: Patient delivered Repeat C/S at 0825 with Dr. RAMON Lerner in attendance and baby placed on mother's abdomen for delayed cord clamping. Baby received from surgeon. Baby to warmer for drying and wrapped in blanket/measurements/assessments.  given to father @ 0835. Apgars 8/9. Delivery QBL; 506.  IV infusion of Oxytocin infused. Placenta removal manual. MD does not want placenta sent to pathology.  See Flowsheet for VS and PP checks. Labor care plan goals met, transition now to postpartum care. Postpartum QBL; 117  R: Expect routine postpartum care. Anticipate first feeding within the hour or whenever infant displays feeding cues. Continue skin to skin. Prior discussion with mother indicates that feeding plan is Breast feeding. Educated mother on importance of exclusive breastfeeding, expected feeding readiness cues and encouraged her to observe for these cues while rooming in. Informed her that breastfeeding assistance would be provided.

## 2023-11-13 NOTE — OP NOTE
[facility]  Section Operative Note    Patient Name: Ana Maria Espinoza  MRN:[id number]  : 1990  Date of Surgery: 23   Pre-operative diagnosis:  1. IUP at 39w3d  2. History of prior  section, planned repeat  3. Hx of Factor 5 Leiden, on lovenox this pregnancy  4. Gestational Hypertension   5. Desires sterilization    Post-operative diagnosis:  Same   Procedure:  Repeat  section via pfannenstiel skin incision with double layer uterine closure bilateral salpingectomy, placement of a wound vac    Surgeon:  Dr. Katharina Lerner (OB/GYN Attending Staff)    Assistant(s):  Geraldo Hurd MS3   Anesthesia:  Spinal    Quantitative blood loss:  506 mL    Total IV fluids:  See anesthesia record   Drains:  Cohen catheter   Specimens:  Bilateral fallopian tube   Findings:  Minimal abdominal wall adhesions.  Filmy adhesions of the bladder to the lower uterine segment.  Moderately adhesed fimbria of the right fallopian tube to the right broad ligament.  Clear fluid with amniotomy. Liveborn, vigorous male infant born vertex, LOT APGARS 8 and 9. Weight: 6lbd 3oz . Placenta appeared intact with a three vessel cord and no apparent gross pathology. Uterus appears normal and clear of any retained product.  Normal-appearing ovaries and left fallopian tube.  Hemostatic surgical site at the end of the case.    Complications:  None apparent    Condition:  Stable, transferred to PACU    Indication: Ana Maria Espinoza is a 33 year old  at 39w3d by 9w4d US who presents for scheduled repeat  section and bilateral salpingectomy.  The risks of bleeding, infection and intraabdominal injury were discussed. Written informed consent was signed. She is certain she never wants to be pregnant again. She is agreeable to a blood transfusion if indicated.   FTP were verified and signed today.      Procedure:  After obtaining informed consent, the patient was taken to the operating room. She received 2  grams of ancef prior to the skin incision. She was placed in the dorsal supine position with a leftward tilt and prepped and draped in the usual sterile fashion. Following test of adequate spinal anesthesia, the abdomen was entered through a transverse skin incision through her previous scar. The skin incision was made sharply and carried through the subcutaneous tissue to the fascia.  Fascia was incised in the midline and extended laterally with the Salinas scissors. The superior margin of the fascial incision was grasped with Kocher clamps and dissected from the underlying muscle sharp and blunt dissecton, which was then repeated at the lower margin of the fascial incision.  The muscle was  in the midline. The peritoneum was entered bluntly and the opening extended by digital dissection with care to avoid the bladder. An inna-o retractor was placed. The lower segment of the uterus was opened sharply in a transverse fashion and extended with digital pressure. The infant's head was elevated to the level of the hysterotomy and was delivered atraumatically. The cord was doubly clamped and cut and the infant was handed off to the waiting nursing staff after 1 minute of delayed cord clamping. The placenta was extracted via cord traction. The uterus was cleared of all clots and debris. Oxytocin was given through the running IV. With vigorous massage as well as administration of oxytocin, good uterine tone was achieved. The hysterotomy was repaired with 0-vicryl suture in a running locked fashion. A 2nd layer of 0-monocryl was used to imbricate the incision and good hemostasis was achieved. The hysterotomy was irrigated, inspected and found to have no active sites of bleeding.  Attention was then turned to the bilateral fallopian tubes which were each individually grasped with a Isabel, cauterized with a hand-held LigaSure and excised.  The left fallopian tube I was able to excise in its entirety but I did have  to leave the fimbriae of the right fallopian tube due to moderate adhesions to the broad ligament.  Bleeding was noted from the mesosalpinx of both fallopian tubes which I did oversew with Vicryl suture and hemostasis was assured.  The abdominal wall was examined and found to have no active sites of bleeding. The fascia was closed with a running suture of 0-vicryl. Subcutaneous tissue was irrigated. Areas that were oozing were controlled with cautery. The subcutaneous tissue was closed with interrupted plain gut. The skin was closed with 4-0 monocryl.  Given the history of wound infection x2 I did place a wound VAC over the incision which was noted to obtain adequate suction. The patient tolerated the procedure well and was taken to the recovery room in stable condition. All sponge, needle and instrument counts were correct x2.      Katharina Lerner MD   OB/GYN

## 2023-11-13 NOTE — ANESTHESIA POSTPROCEDURE EVALUATION
Patient: Ana Maria Espinoza    Procedure: Procedure(s):   SECTION, WITH POSTPARTUM TUBAL LIGATION       Anesthesia Type:  Spinal    Note:  Disposition: Inpatient   Postop Pain Control: Uneventful            Sign Out: Well controlled pain   PONV: No   Neuro/Psych: Uneventful            Sign Out: Acceptable/Baseline neuro status   Airway/Respiratory: Uneventful            Sign Out: Acceptable/Baseline resp. status   CV/Hemodynamics: Uneventful            Sign Out: Acceptable CV status; No obvious hypovolemia; No obvious fluid overload   Other NRE: NONE   DID A NON-ROUTINE EVENT OCCUR? No           Last vitals:  Vitals:    23 0626   BP: (!) 139/94   Pulse: 96   Resp: 18   Temp: 36.7  C (98.1  F)   SpO2: 98%       Electronically Signed By: Ignacia Ovalle  2023  9:35 AM

## 2023-11-13 NOTE — ANESTHESIA POSTPROCEDURE EVALUATION
Patient: Ana Maria Espinoza    Procedure: Procedure(s):   SECTION, WITH POSTPARTUM TUBAL LIGATION       Anesthesia Type:  Spinal    Note:  Disposition: Inpatient   Postop Pain Control: Uneventful            Sign Out: Well controlled pain   PONV: No   Neuro/Psych: Uneventful            Sign Out: Acceptable/Baseline neuro status   Airway/Respiratory: Uneventful            Sign Out: Acceptable/Baseline resp. status   CV/Hemodynamics: Uneventful            Sign Out: Acceptable CV status; No obvious hypovolemia; No obvious fluid overload   Other NRE: NONE   DID A NON-ROUTINE EVENT OCCUR? No           Last vitals:  Vitals Value Taken Time   /78 23 1118   Temp     Pulse 80 23 1118   Resp     SpO2 97 % 23 1100       Electronically Signed By: Andrzej Viveros CRNA, APRN [place]  2023  12:56 PM

## 2023-11-13 NOTE — ANESTHESIA CARE TRANSFER NOTE
Patient: Ana Maria Espinoza    Procedure: Procedure(s):   SECTION, WITH POSTPARTUM TUBAL LIGATION       Diagnosis: H/O  section [Z98.891]  Diagnosis Additional Information: No value filed.    Anesthesia Type:   Spinal     Note:    Oropharynx: oropharynx clear of all foreign objects  Level of Consciousness: awake      Independent Airway: airway patency satisfactory and stable  Dentition: dentition unchanged  Vital Signs Stable: post-procedure vital signs reviewed and stable  Report to RN Given: handoff report given  Patient transferred to: Labor and Delivery    Handoff Report: Identifed the Patient, Identified the Reponsible Provider, Reviewed the pertinent medical history, Discussed the surgical course, Reviewed Intra-OP anesthesia mangement and issues during anesthesia, Set expectations for post-procedure period and Allowed opportunity for questions and acknowledgement of understanding      Vitals:  Vitals Value Taken Time   BP     Temp     Pulse     Resp     SpO2         Electronically Signed By: Ignacia Ovalle  2023  9:26 AM

## 2023-11-13 NOTE — ANESTHESIA PROCEDURE NOTES
"Intrathecal injection Procedure Note    Pre-Procedure   Staff -        CRNA: Kiet Restrepo APRN CRNA       Other Anesthesia Staff: Ignacia Ovalle       Performed By: CRNA       Location: OR       Pre-Anesthestic Checklist: patient identified, IV checked, risks and benefits discussed, informed consent, monitors and equipment checked, pre-op evaluation, at physician/surgeon's request and post-op pain management  Timeout:       Correct Patient: Yes        Correct Procedure: Yes        Correct Site: Yes        Correct Position: Yes   Procedure Documentation  Procedure: intrathecal injection       Patient Position: sitting       Skin prep: Betadine       Insertion Site: L3-4. (midline approach).       Needle Gauge: 25.        Needle Length (Inches): 3.5        Spinal Needle Type: Pencan       Introducer: 20 G       # of attempts: 1 and  # of redirects:  1    Assessment/Narrative         Paresthesias: No.       CSF fluid: clear.    Medication(s) Administered   0.75% Hyperbaric Bupivacaine (Intrathecal) - Intrathecal   1.8 mL - 11/13/2023 8:00:00 AM  Morphine PF 1 mg/mL (Intrathecal) - Intrathecal   0.2 mg - 11/13/2023 8:00:00 AM    FOR [facility] ([facility]) ONLY:   Pain Team Contact information: please page the Pain Team Via [employer]. Search \"Pain\". During daytime hours, please page the attending first. At night please page the resident first.      "

## 2023-11-13 NOTE — H&P
Chart reviewed. Last EGD on 7/10/19 by Dr. Love. Recommend repeat in 3 months.     Please call pt to schedule Recall EGD with Dr. Love.      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 No   ANTIPLATELET / ANTICOAGULATION: None  Latex allergy: No  Sleep apnea: Yes  Location: Patient Preference  Special Instructions:   MAC Anesthesia     [facility] Labor and Delivery H&P  2023  Ana Maria Espinoza  [id number]      HPI: Ana Maria Espinoza is a 33 year old  at 39w3d by 9w4d US who presents for scheduled repeat  section.    She states that she is feeling well today. +FM. No LOF, VB or ctx. 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  --desires sterilization; FTP signed and verified today, signed >30 days ago  --Hx of prior c/s, planned repeat  --Hx of PPD  --mild asthma   --smoker  --Hx of pre-e, gHTN now confirmed; mild range BPs    OBHX:   OB History    Para Term  AB Living   3 2 1 1 0 1   SAB IAB Ectopic Multiple Live Births   0 0 0 0 2      # Outcome Date GA Lbr Eliot/2nd Weight Sex Delivery Anes PTL Lv   3 Current            2 Term 11 39w4d  3.402 kg (7 lb 8 oz) F CS-Unspec   NOEL      Name: Shahbaz   1  09 33w6d  3.232 kg (7 lb 2 oz) F CS-Unspec   DEC      Birth Comments: congenital heart defect      Name: Kaylie       MedicalHX:   Past Medical History:   Diagnosis Date    ADHD (attention deficit hyperactivity disorder)     Blood clot in bladder 2001    right kidney involved and went into kidney failure    Chickenpox     x2    Chlamydia     Factor V Leiden (H24)     GERD (gastroesophageal reflux disease)     History of urinary tract infection     Postpartum depression 2009    loss of child    Pre-eclampsia 10/15/2023    Renal failure     secondary to blood clot in     Uncomplicated asthma 1996       SurgicalHX:   Past Surgical History:   Procedure Laterality Date    ABDOMEN SURGERY      2 c sections  @     ESOPHAGOSCOPY, GASTROSCOPY, DUODENOSCOPY (EGD), COMBINED N/A 2021    Procedure: ESOPHAGOGASTRODUODENOSCOPY, WITH BIOPSY;  Surgeon: German Flynn MD;   Location: [facility]    ORTHOPEDIC SURGERY      Had surgery on both big toes    TONSILLECTOMY         Medications:   No current facility-administered medications on file prior to encounter.  albuterol (PROAIR HFA/PROVENTIL HFA/VENTOLIN HFA) 108 (90 Base) MCG/ACT inhaler, Inhale 1-2 puffs into the lungs every 6 hours as needed for shortness of breath or wheezing  enoxaparin ANTICOAGULANT (LOVENOX) 40 MG/0.4ML syringe,   omeprazole (PRILOSEC) 40 MG DR capsule, Take 1 capsule (40 mg) by mouth daily  ondansetron (ZOFRAN) 4 MG tablet, Take 1 tablet (4 mg) by mouth every 8 hours as needed for nausea or vomiting  Prenatal Vit-Fe Fumarate-FA (PRENATAL MULTIVITAMIN W/IRON) 27-0.8 MG tablet, Take 1 tablet by mouth daily        Allergies:  Allergies   Allergen Reactions    Bee Venom Anaphylaxis     Has epipen    Aloe Hives     Dry skin as well     Codeine Rash     Other reaction(s): Intolerance-Can't Take       FamilyHX:    Family History   Problem Relation Age of Onset    Depression Mother     Mental Illness Mother 56        suicide-    Obesity Mother     Connective Tissue Disorder Mother         EDS    Alcoholism Mother     Diabetes Father     Obesity Father     Deep Vein Thrombosis Father         double amputee below knee    Asthma Sister     Thyroid Disease Sister     Obesity Sister     Asthma Brother     Obesity Brother     Obesity Paternal Grandmother     Heart Defect Daughter          at 7 weeks after 3 heart surgeries    Genetic Disorder Daughter         Avascular necrosis and Legg calve perthes disease    Connective Tissue Disorder Maternal Cousin         EDS    Connective Tissue Disorder Maternal Cousin         EDS       SocialHX:   Social History     Socioeconomic History    Marital status: Single   Tobacco Use    Smoking status: Every Day     Packs/day: 0.20     Years: 10.00     Additional pack years: 0.00     Total pack years: 2.00     Types: Cigarettes     Start date: 3/19/2009    Smokeless tobacco: Never     Tobacco comments:     Down to 2-3 cig/day with pregnancy   Vaping Use    Vaping Use: Never used   Substance and Sexual Activity    Alcohol use: No    Drug use: Not Currently     Types: Marijuana     Comment: quit with pregnancy    Sexual activity: Yes     Partners: Male   Other Topics Concern    Parent/sibling w/ CABG, MI or angioplasty before 65F 55M? No       ROS: 10-point ROS negative except as in HPI     Physical Exam:  Vitals:    23 0626   BP: (!) 139/94   BP Location: Right arm   Patient Position: Semi-Hardin's   Cuff Size: Adult Regular   Pulse: 96   Resp: 18   Temp: 98.1  F (36.7  C)   TempSrc: Oral   SpO2: 98%     GEN: resting comfortably in bed, NAD   CV: Reg rate, warm and well-perfused, mild range HTN  PULM: no increased work of breathing, no cough/wheeze   ABD: soft, gravid, non-tender, non-distended  EXT: 1+ edema, non-tender to palpation  CVX: deferred  Presentation: breech presentation by leopold's  EFW: 7.5 lbs  Membranes: intact    NST:  FHT: baseline 120, mod variability, + accels, no decels  TOCO: q3-5    Labs:    Lab Results   Component Value Date    AS Negative 2023    HEPBANG Nonreactive 2023    CHPCRT Negative 2023    GCPCRT Negative 2023    HGB 12.1 2023       A/P: Ana Maria Espinoza is a 33 year old  at 39w3d by 9w4d US who presents for scheduled repeat  section and bilateral salpingectomy.  The risks of bleeding, infection and intraabdominal injury were discussed. Written informed consent was signed. She is certain she never wants to be pregnant again. She is agreeable to a blood transfusion if indicated.   FTP were verified and signed today.     Admit to L&D. Place PIV. Draw labs: T&S, CBC, RPR and HELLP labs.   FWB: Category 1 FHT.  Continue EFM and toco  PNC: Rh A POS, Rubella immune, GBS POS.     Factor 5 Leiden, hx of clot: plan to start lovenox tomorrow AM.     Katharina Lerner MD  OB/GYN

## 2023-11-13 NOTE — PROGRESS NOTES
Data: Vital signs within normal limits. Postpartum checks within normal limits - see flow record. Patient eating and drinking normally. Patient able to empty bladder independently and is up ambulating. Catheter removed. No apparent signs of infection. Incision healing well. Wound vac has had no output. PIV saline locked. Patient performing self cares and is able to care for infant.  Action: Patient medicated during the shift for pain and cramping. See MAR. Patient reassessed within 1 hour after each medication and pain was improved - patient stated she was comfortable. Patient education done about; breastfeeding/basic infant cares, postpartum cares, see flow record.  Response: Positive attachment behaviors observed with infant. Support persons; John present.   Plan: Anticipate discharge on TBD.

## 2023-11-13 NOTE — PROGRESS NOTES
Patient arrived from home for scheduled . Admission questions asked. IV placed and fluids running. Baby on monitor.

## 2023-11-14 PROBLEM — D62 ANEMIA DUE TO BLOOD LOSS, ACUTE: Status: ACTIVE | Noted: 2023-11-14

## 2023-11-14 PROBLEM — Z34.80 PRENATAL CARE, SUBSEQUENT PREGNANCY: Status: RESOLVED | Noted: 2023-03-16 | Resolved: 2023-11-14

## 2023-11-14 PROBLEM — Z98.890 POSTOPERATIVE STATE: Status: RESOLVED | Noted: 2023-11-13 | Resolved: 2023-11-14

## 2023-11-14 PROBLEM — Z34.90 PREGNANCY: Status: RESOLVED | Noted: 2023-10-15 | Resolved: 2023-11-14

## 2023-11-14 PROBLEM — Z98.891 S/P REPEAT LOW TRANSVERSE C-SECTION: Status: ACTIVE | Noted: 2023-11-14

## 2023-11-14 PROBLEM — Z97.5 NEXPLANON IN PLACE: Status: RESOLVED | Noted: 2021-01-08 | Resolved: 2023-11-14

## 2023-11-14 LAB
HGB BLD-MCNC: 10.5 G/DL (ref 11.7–15.7)
PATH REPORT.COMMENTS IMP SPEC: NORMAL
PATH REPORT.COMMENTS IMP SPEC: NORMAL
PATH REPORT.FINAL DX SPEC: NORMAL
PATH REPORT.GROSS SPEC: NORMAL
PATH REPORT.MICROSCOPIC SPEC OTHER STN: NORMAL
PATH REPORT.RELEVANT HX SPEC: NORMAL
PHOTO IMAGE: NORMAL

## 2023-11-14 PROCEDURE — 250N000013 HC RX MED GY IP 250 OP 250 PS 637: Performed by: OBSTETRICS & GYNECOLOGY

## 2023-11-14 PROCEDURE — 36415 COLL VENOUS BLD VENIPUNCTURE: CPT | Performed by: OBSTETRICS & GYNECOLOGY

## 2023-11-14 PROCEDURE — 250N000011 HC RX IP 250 OP 636: Performed by: OBSTETRICS & GYNECOLOGY

## 2023-11-14 PROCEDURE — 120N000001 HC R&B MED SURG/OB

## 2023-11-14 PROCEDURE — 85018 HEMOGLOBIN: CPT | Performed by: OBSTETRICS & GYNECOLOGY

## 2023-11-14 RX ORDER — PRENATAL VIT/IRON FUM/FOLIC AC 27MG-0.8MG
1 TABLET ORAL DAILY
Status: DISCONTINUED | OUTPATIENT
Start: 2023-11-14 | End: 2023-11-16 | Stop reason: HOSPADM

## 2023-11-14 RX ADMIN — IBUPROFEN 800 MG: 800 TABLET ORAL at 22:46

## 2023-11-14 RX ADMIN — SENNOSIDES AND DOCUSATE SODIUM 1 TABLET: 50; 8.6 TABLET ORAL at 07:29

## 2023-11-14 RX ADMIN — PRENATAL VITAMINS-IRON FUMARATE 27 MG IRON-FOLIC ACID 0.8 MG TABLET 1 TABLET: at 20:42

## 2023-11-14 RX ADMIN — ACETAMINOPHEN 975 MG: 325 TABLET, FILM COATED ORAL at 10:04

## 2023-11-14 RX ADMIN — PANTOPRAZOLE SODIUM 40 MG: 40 TABLET, DELAYED RELEASE ORAL at 16:26

## 2023-11-14 RX ADMIN — ACETAMINOPHEN 975 MG: 325 TABLET, FILM COATED ORAL at 04:21

## 2023-11-14 RX ADMIN — IBUPROFEN 800 MG: 800 TABLET ORAL at 16:26

## 2023-11-14 RX ADMIN — ACETAMINOPHEN 975 MG: 325 TABLET, FILM COATED ORAL at 16:26

## 2023-11-14 RX ADMIN — ACETAMINOPHEN 975 MG: 325 TABLET, FILM COATED ORAL at 22:46

## 2023-11-14 RX ADMIN — KETOROLAC TROMETHAMINE 30 MG: 30 INJECTION INTRAMUSCULAR; INTRAVENOUS at 04:21

## 2023-11-14 RX ADMIN — PANTOPRAZOLE SODIUM 40 MG: 40 TABLET, DELAYED RELEASE ORAL at 07:29

## 2023-11-14 RX ADMIN — SENNOSIDES AND DOCUSATE SODIUM 1 TABLET: 50; 8.6 TABLET ORAL at 20:42

## 2023-11-14 RX ADMIN — ENOXAPARIN SODIUM 40 MG: 100 INJECTION SUBCUTANEOUS at 22:45

## 2023-11-14 RX ADMIN — IBUPROFEN 800 MG: 800 TABLET ORAL at 10:07

## 2023-11-14 RX ADMIN — OXYCODONE HYDROCHLORIDE 5 MG: 5 TABLET ORAL at 22:48

## 2023-11-14 ASSESSMENT — ACTIVITIES OF DAILY LIVING (ADL)
ADLS_ACUITY_SCORE: 18

## 2023-11-14 NOTE — PROGRESS NOTES
[facility] OB/GYN Department    Post-Partum Progress Note: POD #1    Name: Ana Maria Espinoza  Date: 11/14/2023    Subjective:   Patient seen and examined.  No complaints today.  Pain well controlled.  Tolerating regular diet, without nausea or vomiting.  Ambulating without difficulty.  Cohen removed post operatively, voiding spontaneously. + flatus, no BM yet. Breast feeding.     ROS:    General/Constitutional:  Denies chills or fever  Respiratory: Denies shortness of breath  Cardiovascular: Denies chest pain  Gastrointestinal:  +mild uterine cramping, no nausea or vomiting, + flatus, no BM  Genitourinary: Denies difficulty urinating  Musculoskeletal: + peripheral edema      Objective:     Intake/Output Summary (Last 24 hours) at 11/14/2023 1002  Last data filed at 11/14/2023 0421  Gross per 24 hour   Intake --   Output 367 ml   Net -367 ml       Patient Vitals for the past 24 hrs:   BP Temp Temp src Pulse Resp SpO2   11/14/23 0746 116/82 98.4  F (36.9  C) Oral 100 16 --   11/14/23 0421 126/73 98.4  F (36.9  C) Oral 81 18 --   11/13/23 2359 117/64 97.4  F (36.3  C) Oral 71 18 97 %   11/13/23 1930 115/85 98  F (36.7  C) Oral 79 16 --   11/13/23 1430 106/59 98  F (36.7  C) Oral 77 16 99 %   11/13/23 1145 108/67 -- -- 74 -- --   11/13/23 1130 113/69 97.1  F (36.2  C) -- 72 16 --   11/13/23 1118 136/78 -- -- 80 -- --   11/13/23 1100 130/70 -- -- 78 -- 97 %   11/13/23 1045 122/79 -- -- 80 -- 100 %   11/13/23 1033 126/70 -- -- 78 -- 100 %   11/13/23 1015 (!) 143/63 -- -- 67 -- 100 %       Recent Labs   Lab 11/14/23  0638 11/13/23  0653   HGB 10.5* 12.1         General appearance: well-hydrated, A&O x 3, no apparent distress  ENT: EOMI, sclera anicteric   Lungs: Equal expansion bilaterally, no accessory muscle use  Heart: No heaves or thrills. No peripheral varicosities  Constitutional: See vitals  Abdomen: Soft, non-distended, no rebound or rigidity. Incision with wound vac in place, uterus firm at umbilicus  with non-tender fundus. Edema in dependent areas of abdomen/panus   Neurologic: CN II-XII grossly intact, no lateralizing defects, no gross movement abnormalities  Extremities: 2+ pitting edema, no calf tenderness      Assessment and Plan:    S/P repeat low transverse   POD#1 s/p RLTCS with tubal ligation  Routine post operative care  Encourage ambulation  Optimize pain management  Lactation support  Anticipate discharge home tomorrow      Factor V Leiden mutation (H24)  Lovenox has been resumed post partum    Anemia due to blood loss, acute  Appropriate hemoglobin dropped for intraoperative blood loss  Patient asymptomatic  Continue prenatal with iron    Stefanie Brooks DO

## 2023-11-14 NOTE — PLAN OF CARE
Goal Outcome Evaluation:    Data: Vital signs within normal limits. Postpartum checks within normal limits - see flow record. Patient  Is tolerating po intake. Patient is able to empty bladder independently. . Patient ambulating independently..   No apparent signs of infection. Incision closed with wound vac, continuous suction, good seal, no drainage in tubing or canister. Patient Is performing self cares and Is able to care for infant. Positive attachment behaviors are observed with infant. Support persons are present.  Action:  Pain plan was discussed. Patient would like pain meds to be brought in when they are due. Patient was medicated during the shift for pain. See MAR.Patient education done about breastfeeding,  cares, postpartum cares, and pain management/plan. See flow record.  Response:   Patient reassessed within 1 hour after each medication for pain. Patient stated that pain had improved. Patient stated that she was comfortable. .   Plan: Anticipate discharge on 11/15/2023.

## 2023-11-14 NOTE — ASSESSMENT & PLAN NOTE
POD#1 s/p RLTCS with tubal ligation  Routine post operative care  Encourage ambulation  Optimize pain management  Lactation support  Anticipate discharge home tomorrow

## 2023-11-14 NOTE — PLAN OF CARE
Data: Vital signs within normal limits. Postpartum checks within normal limits - see flow record. Patient eating and drinking normally. Patient able to empty bladder independently and is up ambulating. No apparent signs of infection. Incision healing well, wound vac intact with no output. Patient performing self cares and is able to care for infant.  Action: Patient medicated during the shift for pain and cramping. See MAR. Patient reassessed within 1 hour after each medication and pain was improved - patient stated she was comfortable. Patient education done about breastfeeding, latch. See flow record.  Response: Positive attachment behaviors observed with infant. Support persons 1 present.   Plan: Anticipate discharge on 11/15/23.

## 2023-11-15 PROBLEM — O13.9 GESTATIONAL HYPERTENSION: Status: ACTIVE | Noted: 2023-11-15

## 2023-11-15 PROCEDURE — 250N000013 HC RX MED GY IP 250 OP 250 PS 637: Performed by: OBSTETRICS & GYNECOLOGY

## 2023-11-15 PROCEDURE — 250N000011 HC RX IP 250 OP 636: Mod: JZ | Performed by: OBSTETRICS & GYNECOLOGY

## 2023-11-15 PROCEDURE — 120N000001 HC R&B MED SURG/OB

## 2023-11-15 RX ORDER — ENOXAPARIN SODIUM 100 MG/ML
40 INJECTION SUBCUTANEOUS EVERY 12 HOURS
Qty: 33.6 ML | Refills: 0 | Status: SHIPPED | OUTPATIENT
Start: 2023-11-15 | End: 2023-12-27

## 2023-11-15 RX ORDER — OXYCODONE HYDROCHLORIDE 5 MG/1
5 TABLET ORAL EVERY 6 HOURS PRN
Qty: 10 TABLET | Refills: 0 | Status: SHIPPED | OUTPATIENT
Start: 2023-11-15 | End: 2023-11-18

## 2023-11-15 RX ORDER — POLYETHYLENE GLYCOL 3350 17 G/17G
1 POWDER, FOR SOLUTION ORAL DAILY
Qty: 527 G | Refills: 0 | Status: SHIPPED | OUTPATIENT
Start: 2023-11-15 | End: 2023-12-21

## 2023-11-15 RX ORDER — IBUPROFEN 600 MG/1
600 TABLET, FILM COATED ORAL EVERY 6 HOURS PRN
Qty: 60 TABLET | Refills: 0 | Status: SHIPPED | OUTPATIENT
Start: 2023-11-15 | End: 2024-02-15

## 2023-11-15 RX ORDER — ACETAMINOPHEN 325 MG/1
650 TABLET ORAL EVERY 6 HOURS PRN
Qty: 100 TABLET | Refills: 0 | Status: SHIPPED | OUTPATIENT
Start: 2023-11-15 | End: 2024-03-18

## 2023-11-15 RX ADMIN — ACETAMINOPHEN 975 MG: 325 TABLET, FILM COATED ORAL at 10:12

## 2023-11-15 RX ADMIN — ACETAMINOPHEN 975 MG: 325 TABLET, FILM COATED ORAL at 04:02

## 2023-11-15 RX ADMIN — IBUPROFEN 800 MG: 800 TABLET ORAL at 04:01

## 2023-11-15 RX ADMIN — OXYCODONE HYDROCHLORIDE 5 MG: 5 TABLET ORAL at 17:58

## 2023-11-15 RX ADMIN — ACETAMINOPHEN 975 MG: 325 TABLET, FILM COATED ORAL at 17:57

## 2023-11-15 RX ADMIN — PRENATAL VITAMINS-IRON FUMARATE 27 MG IRON-FOLIC ACID 0.8 MG TABLET 1 TABLET: at 20:21

## 2023-11-15 RX ADMIN — ENOXAPARIN SODIUM 40 MG: 100 INJECTION SUBCUTANEOUS at 22:32

## 2023-11-15 RX ADMIN — SENNOSIDES AND DOCUSATE SODIUM 2 TABLET: 50; 8.6 TABLET ORAL at 20:20

## 2023-11-15 RX ADMIN — PANTOPRAZOLE SODIUM 40 MG: 40 TABLET, DELAYED RELEASE ORAL at 05:48

## 2023-11-15 RX ADMIN — IBUPROFEN 800 MG: 800 TABLET ORAL at 10:12

## 2023-11-15 RX ADMIN — IBUPROFEN 800 MG: 800 TABLET ORAL at 22:32

## 2023-11-15 RX ADMIN — IBUPROFEN 800 MG: 800 TABLET ORAL at 17:59

## 2023-11-15 RX ADMIN — ACETAMINOPHEN 975 MG: 325 TABLET, FILM COATED ORAL at 22:32

## 2023-11-15 ASSESSMENT — ACTIVITIES OF DAILY LIVING (ADL)
ADLS_ACUITY_SCORE: 18

## 2023-11-15 NOTE — DISCHARGE SUMMARY
[facility] Labor and Delivery Discharge Summary    Ana Maria Espinoza MRN# [id number]   Age: 33 year old YOB: 1990     Date of Admission:  2023  Date of Discharge::  11/15/2023  Admitting Physician:  Katharina Lerner MD  Discharge Physician:  America Cross MD            Admission Diagnoses:   --Intrauterine pregnancy at 39w3d  --Factor V Leiden heterozygous mutation with personal history of right renal artery infarct clot. On prophylactic lovenox 40mg q24hr prior to admission during pregnancy while not on anticoagulation prior to pregnancy.  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  --Desires sterilization; FTP signed and verified today, signed >30 days ago  --Hx of prior c/s, planned repeat  --Hx of PPD  --Mild asthma   --smoker  --Hx of pre-e, gHTN now confirmed; mid range BPs treated with Nifedipine           Discharge Diagnosis:     --Same, delivered   --Acute blood loss anemia from appropriate amount of blood loss during surgery           Procedures:   Repeat  section via pfannenstiel skin incision with double layer uterine closure bilateral salpingectomy, placement of a wound vac    Spinal anesthesia           Medications Prior to Admission:     Medications Prior to Admission   Medication Sig Dispense Refill Last Dose    albuterol (PROAIR HFA/PROVENTIL HFA/VENTOLIN HFA) 108 (90 Base) MCG/ACT inhaler Inhale 1-2 puffs into the lungs every 6 hours as needed for shortness of breath or wheezing 16 g 1 Past Week at Unknown    omeprazole (PRILOSEC) 40 MG DR capsule Take 1 capsule (40 mg) by mouth daily 90 capsule 4 Past Week at pm    Prenatal Vit-Fe Fumarate-FA (PRENATAL MULTIVITAMIN W/IRON) 27-0.8 MG tablet Take 1 tablet by mouth daily   Past Week at am    [DISCONTINUED] enoxaparin ANTICOAGULANT (LOVENOX) 40 MG/0.4ML syringe Inject 40 mg Subcutaneous every 24 hours   2023 at  1800    [DISCONTINUED] ondansetron (ZOFRAN) 4 MG tablet Take 1 tablet (4 mg) by mouth every 8 hours as needed for nausea or vomiting 30 tablet 12 Past Week at am daily          Discharge Medications:        Review of your medicines        START taking        Dose / Directions   acetaminophen 325 MG tablet  Commonly known as: TYLENOL      Dose: 650 mg  Take 2 tablets (650 mg) by mouth every 6 hours as needed for mild pain Start after Delivery.  Quantity: 100 tablet  Refills: 0     ibuprofen 600 MG tablet  Commonly known as: ADVIL/MOTRIN      Dose: 600 mg  Take 1 tablet (600 mg) by mouth every 6 hours as needed for moderate pain Start after delivery  Quantity: 60 tablet  Refills: 0     oxyCODONE 5 MG tablet  Commonly known as: ROXICODONE      Dose: 5 mg  Take 1 tablet (5 mg) by mouth every 6 hours as needed for pain  Quantity: 10 tablet  Refills: 0     polyethylene glycol 17 GM/Dose powder  Commonly known as: MIRALAX      Dose: 1 Capful  Take 17 g (1 Capful) by mouth daily  Quantity: 527 g  Refills: 0            CONTINUE these medicines which may have CHANGED, or have new prescriptions. If we are uncertain of the size of tablets/capsules you have at home, strength may be listed as something that might have changed.        Dose / Directions   enoxaparin ANTICOAGULANT 40 MG/0.4ML syringe  Commonly known as: LOVENOX  This may have changed: when to take this      Dose: 40 mg  Inject 0.4 mLs (40 mg) Subcutaneous every 12 hours for 42 days  Quantity: 33.6 mL  Refills: 0            CONTINUE these medicines which have NOT CHANGED        Dose / Directions   albuterol 108 (90 Base) MCG/ACT inhaler  Commonly known as: PROAIR HFA/PROVENTIL HFA/VENTOLIN HFA  Used for: Viral URI with cough      Dose: 1-2 puff  Inhale 1-2 puffs into the lungs every 6 hours as needed for shortness of breath or wheezing  Quantity: 16 g  Refills: 1     omeprazole 40 MG DR capsule  Commonly known as: PriLOSEC  Used for: Gastroesophageal reflux disease  without esophagitis      Dose: 40 mg  Take 1 capsule (40 mg) by mouth daily  Quantity: 90 capsule  Refills: 4     prenatal multivitamin w/iron 27-0.8 MG tablet      Dose: 1 tablet  Take 1 tablet by mouth daily  Refills: 0            STOP taking      ondansetron 4 MG tablet  Commonly known as: ZOFRAN                  Where to get your medicines        These medications were sent to [facility] [address] [place], [place] [id number] [address] [place] [id number]      Phone: [phone]   acetaminophen 325 MG tablet  enoxaparin ANTICOAGULANT 40 MG/0.4ML syringe  ibuprofen 600 MG tablet  oxyCODONE 5 MG tablet  polyethylene glycol 17 GM/Dose powder             Consultations:   Anesthesia          Brief Admission History:   Ana Maria Espinoza is a 33 year old  at 39w3d by 9w4d US who presents for scheduled repeat  section and bilateral salpingectomy.      Intraoperative course   The procedure was uncomplicated.   mL.  See operative report for details.        Postpartum Course   The patient's hospital course was unremarkable.  She recovered as anticipated and experienced no post-operative complications. On discharge, her pain was well controlled. Vaginal bleeding is similar to peak menstrual flow.  Voiding without difficulty.  Ambulating well and tolerating a normal diet.  No fever or significant wound drainage.  Breastfeeding well.  Infant is stable. She was discharged on post-partum day #2.    For patient's factor V Leiden heterozygous with personal history of renal artery thrombosis outside of pregnancy, she was discharged home with subcutaneous Lovenox 40 mg every 12 hours for 6 weeks due to her BMI at 39.8.    For her gestational hypertension, patient was discharged home with a blood pressure cuff and instructions to call for preeclampsia symptoms or if her blood pressures are consistently >= 140/90.  She was not discharged home with any antihypertensive  medications.    For her acute blood loss anemia, presumably from appropriate amount of blood loss during surgery, she was discharged home with her prenatal vitamin with iron supplement.  Start taking Nifedipine 30 XR daily  BP checks qid and if symptoms  RTC on Monday 11/20 for Nurse visit and BP check  Post-partum hemoglobin:   Hemoglobin   Date Value Ref Range Status   11/14/2023 10.5 (L) 11.7 - 15.7 g/dL Final          Discharge Instructions and Follow-Up:     Discharge diet: Regular   Discharge activity: No lifting greater than 20 lbs, pushing, pulling, or other strenuous activity for 6 weeks. Pelvic rest for 6 weeks including no sexual intercourse, tampons, or douching. No driving until you can slam on the breaks without pain or while on narcotic pain medications.    Discharge follow-up: Follow up with primary OB for routine postpartum visit in 6 weeks and within 1 week for BP check   Wound care: Keep incision clean and dry           Discharge Disposition:     Discharged to home in good condition    MD America Weathers MD  Obstetrics and Gynecology  [facility]   11/15/2023

## 2023-11-15 NOTE — PROGRESS NOTES
Pt vss, afebrile. Abd inc CDI.  Drain patent.  Small amt vaginal bleeding.  Taking Ibup & tylenol for discomfort.  Breastfeeding infant. Indep with self & infant cares.  Pt stable.

## 2023-11-15 NOTE — PROGRESS NOTES
Postpartum assessment WDL. VSS. Fundus firm and midline. Wound vac in place with no output.  No apparent signs of infection. Given Tylenol and Ibuprofen per orders. Given oxy PRN x1 which was effective. Breastfeeding going well. Patient caring for self and baby independently with assistance from staff at times. Will continue to monitor and assist with cares as needed.

## 2023-11-15 NOTE — PROGRESS NOTES
[facility] OB/GYN Postpartum Note    S: Pain well controlled with current pain meds. Eating and drinking without nausea/vomiting.  Passing flatus and BM. Ambulating without difficulties.Voiding without difficulty. Trying to breast feed.     O:  Patient Vitals for the past 12 hrs:   BP Temp Temp src Pulse Resp   11/15/23 0735 132/78 98.4  F (36.9  C) Oral 84 16     Gen:  NAD  CV: Well perfused  Resp: Normal respiratory efforts  Abd: soft, nondistended, nontender, fundus firm at 2cm below umbilicus;   Incision: pfannenstiel skin incision is still covered with wound vac with minimal drainainge  Ext: non-tender, 2+ symmetrical bilateral edema, no erythema    Hemoglobin   Date Value Ref Range Status   2023 10.5 (L) 11.7 - 15.7 g/dL Final   2023 12.1 11.7 - 15.7 g/dL Final     A/P: 33 year old  POD#2 s/p RLTCS + BS.    Routine postpartum care: Meeting all postop goals for discharge.    Factor V Leiden heterozygous with personal history of renal artery thrombosis outside of pregnancy: Was not on anticoagulation prior to pregnancy.  Was on subcutaneous Lovenox 40 mg q24hr antepartum. Has also been on subcutaneous Lovenox 40 mg q24hr after  section.  With patient's BMI at 39.8, we discussed that she is right on the edge of whether we should do subcutaneous Lovenox 40 mg q12hr vs q24hr dosing.  With her personal history of the renal vein thrombosis, I recommended doing 40 mg q12hr instead of q24hr, which patient is agreeable.  We reviewed symptoms that she need to be watching out for, specifically back pain, shortness of breath that is new to her, or 1 lower extremity more swollen compared to the other.  Plan to continue subcutaneous Lovenox 40 mg q12hr for 6 weeks postpartum    Gestational hypertension: Blood pressures are well controlled without any medications.  We will discharge patient home with a blood pressure cuff.  Patient is aware to call for any preeclampsia symptoms or if  her blood pressures are consistently >= 140/90.    Acute blood loss anemia from appropriate amount of blood loss during surgery: Continue prenatal vitamin with iron supplement upon discharge    Disposition: discharge to home today.    America Cross MD  Obstetrics and Gynecology  [facility]   11/15/2023 8:56 AM

## 2023-11-16 VITALS
WEIGHT: 186.9 LBS | HEART RATE: 104 BPM | TEMPERATURE: 98.6 F | SYSTOLIC BLOOD PRESSURE: 144 MMHG | RESPIRATION RATE: 16 BRPM | DIASTOLIC BLOOD PRESSURE: 85 MMHG | OXYGEN SATURATION: 96 % | BODY MASS INDEX: 36.5 KG/M2

## 2023-11-16 PROBLEM — Z98.891 S/P REPEAT LOW TRANSVERSE C-SECTION: Status: RESOLVED | Noted: 2023-11-14 | Resolved: 2023-11-16

## 2023-11-16 PROCEDURE — 250N000013 HC RX MED GY IP 250 OP 250 PS 637: Performed by: OBSTETRICS & GYNECOLOGY

## 2023-11-16 RX ORDER — NIFEDIPINE 30 MG
30 TABLET, EXTENDED RELEASE ORAL DAILY
Qty: 30 TABLET | Refills: 1 | Status: SHIPPED | OUTPATIENT
Start: 2023-11-16 | End: 2023-12-26

## 2023-11-16 RX ORDER — NIFEDIPINE 10 MG/1
10 CAPSULE ORAL ONCE
Status: COMPLETED | OUTPATIENT
Start: 2023-11-16 | End: 2023-11-16

## 2023-11-16 RX ORDER — NIFEDIPINE 30 MG/1
30 TABLET, EXTENDED RELEASE ORAL DAILY
Status: DISCONTINUED | OUTPATIENT
Start: 2023-11-16 | End: 2023-11-16 | Stop reason: HOSPADM

## 2023-11-16 RX ADMIN — PANTOPRAZOLE SODIUM 40 MG: 40 TABLET, DELAYED RELEASE ORAL at 06:29

## 2023-11-16 RX ADMIN — PRENATAL VITAMINS-IRON FUMARATE 27 MG IRON-FOLIC ACID 0.8 MG TABLET 1 TABLET: at 08:31

## 2023-11-16 RX ADMIN — NIFEDIPINE 10 MG: 10 CAPSULE ORAL at 09:33

## 2023-11-16 RX ADMIN — NIFEDIPINE 30 MG: 30 TABLET, FILM COATED, EXTENDED RELEASE ORAL at 12:32

## 2023-11-16 RX ADMIN — IBUPROFEN 800 MG: 800 TABLET ORAL at 04:23

## 2023-11-16 RX ADMIN — ACETAMINOPHEN 975 MG: 325 TABLET, FILM COATED ORAL at 04:23

## 2023-11-16 RX ADMIN — ACETAMINOPHEN 975 MG: 325 TABLET, FILM COATED ORAL at 10:24

## 2023-11-16 RX ADMIN — IBUPROFEN 800 MG: 800 TABLET ORAL at 10:23

## 2023-11-16 ASSESSMENT — ACTIVITIES OF DAILY LIVING (ADL)
ADLS_ACUITY_SCORE: 18

## 2023-11-16 NOTE — PROGRESS NOTES
Pt vss, afebrile.  [facility] CDI.  Has a wound vac.  Small amt vaginal bleeding.  Taking Ibup, tylenol & oxy for discomfort.  Pt stable.

## 2023-11-16 NOTE — PROGRESS NOTES
Discharge instructions reviewed, states understanding. Office appointment in 6 weeks.Discharged to home with FOB and infant ambulatory at 1530.

## 2023-11-16 NOTE — PROGRESS NOTES
blood pressure has been elevated since last night.  She denies headache, visual changes or RUQ pain.    BP (!) 171/88   Pulse 111   Temp 98.6  F (37  C) (Oral)   Resp 16   LMP 02/02/2023   SpO2 96%   Breastfeeding Unknown     Repeat /89    Have discussed plan with Dr Cross regarding addition of Nifedipine to her regimen   I will start with Nifedipine 10 mg IR x 1 dose to gauge the effect on her BP.  If she tolerates her dose and the BP decreases will plan to Discharge to home on Nifedipine 30 XR and a BP cuff to monitor her BP and early return to clinic for BP follow up    Mauri Hurt MD

## 2023-11-16 NOTE — DISCHARGE INSTRUCTIONS
Warning Signs after Having a Baby    Keep this paper on your fridge or somewhere else where you can see it.    Call your provider if you have any of these symptoms up to 12 weeks after having your baby.    Thoughts of hurting yourself or your baby  Pain in your chest or trouble breathing  Severe headache not helped by pain medicine  Eyesight concerns (blurry vision, seeing spots or flashes of light, other changes to eyesight)  Fainting, shaking or other signs of a seizure    Call 9-1-1 if you feel that it is an emergency.     The symptoms below can happen to anyone after giving birth. They can be very serious. Call your provider if you have any of these warning signs.    My provider s phone number: _______________________    Losing too much blood (hemorrhage)    Call your provider if you soak through a pad in less than an hour or pass blood clots bigger than a golf ball. These may be signs that you are bleeding too much.    Blood clots in the legs or lungs    After you give birth, your body naturally clots its blood to help prevent blood loss. Sometimes this increased clotting can happen in other areas of the body, like the legs or lungs. This can block your blood flow and be very dangerous.     Call your provider if you:  Have a red, swollen spot on the back of your leg that is warm or painful when you touch it.   Are coughing up blood.     Infection    Call your provider if you have any of these symptoms:  Fever of 100.4 F (38 C) or higher.  Pain or redness around your stitches if you had an incision.   Any yellow, white, or green fluid coming from places where you had stitches or surgery.    Mood Problems (postpartum depression)    Many people feel sad or have mood changes after having a baby. But for some people, these mood swings are worse.     Call your provider right away if you feel so anxious or nervous that you can't care for yourself or your baby.    Preeclampsia (high blood pressure)    Even if you  didn't have high blood pressure when you were pregnant, you are at risk for the high blood pressure disease called preeclampsia. This risk can last up to 12 weeks after giving birth.     Call your provider if you have:   Pain on your right side under your rib cage  Sudden swelling in the hands and face    Remember: You know your body. If something doesn't feel right, get medical help.     For informational purposes only. Not to replace the advice of your health care provider. Copyright 2020 [facility]. All rights reserved. Clinically reviewed by Kaila Juarez, RNC-OB, MSN. [employer] [id number] - Rev 02/23.

## 2023-11-16 NOTE — PLAN OF CARE
Blood pressure is still elevated this am.  Nifedipine was given at 0930  will recheck in an hour  patient is feeling well and has minimal swelling  preeclampsia symptoms reviewed.  Patient verb understanding      Plan of Care Reviewed With: patient, spouse

## 2023-11-16 NOTE — PROGRESS NOTES
Updated Dr. Cross that patient has had two blood pressures of 141/81 and 152/89. Dr. Cross reported no new orders at this time. Patient is asymptomatic and blood pressures are to continue every 4 hours per policy.     Alex Hall RN on 11/16/2023 at 7:19 AM

## 2023-11-16 NOTE — PROGRESS NOTES
Verbal order from Dr. Cross to remove patient's wound vac. Wound vac removed at 2030. Patient tolerated removed. No drainage noted. Site is clean, dry, and intact. Incision is now open to air.     Alex Hall RN on 11/15/2023 at 8:51 PM

## 2023-11-17 ENCOUNTER — PATIENT OUTREACH (OUTPATIENT)
Dept: CARE COORDINATION | Facility: CLINIC | Age: 33
End: 2023-11-17
Payer: COMMERCIAL

## 2023-11-17 ENCOUNTER — TELEPHONE (OUTPATIENT)
Dept: OBGYN | Facility: CLINIC | Age: 33
End: 2023-11-17
Payer: COMMERCIAL

## 2023-11-17 NOTE — PROGRESS NOTES
[facility]    Background: Transitional Care Management program identified per system criteria and reviewed by [facility] team for possible outreach.    Assessment: Upon chart review, CCR Team member will not proceed with patient outreach related to this episode of Transitional Care Management program due to reason below:    Patient has active communication with a nurse, provider or care team for reason of post-hospital follow up plan.  Outreach call by CCR team not indicated to minimize duplicative efforts.     Plan: Transitional Care Management episode addressed appropriately per reason noted above.      Patience Singh  Community Health Worker  [facility]  [phone]      *Connected Care Resource Team does NOT follow patient ongoing. Referrals are identified based on internal discharge reports and the outreach is to ensure patient has an understanding of their discharge instructions.

## 2023-11-17 NOTE — TELEPHONE ENCOUNTER
"S-(situation): Pt calling per Dr. Hurt instruction to discuss BPs with RN    B-(background): ; repeat c/s on 23 with Dr. Lerner    A-(assessment): Pt reports feeling sore but \"decent\"   Pt denies HA, SOB, vision change, sudden swelling, n/v, RUQ pain  Pt reports bruising on stomach that MD was aware of prior to discharge and has not changed    Post discharge BP levels:  When she got home: 134/89  This mornin/85    R-(recommendations): Recommended continued monitoring   Discussed that if having BPs consistently in 140's/90's to call clinic or if 150's/100's go to ER for evaluation  Recommended ER visit for any symptoms including HA, vision change, RUQ pain, sudden swelling  Pt verbalizes understanding and agrees    Pt scheduled for BP check with Dr. Lerner on Monday,  at 1615  Pt scheduled for PP visit with Dr. Lerner on Dec 26th    Pt denies further questions or concerns  Nurse advise line phone number given for questions or concerns over the weekend or call clinic during clinic hours    N[facility] L[facility]  Ob[facility]Gyn Clinic      "
Reason for call:  Patient reporting a symptom    Symptom or request: Pt states [facility]  Was told to call to speak to RN today about her BP - has been really high since she came in and had her .  Does have a f/up BP check appt on Mon but Dr. Hurt wanted her to speak with nurse today to check in.    Duration (how long have symptoms been present):     Have you been treated for this before? Yes    Additional comments:     Phone Number patient can be reached at:  Home number on file [phone] (home)    Best Time:      Can we leave a detailed message on this number:  YES    Call taken on 2023 at 10:29 AM by Ashlie Shelley    
pt c/o 'fluttering of heart', AICD reached RRT.
AICD at End of life

## 2023-11-20 ENCOUNTER — PRENATAL OFFICE VISIT (OUTPATIENT)
Dept: OBGYN | Facility: CLINIC | Age: 33
End: 2023-11-20
Payer: COMMERCIAL

## 2023-11-20 VITALS
TEMPERATURE: 98.4 F | RESPIRATION RATE: 16 BRPM | BODY MASS INDEX: 35.95 KG/M2 | WEIGHT: 183.1 LBS | HEART RATE: 78 BPM | HEIGHT: 60 IN | DIASTOLIC BLOOD PRESSURE: 77 MMHG | SYSTOLIC BLOOD PRESSURE: 114 MMHG

## 2023-11-20 DIAGNOSIS — L21.9 SEBORRHEIC DERMATITIS: ICD-10-CM

## 2023-11-20 PROCEDURE — 99024 POSTOP FOLLOW-UP VISIT: CPT | Performed by: OBSTETRICS & GYNECOLOGY

## 2023-11-20 PROCEDURE — 99213 OFFICE O/P EST LOW 20 MIN: CPT | Mod: 24 | Performed by: OBSTETRICS & GYNECOLOGY

## 2023-11-20 RX ORDER — OXYCODONE HYDROCHLORIDE 5 MG/1
5-10 TABLET ORAL EVERY 4 HOURS PRN
Qty: 12 TABLET | Refills: 0 | Status: SHIPPED | OUTPATIENT
Start: 2023-11-20 | End: 2023-11-23

## 2023-11-20 RX ORDER — KETOCONAZOLE 20 MG/ML
SHAMPOO TOPICAL
Qty: 120 ML | Refills: 4 | Status: SHIPPED | OUTPATIENT
Start: 2023-11-20

## 2023-11-20 RX ORDER — KETOCONAZOLE 20 MG/G
CREAM TOPICAL DAILY
Qty: 30 G | Refills: 3 | Status: SHIPPED | OUTPATIENT
Start: 2023-11-20

## 2023-11-20 ASSESSMENT — ANXIETY QUESTIONNAIRES
7. FEELING AFRAID AS IF SOMETHING AWFUL MIGHT HAPPEN: NOT AT ALL
4. TROUBLE RELAXING: NOT AT ALL
IF YOU CHECKED OFF ANY PROBLEMS ON THIS QUESTIONNAIRE, HOW DIFFICULT HAVE THESE PROBLEMS MADE IT FOR YOU TO DO YOUR WORK, TAKE CARE OF THINGS AT HOME, OR GET ALONG WITH OTHER PEOPLE: NOT DIFFICULT AT ALL
GAD7 TOTAL SCORE: 0
1. FEELING NERVOUS, ANXIOUS, OR ON EDGE: NOT AT ALL
5. BEING SO RESTLESS THAT IT IS HARD TO SIT STILL: NOT AT ALL
3. WORRYING TOO MUCH ABOUT DIFFERENT THINGS: NOT AT ALL
6. BECOMING EASILY ANNOYED OR IRRITABLE: NOT AT ALL
GAD7 TOTAL SCORE: 0
2. NOT BEING ABLE TO STOP OR CONTROL WORRYING: NOT AT ALL

## 2023-11-20 ASSESSMENT — PATIENT HEALTH QUESTIONNAIRE - PHQ9
SUM OF ALL RESPONSES TO PHQ QUESTIONS 1-9: 0
SUM OF ALL RESPONSES TO PHQ QUESTIONS 1-9: 0
10. IF YOU CHECKED OFF ANY PROBLEMS, HOW DIFFICULT HAVE THESE PROBLEMS MADE IT FOR YOU TO DO YOUR WORK, TAKE CARE OF THINGS AT HOME, OR GET ALONG WITH OTHER PEOPLE: NOT DIFFICULT AT ALL

## 2023-11-20 NOTE — PROGRESS NOTES
[facility] OB/GYN    CC: Postpartum Visit - BP and wound check     S: Pt is overall doing ok, but has some pain and bruising over her mid abdomen she is worried about. A few days after delivery, she noted worsening pain just below the umbilbicus and to the right mostly. She developed a large area of ecchymosis, swelling and edema in that area. She is using ibuprofen, tylenol and roxicodone, but is out of the roxicodone and now pain is not controlled. Area has been stable since she first noticed and actually bruising is improved/turning more yellow/green. Eating, drinking, urinating and moving bowels without any issues. Lochia is scant. Mood is good. She has been compliant with her blood pressure meds. She has been giving herself twice daily lovenox in the left side to avoid this area of bruising.       O:   VS: Patient Vitals for the past 24 hrs:   BP Temp Pulse Resp Height Weight   11/20/23 1609 114/77 98.4  F (36.9  C) 78 16 1.524 m (5') 83.1 kg (183 lb 1.6 oz)     General: NAD  CV: Regular rate, warm and well perfused  Resp: breathing comfortably on room air   Abdomen: soft, appropriately tender, there is a [id number] area of ecchymosis on the mid-lower abdomen, slightly right of the umbilicus. This area is more tender to palpation, mildly swollen, but no erythematous or warm. He bruise appears to be dissolving and the edges are turning yellow/green.   Uterus is involuting well.   Incision is well-healing, c/d/i  Extremities: non-tender, trace b/l edema     A/P: 34 yo P2, POD#7 s/p RLTCS with BTL, wound/BP check  Area of ecchymosis likely a superficial bruise from fundal checks and anticoagulation. Since improving/not expanding will have her continue her lovenox as I believe VTE is a greater risk that this area of bruising/superficial hematoma. Plan CT scan to assess. Ok to decrease to once daily dosing of lovenox given weight decrease and fluid mobilization after surgery. I did discuss that these  are typicaly managed expectantly. Wound healing well, with no s/s of infection.   As far as her BPs, ok to spot medication today.   RTC in 1 week for BP check and close monitoring of hematoma.       Katharina Lerner MD, MD  [facility]   11/20/2023 4:22 PM    Answers submitted by the patient for this visit:  Patient Health Questionnaire (Submitted on 11/20/2023)  If you checked off any problems, how difficult have these problems made it for you to do your work, take care of things at home, or get along with other people?: Not difficult at all  PHQ9 TOTAL SCORE: 0  DEENA-7 (Submitted on 11/20/2023)  DEENA 7 TOTAL SCORE: 0

## 2023-11-21 ENCOUNTER — MYC MEDICAL ADVICE (OUTPATIENT)
Dept: OBGYN | Facility: CLINIC | Age: 33
End: 2023-11-21
Payer: COMMERCIAL

## 2023-11-21 DIAGNOSIS — K64.4 EXTERNAL HEMORRHOIDS: Primary | ICD-10-CM

## 2023-11-24 ENCOUNTER — HOSPITAL ENCOUNTER (OUTPATIENT)
Dept: CT IMAGING | Facility: CLINIC | Age: 33
Discharge: HOME | End: 2023-11-24
Attending: OBSTETRICS & GYNECOLOGY | Admitting: OBSTETRICS & GYNECOLOGY
Payer: COMMERCIAL

## 2023-11-24 PROCEDURE — 250N000009 HC RX 250: Performed by: RADIOLOGY

## 2023-11-24 PROCEDURE — 250N000011 HC RX IP 250 OP 636: Performed by: RADIOLOGY

## 2023-11-24 PROCEDURE — 74177 CT ABD & PELVIS W/CONTRAST: CPT

## 2023-11-24 RX ORDER — HYDROCORTISONE 25 MG/G
CREAM TOPICAL 3 TIMES DAILY PRN
Qty: 30 G | Refills: 1 | Status: SHIPPED | OUTPATIENT
Start: 2023-11-24 | End: 2023-12-21

## 2023-11-24 RX ORDER — IOPAMIDOL 755 MG/ML
90 INJECTION, SOLUTION INTRAVASCULAR ONCE
Status: COMPLETED | OUTPATIENT
Start: 2023-11-24 | End: 2023-11-24

## 2023-11-24 RX ADMIN — SODIUM CHLORIDE 62 ML: 9 INJECTION, SOLUTION INTRAVENOUS at 08:58

## 2023-11-24 RX ADMIN — IOPAMIDOL 90 ML: 755 INJECTION, SOLUTION INTRAVENOUS at 08:58

## 2023-11-24 NOTE — TELEPHONE ENCOUNTER
PP pt has appt next week Tuesday.    Has hemorrhoids that are really bothering her, requests the Anusol be sent as she had used this previously.    Demetria RED RN BSN

## 2023-11-28 ENCOUNTER — PRENATAL OFFICE VISIT (OUTPATIENT)
Dept: OBGYN | Facility: CLINIC | Age: 33
End: 2023-11-28
Payer: COMMERCIAL

## 2023-11-28 VITALS
HEART RATE: 101 BPM | SYSTOLIC BLOOD PRESSURE: 121 MMHG | TEMPERATURE: 98.5 F | BODY MASS INDEX: 34.83 KG/M2 | HEIGHT: 60 IN | RESPIRATION RATE: 16 BRPM | WEIGHT: 177.4 LBS | DIASTOLIC BLOOD PRESSURE: 80 MMHG

## 2023-11-28 DIAGNOSIS — K64.4 EXTERNAL HEMORRHOIDS: Primary | ICD-10-CM

## 2023-11-28 PROCEDURE — 99024 POSTOP FOLLOW-UP VISIT: CPT | Performed by: OBSTETRICS & GYNECOLOGY

## 2023-11-28 ASSESSMENT — ANXIETY QUESTIONNAIRES
7. FEELING AFRAID AS IF SOMETHING AWFUL MIGHT HAPPEN: NOT AT ALL
3. WORRYING TOO MUCH ABOUT DIFFERENT THINGS: NOT AT ALL
1. FEELING NERVOUS, ANXIOUS, OR ON EDGE: NOT AT ALL
6. BECOMING EASILY ANNOYED OR IRRITABLE: NOT AT ALL
GAD7 TOTAL SCORE: 0
2. NOT BEING ABLE TO STOP OR CONTROL WORRYING: NOT AT ALL
IF YOU CHECKED OFF ANY PROBLEMS ON THIS QUESTIONNAIRE, HOW DIFFICULT HAVE THESE PROBLEMS MADE IT FOR YOU TO DO YOUR WORK, TAKE CARE OF THINGS AT HOME, OR GET ALONG WITH OTHER PEOPLE: NOT DIFFICULT AT ALL
GAD7 TOTAL SCORE: 0
5. BEING SO RESTLESS THAT IT IS HARD TO SIT STILL: NOT AT ALL

## 2023-11-28 ASSESSMENT — PATIENT HEALTH QUESTIONNAIRE - PHQ9
5. POOR APPETITE OR OVEREATING: NOT AT ALL
SUM OF ALL RESPONSES TO PHQ QUESTIONS 1-9: 2

## 2023-11-28 NOTE — PROGRESS NOTES
Initial /80 (BP Location: Left arm, Patient Position: Chair, Cuff Size: Adult Regular)   Pulse 101   Temp 98.5  F (36.9  C) (Tympanic)   Resp 16   Ht 1.524 m (5')   Wt 80.5 kg (177 lb 6.4 oz)   LMP 02/02/2023   Breastfeeding No   BMI 34.65 kg/m   Estimated body mass index is 34.65 kg/m  as calculated from the following:    Height as of this encounter: 1.524 m (5').    Weight as of this encounter: 80.5 kg (177 lb 6.4 oz). .

## 2023-11-28 NOTE — PROGRESS NOTES
[facility] OB/GYN     CC: Postpartum Visit - BP and wound check      S: Ana Maria reports she is feeling much improved. Her pain is largely resolved and now she is only needing tylenol. Ecchymosis is improving. Eating, drinking, urinating and moving bowels without any issues. Lochia is scant, but she did note a gush of BRB this week, then resolved to light bleeding. Mood is good. Off BP meds. Decreased to once daily lovenox. Mostly bothered by the large hemorrhoid.       O:   VS: /80 (BP Location: Left arm, Patient Position: Chair, Cuff Size: Adult Regular)   Pulse 101   Temp 98.5  F (36.9  C) (Tympanic)   Resp 16   Ht 1.524 m (5')   Wt 80.5 kg (177 lb 6.4 oz)   LMP 02/02/2023   Breastfeeding No   BMI 34.65 kg/m      General: NAD  CV: Regular rate, warm and well perfused  Resp: breathing comfortably on room air   Abdomen: soft, significant improvement in ecchymosis - largely resolved.   Uterus is involuting well.   Incision is well-healing, c/d/I, palpation along the fascia revealed no obvious defect, but exam is limited by habitus and patient discomfort   Perineum with large 3x4cm non-thrombosed external hemorroid   Extremities: non-tender, no b/l edema      A/P: 32 yo P2, POD#15 s/p RLTCS with BTL, wound/BP check  CT scan of ?hematoma without drainable area - significant clinical improvement so planm for expectant management  Wound healing well, with no s/s of infection. No obvious signs of fascia defect over pfannesteil incision, but CT scan reported this. Will refer to general surgery to have them weigh in on this.   Will also refer for management of large hemorroids  BPs normal off of medications.     RTC for routine 6 wk pp visit.       Katharina Lerner MD, MD  [facility]

## 2023-11-30 ENCOUNTER — OFFICE VISIT (OUTPATIENT)
Dept: SURGERY | Facility: CLINIC | Age: 33
End: 2023-11-30
Payer: COMMERCIAL

## 2023-11-30 VITALS
TEMPERATURE: 98.8 F | HEART RATE: 106 BPM | BODY MASS INDEX: 34.75 KG/M2 | OXYGEN SATURATION: 97 % | HEIGHT: 60 IN | DIASTOLIC BLOOD PRESSURE: 86 MMHG | SYSTOLIC BLOOD PRESSURE: 133 MMHG | WEIGHT: 177 LBS

## 2023-11-30 DIAGNOSIS — K64.4 EXTERNAL HEMORRHOIDS: ICD-10-CM

## 2023-11-30 DIAGNOSIS — K43.2 INCISIONAL HERNIA, WITHOUT OBSTRUCTION OR GANGRENE: Primary | ICD-10-CM

## 2023-11-30 PROCEDURE — 99203 OFFICE O/P NEW LOW 30 MIN: CPT | Performed by: SURGERY

## 2023-11-30 ASSESSMENT — PAIN SCALES - GENERAL: PAINLEVEL: MODERATE PAIN (4)

## 2023-11-30 NOTE — LETTER
2023         RE: Ana Maria Espinoza  [address] [place] [id number]        Dear Colleague,    Thank you for referring your patient, Ana Maria Espinoza, to the [facility]. Please see a copy of my visit note below.    Surgical Consultation/History and Physical  [facility]    Ana Maria is seen in consultation for Hernia, at the request of Susana Mccollum, SY HENSON.    Chief Complaint:  Hernia    History of Present Illness: Ana Maria Espinoza is a 33 year old female presents with Hernia.  Patient is 2 weeks post-partum from caesarean section.  She admits bruising of her low abdomen and ongoing issues with hemorrhoids.  She states she had them earlier in pregnancy and they worsened at conclusion.  She admits minimal pain at site of bulge in lower abdomen.  No nausea, vomiting, fevers, chills.  She has been having normal bowel function.  She has not attempted any interventions for her hemorrhoidal disease.  Patient is actively smoking.  She has a history of C section x 3 and PPTL.  No history of hernia repairs.  She has a history of Factor V Leiden.    Patient Active Problem List   Diagnosis     Tobacco use     H/O herpes simplex type 2 infection     Factor V Leiden mutation (H24)     Renal infarction (H24)     ADHD (attention deficit hyperactivity disorder)     Chronic low back pain     Contact dermatitis due to chemicals     Dyslipidemia     GERD (gastroesophageal reflux disease)     Hip pain     History of  section     History of chronic back pain     Knee strain     Lumbar strain     Mild persistent asthma without complication     Pain in left hand     Pain medication agreement     Patellar dislocation     Reduced libido     Sciatica associated with disorder of lumbar spine     Tonsillar hypertrophy     Vitamin D deficiency     Wrist strain     Microscopic hematuria     Chronic, continuous use of opioids     Pre-eclampsia     Anemia due to blood loss, acute      Gestational hypertension       Past Medical History:   Diagnosis Date     ADHD (attention deficit hyperactivity disorder)      Blood clot in bladder 2001    right kidney involved and went into kidney failure     Chickenpox     x2     Chlamydia      Factor V Leiden (H24)      GERD (gastroesophageal reflux disease)      History of urinary tract infection      Postpartum depression 2009    loss of child     Pre-eclampsia 10/15/2023     Renal failure     secondary to blood clot in      Uncomplicated asthma        Past Surgical History:   Procedure Laterality Date     ABDOMEN SURGERY      2 c sections  @       SECTION, TUBAL LIGATION, COMBINED N/A 2023    Procedure:  SECTION, WITH POSTPARTUM TUBAL LIGATION;  Surgeon: Katharina Lerner MD;  Location: [facility]     ESOPHAGOSCOPY, GASTROSCOPY, DUODENOSCOPY (EGD), COMBINED N/A 2021    Procedure: ESOPHAGOGASTRODUODENOSCOPY, WITH BIOPSY;  Surgeon: German Flynn MD;  Location: [facility]     ORTHOPEDIC SURGERY      Had surgery on both big toes     TONSILLECTOMY         Family History   Problem Relation Age of Onset     Depression Mother      Mental Illness Mother 56        suicide-     Obesity Mother      Connective Tissue Disorder Mother         EDS     Alcoholism Mother      Diabetes Father      Obesity Father      Deep Vein Thrombosis Father         double amputee below knee     Asthma Sister      Thyroid Disease Sister      Obesity Sister      Asthma Brother      Obesity Brother      Obesity Paternal Grandmother      Heart Defect Daughter          at 7 weeks after 3 heart surgeries     Genetic Disorder Daughter         Avascular necrosis and Legg calve perthes disease     Connective Tissue Disorder Maternal Cousin         EDS     Connective Tissue Disorder Maternal Cousin         EDS     Social History     Tobacco Use     Smoking status: Every Day     Packs/day: 0.20     Years: 10.00     Additional pack years: 0.00      Total pack years: 2.00     Types: Cigarettes     Start date: 3/19/2009     Smokeless tobacco: Never     Tobacco comments:     Down to 2-3 cig/day with pregnancy   Substance Use Topics     Alcohol use: No      History   Drug Use Unknown     Comment: quit with pregnancy     Current Outpatient Medications   Medication Sig Dispense Refill     acetaminophen (TYLENOL) 325 MG tablet Take 2 tablets (650 mg) by mouth every 6 hours as needed for mild pain Start after Delivery. 100 tablet 0     enoxaparin ANTICOAGULANT (LOVENOX) 40 MG/0.4ML syringe Inject 0.4 mLs (40 mg) Subcutaneous every 12 hours for 42 days 33.6 mL 0     hydrocortisone, Perianal, (HYDROCORTISONE) 2.5 % cream Place rectally 3 times daily as needed for hemorrhoids 30 g 1     ibuprofen (ADVIL/MOTRIN) 600 MG tablet Take 1 tablet (600 mg) by mouth every 6 hours as needed for moderate pain Start after delivery 60 tablet 0     ketoconazole (NIZORAL) 2 % external cream Apply topically daily 30 g 3     ketoconazole (NIZORAL) 2 % external shampoo Use shampoo daily for flaky skin 120 mL 4     Prenatal Vit-Fe Fumarate-FA (PRENATAL MULTIVITAMIN W/IRON) 27-0.8 MG tablet Take 1 tablet by mouth daily       albuterol (PROAIR HFA/PROVENTIL HFA/VENTOLIN HFA) 108 (90 Base) MCG/ACT inhaler Inhale 1-2 puffs into the lungs every 6 hours as needed for shortness of breath or wheezing (Patient not taking: Reported on 11/28/2023) 16 g 1     NIFEdipine ER OSMOTIC (ADALAT CC) 30 MG 24 hr tablet Take 1 tablet (30 mg) by mouth daily (Patient not taking: Reported on 11/28/2023) 30 tablet 1     omeprazole (PRILOSEC) 40 MG DR capsule Take 1 capsule (40 mg) by mouth daily (Patient not taking: Reported on 11/20/2023) 90 capsule 4     polyethylene glycol (MIRALAX) 17 GM/Dose powder Take 17 g (1 Capful) by mouth daily (Patient not taking: Reported on 11/30/2023) 527 g 0     Allergies   Allergen Reactions     Bee Venom Anaphylaxis     Has epipen     Aloe Hives     Dry skin as well       Codeine Rash     Review of Systems:   10 point ROS otherwise negative    Physical Exam:  /86 (BP Location: Right arm, Patient Position: Chair, Cuff Size: Adult Regular)   Pulse 106   Temp 98.8  F (37.1  C) (Tympanic)   Ht 1.524 m (5')   Wt 80.3 kg (177 lb)   LMP 2023   SpO2 97%   BMI 34.57 kg/m      Constitutional- No acute distress, well nourished, non-toxic  Eyes: Anicteric, no injection.  PERRL  ENT:  Normocephalic, atraumatic, Nose midline, moist mucus membranes  Neck - supple, no LAD  Respiratory- Good inspiratory effort  Cardiovascular - No peripheral edema.  No clubbing.  Abdomen - Soft, non-tender, +BS, at inferior portion of abdomen, subtle defect detect which spontaneously reduces.  Rectal - good tone, no masses, Examined with Isi Villalpando MA.  Thrombosed, draining external hemorrhoids.  Neuro - No focal neuro deficits, Alert and oriented x 3  Psych: Appropriate mood and affect  Musculoskeletal: Normal gait, symmetric strength.  FROM upper and lower extremities.  Skin: Warm, Dry    CT Abdomen/Pelvis:  TECHNIQUE: CT scan of the abdomen and pelvis was performed following  injection of IV contrast. Multiplanar reformats were obtained. Dose  reduction techniques were used.  CONTRAST: 90mL of Isovue 370     COMPARISON: 2021     FINDINGS:   LOWER CHEST: Normal.     HEPATOBILIARY: Normal.     PANCREAS: Normal.     SPLEEN: Normal.     ADRENAL GLANDS: Normal.     KIDNEYS/BLADDER: Since 2021 multiple cortical scars and moderate  volume loss of the right kidney has developed. No hydronephrosis or  urinary calculi. No acute renal findings. The left kidney appears  normal. The bladder is decompressed.     BOWEL: Very tiny sliding-type hiatal hernia of doubtful clinical  significance. Bowel loops are normal caliber. There is herniation of  small bowel loops through the abdominal wall at the site of the recent   section. No obstruction. The abdominal wall defect at the  site of the  incisional hernia measures 4.8 cm in the craniocaudal  dimension and 3.9 cm in the transverse dimension. There is mild soft  tissue fullness and heterogeneity of the right rectus abdominis  musculature inferiorly at the level of the recent incision compatible  with a small amount of dissecting blood products. A well-defined  drainable hematoma is not identified. No active bleeding is  visualized.     PELVIC ORGANS: Heterogeneous enlarged uterus consistent with recent  pregnancy. The ovaries are normal in size. The gonadal veins are  patent.     ADDITIONAL FINDINGS: Sliver of free fluid in the pelvis is within  physiologic amounts. No adenopathy. Normal caliber abdominal aorta.     MUSCULOSKELETAL: No significant osseous pathology.                                                                      IMPRESSION:   1.  Incisional hernia at the site of the recent  section  containing a few loops of small bowel. No obstruction. Dimensions  provided above.  2.  Small amount of dissecting blood products within the right rectus  sheath inferiorly at the site of the recent incision. No large  drainable hematoma or active bleeding.  3.  Enlarged heterogeneous uterus consistent with recent pregnancy.  4.  Multiple cortical scars and moderate volume loss of the right  kidney have developed since 2021. Medical records indicate she  had acute infarcts diagnosed at the time of the prior scan. Findings  are consistent with the residual sequela. No acute renal pathology.     LAURA LONGORIA MD     Assessment:  1. Thrombosed external hemorrhoids, subacute  2. Incisional hernia     Plan:   Ana Maria is a 34 yo woman with subacute thrombosed external hemorrhoids.  These are draining and would not benefit from incision and drainage at this time.  I discussed conservative measures and reviewed CONSUELO guide in detail.    She also has an incisional hernia which will likely need repair.  She is actively smoking and working on  quitting.  I reviewed the KRAMES hernia guide with her in detail.  She would be an excellent candidate for a robotic approach given location.  I advised this would be best done after her scar tissue has matured from her surgery and she has obtained her pre-pregnancy weight.  I will see her back in 3 months to plan surgery.  Advised of signs/symptoms of gangrene/obstruction.     Renny Moreno,  on 11/30/2023 at 9:56 AM      Again, thank you for allowing me to participate in the care of your patient.        Sincerely,        Renny Moreno, DO

## 2023-11-30 NOTE — PROGRESS NOTES
Surgical Consultation/History and Physical  [facility]    Ana Maria is seen in consultation for Hernia, at the request of SY Sebastian CNP.    Chief Complaint:  Hernia    History of Present Illness: Ana Maria Espinoza is a 33 year old female presents with Hernia.  Patient is 2 weeks post-partum from caesarean section.  She admits bruising of her low abdomen and ongoing issues with hemorrhoids.  She states she had them earlier in pregnancy and they worsened at conclusion.  She admits minimal pain at site of bulge in lower abdomen.  No nausea, vomiting, fevers, chills.  She has been having normal bowel function.  She has not attempted any interventions for her hemorrhoidal disease.  Patient is actively smoking.  She has a history of C section x 3 and PPTL.  No history of hernia repairs.  She has a history of Factor V Leiden.    Patient Active Problem List   Diagnosis    Tobacco use    H/O herpes simplex type 2 infection    Factor V Leiden mutation (H24)    Renal infarction (H24)    ADHD (attention deficit hyperactivity disorder)    Chronic low back pain    Contact dermatitis due to chemicals    Dyslipidemia    GERD (gastroesophageal reflux disease)    Hip pain    History of  section    History of chronic back pain    Knee strain    Lumbar strain    Mild persistent asthma without complication    Pain in left hand    Pain medication agreement    Patellar dislocation    Reduced libido    Sciatica associated with disorder of lumbar spine    Tonsillar hypertrophy    Vitamin D deficiency    Wrist strain    Microscopic hematuria    Chronic, continuous use of opioids    Pre-eclampsia    Anemia due to blood loss, acute    Gestational hypertension       Past Medical History:   Diagnosis Date    ADHD (attention deficit hyperactivity disorder)     Blood clot in bladder 2001    right kidney involved and went into kidney failure    Chickenpox     x2    Chlamydia     Factor V Leiden (H24)      GERD (gastroesophageal reflux disease)     History of urinary tract infection     Postpartum depression 2009    loss of child    Pre-eclampsia 10/15/2023    Renal failure     secondary to blood clot in     Uncomplicated asthma        Past Surgical History:   Procedure Laterality Date    ABDOMEN SURGERY      2 c sections  @      SECTION, TUBAL LIGATION, COMBINED N/A 2023    Procedure:  SECTION, WITH POSTPARTUM TUBAL LIGATION;  Surgeon: Katharina Lerner MD;  Location: [facility]    ESOPHAGOSCOPY, GASTROSCOPY, DUODENOSCOPY (EGD), COMBINED N/A 2021    Procedure: ESOPHAGOGASTRODUODENOSCOPY, WITH BIOPSY;  Surgeon: German Flynn MD;  Location: [facility]    ORTHOPEDIC SURGERY      Had surgery on both big toes    TONSILLECTOMY         Family History   Problem Relation Age of Onset    Depression Mother     Mental Illness Mother 56        suicide-    Obesity Mother     Connective Tissue Disorder Mother         EDS    Alcoholism Mother     Diabetes Father     Obesity Father     Deep Vein Thrombosis Father         double amputee below knee    Asthma Sister     Thyroid Disease Sister     Obesity Sister     Asthma Brother     Obesity Brother     Obesity Paternal Grandmother     Heart Defect Daughter          at 7 weeks after 3 heart surgeries    Genetic Disorder Daughter         Avascular necrosis and Legg calve perthes disease    Connective Tissue Disorder Maternal Cousin         EDS    Connective Tissue Disorder Maternal Cousin         EDS     Social History     Tobacco Use    Smoking status: Every Day     Packs/day: 0.20     Years: 10.00     Additional pack years: 0.00     Total pack years: 2.00     Types: Cigarettes     Start date: 3/19/2009    Smokeless tobacco: Never    Tobacco comments:     Down to 2-3 cig/day with pregnancy   Substance Use Topics    Alcohol use: No      History   Drug Use Unknown     Comment: quit with pregnancy     Current Outpatient Medications    Medication Sig Dispense Refill    acetaminophen (TYLENOL) 325 MG tablet Take 2 tablets (650 mg) by mouth every 6 hours as needed for mild pain Start after Delivery. 100 tablet 0    enoxaparin ANTICOAGULANT (LOVENOX) 40 MG/0.4ML syringe Inject 0.4 mLs (40 mg) Subcutaneous every 12 hours for 42 days 33.6 mL 0    hydrocortisone, Perianal, (HYDROCORTISONE) 2.5 % cream Place rectally 3 times daily as needed for hemorrhoids 30 g 1    ibuprofen (ADVIL/MOTRIN) 600 MG tablet Take 1 tablet (600 mg) by mouth every 6 hours as needed for moderate pain Start after delivery 60 tablet 0    ketoconazole (NIZORAL) 2 % external cream Apply topically daily 30 g 3    ketoconazole (NIZORAL) 2 % external shampoo Use shampoo daily for flaky skin 120 mL 4    Prenatal Vit-Fe Fumarate-FA (PRENATAL MULTIVITAMIN W/IRON) 27-0.8 MG tablet Take 1 tablet by mouth daily      albuterol (PROAIR HFA/PROVENTIL HFA/VENTOLIN HFA) 108 (90 Base) MCG/ACT inhaler Inhale 1-2 puffs into the lungs every 6 hours as needed for shortness of breath or wheezing (Patient not taking: Reported on 11/28/2023) 16 g 1    NIFEdipine ER OSMOTIC (ADALAT CC) 30 MG 24 hr tablet Take 1 tablet (30 mg) by mouth daily (Patient not taking: Reported on 11/28/2023) 30 tablet 1    omeprazole (PRILOSEC) 40 MG DR capsule Take 1 capsule (40 mg) by mouth daily (Patient not taking: Reported on 11/20/2023) 90 capsule 4    polyethylene glycol (MIRALAX) 17 GM/Dose powder Take 17 g (1 Capful) by mouth daily (Patient not taking: Reported on 11/30/2023) 527 g 0     Allergies   Allergen Reactions    Bee Venom Anaphylaxis     Has epipen    Aloe Hives     Dry skin as well     Codeine Rash     Review of Systems:   10 point ROS otherwise negative    Physical Exam:  /86 (BP Location: Right arm, Patient Position: Chair, Cuff Size: Adult Regular)   Pulse 106   Temp 98.8  F (37.1  C) (Tympanic)   Ht 1.524 m (5')   Wt 80.3 kg (177 lb)   LMP 02/02/2023   SpO2 97%   BMI 34.57 kg/m       Constitutional- No acute distress, well nourished, non-toxic  Eyes: Anicteric, no injection.  PERRL  ENT:  Normocephalic, atraumatic, Nose midline, moist mucus membranes  Neck - supple, no LAD  Respiratory- Good inspiratory effort  Cardiovascular - No peripheral edema.  No clubbing.  Abdomen - Soft, non-tender, +BS, at inferior portion of abdomen, subtle defect detect which spontaneously reduces.  Rectal - good tone, no masses, Examined with Isi Villalpando MA.  Thrombosed, draining external hemorrhoids.  Neuro - No focal neuro deficits, Alert and oriented x 3  Psych: Appropriate mood and affect  Musculoskeletal: Normal gait, symmetric strength.  FROM upper and lower extremities.  Skin: Warm, Dry    CT Abdomen/Pelvis:  TECHNIQUE: CT scan of the abdomen and pelvis was performed following  injection of IV contrast. Multiplanar reformats were obtained. Dose  reduction techniques were used.  CONTRAST: 90mL of Isovue 370     COMPARISON: 2021     FINDINGS:   LOWER CHEST: Normal.     HEPATOBILIARY: Normal.     PANCREAS: Normal.     SPLEEN: Normal.     ADRENAL GLANDS: Normal.     KIDNEYS/BLADDER: Since 2021 multiple cortical scars and moderate  volume loss of the right kidney has developed. No hydronephrosis or  urinary calculi. No acute renal findings. The left kidney appears  normal. The bladder is decompressed.     BOWEL: Very tiny sliding-type hiatal hernia of doubtful clinical  significance. Bowel loops are normal caliber. There is herniation of  small bowel loops through the abdominal wall at the site of the recent   section. No obstruction. The abdominal wall defect at the  site of the incisional hernia measures 4.8 cm in the craniocaudal  dimension and 3.9 cm in the transverse dimension. There is mild soft  tissue fullness and heterogeneity of the right rectus abdominis  musculature inferiorly at the level of the recent incision compatible  with a small amount of dissecting blood products. A  well-defined  drainable hematoma is not identified. No active bleeding is  visualized.     PELVIC ORGANS: Heterogeneous enlarged uterus consistent with recent  pregnancy. The ovaries are normal in size. The gonadal veins are  patent.     ADDITIONAL FINDINGS: Sliver of free fluid in the pelvis is within  physiologic amounts. No adenopathy. Normal caliber abdominal aorta.     MUSCULOSKELETAL: No significant osseous pathology.                                                                      IMPRESSION:   1.  Incisional hernia at the site of the recent  section  containing a few loops of small bowel. No obstruction. Dimensions  provided above.  2.  Small amount of dissecting blood products within the right rectus  sheath inferiorly at the site of the recent incision. No large  drainable hematoma or active bleeding.  3.  Enlarged heterogeneous uterus consistent with recent pregnancy.  4.  Multiple cortical scars and moderate volume loss of the right  kidney have developed since 2021. Medical records indicate she  had acute infarcts diagnosed at the time of the prior scan. Findings  are consistent with the residual sequela. No acute renal pathology.     LAURA LONGORIA MD     Assessment:  1. Thrombosed external hemorrhoids, subacute  2. Incisional hernia     Plan:   Ana Maria is a 32 yo woman with subacute thrombosed external hemorrhoids.  These are draining and would not benefit from incision and drainage at this time.  I discussed conservative measures and reviewed KRAMES guide in detail.    She also has an incisional hernia which will likely need repair.  She is actively smoking and working on quitting.  I reviewed the KRAMES hernia guide with her in detail.  She would be an excellent candidate for a robotic approach given location.  I advised this would be best done after her scar tissue has matured from her surgery and she has obtained her pre-pregnancy weight.  I will see her back in 3 months to plan  surgery.  Advised of signs/symptoms of gangrene/obstruction.     Renny Moreno,  on 11/30/2023 at 9:56 AM

## 2023-11-30 NOTE — NURSING NOTE
Initial /86 (BP Location: Right arm, Patient Position: Chair, Cuff Size: Adult Regular)   Pulse 106   Temp 98.8  F (37.1  C) (Tympanic)   Ht 1.524 m (5')   Wt 80.3 kg (177 lb)   LMP 02/02/2023   SpO2 97%   BMI 34.57 kg/m   Estimated body mass index is 34.57 kg/m  as calculated from the following:    Height as of this encounter: 1.524 m (5').    Weight as of this encounter: 80.3 kg (177 lb). .  Bam Colindres on 11/30/2023 at 9:56 AM

## 2023-12-12 ENCOUNTER — MEDICAL CORRESPONDENCE (OUTPATIENT)
Dept: HEALTH INFORMATION MANAGEMENT | Facility: CLINIC | Age: 33
End: 2023-12-12
Payer: COMMERCIAL

## 2023-12-18 ENCOUNTER — MYC MEDICAL ADVICE (OUTPATIENT)
Dept: SURGERY | Facility: CLINIC | Age: 33
End: 2023-12-18
Payer: COMMERCIAL

## 2023-12-18 ENCOUNTER — TELEPHONE (OUTPATIENT)
Dept: SURGERY | Facility: CLINIC | Age: 33
End: 2023-12-18
Payer: COMMERCIAL

## 2023-12-18 NOTE — TELEPHONE ENCOUNTER
Reason for call:  Other   Patient called regarding (reason for call): call back    Additional comments: Pt says she still has lump and is really sore to the touch near her c section scar she's not sure if its a new issue or still relating to the hernia she was seen for end of November. She's not sure if she should come in before her next appt 2/29 or if it can wait and would like a call to discuss her issues. Pt states her ob told her to contact nazario.     Phone number to reach patient:  Cell number on file:    Telephone Information:   Mobile [phone]       Best Time:  anytime    Can we leave a detailed message on this number?  YES    Travel screening: Not Applicable

## 2023-12-18 NOTE — TELEPHONE ENCOUNTER
Patient would like a call back from Dr. Moreno or nurse to discuss increasing issues with her hernia. Please advise, thank you.

## 2023-12-19 NOTE — TELEPHONE ENCOUNTER
Provider responded moments after to this duplicate message regarding her issue in her mychart.    Another mychart sent asking if she would like an appt as Dr Moreno suggested.  Sushma BROWN   [facility]

## 2023-12-21 ENCOUNTER — OFFICE VISIT (OUTPATIENT)
Dept: SURGERY | Facility: CLINIC | Age: 33
End: 2023-12-21
Payer: COMMERCIAL

## 2023-12-21 VITALS
TEMPERATURE: 98.5 F | OXYGEN SATURATION: 100 % | SYSTOLIC BLOOD PRESSURE: 132 MMHG | HEART RATE: 97 BPM | HEIGHT: 60 IN | DIASTOLIC BLOOD PRESSURE: 85 MMHG | BODY MASS INDEX: 34.75 KG/M2 | WEIGHT: 177 LBS

## 2023-12-21 DIAGNOSIS — K43.2 INCISIONAL HERNIA, WITHOUT OBSTRUCTION OR GANGRENE: Primary | ICD-10-CM

## 2023-12-21 PROCEDURE — 99212 OFFICE O/P EST SF 10 MIN: CPT | Performed by: SURGERY

## 2023-12-21 ASSESSMENT — PAIN SCALES - GENERAL: PAINLEVEL: SEVERE PAIN (7)

## 2023-12-21 NOTE — LETTER
12/21/2023         RE: Ana Maria Espinoza  [address] [facility] [address]        Dear Colleague,    Thank you for referring your patient, Ana Maria Espinoza, to the [facility]. Please see a copy of my visit note below.    General Surgery Progress    Pt returns for follow up of hernia    Patient had episode of emesis and pain x1.  Last vomited 2 days prior.  Since that time passing gas, having bowel function, eating without issue, pain has improved.  Denies distension or worsening of hernia bulge.  No other changes since last visit.    Physical exam: Vitals: /85   Pulse 97   Temp 98.5  F (36.9  C) (Tympanic)   Ht 1.524 m (5')   Wt 80.3 kg (177 lb)   LMP 02/02/2023   SpO2 100%   BMI 34.57 kg/m    BMI= Body mass index is 34.57 kg/m .    Exam:  Constitutional: healthy, alert, and no distress  Cardiovascular: negative  Respiratory: negative  Gastrointestinal: Abdomen soft, non-tender. BS normal. Hernia defect right of midline low, reducible, organomegaly      Assessment:     ICD-10-CM    1. Incisional hernia, without obstruction or gangrene  K43.2         Plan: Ana Maria presents for follow-up of incisional hernia.  She had a single episode of emesis with some increased hernia pain at this site several days ago.  Her exam is benign and her symptoms have resolved.  She is 6 weeks out from her C section.  I advised waiting as long as possible to allow scar tissue to mature.  I would ideally like her at her pre-pregnancy weight as well.  Advised of signs/symptoms of incarceration/strangulation and need for prompt evaluation.  All questions answered.  She is scheduled in 2 months for hopeful surgical planning.    Renny Moreno DO on 12/21/2023 at 8:19 AM        Again, thank you for allowing me to participate in the care of your patient.        Sincerely,        Renny Moreno DO

## 2023-12-21 NOTE — PROGRESS NOTES
General Surgery Progress    Pt returns for follow up of hernia    Patient had episode of emesis and pain x1.  Last vomited 2 days prior.  Since that time passing gas, having bowel function, eating without issue, pain has improved.  Denies distension or worsening of hernia bulge.  No other changes since last visit.    Physical exam: Vitals: /85   Pulse 97   Temp 98.5  F (36.9  C) (Tympanic)   Ht 1.524 m (5')   Wt 80.3 kg (177 lb)   LMP 02/02/2023   SpO2 100%   BMI 34.57 kg/m    BMI= Body mass index is 34.57 kg/m .    Exam:  Constitutional: healthy, alert, and no distress  Cardiovascular: negative  Respiratory: negative  Gastrointestinal: Abdomen soft, non-tender. BS normal. Hernia defect right of midline low, reducible, organomegaly      Assessment:     ICD-10-CM    1. Incisional hernia, without obstruction or gangrene  K43.2         Plan: Ana Maria presents for follow-up of incisional hernia.  She had a single episode of emesis with some increased hernia pain at this site several days ago.  Her exam is benign and her symptoms have resolved.  She is 6 weeks out from her C section.  I advised waiting as long as possible to allow scar tissue to mature.  I would ideally like her at her pre-pregnancy weight as well.  Advised of signs/symptoms of incarceration/strangulation and need for prompt evaluation.  All questions answered.  She is scheduled in 2 months for hopeful surgical planning.    Renny Moreno, DO on 12/21/2023 at 8:19 AM

## 2023-12-26 ENCOUNTER — PRENATAL OFFICE VISIT (OUTPATIENT)
Dept: OBGYN | Facility: CLINIC | Age: 33
End: 2023-12-26
Payer: COMMERCIAL

## 2023-12-26 VITALS
BODY MASS INDEX: 34.81 KG/M2 | HEART RATE: 84 BPM | SYSTOLIC BLOOD PRESSURE: 119 MMHG | HEIGHT: 60 IN | WEIGHT: 177.3 LBS | RESPIRATION RATE: 14 BRPM | TEMPERATURE: 98.3 F | DIASTOLIC BLOOD PRESSURE: 74 MMHG

## 2023-12-26 PROCEDURE — 99207 PR POST PARTUM EXAM: CPT | Performed by: OBSTETRICS & GYNECOLOGY

## 2023-12-26 ASSESSMENT — PATIENT HEALTH QUESTIONNAIRE - PHQ9
10. IF YOU CHECKED OFF ANY PROBLEMS, HOW DIFFICULT HAVE THESE PROBLEMS MADE IT FOR YOU TO DO YOUR WORK, TAKE CARE OF THINGS AT HOME, OR GET ALONG WITH OTHER PEOPLE: NOT DIFFICULT AT ALL
SUM OF ALL RESPONSES TO PHQ QUESTIONS 1-9: 0
SUM OF ALL RESPONSES TO PHQ QUESTIONS 1-9: 0

## 2023-12-26 ASSESSMENT — ANXIETY QUESTIONNAIRES
5. BEING SO RESTLESS THAT IT IS HARD TO SIT STILL: NOT AT ALL
3. WORRYING TOO MUCH ABOUT DIFFERENT THINGS: NOT AT ALL
6. BECOMING EASILY ANNOYED OR IRRITABLE: NOT AT ALL
GAD7 TOTAL SCORE: 0
GAD7 TOTAL SCORE: 0
4. TROUBLE RELAXING: NOT AT ALL
7. FEELING AFRAID AS IF SOMETHING AWFUL MIGHT HAPPEN: NOT AT ALL
IF YOU CHECKED OFF ANY PROBLEMS ON THIS QUESTIONNAIRE, HOW DIFFICULT HAVE THESE PROBLEMS MADE IT FOR YOU TO DO YOUR WORK, TAKE CARE OF THINGS AT HOME, OR GET ALONG WITH OTHER PEOPLE: NOT DIFFICULT AT ALL
1. FEELING NERVOUS, ANXIOUS, OR ON EDGE: NOT AT ALL
2. NOT BEING ABLE TO STOP OR CONTROL WORRYING: NOT AT ALL

## 2023-12-26 NOTE — PROGRESS NOTES
[facility]    CC: Postpartum Visit    S: Pt doing well. She denies any complaints. Off any pain medications. Eating, drinking, urinating and moving bowels without any issues. Lochia has resolved. Period returned.   Had had some vomiting; was evaluated for hernia incarceration, but exam was fine.   Still using ibuprofen or tylenol.   Formula-feeding.     O:   VS: Patient Vitals for the past 24 hrs:   BP Temp Temp src Pulse Resp Height Weight   12/26/23 1039 119/74 98.3  F (36.8  C) Tympanic 84 14 1.524 m (5') 80.4 kg (177 lb 4.8 oz)     General: NAD  CV: Regular rate, warm and well perfused  Resp: breathing comfortably on room air   Abdomen: soft, fullness right superior to the pfannenstiel incision, tenderness over this area  Incision is c/d/I and well-healed   Extremities: non-tender, non-edematous     A/P: 33year old now P4, 6 wks post-op c/s.   Appropriate postpartum recovery.   Has a pfannenstiel incision hernia; following with Gen Surg for DaVinci surgical repair planned in Feb     Plan for BTL for contraception.     Plan for routine GYN cares and annual exam in one year, PAP smear due 2026.       Katharina Lerner MD, MD  [facility]   12/26/2023 10:46 AM     Answers submitted by the patient for this visit:  Patient Health Questionnaire (Submitted on 12/26/2023)  If you checked off any problems, how difficult have these problems made it for you to do your work, take care of things at home, or get along with other people?: Not difficult at all  PHQ9 TOTAL SCORE: 0  DEENA-7 (Submitted on 12/26/2023)  DEENA 7 TOTAL SCORE: 0

## 2023-12-26 NOTE — PROGRESS NOTES
Answers submitted by the patient for this visit:  Patient Health Questionnaire (Submitted on 12/26/2023)  If you checked off any problems, how difficult have these problems made it for you to do your work, take care of things at home, or get along with other people?: Not difficult at all  PHQ9 TOTAL SCORE: 0  DEENA-7 (Submitted on 12/26/2023)  DEENA 7 TOTAL SCORE: 0

## 2023-12-26 NOTE — PROGRESS NOTES
Initial /74 (BP Location: Left arm, Patient Position: Chair, Cuff Size: Adult Regular)   Pulse 84   Temp 98.3  F (36.8  C) (Tympanic)   Resp 14   Ht 1.524 m (5')   Wt 80.4 kg (177 lb 4.8 oz)   LMP 02/02/2023   Breastfeeding No   BMI 34.63 kg/m   Estimated body mass index is 34.63 kg/m  as calculated from the following:    Height as of this encounter: 1.524 m (5').    Weight as of this encounter: 80.4 kg (177 lb 4.8 oz). .

## 2023-12-26 NOTE — LETTER
12/26/2023        RE: Ana Maria Espinoza  [address] [place] [id number]      To Whom It May Concern:     Ms. Espinoza needs to limit her lifting to less than 5 lbs until she is evaluated by her general surgeon on 2/29/24.     Katharina Lerner MD  OB/GYN

## 2024-01-10 ENCOUNTER — TELEPHONE (OUTPATIENT)
Dept: FAMILY MEDICINE | Facility: CLINIC | Age: 34
End: 2024-01-10
Payer: COMMERCIAL

## 2024-01-10 NOTE — TELEPHONE ENCOUNTER
Patient Quality Outreach    Patient is due for the following:   Asthma  -  ACT needed  Physical Preventive Adult Physical    Next Steps:   Patient was assigned appropriate questionnaire to complete    Type of outreach:    Sent [employer] message.    Next Steps:  Reach out within 90 days via [employer].    Max number of attempts reached: No. Will try again in 90 days if patient still on fail list.    Questions for provider review:    None           Keiry Fonseca, [facility]  Chart routed to Care Team.

## 2024-01-13 ENCOUNTER — MEDICAL CORRESPONDENCE (OUTPATIENT)
Dept: HEALTH INFORMATION MANAGEMENT | Facility: CLINIC | Age: 34
End: 2024-01-13
Payer: COMMERCIAL

## 2024-02-02 ENCOUNTER — VIRTUAL VISIT (OUTPATIENT)
Dept: FAMILY MEDICINE | Facility: CLINIC | Age: 34
End: 2024-02-02
Payer: COMMERCIAL

## 2024-02-02 DIAGNOSIS — J01.90 ACUTE SINUSITIS WITH SYMPTOMS > 10 DAYS: Primary | ICD-10-CM

## 2024-02-02 DIAGNOSIS — G43.801 OTHER MIGRAINE WITH STATUS MIGRAINOSUS, NOT INTRACTABLE: ICD-10-CM

## 2024-02-02 DIAGNOSIS — Z71.6 ENCOUNTER FOR SMOKING CESSATION COUNSELING: ICD-10-CM

## 2024-02-02 PROCEDURE — 99443 PR PHYSICIAN TELEPHONE EVALUATION 21-30 MIN: CPT | Mod: 93 | Performed by: NURSE PRACTITIONER

## 2024-02-02 RX ORDER — NICOTINE 21 MG/24HR
1 PATCH, TRANSDERMAL 24 HOURS TRANSDERMAL EVERY 24 HOURS
Qty: 30 PATCH | Refills: 0 | Status: SHIPPED | OUTPATIENT
Start: 2024-02-02

## 2024-02-02 RX ORDER — AMOXICILLIN 875 MG
875 TABLET ORAL 2 TIMES DAILY
Qty: 20 TABLET | Refills: 0 | Status: SHIPPED | OUTPATIENT
Start: 2024-02-02 | End: 2024-03-18

## 2024-02-02 RX ORDER — FLUTICASONE PROPIONATE 50 MCG
1 SPRAY, SUSPENSION (ML) NASAL DAILY
Qty: 16 G | Refills: 1 | Status: SHIPPED | OUTPATIENT
Start: 2024-02-02 | End: 2024-03-18

## 2024-02-15 ENCOUNTER — OFFICE VISIT (OUTPATIENT)
Dept: SURGERY | Facility: CLINIC | Age: 34
End: 2024-02-15
Payer: COMMERCIAL

## 2024-02-15 ENCOUNTER — TELEPHONE (OUTPATIENT)
Dept: SURGERY | Facility: CLINIC | Age: 34
End: 2024-02-15

## 2024-02-15 VITALS
HEART RATE: 107 BPM | TEMPERATURE: 98.9 F | WEIGHT: 177 LBS | SYSTOLIC BLOOD PRESSURE: 127 MMHG | DIASTOLIC BLOOD PRESSURE: 90 MMHG | OXYGEN SATURATION: 98 % | BODY MASS INDEX: 34.75 KG/M2 | HEIGHT: 60 IN

## 2024-02-15 DIAGNOSIS — K43.2 INCISIONAL HERNIA, WITHOUT OBSTRUCTION OR GANGRENE: Primary | ICD-10-CM

## 2024-02-15 PROCEDURE — 99213 OFFICE O/P EST LOW 20 MIN: CPT | Performed by: SURGERY

## 2024-02-15 ASSESSMENT — PAIN SCALES - GENERAL: PAINLEVEL: SEVERE PAIN (6)

## 2024-02-15 NOTE — PROGRESS NOTES
Update 2/15/2024:    Patient overall doing well.  Bulge causing discomfort. No obstructive symptoms.      BP (!) 127/90   Pulse 107   Temp 98.9  F (37.2  C) (Tympanic)   Ht 1.524 m (5')   Wt 80.3 kg (177 lb)   SpO2 98%   BMI 34.57 kg/m    Gen: Alert, appropriate, NAD  Abdomen: Reducible incisional hernia, 5 cm x 4 cm defect    Ana Maria Espinoza presents with symptomatic incisonal hernia. Symptoms  are  progressively worsening recently. The patient may benefit from robotic assisted incisional hernia repair with mesh, and the indications, risks, benefits and alternatives to surgery were discussed in detail, and the patient understood the counseling offered and wishes to proceed as planned and outlined. Risks specifically discussed include bleeding, infection, seroma, need for additional treatment, chronic pain, mesh complications (erosion, infection), nontherapeutic intervention, recurrent hernia, potential need for mesh, potential need for temporary surgical drain, wound complication (such as dehiscence), and rare complications related to surgery and/or anesthesia such as venous thromboembolism and cardiorespiratory complications.    Surgical Consultation/History and Physical  [facility]     Ana Maria is seen in consultation for Hernia, at the request of SY Sebastian CNP.     Chief Complaint:  Hernia     History of Present Illness: Ana Maria Espinoaz is a 33 year old female presents with Hernia.  Patient is 2 weeks post-partum from caesarean section.  She admits bruising of her low abdomen and ongoing issues with hemorrhoids.  She states she had them earlier in pregnancy and they worsened at conclusion.  She admits minimal pain at site of bulge in lower abdomen.  No nausea, vomiting, fevers, chills.  She has been having normal bowel function.  She has not attempted any interventions for her hemorrhoidal disease.  Patient is actively smoking.  She has a history of C section x 3 and PPTL.   No history of hernia repairs.  She has a history of Factor V Leiden.         Patient Active Problem List   Diagnosis    Tobacco use    H/O herpes simplex type 2 infection    Factor V Leiden mutation (H24)    Renal infarction (H24)    ADHD (attention deficit hyperactivity disorder)    Chronic low back pain    Contact dermatitis due to chemicals    Dyslipidemia    GERD (gastroesophageal reflux disease)    Hip pain    History of  section    History of chronic back pain    Knee strain    Lumbar strain    Mild persistent asthma without complication    Pain in left hand    Pain medication agreement    Patellar dislocation    Reduced libido    Sciatica associated with disorder of lumbar spine    Tonsillar hypertrophy    Vitamin D deficiency    Wrist strain    Microscopic hematuria    Chronic, continuous use of opioids    Pre-eclampsia    Anemia due to blood loss, acute    Gestational hypertension         Past Medical History        Past Medical History:   Diagnosis Date    ADHD (attention deficit hyperactivity disorder)      Blood clot in bladder 2001     right kidney involved and went into kidney failure    Chickenpox       x2    Chlamydia      Factor V Leiden (H24)      GERD (gastroesophageal reflux disease)      History of urinary tract infection      Postpartum depression 2009     loss of child    Pre-eclampsia 10/15/2023    Renal failure       secondary to blood clot in     Uncomplicated asthma             Past Surgical History         Past Surgical History:   Procedure Laterality Date    ABDOMEN SURGERY         2 c sections  @      SECTION, TUBAL LIGATION, COMBINED N/A 2023     Procedure:  SECTION, WITH POSTPARTUM TUBAL LIGATION;  Surgeon: Katharina Lerner MD;  Location: [facility] OR    ESOPHAGOSCOPY, GASTROSCOPY, DUODENOSCOPY (EGD), COMBINED N/A 2021     Procedure: ESOPHAGOGASTRODUODENOSCOPY, WITH BIOPSY;  Surgeon: German Flynn MD;  Location: [facility]     ORTHOPEDIC SURGERY         Had surgery on both big toes    TONSILLECTOMY                Family History         Family History   Problem Relation Age of Onset    Depression Mother      Mental Illness Mother 56         suicide-2011    Obesity Mother      Connective Tissue Disorder Mother           EDS    Alcoholism Mother      Diabetes Father      Obesity Father      Deep Vein Thrombosis Father           double amputee below knee    Asthma Sister      Thyroid Disease Sister      Obesity Sister      Asthma Brother      Obesity Brother      Obesity Paternal Grandmother      Heart Defect Daughter            at 7 weeks after 3 heart surgeries    Genetic Disorder Daughter           Avascular necrosis and Legg calve perthes disease    Connective Tissue Disorder Maternal Cousin           EDS    Connective Tissue Disorder Maternal Cousin           EDS         Social History            Tobacco Use    Smoking status: Every Day       Packs/day: 0.20       Years: 10.00       Additional pack years: 0.00       Total pack years: 2.00       Types: Cigarettes       Start date: 3/19/2009    Smokeless tobacco: Never    Tobacco comments:       Down to 2-3 cig/day with pregnancy   Substance Use Topics    Alcohol use: No            History   Drug Use Unknown       Comment: quit with pregnancy       Active Medications          Current Outpatient Medications   Medication Sig Dispense Refill    acetaminophen (TYLENOL) 325 MG tablet Take 2 tablets (650 mg) by mouth every 6 hours as needed for mild pain Start after Delivery. 100 tablet 0    enoxaparin ANTICOAGULANT (LOVENOX) 40 MG/0.4ML syringe Inject 0.4 mLs (40 mg) Subcutaneous every 12 hours for 42 days 33.6 mL 0    hydrocortisone, Perianal, (HYDROCORTISONE) 2.5 % cream Place rectally 3 times daily as needed for hemorrhoids 30 g 1    ibuprofen (ADVIL/MOTRIN) 600 MG tablet Take 1 tablet (600 mg) by mouth every 6 hours as needed for moderate pain Start after delivery 60 tablet 0     ketoconazole (NIZORAL) 2 % external cream Apply topically daily 30 g 3    ketoconazole (NIZORAL) 2 % external shampoo Use shampoo daily for flaky skin 120 mL 4    Prenatal Vit-Fe Fumarate-FA (PRENATAL MULTIVITAMIN W/IRON) 27-0.8 MG tablet Take 1 tablet by mouth daily        albuterol (PROAIR HFA/PROVENTIL HFA/VENTOLIN HFA) 108 (90 Base) MCG/ACT inhaler Inhale 1-2 puffs into the lungs every 6 hours as needed for shortness of breath or wheezing (Patient not taking: Reported on 11/28/2023) 16 g 1    NIFEdipine ER OSMOTIC (ADALAT CC) 30 MG 24 hr tablet Take 1 tablet (30 mg) by mouth daily (Patient not taking: Reported on 11/28/2023) 30 tablet 1    omeprazole (PRILOSEC) 40 MG DR capsule Take 1 capsule (40 mg) by mouth daily (Patient not taking: Reported on 11/20/2023) 90 capsule 4    polyethylene glycol (MIRALAX) 17 GM/Dose powder Take 17 g (1 Capful) by mouth daily (Patient not taking: Reported on 11/30/2023) 527 g 0               Allergies   Allergen Reactions    Bee Venom Anaphylaxis       Has epipen    Aloe Hives       Dry skin as well     Codeine Rash      Review of Systems:   10 point ROS otherwise negative     Physical Exam:  /86 (BP Location: Right arm, Patient Position: Chair, Cuff Size: Adult Regular)   Pulse 106   Temp 98.8  F (37.1  C) (Tympanic)   Ht 1.524 m (5')   Wt 80.3 kg (177 lb)   LMP 02/02/2023   SpO2 97%   BMI 34.57 kg/m       Constitutional- No acute distress, well nourished, non-toxic  Eyes: Anicteric, no injection.  PERRL  ENT:  Normocephalic, atraumatic, Nose midline, moist mucus membranes  Neck - supple, no LAD  Respiratory- Good inspiratory effort  Cardiovascular - No peripheral edema.  No clubbing.  Abdomen - Soft, non-tender, +BS, at inferior portion of abdomen, subtle defect detect which spontaneously reduces.  Rectal - good tone, no masses, Examined with Isi Villalpando MA.  Thrombosed, draining external hemorrhoids.  Neuro - No focal neuro deficits, Alert and oriented x  3  Psych: Appropriate mood and affect  Musculoskeletal: Normal gait, symmetric strength.  FROM upper and lower extremities.  Skin: Warm, Dry     CT Abdomen/Pelvis:  TECHNIQUE: CT scan of the abdomen and pelvis was performed following  injection of IV contrast. Multiplanar reformats were obtained. Dose  reduction techniques were used.  CONTRAST: 90mL of Isovue 370     COMPARISON: 2021     FINDINGS:   LOWER CHEST: Normal.     HEPATOBILIARY: Normal.     PANCREAS: Normal.     SPLEEN: Normal.     ADRENAL GLANDS: Normal.     KIDNEYS/BLADDER: Since 2021 multiple cortical scars and moderate  volume loss of the right kidney has developed. No hydronephrosis or  urinary calculi. No acute renal findings. The left kidney appears  normal. The bladder is decompressed.     BOWEL: Very tiny sliding-type hiatal hernia of doubtful clinical  significance. Bowel loops are normal caliber. There is herniation of  small bowel loops through the abdominal wall at the site of the recent   section. No obstruction. The abdominal wall defect at the  site of the incisional hernia measures 4.8 cm in the craniocaudal  dimension and 3.9 cm in the transverse dimension. There is mild soft  tissue fullness and heterogeneity of the right rectus abdominis  musculature inferiorly at the level of the recent incision compatible  with a small amount of dissecting blood products. A well-defined  drainable hematoma is not identified. No active bleeding is  visualized.     PELVIC ORGANS: Heterogeneous enlarged uterus consistent with recent  pregnancy. The ovaries are normal in size. The gonadal veins are  patent.     ADDITIONAL FINDINGS: Sliver of free fluid in the pelvis is within  physiologic amounts. No adenopathy. Normal caliber abdominal aorta.     MUSCULOSKELETAL: No significant osseous pathology.                                                                      IMPRESSION:   1.  Incisional hernia at the site of the recent   section  containing a few loops of small bowel. No obstruction. Dimensions  provided above.  2.  Small amount of dissecting blood products within the right rectus  sheath inferiorly at the site of the recent incision. No large  drainable hematoma or active bleeding.  3.  Enlarged heterogeneous uterus consistent with recent pregnancy.  4.  Multiple cortical scars and moderate volume loss of the right  kidney have developed since 12/26/2021. Medical records indicate she  had acute infarcts diagnosed at the time of the prior scan. Findings  are consistent with the residual sequela. No acute renal pathology.     LAURA LONGORIA MD

## 2024-02-15 NOTE — LETTER
2/15/2024         RE: Ana Maria Espinoza  [address] [place] [id number]        Dear Colleague,    Thank you for referring your patient, Ana Maria Espinoza, to the [facility]. Please see a copy of my visit note below.    Update 2/15/2024:    Patient overall doing well.  Bulge causing discomfort. No obstructive symptoms.      BP (!) 127/90   Pulse 107   Temp 98.9  F (37.2  C) (Tympanic)   Ht 1.524 m (5')   Wt 80.3 kg (177 lb)   SpO2 98%   BMI 34.57 kg/m    Gen: Alert, appropriate, NAD  Abdomen: Reducible incisional hernia, 5 cm x 4 cm defect    Ana Maria Espinoza presents with symptomatic incisonal hernia. Symptoms  are  progressively worsening recently. The patient may benefit from robotic assisted incisional hernia repair with mesh, and the indications, risks, benefits and alternatives to surgery were discussed in detail, and the patient understood the counseling offered and wishes to proceed as planned and outlined. Risks specifically discussed include bleeding, infection, seroma, need for additional treatment, chronic pain, mesh complications (erosion, infection), nontherapeutic intervention, recurrent hernia, potential need for mesh, potential need for temporary surgical drain, wound complication (such as dehiscence), and rare complications related to surgery and/or anesthesia such as venous thromboembolism and cardiorespiratory complications.    Surgical Consultation/History and Physical  [facility] General Surgery     Ana Maria is seen in consultation for Hernia, at the request of SY Sebastian CNP.     Chief Complaint:  Hernia     History of Present Illness: Ana Maria Espinoza is a 33 year old female presents with Hernia.  Patient is 2 weeks post-partum from caesarean section.  She admits bruising of her low abdomen and ongoing issues with hemorrhoids.  She states she had them earlier in pregnancy and they worsened at conclusion.  She admits minimal pain at site of bulge in  lower abdomen.  No nausea, vomiting, fevers, chills.  She has been having normal bowel function.  She has not attempted any interventions for her hemorrhoidal disease.  Patient is actively smoking.  She has a history of C section x 3 and PPTL.  No history of hernia repairs.  She has a history of Factor V Leiden.         Patient Active Problem List   Diagnosis     Tobacco use     H/O herpes simplex type 2 infection     Factor V Leiden mutation (H24)     Renal infarction (H24)     ADHD (attention deficit hyperactivity disorder)     Chronic low back pain     Contact dermatitis due to chemicals     Dyslipidemia     GERD (gastroesophageal reflux disease)     Hip pain     History of  section     History of chronic back pain     Knee strain     Lumbar strain     Mild persistent asthma without complication     Pain in left hand     Pain medication agreement     Patellar dislocation     Reduced libido     Sciatica associated with disorder of lumbar spine     Tonsillar hypertrophy     Vitamin D deficiency     Wrist strain     Microscopic hematuria     Chronic, continuous use of opioids     Pre-eclampsia     Anemia due to blood loss, acute     Gestational hypertension         Past Medical History        Past Medical History:   Diagnosis Date     ADHD (attention deficit hyperactivity disorder)       Blood clot in bladder 2001     right kidney involved and went into kidney failure     Chickenpox       x2     Chlamydia       Factor V Leiden (H24)       GERD (gastroesophageal reflux disease)       History of urinary tract infection       Postpartum depression 2009     loss of child     Pre-eclampsia 10/15/2023     Renal failure       secondary to blood clot in      Uncomplicated asthma             Past Surgical History         Past Surgical History:   Procedure Laterality Date     ABDOMEN SURGERY         2 c sections  @       SECTION, TUBAL LIGATION, COMBINED N/A 2023     Procedure:   SECTION, WITH POSTPARTUM TUBAL LIGATION;  Surgeon: Katharina Lerner MD;  Location: [facility]     ESOPHAGOSCOPY, GASTROSCOPY, DUODENOSCOPY (EGD), COMBINED N/A 2021     Procedure: ESOPHAGOGASTRODUODENOSCOPY, WITH BIOPSY;  Surgeon: German Flynn MD;  Location: [facility]     ORTHOPEDIC SURGERY         Had surgery on both big toes     TONSILLECTOMY                Family History         Family History   Problem Relation Age of Onset     Depression Mother       Mental Illness Mother 56         suicide-     Obesity Mother       Connective Tissue Disorder Mother           EDS     Alcoholism Mother       Diabetes Father       Obesity Father       Deep Vein Thrombosis Father           double amputee below knee     Asthma Sister       Thyroid Disease Sister       Obesity Sister       Asthma Brother       Obesity Brother       Obesity Paternal Grandmother       Heart Defect Daughter            at 7 weeks after 3 heart surgeries     Genetic Disorder Daughter           Avascular necrosis and Legg calve perthes disease     Connective Tissue Disorder Maternal Cousin           EDS     Connective Tissue Disorder Maternal Cousin           EDS         Social History            Tobacco Use     Smoking status: Every Day       Packs/day: 0.20       Years: 10.00       Additional pack years: 0.00       Total pack years: 2.00       Types: Cigarettes       Start date: 3/19/2009     Smokeless tobacco: Never     Tobacco comments:       Down to 2-3 cig/day with pregnancy   Substance Use Topics     Alcohol use: No            History   Drug Use Unknown       Comment: quit with pregnancy       Active Medications          Current Outpatient Medications   Medication Sig Dispense Refill     acetaminophen (TYLENOL) 325 MG tablet Take 2 tablets (650 mg) by mouth every 6 hours as needed for mild pain Start after Delivery. 100 tablet 0     enoxaparin ANTICOAGULANT (LOVENOX) 40 MG/0.4ML syringe Inject 0.4 mLs (40 mg) Subcutaneous  every 12 hours for 42 days 33.6 mL 0     hydrocortisone, Perianal, (HYDROCORTISONE) 2.5 % cream Place rectally 3 times daily as needed for hemorrhoids 30 g 1     ibuprofen (ADVIL/MOTRIN) 600 MG tablet Take 1 tablet (600 mg) by mouth every 6 hours as needed for moderate pain Start after delivery 60 tablet 0     ketoconazole (NIZORAL) 2 % external cream Apply topically daily 30 g 3     ketoconazole (NIZORAL) 2 % external shampoo Use shampoo daily for flaky skin 120 mL 4     Prenatal Vit-Fe Fumarate-FA (PRENATAL MULTIVITAMIN W/IRON) 27-0.8 MG tablet Take 1 tablet by mouth daily         albuterol (PROAIR HFA/PROVENTIL HFA/VENTOLIN HFA) 108 (90 Base) MCG/ACT inhaler Inhale 1-2 puffs into the lungs every 6 hours as needed for shortness of breath or wheezing (Patient not taking: Reported on 11/28/2023) 16 g 1     NIFEdipine ER OSMOTIC (ADALAT CC) 30 MG 24 hr tablet Take 1 tablet (30 mg) by mouth daily (Patient not taking: Reported on 11/28/2023) 30 tablet 1     omeprazole (PRILOSEC) 40 MG DR capsule Take 1 capsule (40 mg) by mouth daily (Patient not taking: Reported on 11/20/2023) 90 capsule 4     polyethylene glycol (MIRALAX) 17 GM/Dose powder Take 17 g (1 Capful) by mouth daily (Patient not taking: Reported on 11/30/2023) 527 g 0               Allergies   Allergen Reactions     Bee Venom Anaphylaxis       Has epipen     Aloe Hives       Dry skin as well      Codeine Rash      Review of Systems:   10 point ROS otherwise negative     Physical Exam:  /86 (BP Location: Right arm, Patient Position: Chair, Cuff Size: Adult Regular)   Pulse 106   Temp 98.8  F (37.1  C) (Tympanic)   Ht 1.524 m (5')   Wt 80.3 kg (177 lb)   LMP 02/02/2023   SpO2 97%   BMI 34.57 kg/m       Constitutional- No acute distress, well nourished, non-toxic  Eyes: Anicteric, no injection.  PERRL  ENT:  Normocephalic, atraumatic, Nose midline, moist mucus membranes  Neck - supple, no LAD  Respiratory- Good inspiratory effort  Cardiovascular -  No peripheral edema.  No clubbing.  Abdomen - Soft, non-tender, +BS, at inferior portion of abdomen, subtle defect detect which spontaneously reduces.  Rectal - good tone, no masses, Examined with Isi Villalpando MA.  Thrombosed, draining external hemorrhoids.  Neuro - No focal neuro deficits, Alert and oriented x 3  Psych: Appropriate mood and affect  Musculoskeletal: Normal gait, symmetric strength.  FROM upper and lower extremities.  Skin: Warm, Dry     CT Abdomen/Pelvis:  TECHNIQUE: CT scan of the abdomen and pelvis was performed following  injection of IV contrast. Multiplanar reformats were obtained. Dose  reduction techniques were used.  CONTRAST: 90mL of Isovue 370     COMPARISON: 2021     FINDINGS:   LOWER CHEST: Normal.     HEPATOBILIARY: Normal.     PANCREAS: Normal.     SPLEEN: Normal.     ADRENAL GLANDS: Normal.     KIDNEYS/BLADDER: Since 2021 multiple cortical scars and moderate  volume loss of the right kidney has developed. No hydronephrosis or  urinary calculi. No acute renal findings. The left kidney appears  normal. The bladder is decompressed.     BOWEL: Very tiny sliding-type hiatal hernia of doubtful clinical  significance. Bowel loops are normal caliber. There is herniation of  small bowel loops through the abdominal wall at the site of the recent   section. No obstruction. The abdominal wall defect at the  site of the incisional hernia measures 4.8 cm in the craniocaudal  dimension and 3.9 cm in the transverse dimension. There is mild soft  tissue fullness and heterogeneity of the right rectus abdominis  musculature inferiorly at the level of the recent incision compatible  with a small amount of dissecting blood products. A well-defined  drainable hematoma is not identified. No active bleeding is  visualized.     PELVIC ORGANS: Heterogeneous enlarged uterus consistent with recent  pregnancy. The ovaries are normal in size. The gonadal veins are  patent.     ADDITIONAL  FINDINGS: Sliver of free fluid in the pelvis is within  physiologic amounts. No adenopathy. Normal caliber abdominal aorta.     MUSCULOSKELETAL: No significant osseous pathology.                                                                      IMPRESSION:   1.  Incisional hernia at the site of the recent  section  containing a few loops of small bowel. No obstruction. Dimensions  provided above.  2.  Small amount of dissecting blood products within the right rectus  sheath inferiorly at the site of the recent incision. No large  drainable hematoma or active bleeding.  3.  Enlarged heterogeneous uterus consistent with recent pregnancy.  4.  Multiple cortical scars and moderate volume loss of the right  kidney have developed since 2021. Medical records indicate she  had acute infarcts diagnosed at the time of the prior scan. Findings  are consistent with the residual sequela. No acute renal pathology.     LAURA LONGORIA MD             Again, thank you for allowing me to participate in the care of your patient.        Sincerely,        Renny Moreno DO

## 2024-02-16 NOTE — TELEPHONE ENCOUNTER
Type of surgery: HERNIORRHAPHY, INCISIONAL, ROBOT-ASSISTED, LAPAROSCOPIC, USING DA COLE XI (N/A)   Location of surgery: [facility]  Date and time of surgery: 4-1-24  Surgeon: Tiffanie  Pre-Op Appt Date: 3-18-24  Post-Op Appt Date: 4-18-24   Packet sent out: Yes  Pre-cert/Authorization completed:    Date:

## 2024-03-18 ENCOUNTER — E-CONSULT (OUTPATIENT)
Dept: ONCOLOGY | Facility: CLINIC | Age: 34
End: 2024-03-18
Payer: COMMERCIAL

## 2024-03-18 ENCOUNTER — OFFICE VISIT (OUTPATIENT)
Dept: FAMILY MEDICINE | Facility: CLINIC | Age: 34
End: 2024-03-18
Payer: COMMERCIAL

## 2024-03-18 VITALS
DIASTOLIC BLOOD PRESSURE: 72 MMHG | HEART RATE: 76 BPM | WEIGHT: 175 LBS | OXYGEN SATURATION: 99 % | TEMPERATURE: 97.4 F | BODY MASS INDEX: 34.18 KG/M2 | RESPIRATION RATE: 16 BRPM | SYSTOLIC BLOOD PRESSURE: 104 MMHG

## 2024-03-18 DIAGNOSIS — Z01.818 PREOP GENERAL PHYSICAL EXAM: Primary | ICD-10-CM

## 2024-03-18 DIAGNOSIS — K43.2 INCISIONAL HERNIA, WITHOUT OBSTRUCTION OR GANGRENE: ICD-10-CM

## 2024-03-18 DIAGNOSIS — N28.0 RENAL INFARCTION (H): ICD-10-CM

## 2024-03-18 DIAGNOSIS — D68.51 FACTOR V LEIDEN MUTATION (H): ICD-10-CM

## 2024-03-18 LAB
ANION GAP SERPL CALCULATED.3IONS-SCNC: 9 MMOL/L (ref 7–15)
BUN SERPL-MCNC: 11.2 MG/DL (ref 6–20)
CALCIUM SERPL-MCNC: 9.4 MG/DL (ref 8.6–10)
CHLORIDE SERPL-SCNC: 104 MMOL/L (ref 98–107)
CREAT SERPL-MCNC: 0.96 MG/DL (ref 0.51–0.95)
DEPRECATED HCO3 PLAS-SCNC: 27 MMOL/L (ref 22–29)
EGFRCR SERPLBLD CKD-EPI 2021: 79 ML/MIN/1.73M2
ERYTHROCYTE [DISTWIDTH] IN BLOOD BY AUTOMATED COUNT: 12.8 % (ref 10–15)
GLUCOSE SERPL-MCNC: 96 MG/DL (ref 70–99)
HCT VFR BLD AUTO: 42.1 % (ref 35–47)
HGB BLD-MCNC: 14.2 G/DL (ref 11.7–15.7)
MCH RBC QN AUTO: 31.6 PG (ref 26.5–33)
MCHC RBC AUTO-ENTMCNC: 33.7 G/DL (ref 31.5–36.5)
MCV RBC AUTO: 94 FL (ref 78–100)
PLATELET # BLD AUTO: 308 10E3/UL (ref 150–450)
POTASSIUM SERPL-SCNC: 5.2 MMOL/L (ref 3.4–5.3)
RBC # BLD AUTO: 4.49 10E6/UL (ref 3.8–5.2)
SODIUM SERPL-SCNC: 140 MMOL/L (ref 135–145)
WBC # BLD AUTO: 10.9 10E3/UL (ref 4–11)

## 2024-03-18 PROCEDURE — 99207 E-CONSULT TO HEMATOLOGY (ADULT OUTPT PROVIDER TO SPECIALIST WRITTEN QUESTION & RESPONSE): CPT | Performed by: NURSE PRACTITIONER

## 2024-03-18 PROCEDURE — 99451 NTRPROF PH1/NTRNET/EHR 5/>: CPT | Performed by: INTERNAL MEDICINE

## 2024-03-18 PROCEDURE — 36415 COLL VENOUS BLD VENIPUNCTURE: CPT | Performed by: NURSE PRACTITIONER

## 2024-03-18 PROCEDURE — 99214 OFFICE O/P EST MOD 30 MIN: CPT | Performed by: NURSE PRACTITIONER

## 2024-03-18 PROCEDURE — 80048 BASIC METABOLIC PNL TOTAL CA: CPT | Performed by: NURSE PRACTITIONER

## 2024-03-18 PROCEDURE — 85027 COMPLETE CBC AUTOMATED: CPT | Performed by: NURSE PRACTITIONER

## 2024-03-18 RX ORDER — ASPIRIN 81 MG/1
81 TABLET ORAL DAILY
COMMUNITY

## 2024-03-18 ASSESSMENT — PAIN SCALES - GENERAL: PAINLEVEL: NO PAIN (0)

## 2024-03-18 NOTE — NURSING NOTE
Chief Complaint   Patient presents with    Pre-Op Exam            /72   Pulse 76   Temp 97.4  F (36.3  C) (Tympanic)   Resp 16   Wt 79.4 kg (175 lb)   LMP 02/19/2024   SpO2 99%   BMI 34.18 kg/m   Estimated body mass index is 34.18 kg/m  as calculated from the following:    Height as of 2/15/24: 1.524 m (5').    Weight as of this encounter: 79.4 kg (175 lb).  Patient presents to the clinic using No DME      Health Maintenance that is potentially due pending provider review:    Health Maintenance Due   Topic Date Due    CONTROLLED SUBSTANCE AGREEMENT FOR CHRONIC PAIN MANAGEMENT  Never done    Pneumococcal Vaccine: Pediatrics (0 to 5 Years) and At-Risk Patients (6 to 64 Years) (1 of 2 - PCV) Never done    HEPATITIS B IMMUNIZATION (1 of 3 - 19+ 3-dose series) Never done    HPV IMMUNIZATION (3 - 3-dose series) 04/27/2012    YEARLY PREVENTIVE VISIT  08/04/2022    LIPID  08/04/2022    URINE DRUG SCREEN  04/22/2023    NICOTINE/TOBACCO CESSATION COUNSELING Q 1 YR  07/25/2023    ASTHMA ACTION PLAN  07/25/2023    COVID-19 Vaccine (2 - 2023-24 season) 09/01/2023

## 2024-03-18 NOTE — PATIENT INSTRUCTIONS
Preparing for Your Surgery  Getting started  A nurse will call you to review your health history and instructions. They will give you an arrival time based on your scheduled surgery time. Please be ready to share:  Your doctor's clinic name and phone number  Your medical, surgical, and anesthesia history  A list of allergies and sensitivities  A list of medicines, including herbal treatments and over-the-counter drugs  Whether the patient has a legal guardian (ask how to send us the papers in advance)  Please tell us if you're pregnant--or if there's any chance you might be pregnant. Some surgeries may injure a fetus (unborn baby), so they require a pregnancy test. Surgeries that are safe for a fetus don't always need a test, and you can choose whether to have one.   If you have a child who's having surgery, please ask for a copy of Preparing for Your Child's Surgery.    Preparing for surgery  Within 10 to 30 days of surgery: Have a pre-op exam (sometimes called an H&P, or History and Physical). This can be done at a clinic or pre-operative center.  If you're having a , you may not need this exam. Talk to your care team.  At your pre-op exam, talk to your care team about all medicines you take. If you need to stop any medicines before surgery, ask when to start taking them again.  We do this for your safety. Many medicines can make you bleed too much during surgery. Some change how well surgery (anesthesia) drugs work.  Call your insurance company to let them know you're having surgery. (If you don't have insurance, call [phone].)  Call your clinic if there's any change in your health. This includes signs of a cold or flu (sore throat, runny nose, cough, rash, fever). It also includes a scrape or scratch near the surgery site.  If you have questions on the day of surgery, call your hospital or surgery center.  Eating and drinking guidelines  For your safety: Unless your surgeon tells you otherwise,  follow the guidelines below.  Eat and drink as usual until 8 hours before you arrive for surgery. After that, no food or milk.  Drink clear liquids until 2 hours before you arrive. These are liquids you can see through, like water, Gatorade, and Propel Water. They also include plain black coffee and tea (no cream or milk), candy, and breath mints. You can spit out gum when you arrive.  If you drink alcohol: Stop drinking it the night before surgery.  If your care team tells you to take medicine on the morning of surgery, it's okay to take it with a sip of water.  Preventing infection  Shower or bathe the night before and morning of your surgery. Follow the instructions your clinic gave you. (If no instructions, use regular soap.)  Don't shave or clip hair near your surgery site. We'll remove the hair if needed.  Don't smoke or vape the morning of surgery. You may chew nicotine gum up to 2 hours before surgery. A nicotine patch is okay.  Note: Some surgeries require you to completely quit smoking and nicotine. Check with your surgeon.  Your care team will make every effort to keep you safe from infection. We will:  Clean our hands often with soap and water (or an alcohol-based hand rub).  Clean the skin at your surgery site with a special soap that kills germs.  Give you a special gown to keep you warm. (Cold raises the risk of infection.)  Wear special hair covers, masks, gowns and gloves during surgery.  Give antibiotic medicine, if prescribed. Not all surgeries need antibiotics.  What to bring on the day of surgery  Photo ID and insurance card  Copy of your health care directive, if you have one  Glasses and hearing aids (bring cases)  You can't wear contacts during surgery  Inhaler and eye drops, if you use them (tell us about these when you arrive)  CPAP machine or breathing device, if you use them  A few personal items, if spending the night  If you have . . .  A pacemaker, ICD (cardiac defibrillator) or other  implant: Bring the ID card.  An implanted stimulator: Bring the remote control.  A legal guardian: Bring a copy of the certified (court-stamped) guardianship papers.  Please remove any jewelry, including body piercings. Leave jewelry and other valuables at home.  If you're going home the day of surgery  You must have a responsible adult drive you home. They should stay with you overnight as well.  If you don't have someone to stay with you, and you aren't safe to go home alone, we may keep you overnight. Insurance often won't pay for this.  After surgery  If it's hard to control your pain or you need more pain medicine, please call your surgeon's office.  Questions?   If you have any questions for your care team, list them here: _________________________________________________________________________________________________________________________________________________________________________ ____________________________________ ____________________________________ ____________________________________  For informational purposes only. Not to replace the advice of your health care provider. Copyright   2003, 2019 [facility]. All rights reserved. Clinically reviewed by Isabel Castle MD. [employer] [id number] - REV 12/22.

## 2024-03-18 NOTE — PROGRESS NOTES
Preoperative Evaluation  [facility]  [address] [place] [id number]  Phone: [phone]  Fax: [phone]  Primary Provider: Susana Agosto  Pre-op Performing Provider: SUSANA AGOSTO 2024       Ana Maria is a 33 year old, presenting for the following:  Pre-Op Exam (/)        3/18/2024     7:22 AM   Additional Questions   Roomed by Beto HEREDIA   Accompanied by Self         3/18/2024     7:22 AM   Patient Reported Additional Medications   Patient reports taking the following new medications .     Surgical Information  Surgery/Procedure: HERNIORRHAPHY, INCISIONAL, ROBOT-ASSISTED, LAPAROSCOPIC, USING DA COLE XI   Surgery Location: [facility]  Surgeon: Dr. Renny Moreno  Surgery Date: 2024  Time of Surgery: 7:30am  Where patient plans to recover: At home with family  Fax number for surgical facility: Note does not need to be faxed, will be available electronically in Epic.    Assessment & Plan     The proposed surgical procedure is considered INTERMEDIATE risk.    Preop general physical exam  - CBC with platelets; Future  - Basic metabolic panel  (Ca, Cl, CO2, Creat, Gluc, K, Na, BUN); Future  - Basic metabolic panel  (Ca, Cl, CO2, Creat, Gluc, K, Na, BUN)  - CBC with platelets    Incisional hernia, without obstruction or gangrene  Patient has large incisional hernia after  section in 2023 after wound dehiscence.  Scheduled for repair.  Patient does have a history of factor V Leiden mutation; hematology E-consult submitted for DVT prophylaxis.  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  Renal infarction most likely due to fibromuscular dysplasia and not FVL mutation.  Was previously on  aspirin and Eliquis and transition to aspirin and 2022 by vascular medicine.  Patient is due for recheck with vascular medicine including a repeat CT scan.  During pregnancy, patient was on enoxaparin.  Given history of FVL mutation, would appreciate hematology's input, E-consult discussed with patient and agreeable.  - Adult E-Consult to Hematology (Outpt Provider to Specialist Written Question & Response)    UPDATE: E consult received back from hematology, recommend standard pharmacologic prophylaxis with enoxaparin while hospitalized and during postop period.  Discussed these recommendations with patient and orders placed for enoxaparin for patient to start morning of surgery, patient advised to administer prior to leaving the house morning of surgery.  Will continue at least 5 days postop and if patient is still not ambulating well, patient to notify provider for longer course.    Risks and Recommendations  The patient has the following additional risks and recommendations for perioperative complications:  Anemia/Bleeding/Clotting:    - Factor V Leiden mutation as above.  Patient to continue aspirin up until day before surgery.    Antiplatelet or Anticoagulation Medication Instructions   - aspirin: As above.    Additional Medication Instructions  Patient to hold all medications morning of surgery with the exception of enoxaparin as above.    Recommendation  APPROVAL GIVEN to proceed with proposed procedure, without further diagnostic evaluation.    [facility]  65 minutes spent by me on the date of the encounter doing chart review, history and exam, documentation and further activities per the note    Subjective       HPI related to upcoming procedure: Patient has a large hernia on right side of abdomen secondary to the dehiscence after  section in 2023.  Scheduled for repair.        3/12/2024     8:52 AM   Preop  Questions   1. Have you ever had a heart attack or stroke? No   2. Have you ever had surgery on your heart or blood vessels, such as a stent placement, a coronary artery bypass, or surgery on an artery in your head, neck, heart, or legs? No   3. Do you have chest pain with activity? No   4. Do you have a history of  heart failure? No   5. Do you currently have a cold, bronchitis or symptoms of other infection? No   6. Do you have a cough, shortness of breath, or wheezing? No   7. Do you or anyone in your family have previous history of blood clots? YES - Personal Hx - on ASA for factor V gene   8. Do you or does anyone in your family have a serious bleeding problem such as prolonged bleeding following surgeries or cuts? No   9. Have you ever had problems with anemia or been told to take iron pills? No   10. Have you had any abnormal blood loss such as black, tarry or bloody stools, or abnormal vaginal bleeding? No   11. Have you ever had a blood transfusion? UNKNOWN - Not that patient is aware of    12. Are you willing to have a blood transfusion if it is medically needed before, during, or after your surgery? Yes   13. Have you or any of your relatives ever had problems with anesthesia? UNKNOWN    14. Do you have sleep apnea, excessive snoring or daytime drowsiness? No   15. Do you have any artifical heart valves or other implanted medical devices like a pacemaker, defibrillator, or continuous glucose monitor? No   16. Do you have artificial joints? No   17. Are you allergic to latex? No   18. Is there any chance that you may be pregnant? No       Health Care Directive  Patient does not have a Health Care Directive or Living Will: Discussed advance care planning with patient; however, patient declined at this time.    Preoperative Review of    reviewed - previously prescribed opioids by other providers, none recent.      Status of Chronic Conditions:  See problem list for active medical problems.  Problems  all longstanding and stable, except as noted/documented.  See ROS for pertinent symptoms related to these conditions.    Patient Active Problem List    Diagnosis Date Noted    Gestational hypertension 11/15/2023     Priority: Medium    Anemia due to blood loss, acute 2023     Priority: Medium    Pre-eclampsia 10/15/2023     Priority: Medium    Chronic, continuous use of opioids 2022     Priority: Medium    Microscopic hematuria 02/10/2022     Priority: Medium    Factor V Leiden mutation (H24) 2022     Priority: Medium    Renal infarction (H24) 2022     Priority: Medium    Tobacco use 2020     Priority: Medium    Hip pain 2020     Priority: Medium    Contact dermatitis due to chemicals 2019     Priority: Medium    Pain in left hand 2018     Priority: Medium    H/O herpes simplex type 2 infection 10/16/2017     Priority: Medium    Dyslipidemia 2016     Priority: Medium     Formatting of this note might be different from the original.  Formatting of this note might be different from the original.  : to quit smoking and work otitis media a more active lifestyle.  Last Lipids:  Chol: 2016 142   73  HDL: 2016 27   LDL CHOLESTEROL (mg/dL)   Date Value   2016 100  Formatting of this note is different from the original.  : to quit smoking and work otitis media a more active lifestyle.  Last Lipids:  Chol: 2016 142   73  HDL: 2016 27   LDL CHOLESTEROL (mg/dL)   Date Value   2016 100      Knee strain 2016     Priority: Medium    Lumbar strain 2016     Priority: Medium    Wrist strain 2016     Priority: Medium    Mild persistent asthma without complication 2015     Priority: Medium    Pain medication agreement 2013     Priority: Medium     Formatting of this note might be different from the original.  Prn judicious use of vicodin or percocet      Tonsillar hypertrophy  2011     Priority: Medium    History of  section 2011     Priority: Medium    Patellar dislocation 2010     Priority: Medium    ADHD (attention deficit hyperactivity disorder) 2009     Priority: Medium     Formatting of this note might be different from the original.  Updated by system to replace inactive record  Formatting of this note might be different from the original.  Updated by system to replace inactive record      Vitamin D deficiency 2009     Priority: Medium    Reduced libido 2009     Priority: Medium    GERD (gastroesophageal reflux disease) 2009     Priority: Medium    Sciatica associated with disorder of lumbar spine 06/15/2005     Priority: Medium    Chronic low back pain 1993     Priority: Medium    History of chronic back pain 1992     Priority: Medium      Past Medical History:   Diagnosis Date    ADHD (attention deficit hyperactivity disorder)     Arthritis     Rheumatology    Blood clot in bladder 2001    right kidney involved and went into kidney failure    Chickenpox     x2    Chlamydia     Factor V Leiden (H24)     GERD (gastroesophageal reflux disease)     History of urinary tract infection     Postpartum depression     loss of child    Pre-eclampsia 10/15/2023    Renal failure     secondary to blood clot in     Uncomplicated asthma      Past Surgical History:   Procedure Laterality Date    ABDOMEN SURGERY      2 c sections  @      SECTION, TUBAL LIGATION, COMBINED N/A 2023    Procedure:  SECTION, WITH POSTPARTUM TUBAL LIGATION;  Surgeon: Katharina Lerenr MD;  Location: [facility]    ESOPHAGOSCOPY, GASTROSCOPY, DUODENOSCOPY (EGD), COMBINED N/A 2021    Procedure: ESOPHAGOGASTRODUODENOSCOPY, WITH BIOPSY;  Surgeon: German Flynn MD;  Location: [facility]    ORTHOPEDIC SURGERY      Had surgery on both big toes    TONSILLECTOMY       Current Outpatient Medications   Medication  Sig Dispense Refill    albuterol (PROAIR HFA/PROVENTIL HFA/VENTOLIN HFA) 108 (90 Base) MCG/ACT inhaler Inhale 1-2 puffs into the lungs every 6 hours as needed for shortness of breath or wheezing 16 g 1    aspirin 81 MG EC tablet Take 81 mg by mouth daily      ketoconazole (NIZORAL) 2 % external cream Apply topically daily 30 g 3    ketoconazole (NIZORAL) 2 % external shampoo Use shampoo daily for flaky skin 120 mL 4    nicotine (NICODERM CQ) 21 MG/24HR 24 hr patch Place 1 patch onto the skin every 24 hours 30 patch 0    omeprazole (PRILOSEC) 40 MG DR capsule Take 1 capsule (40 mg) by mouth daily 90 capsule 4    enoxaparin ANTICOAGULANT (LOVENOX) 40 MG/0.4ML syringe Inject 0.4 mLs (40 mg) Subcutaneous daily for 5 days Starting morning of surgery ~ 2 hours prior 2 mL 0       Allergies   Allergen Reactions    Bee Venom Anaphylaxis     Has epipen    Aloe Hives     Dry skin as well     Codeine Rash        Social History     Tobacco Use    Smoking status: Some Days     Packs/day: 0.50     Years: 10.00     Additional pack years: 0.00     Total pack years: 5.00     Types: Cigarettes     Start date: 3/19/2009     Last attempt to quit: 2021     Years since quittin.7    Smokeless tobacco: Never    Tobacco comments:     Cut down 1 cigarette    Substance Use Topics    Alcohol use: No     Family History   Problem Relation Age of Onset    Depression Mother     Mental Illness Mother 56        suicide-    Obesity Mother     Connective Tissue Disorder Mother         EDS    Alcoholism Mother     Substance Abuse Mother     Diabetes Father     Obesity Father         Also had blood clots in both legs and is dual leg amputee    Deep Vein Thrombosis Father         double amputee below knee    Coronary Artery Disease Father     Cerebrovascular Disease Father     Other Cancer Father         Bladder cancer    Asthma Sister     Thyroid Disease Sister     Obesity Sister     Asthma Brother     Obesity Brother     Obesity Paternal  Grandmother     Heart Defect Daughter          at 7 weeks after 3 heart surgeries    Genetic Disorder Daughter         Avascular necrosis and Legg calve perthes disease    Connective Tissue Disorder Maternal Cousin         EDS    Connective Tissue Disorder Maternal Cousin         EDS     History   Drug Use Unknown     Comment: quit with pregnancy         Review of Systems    Review of Systems  Constitutional, neuro, ENT, endocrine, pulmonary, cardiac, gastrointestinal, genitourinary, musculoskeletal, integument and psychiatric systems are negative, except as otherwise noted.    Objective    /72   Pulse 76   Temp 97.4  F (36.3  C) (Tympanic)   Resp 16   Wt 79.4 kg (175 lb)   LMP 2024   SpO2 99%   BMI 34.18 kg/m     Estimated body mass index is 34.18 kg/m  as calculated from the following:    Height as of 2/15/24: 1.524 m (5').    Weight as of this encounter: 79.4 kg (175 lb).  Physical Exam  GENERAL: alert and no distress  NECK: no adenopathy, no asymmetry, masses, or scars  RESP: lungs clear to auscultation - no rales, rhonchi or wheezes  CV: regular rate and rhythm, normal S1 S2, no S3 or S4, no murmur, click or rub, no peripheral edema  ABDOMEN: soft, nontender, no hepatosplenomegaly, large hernia on right abdomen and bowel sounds normal  MS: no gross musculoskeletal defects noted, no edema  NEURO: Normal strength and tone, mentation intact and speech normal  PSYCH: mentation appears normal, affect normal/bright    Recent Labs   Lab Test 23  0638 23  0653 10/15/23  1657   HGB 10.5* 12.1 12.2   PLT  --  167 177   NA  --  138 135   POTASSIUM  --  4.6 4.4   CR  --  0.89 0.91        Diagnostics  Recent Results (from the past 240 hour(s))   Basic metabolic panel  (Ca, Cl, CO2, Creat, Gluc, K, Na, BUN)    Collection Time: 24  8:21 AM   Result Value Ref Range    Sodium 140 135 - 145 mmol/L    Potassium 5.2 3.4 - 5.3 mmol/L    Chloride 104 98 - 107 mmol/L    Carbon Dioxide (CO2) 27  22 - 29 mmol/L    Anion Gap 9 7 - 15 mmol/L    Urea Nitrogen 11.2 6.0 - 20.0 mg/dL    Creatinine 0.96 (H) 0.51 - 0.95 mg/dL    GFR Estimate 79 >60 mL/min/1.73m2    Calcium 9.4 8.6 - 10.0 mg/dL    Glucose 96 70 - 99 mg/dL   CBC with platelets    Collection Time: 03/18/24  8:21 AM   Result Value Ref Range    WBC Count 10.9 4.0 - 11.0 10e3/uL    RBC Count 4.49 3.80 - 5.20 10e6/uL    Hemoglobin 14.2 11.7 - 15.7 g/dL    Hematocrit 42.1 35.0 - 47.0 %    MCV 94 78 - 100 fL    MCH 31.6 26.5 - 33.0 pg    MCHC 33.7 31.5 - 36.5 g/dL    RDW 12.8 10.0 - 15.0 %    Platelet Count 308 150 - 450 10e3/uL      No EKG required, no history of coronary heart disease, significant arrhythmia, peripheral arterial disease or other structural heart disease.    Revised Cardiac Risk Index (RCRI)  The patient has the following serious cardiovascular risks for perioperative complications:   - No serious cardiac risks = 0 points     RCRI Interpretation: 0 points: Class I (very low risk - 0.4% complication rate)         Signed Electronically by: Susana Ch, DNP, APRN-CNP   Copy of this evaluation report is provided to requesting physician.           Chart documentation with Dragon Voice recognition Software. Although reviewed after completion, some words and grammatical errors may remain.

## 2024-03-18 NOTE — PROGRESS NOTES
Hematology E-Consult  3/18/2024     E-Consult has been accepted.    Interprofessional consultation requested by:  Susana Agosto DNP      Clinical Question/Purpose: MY CLINICAL QUESTION IS: Patient with history of Factor V Leiden w/history of renal clot with subsequent renal failure in 2021. Patient was on ASA and Eliquis, stopped around time of most recent pregnancy April 22' and was on Lovenox during pregnancy. Patient only on baby ASA now. Has not seen Vascular since 22'. Patient scheduled for hernia surgery in just over 2 weeks for a significantly large incisional hernia. Inquiring on guidance for prophylaxis anticoagulation for upcoming surgery.     Patient assessment and information reviewed:   This is a complex history and I could gather several facts from review of the medical record as follows:  --She had a history of renal ARTERY thrombosis (although questionable per the imaging report 12/27/2021) which was felt to possibly be in the setting of fibromuscular dysplasia  --A VENOUS thrombophilia workup was sent and she was also found to be heterozygous for the FVL (factor V Leiden) mutation  --She was managed for the clot with Lovenox and then switched to Apixaban and ultimately transitioned to aspirin in 4/2022  --She saw vascular medicine and there was a  plan for repeat imaging to reassess clot status but she became pregnant   --During pregnancy she was seen by MFM and additional risk factors for clot were discussed including smoking and (per the chart Nexplanon although please see discussion below) and therefore she received enoxaparin during pregnancy/until 6 weeks postpartum    Recommendations:    This is a very complex case but overall I would say the following:  --She had an ARTERIAL clot but FVL is a risk factor for VENOUS (not arterial) thrombosis   --Therefore the ARTERIAL clot most likely was due to fibromuscular dysplasia or other risk factors not the FVL mutation  --I would also note  that while estrogen is a risk factor for thrombosis, Nexplanon is NOT felt to be a major risk factor for VTE (this is not directly related to this case however!! But something to be considered in discussion of contraception for the future), please see Tim dean al, [facility] 2022 article:    --With regards to the hernia surgery, would recommend standard pharmacologic prophylaxis (I.e. lovenox 40mg daily) while hospitalized during postoperative periods along with early ambulation, careful attention to hydration and of course making sure she is not smoking    The recommendations provided in this E-Consult are based on a review of clinical data pertinent to the clinical question presented, without a review of the patient's complete medical record or, the benefit of a comprehensive in-person or virtual patient evaluation. This consultation should not replace the clinical judgement and evaluation of the provider ordering this E-Consult. Any new clinical issues, or changes in patient status since the filing of this E-Consult will need to be taken into account when assessing these recommendations. Please contact me if you have further questions.    My total time spent reviewing clinical information and formulating assessment was 25 minutes.        Clarissa Kat MD

## 2024-03-20 ENCOUNTER — MYC MEDICAL ADVICE (OUTPATIENT)
Dept: FAMILY MEDICINE | Facility: CLINIC | Age: 34
End: 2024-03-20
Payer: COMMERCIAL

## 2024-03-20 DIAGNOSIS — N28.0 RENAL INFARCTION (H): Primary | ICD-10-CM

## 2024-03-20 DIAGNOSIS — K59.00 CONSTIPATION, UNSPECIFIED CONSTIPATION TYPE: ICD-10-CM

## 2024-03-20 DIAGNOSIS — D68.51 FACTOR V LEIDEN MUTATION (H): ICD-10-CM

## 2024-03-20 NOTE — TELEPHONE ENCOUNTER
Discussed E-consult recommendations from hematology the patient.  Will start Lovenox morning of surgery and continue for at minimum 5 days until patient is up ambulatory.  Patient is to resume aspirin patient verbalizes understanding.  Prescriptions were sent to the pharmacy.    Susana Ch DNP, APRN-CNP

## 2024-03-21 ENCOUNTER — TELEPHONE (OUTPATIENT)
Dept: VASCULAR SURGERY | Facility: CLINIC | Age: 34
End: 2024-03-21
Payer: COMMERCIAL

## 2024-03-21 DIAGNOSIS — N28.0 RENAL INFARCTION (H): Primary | ICD-10-CM

## 2024-03-21 NOTE — TELEPHONE ENCOUNTER
Caller: Ana Maria    Provider: none, asking for a consult    Detailed reason for call: Patient was seen previously with [facility] for a renal infarct in 2021 and has had follow ups in 2022.  She states she missed her annual follow up last year due to being pregnant and knows that she is due for a CTA.  She would like to transfer her care to [facility] from [facility].  Please review her chart and advise which provider and add in any imaging that is needed.     Best phone number to contact: [phone]    Best time to contact: any    Ok to leave a detailed message: Yes    Ok to speak to authorized person if needed: No      (Noted to patient if reason is related to wound or incision, to please send a photo via email or mPortt.)

## 2024-03-22 ENCOUNTER — MEDICAL CORRESPONDENCE (OUTPATIENT)
Dept: HEALTH INFORMATION MANAGEMENT | Facility: CLINIC | Age: 34
End: 2024-03-22

## 2024-03-25 RX ORDER — ENOXAPARIN SODIUM 100 MG/ML
40 INJECTION SUBCUTANEOUS DAILY
Qty: 2 ML | Refills: 0 | Status: SHIPPED | OUTPATIENT
Start: 2024-03-25 | End: 2024-03-30

## 2024-03-27 RX ORDER — DOCUSATE SODIUM 100 MG/1
100-200 CAPSULE, LIQUID FILLED ORAL 2 TIMES DAILY PRN
Qty: 60 CAPSULE | Refills: 0 | Status: SHIPPED | OUTPATIENT
Start: 2024-03-27

## 2024-03-29 ENCOUNTER — ANESTHESIA EVENT (OUTPATIENT)
Dept: SURGERY | Facility: CLINIC | Age: 34
End: 2024-03-29
Payer: COMMERCIAL

## 2024-03-29 NOTE — ANESTHESIA PREPROCEDURE EVALUATION
Anesthesia Pre-Procedure Evaluation    Patient: Ana Maria Espinoza   MRN: [id number] : 1990        Procedure : Procedure(s):  HERNIORRHAPHY, INCISIONAL, ROBOT-ASSISTED, LAPAROSCOPIC, USING DA COLE XI          Past Medical History:   Diagnosis Date    ADHD (attention deficit hyperactivity disorder)     Arthritis     Rheumatology    Blood clot in bladder 2001    right kidney involved and went into kidney failure    Chickenpox     x2    Chlamydia     Factor V Leiden (H24)     GERD (gastroesophageal reflux disease)     History of urinary tract infection     Postpartum depression     loss of child    Pre-eclampsia 10/15/2023    Renal failure     secondary to blood clot in     Uncomplicated asthma       Past Surgical History:   Procedure Laterality Date    ABDOMEN SURGERY      2 c sections  @      SECTION, TUBAL LIGATION, COMBINED N/A 2023    Procedure:  SECTION, WITH POSTPARTUM TUBAL LIGATION;  Surgeon: Katharina Lerner MD;  Location: [facility]    ESOPHAGOSCOPY, GASTROSCOPY, DUODENOSCOPY (EGD), COMBINED N/A 2021    Procedure: ESOPHAGOGASTRODUODENOSCOPY, WITH BIOPSY;  Surgeon: German Flynn MD;  Location: [facility]    ORTHOPEDIC SURGERY      Had surgery on both big toes    TONSILLECTOMY        Allergies   Allergen Reactions    Bee Venom Anaphylaxis     Has epipen    Aloe Hives     Dry skin as well     Codeine Rash      Social History     Tobacco Use    Smoking status: Some Days     Packs/day: 0.50     Years: 10.00     Additional pack years: 0.00     Total pack years: 5.00     Types: Cigarettes     Start date: 3/19/2009     Last attempt to quit: 2021     Years since quittin.7    Smokeless tobacco: Never    Tobacco comments:     Cut down 1 cigarette    Substance Use Topics    Alcohol use: No      Wt Readings from Last 1 Encounters:   24 79.4 kg (175 lb)        Anesthesia Evaluation   Pt has had prior anesthetic. Type: General and MAC.   "      ROS/MED HX  ENT/Pulmonary:     (+)                      asthma                  Neurologic:       Cardiovascular:     (+) Dyslipidemia hypertension- -   -  - -                                      METS/Exercise Tolerance:     Hematologic:       Musculoskeletal:       GI/Hepatic:     (+) GERD,                   Renal/Genitourinary:     (+) renal disease,             Endo:       Psychiatric/Substance Use:     (+) psychiatric history depression  H/O chronic opiod use .     Infectious Disease:       Malignancy:       Other:            Physical Exam    Airway  airway exam normal      Mallampati: I   TM distance: > 3 FB   Neck ROM: full   Mouth opening: > 3 cm    Respiratory Devices and Support         Dental       (+) Removable bridges or other hardware      Cardiovascular   cardiovascular exam normal       Rhythm and rate: regular and normal     Pulmonary   pulmonary exam normal        breath sounds clear to auscultation           OUTSIDE LABS:  CBC:   Lab Results   Component Value Date    WBC 10.9 03/18/2024    WBC 13.2 (H) 11/13/2023    HGB 14.2 03/18/2024    HGB 10.5 (L) 11/14/2023    HCT 42.1 03/18/2024    HCT 35.3 11/13/2023     03/18/2024     11/13/2023     BMP:   Lab Results   Component Value Date     03/18/2024     11/13/2023    POTASSIUM 5.2 03/18/2024    POTASSIUM 4.6 11/13/2023    CHLORIDE 104 03/18/2024    CHLORIDE 107 11/13/2023    CO2 27 03/18/2024    CO2 21 (L) 11/13/2023    BUN 11.2 03/18/2024    BUN 7.4 11/13/2023    CR 0.96 (H) 03/18/2024    CR 0.89 11/13/2023    GLC 96 03/18/2024    GLC 79 11/13/2023     COAGS: No results found for: \"PTT\", \"INR\", \"FIBR\"  POC:   Lab Results   Component Value Date    HCG Negative 02/13/2023    HCGS Positive (A) 03/14/2023     HEPATIC:   Lab Results   Component Value Date    ALBUMIN 3.1 (L) 11/13/2023    PROTTOTAL 5.2 (L) 11/13/2023    ALT 9 11/13/2023    AST 14 11/13/2023    ALKPHOS 130 (H) 11/13/2023    BILITOTAL 0.2 11/13/2023 "     OTHER:   Lab Results   Component Value Date    FELICIA 9.4 03/18/2024    PHOS 4.1 04/11/2023    MAG 2.2 04/11/2023    LIPASE 34 03/23/2023    TSH 0.93 08/04/2021    CRP 3.5 04/15/2022    SED 8 04/15/2022       Anesthesia Plan    ASA Status:  3    NPO Status:  NPO Appropriate    Anesthesia Type: General.     - Airway: ETT   Induction: Propofol, Intravenous.   Maintenance: TIVA.        Consents    Anesthesia Plan(s) and associated risks, benefits, and realistic alternatives discussed. Questions answered and patient/representative(s) expressed understanding.     - Discussed: Risks, Benefits and Alternatives for BOTH SEDATION and the PROCEDURE were discussed     - Discussed with:  Patient      - Extended Intubation/Ventilatory Support Discussed: No.      - Patient is DNR/DNI Status: No     Use of blood products discussed: No .     Postoperative Care    Pain management: IV analgesics.   PONV prophylaxis: Dexamethasone or Solumedrol, Ondansetron (or other 5HT-3)     Comments:               SY Benitez CRNA    I have reviewed the pertinent notes and labs in the chart from the past 30 days and (re)examined the patient.  Any updates or changes from those notes are reflected in this note.

## 2024-04-01 ENCOUNTER — HOSPITAL ENCOUNTER (OUTPATIENT)
Facility: CLINIC | Age: 34
Discharge: HOME | End: 2024-04-01
Attending: SURGERY | Admitting: SURGERY
Payer: COMMERCIAL

## 2024-04-01 ENCOUNTER — APPOINTMENT (OUTPATIENT)
Dept: GENERAL RADIOLOGY | Facility: CLINIC | Age: 34
End: 2024-04-01
Attending: NURSE ANESTHETIST, CERTIFIED REGISTERED
Payer: COMMERCIAL

## 2024-04-01 ENCOUNTER — ANESTHESIA (OUTPATIENT)
Dept: SURGERY | Facility: CLINIC | Age: 34
End: 2024-04-01
Payer: COMMERCIAL

## 2024-04-01 VITALS
HEART RATE: 84 BPM | OXYGEN SATURATION: 98 % | WEIGHT: 175 LBS | TEMPERATURE: 98.1 F | SYSTOLIC BLOOD PRESSURE: 119 MMHG | DIASTOLIC BLOOD PRESSURE: 80 MMHG | HEIGHT: 60 IN | RESPIRATION RATE: 14 BRPM | BODY MASS INDEX: 34.36 KG/M2

## 2024-04-01 DIAGNOSIS — K43.2 INCISIONAL HERNIA, WITHOUT OBSTRUCTION OR GANGRENE: Primary | ICD-10-CM

## 2024-04-01 PROCEDURE — 250N000011 HC RX IP 250 OP 636: Performed by: NURSE ANESTHETIST, CERTIFIED REGISTERED

## 2024-04-01 PROCEDURE — C1781 MESH (IMPLANTABLE): HCPCS | Performed by: SURGERY

## 2024-04-01 PROCEDURE — 258N000003 HC RX IP 258 OP 636: Performed by: NURSE ANESTHETIST, CERTIFIED REGISTERED

## 2024-04-01 PROCEDURE — 250N000013 HC RX MED GY IP 250 OP 250 PS 637: Performed by: SURGERY

## 2024-04-01 PROCEDURE — 250N000009 HC RX 250: Performed by: NURSE ANESTHETIST, CERTIFIED REGISTERED

## 2024-04-01 PROCEDURE — 370N000017 HC ANESTHESIA TECHNICAL FEE, PER MIN: Performed by: SURGERY

## 2024-04-01 PROCEDURE — 360N000080 HC SURGERY LEVEL 7, PER MIN: Performed by: SURGERY

## 2024-04-01 PROCEDURE — 250N000025 HC SEVOFLURANE, PER MIN: Performed by: SURGERY

## 2024-04-01 PROCEDURE — 250N000011 HC RX IP 250 OP 636: Performed by: SURGERY

## 2024-04-01 PROCEDURE — 999N000065 XR CHEST PORT 1 VIEW

## 2024-04-01 PROCEDURE — 250N000009 HC RX 250: Performed by: SURGERY

## 2024-04-01 PROCEDURE — 49593 RPR AA HRN 1ST 3-10 RDC: CPT | Performed by: SURGERY

## 2024-04-01 PROCEDURE — 250N000013 HC RX MED GY IP 250 OP 250 PS 637: Performed by: NURSE ANESTHETIST, CERTIFIED REGISTERED

## 2024-04-01 PROCEDURE — 49593 RPR AA HRN 1ST 3-10 RDC: CPT | Mod: AS | Performed by: PHYSICIAN ASSISTANT

## 2024-04-01 PROCEDURE — 999N000141 HC STATISTIC PRE-PROCEDURE NURSING ASSESSMENT: Performed by: SURGERY

## 2024-04-01 PROCEDURE — 272N000001 HC OR GENERAL SUPPLY STERILE: Performed by: SURGERY

## 2024-04-01 PROCEDURE — 710N000012 HC RECOVERY PHASE 2, PER MINUTE: Performed by: SURGERY

## 2024-04-01 PROCEDURE — 710N000009 HC RECOVERY PHASE 1, LEVEL 1, PER MIN: Performed by: SURGERY

## 2024-04-01 DEVICE — COMPOSITE MESH MONOFILAMENT POLYPROPYLENE MESH WITH ABSORBABLE SYNTHETIC FILM AND MARKING
Type: IMPLANTABLE DEVICE | Site: ABDOMEN | Status: FUNCTIONAL
Brand: PARIETENE DS

## 2024-04-01 RX ORDER — HYDROMORPHONE HCL IN WATER/PF 6 MG/30 ML
0.4 PATIENT CONTROLLED ANALGESIA SYRINGE INTRAVENOUS EVERY 5 MIN PRN
Status: DISCONTINUED | OUTPATIENT
Start: 2024-04-01 | End: 2024-04-01 | Stop reason: HOSPADM

## 2024-04-01 RX ORDER — ENOXAPARIN SODIUM 100 MG/ML
40 INJECTION SUBCUTANEOUS
COMMUNITY
End: 2024-04-18

## 2024-04-01 RX ORDER — PROPOFOL 10 MG/ML
INJECTION, EMULSION INTRAVENOUS PRN
Status: DISCONTINUED | OUTPATIENT
Start: 2024-04-01 | End: 2024-04-01

## 2024-04-01 RX ORDER — OXYCODONE HYDROCHLORIDE 5 MG/1
5 TABLET ORAL EVERY 6 HOURS PRN
Qty: 12 TABLET | Refills: 0 | Status: SHIPPED | OUTPATIENT
Start: 2024-04-01 | End: 2024-04-04

## 2024-04-01 RX ORDER — HYDROXYZINE HYDROCHLORIDE 25 MG/1
25 TABLET, FILM COATED ORAL EVERY 6 HOURS PRN
Status: DISCONTINUED | OUTPATIENT
Start: 2024-04-01 | End: 2024-04-01 | Stop reason: HOSPADM

## 2024-04-01 RX ORDER — ONDANSETRON 4 MG/1
4 TABLET, ORALLY DISINTEGRATING ORAL EVERY 30 MIN PRN
Status: DISCONTINUED | OUTPATIENT
Start: 2024-04-01 | End: 2024-04-01 | Stop reason: HOSPADM

## 2024-04-01 RX ORDER — KETAMINE HYDROCHLORIDE 10 MG/ML
INJECTION INTRAMUSCULAR; INTRAVENOUS PRN
Status: DISCONTINUED | OUTPATIENT
Start: 2024-04-01 | End: 2024-04-01

## 2024-04-01 RX ORDER — METOPROLOL TARTRATE 1 MG/ML
INJECTION, SOLUTION INTRAVENOUS PRN
Status: DISCONTINUED | OUTPATIENT
Start: 2024-04-01 | End: 2024-04-01

## 2024-04-01 RX ORDER — HYDROMORPHONE HCL IN WATER/PF 6 MG/30 ML
0.2 PATIENT CONTROLLED ANALGESIA SYRINGE INTRAVENOUS EVERY 5 MIN PRN
Status: DISCONTINUED | OUTPATIENT
Start: 2024-04-01 | End: 2024-04-01 | Stop reason: HOSPADM

## 2024-04-01 RX ORDER — LIDOCAINE HYDROCHLORIDE 20 MG/ML
INJECTION, SOLUTION INFILTRATION; PERINEURAL PRN
Status: DISCONTINUED | OUTPATIENT
Start: 2024-04-01 | End: 2024-04-01

## 2024-04-01 RX ORDER — HYDRALAZINE HYDROCHLORIDE 20 MG/ML
INJECTION INTRAMUSCULAR; INTRAVENOUS PRN
Status: DISCONTINUED | OUTPATIENT
Start: 2024-04-01 | End: 2024-04-01

## 2024-04-01 RX ORDER — FENTANYL CITRATE 50 UG/ML
25 INJECTION, SOLUTION INTRAMUSCULAR; INTRAVENOUS EVERY 5 MIN PRN
Status: DISCONTINUED | OUTPATIENT
Start: 2024-04-01 | End: 2024-04-01 | Stop reason: HOSPADM

## 2024-04-01 RX ORDER — CEFAZOLIN SODIUM/WATER 2 G/20 ML
2 SYRINGE (ML) INTRAVENOUS
Status: COMPLETED | OUTPATIENT
Start: 2024-04-01 | End: 2024-04-01

## 2024-04-01 RX ORDER — SODIUM CHLORIDE, SODIUM LACTATE, POTASSIUM CHLORIDE, CALCIUM CHLORIDE 600; 310; 30; 20 MG/100ML; MG/100ML; MG/100ML; MG/100ML
INJECTION, SOLUTION INTRAVENOUS CONTINUOUS
Status: DISCONTINUED | OUTPATIENT
Start: 2024-04-01 | End: 2024-04-01 | Stop reason: HOSPADM

## 2024-04-01 RX ORDER — OXYCODONE HYDROCHLORIDE 5 MG/1
5 TABLET ORAL
Status: COMPLETED | OUTPATIENT
Start: 2024-04-01 | End: 2024-04-01

## 2024-04-01 RX ORDER — BUPIVACAINE HYDROCHLORIDE 5 MG/ML
INJECTION, SOLUTION PERINEURAL PRN
Status: DISCONTINUED | OUTPATIENT
Start: 2024-04-01 | End: 2024-04-01 | Stop reason: HOSPADM

## 2024-04-01 RX ORDER — METOPROLOL TARTRATE 1 MG/ML
1-2 INJECTION, SOLUTION INTRAVENOUS EVERY 5 MIN PRN
Status: DISCONTINUED | OUTPATIENT
Start: 2024-04-01 | End: 2024-04-01 | Stop reason: HOSPADM

## 2024-04-01 RX ORDER — NALOXONE HYDROCHLORIDE 0.4 MG/ML
0.1 INJECTION, SOLUTION INTRAMUSCULAR; INTRAVENOUS; SUBCUTANEOUS
Status: DISCONTINUED | OUTPATIENT
Start: 2024-04-01 | End: 2024-04-01 | Stop reason: HOSPADM

## 2024-04-01 RX ORDER — ONDANSETRON 2 MG/ML
INJECTION INTRAMUSCULAR; INTRAVENOUS PRN
Status: DISCONTINUED | OUTPATIENT
Start: 2024-04-01 | End: 2024-04-01

## 2024-04-01 RX ORDER — ALBUTEROL SULFATE 0.83 MG/ML
2.5 SOLUTION RESPIRATORY (INHALATION) EVERY 4 HOURS PRN
Status: DISCONTINUED | OUTPATIENT
Start: 2024-04-01 | End: 2024-04-01 | Stop reason: HOSPADM

## 2024-04-01 RX ORDER — DIMENHYDRINATE 50 MG/ML
25 INJECTION, SOLUTION INTRAMUSCULAR; INTRAVENOUS
Status: DISCONTINUED | OUTPATIENT
Start: 2024-04-01 | End: 2024-04-01 | Stop reason: HOSPADM

## 2024-04-01 RX ORDER — INDOCYANINE GREEN AND WATER 25 MG
2.5 KIT INJECTION ONCE
Status: DISCONTINUED | OUTPATIENT
Start: 2024-04-01 | End: 2024-04-01

## 2024-04-01 RX ORDER — FENTANYL CITRATE 50 UG/ML
50 INJECTION, SOLUTION INTRAMUSCULAR; INTRAVENOUS EVERY 5 MIN PRN
Status: DISCONTINUED | OUTPATIENT
Start: 2024-04-01 | End: 2024-04-01 | Stop reason: HOSPADM

## 2024-04-01 RX ORDER — DEXAMETHASONE SODIUM PHOSPHATE 4 MG/ML
INJECTION, SOLUTION INTRA-ARTICULAR; INTRALESIONAL; INTRAMUSCULAR; INTRAVENOUS; SOFT TISSUE PRN
Status: DISCONTINUED | OUTPATIENT
Start: 2024-04-01 | End: 2024-04-01

## 2024-04-01 RX ORDER — MAGNESIUM SULFATE HEPTAHYDRATE 40 MG/ML
INJECTION, SOLUTION INTRAVENOUS PRN
Status: DISCONTINUED | OUTPATIENT
Start: 2024-04-01 | End: 2024-04-01

## 2024-04-01 RX ORDER — FENTANYL CITRATE 50 UG/ML
INJECTION, SOLUTION INTRAMUSCULAR; INTRAVENOUS PRN
Status: DISCONTINUED | OUTPATIENT
Start: 2024-04-01 | End: 2024-04-01

## 2024-04-01 RX ORDER — CEFAZOLIN SODIUM/WATER 2 G/20 ML
2 SYRINGE (ML) INTRAVENOUS SEE ADMIN INSTRUCTIONS
Status: DISCONTINUED | OUTPATIENT
Start: 2024-04-01 | End: 2024-04-01 | Stop reason: HOSPADM

## 2024-04-01 RX ORDER — MEPERIDINE HYDROCHLORIDE 25 MG/ML
12.5 INJECTION INTRAMUSCULAR; INTRAVENOUS; SUBCUTANEOUS EVERY 5 MIN PRN
Status: DISCONTINUED | OUTPATIENT
Start: 2024-04-01 | End: 2024-04-01 | Stop reason: HOSPADM

## 2024-04-01 RX ORDER — LIDOCAINE HYDROCHLORIDE AND EPINEPHRINE 10; 10 MG/ML; UG/ML
INJECTION, SOLUTION INFILTRATION; PERINEURAL PRN
Status: DISCONTINUED | OUTPATIENT
Start: 2024-04-01 | End: 2024-04-01 | Stop reason: HOSPADM

## 2024-04-01 RX ORDER — ONDANSETRON 2 MG/ML
4 INJECTION INTRAMUSCULAR; INTRAVENOUS EVERY 30 MIN PRN
Status: DISCONTINUED | OUTPATIENT
Start: 2024-04-01 | End: 2024-04-01 | Stop reason: HOSPADM

## 2024-04-01 RX ORDER — HYDROXYZINE HYDROCHLORIDE 50 MG/1
50 TABLET, FILM COATED ORAL EVERY 6 HOURS PRN
Status: DISCONTINUED | OUTPATIENT
Start: 2024-04-01 | End: 2024-04-01 | Stop reason: HOSPADM

## 2024-04-01 RX ORDER — LIDOCAINE 40 MG/G
CREAM TOPICAL
Status: DISCONTINUED | OUTPATIENT
Start: 2024-04-01 | End: 2024-04-01 | Stop reason: HOSPADM

## 2024-04-01 RX ORDER — HEPARIN SODIUM 5000 [USP'U]/.5ML
5000 INJECTION, SOLUTION INTRAVENOUS; SUBCUTANEOUS
Status: DISCONTINUED | OUTPATIENT
Start: 2024-04-01 | End: 2024-04-01 | Stop reason: HOSPADM

## 2024-04-01 RX ORDER — ACETAMINOPHEN 325 MG/1
975 TABLET ORAL ONCE
Status: COMPLETED | OUTPATIENT
Start: 2024-04-01 | End: 2024-04-01

## 2024-04-01 RX ORDER — KETOROLAC TROMETHAMINE 30 MG/ML
INJECTION, SOLUTION INTRAMUSCULAR; INTRAVENOUS PRN
Status: DISCONTINUED | OUTPATIENT
Start: 2024-04-01 | End: 2024-04-01

## 2024-04-01 RX ADMIN — FENTANYL CITRATE 50 MCG: 50 INJECTION INTRAMUSCULAR; INTRAVENOUS at 07:58

## 2024-04-01 RX ADMIN — KETOROLAC TROMETHAMINE 15 MG: 30 INJECTION, SOLUTION INTRAMUSCULAR at 10:08

## 2024-04-01 RX ADMIN — ROCURONIUM BROMIDE 20 MG: 50 INJECTION, SOLUTION INTRAVENOUS at 08:32

## 2024-04-01 RX ADMIN — KETAMINE HYDROCHLORIDE 25 MG: 10 INJECTION INTRAMUSCULAR; INTRAVENOUS at 07:35

## 2024-04-01 RX ADMIN — KETAMINE HYDROCHLORIDE 25 MG: 10 INJECTION INTRAMUSCULAR; INTRAVENOUS at 07:58

## 2024-04-01 RX ADMIN — METOPROLOL TARTRATE 2.5 MG: 5 INJECTION INTRAVENOUS at 08:40

## 2024-04-01 RX ADMIN — ROCURONIUM BROMIDE 50 MG: 50 INJECTION, SOLUTION INTRAVENOUS at 07:35

## 2024-04-01 RX ADMIN — DEXAMETHASONE SODIUM PHOSPHATE 4 MG: 4 INJECTION, SOLUTION INTRA-ARTICULAR; INTRALESIONAL; INTRAMUSCULAR; INTRAVENOUS; SOFT TISSUE at 07:39

## 2024-04-01 RX ADMIN — LIDOCAINE HYDROCHLORIDE 80 MG: 20 INJECTION, SOLUTION INFILTRATION; PERINEURAL at 07:35

## 2024-04-01 RX ADMIN — MAGNESIUM SULFATE HEPTAHYDRATE 2 G: 40 INJECTION, SOLUTION INTRAVENOUS at 07:38

## 2024-04-01 RX ADMIN — HYDRALAZINE HYDROCHLORIDE 10 MG: 20 INJECTION INTRAMUSCULAR; INTRAVENOUS at 08:27

## 2024-04-01 RX ADMIN — FENTANYL CITRATE 50 MCG: 50 INJECTION INTRAMUSCULAR; INTRAVENOUS at 11:15

## 2024-04-01 RX ADMIN — PROPOFOL 150 MCG/KG/MIN: 10 INJECTION, EMULSION INTRAVENOUS at 07:37

## 2024-04-01 RX ADMIN — LIDOCAINE HYDROCHLORIDE 0.1 ML: 10 INJECTION, SOLUTION EPIDURAL; INFILTRATION; INTRACAUDAL; PERINEURAL at 06:49

## 2024-04-01 RX ADMIN — FENTANYL CITRATE 100 MCG: 50 INJECTION INTRAMUSCULAR; INTRAVENOUS at 09:58

## 2024-04-01 RX ADMIN — FENTANYL CITRATE 50 MCG: 50 INJECTION INTRAMUSCULAR; INTRAVENOUS at 10:53

## 2024-04-01 RX ADMIN — OXYCODONE HYDROCHLORIDE 5 MG: 5 TABLET ORAL at 12:15

## 2024-04-01 RX ADMIN — HYDROMORPHONE HYDROCHLORIDE 1 MG: 1 INJECTION, SOLUTION INTRAMUSCULAR; INTRAVENOUS; SUBCUTANEOUS at 08:10

## 2024-04-01 RX ADMIN — HYDROMORPHONE HYDROCHLORIDE 0.4 MG: 0.2 INJECTION, SOLUTION INTRAMUSCULAR; INTRAVENOUS; SUBCUTANEOUS at 11:25

## 2024-04-01 RX ADMIN — HYDRALAZINE HYDROCHLORIDE 10 MG: 20 INJECTION INTRAMUSCULAR; INTRAVENOUS at 08:37

## 2024-04-01 RX ADMIN — ACETAMINOPHEN 975 MG: 325 TABLET, FILM COATED ORAL at 06:32

## 2024-04-01 RX ADMIN — FENTANYL CITRATE 50 MCG: 50 INJECTION INTRAMUSCULAR; INTRAVENOUS at 08:04

## 2024-04-01 RX ADMIN — FENTANYL CITRATE 100 MCG: 50 INJECTION INTRAMUSCULAR; INTRAVENOUS at 07:35

## 2024-04-01 RX ADMIN — FENTANYL CITRATE 50 MCG: 50 INJECTION INTRAMUSCULAR; INTRAVENOUS at 10:44

## 2024-04-01 RX ADMIN — SUGAMMADEX 200 MG: 100 INJECTION, SOLUTION INTRAVENOUS at 10:15

## 2024-04-01 RX ADMIN — SODIUM CHLORIDE, POTASSIUM CHLORIDE, SODIUM LACTATE AND CALCIUM CHLORIDE: 600; 310; 30; 20 INJECTION, SOLUTION INTRAVENOUS at 09:00

## 2024-04-01 RX ADMIN — MIDAZOLAM 2 MG: 1 INJECTION INTRAMUSCULAR; INTRAVENOUS at 07:28

## 2024-04-01 RX ADMIN — Medication 2 G: at 07:27

## 2024-04-01 RX ADMIN — HYDROXYZINE HYDROCHLORIDE 25 MG: 25 TABLET, FILM COATED ORAL at 12:19

## 2024-04-01 RX ADMIN — PROPOFOL 160 MG: 10 INJECTION, EMULSION INTRAVENOUS at 07:35

## 2024-04-01 RX ADMIN — ONDANSETRON 4 MG: 2 INJECTION INTRAMUSCULAR; INTRAVENOUS at 10:09

## 2024-04-01 RX ADMIN — FENTANYL CITRATE 50 MCG: 50 INJECTION INTRAMUSCULAR; INTRAVENOUS at 11:06

## 2024-04-01 RX ADMIN — METOPROLOL TARTRATE 2.5 MG: 5 INJECTION INTRAVENOUS at 08:13

## 2024-04-01 RX ADMIN — SODIUM CHLORIDE, POTASSIUM CHLORIDE, SODIUM LACTATE AND CALCIUM CHLORIDE: 600; 310; 30; 20 INJECTION, SOLUTION INTRAVENOUS at 06:33

## 2024-04-01 ASSESSMENT — ACTIVITIES OF DAILY LIVING (ADL)
ADLS_ACUITY_SCORE: 29
ADLS_ACUITY_SCORE: 30
ADLS_ACUITY_SCORE: 29

## 2024-04-01 NOTE — OP NOTE
Operative Note     Pre-Operative Diagnosis: Incisional hernia    Post-Operative Diagnosis: Same     Procedure:  Robotic incisional Hernia Repair with Mesh     Surgeon: Renny Moreno DO     Assistant: Chilango Willard PA-C (Needed for retraction, suction, bedside assist with robot).    Anesthesia: General Endotracheal Anesthesia, Local Anesthesia (1% Lidocaine with epinephrine, 0.25% Marcaine)     EBL: 30cc     Operative Date: 04/01/24      Indications: Ana Maria Espinoza presents with pain related to an enlarging bulge of the  low abdomen  and is found to have areducible, tender  incisional  hernia on examination. She was counseled about the indications, risks, benefits and alternatives to surgery, and her questions were addressed. She understands and wishes to proceed.      Procedure: After informed consent was obtained, the patient was brought to the operating room and placed supine on the operating room table. Bilateral lower extremity sequential compression devices were placed and functioning prior to induction of anesthesia, which was then administered and included an endotracheal tube. A urinary catheter was not deemed necessary as the patient urinated just prior to transport to the operating room. The abdominal wall was prepped and draped in the standard fashion.     Intra-abdominal access was obtained using an open Jose technique, in the left upper quadrant abdomen. Once intra-abdominal entry was visually established, an 8mm trochar was delivered and pneumoperitoneum administered. Exploration of the abdominal cavity confirmed a large incisional hernia just off midline in the low abdomen.  Omentum was incarcerated at the margin.  The right rectus muscle was exposed. There were no other concerning findings on exploration. Additional working ports were placed in the right upper quadrant abdomen, and epigastrium - and each measured 8mm. The patient was positioned in the Trendelenberg positon. The robot was docked.       The omentum was removed with scissors and cautery where appropriate.  The margin was delineated for the posterior fascia.   The right salpinx had a small cyst which was dissected free in addition to the right ovary which was interfering with mesh overlay.  The defect was measured at 6cm cranial caudal, 4 cm transversely.    A 1-0 symmetric stratafix was run both cephalad and caudad to close the defect with the pneumoperitoneum down to 10 mmHg.  A 12 cm Pariatene DS mesh was brought onto the field.  A central suture was placed and the suture passer was used to approximate the mesh to the anterior abdominal wall once placed intra-abdominally.     The mesh was then secured circumferentially with 2-0 Stratafix suture.  The repair was reassessed and appeared satisfactory.     Hemostasis was assured and all needles removed from the abdominal cavity. Each of the trochars was then removed, and an instrument count (including laparotomy pads, sponges and needles) was performed and found to be correct.      The left upper quadrant fascial defect was closed using 0-Vicryl suture. The skin incisions were closed using 4-0 Monocryl. The wounds were cleansed and dried, and dressed with sterile glue.      The patient tolerated the procedure well.  She did have some gastric secretions while in Trendelenburg which were suctioned free prior to extubation, OG was placed and patient was extubated without incident and transferred to the Recovery Room in stable condition.      Chest x-ray to be performed.    Renny Moreno DO on 4/1/2024 at 10:26 AM

## 2024-04-01 NOTE — ANESTHESIA CARE TRANSFER NOTE
Patient: Ana Maria Espinoza    Procedure: Procedure(s):  HERNIORRHAPHY, INCISIONAL, ROBOT-ASSISTED, LAPAROSCOPIC, USING DA COLE XI       Diagnosis: Incisional hernia, without obstruction or gangrene [K43.2]  Diagnosis Additional Information: No value filed.    Anesthesia Type:   General     Note:    Oropharynx: oropharynx clear of all foreign objects  Level of Consciousness: drowsy  Oxygen Supplementation: room air    Independent Airway: airway patency satisfactory and stable  Dentition: dentition unchanged  Vital Signs Stable: post-procedure vital signs reviewed and stable  Report to RN Given: handoff report given  Patient transferred to: PACU    Handoff Report: Identifed the Patient, Identified the Reponsible Provider, Reviewed the pertinent medical history, Discussed the surgical course, Reviewed Intra-OP anesthesia mangement and issues during anesthesia, Set expectations for post-procedure period and Allowed opportunity for questions and acknowledgement of understanding      Vitals:  Vitals Value Taken Time   /89 04/01/24 1045   Temp 36.9  C (98.4  F) 04/01/24 1033   Pulse 87 04/01/24 1049   Resp 12 04/01/24 1049   SpO2 98 % 04/01/24 1049   Vitals shown include unfiled device data.    Electronically Signed By: SY Mcwilliams CRNA  April 1, 2024  10:50 AM

## 2024-04-01 NOTE — ANESTHESIA PROCEDURE NOTES
Airway       Patient location during procedure: OR       Procedure Start/Stop Times: 4/1/2024 7:36 AM  Staff -        CRNA: Byron Chua APRN CRNA       Performed By: CRNA  Consent for Airway        Urgency: elective  Indications and Patient Condition       Indications for airway management: komal-procedural         Mask difficulty assessment: 1 - vent by mask    Final Airway Details       Final airway type: endotracheal airway       Successful airway: ETT - single  Endotracheal Airway Details        ETT size (mm): 7.0       Cuffed: yes       Cuff volume (mL): 8       Successful intubation technique: video laryngoscopy       VL Blade Size: Jones 3       Grade View of Cords: 1       Adjucts: stylet       Position: Right       Measured from: lips       Secured at (cm): 21    Post intubation assessment        Placement verified by: capnometry, equal breath sounds and chest rise        Number of attempts at approach: 1       Number of other approaches attempted: 0       Secured with: tape       Ease of procedure: easy       Dentition: Intact    Medication(s) Administered   Medication Administration Time: 4/1/2024 7:36 AM

## 2024-04-01 NOTE — DISCHARGE INSTRUCTIONS
HOME CARE FOLLOWING ABDOMINAL SURGERY    INCISIONAL CARE:  Replace the bandage over your incision (or incisions) until all drainage stops, or if more comfortable to have in place.  If present, leave the steri-strips (white paper tapes) in place for 14 days after surgery.  If you have staples in your incision at the time of discharge, they will be removed at your follow-up appointment.  If Dermabond (a type of skin glue) is present, leave in place until it wears/flakes off.     BATHING:  Avoid baths for 1 week after surgery.  Showers are okay.  You may wash your hair at any time.  Gently pat your incision dry after bathing.    ACTIVITY:  Light Activity -- you may immediately be up and about as tolerated.  Driving -- you may drive when comfortable and off narcotic pain medications (example: Norco, Percocet, Hydrocodone).  Light Work -- resume when comfortable off pain medications.  (If you can drive, you probably can work.)  Strenuous Work/Activity -- limit lifting to 20 pounds for 4 weeks.  Then, progressively increase with time.  Active Sports (running, biking, etc.) -- cautiously resume after 6 weeks.  Most importantly listen to your body.  If an activity causes pain or discomfort please stop and try in a few days or decrease your intensity.    DISCOMFORT:  Use pain medications as prescribed by your surgeon.  Take the pain medication with some food, when possible, to minimize side effects.  Expect gradual improvement.      Narcotic Pain Medications:  Do not drive, make important decisions or operate heavy machinery while on narcotic pain medications.  Narcotic pain medications can be associated with nausea, sleep disturbance, and constipation.  Unless directed otherwise, please take an over the counter stool softener (Colace 100mg twice a day) unless directed otherwise.  As soon as you are able to stop narcotic pain medications the better. Long term use of narcotics is associated with tolerance (the need for more  medication for effect) and potential addiction.    DIET:  Return to diet you were on before surgery, unless you are given specific diet instructions.  Drink plenty of fluids.  While taking pain medications, increase dietary fiber or add a fiber supplementation like Metamucil or Citrucel to help prevent constipation - a possible side effect of pain medications.    NAUSEA:  If nauseated from the anesthetic/pain meds; rest in bed, get up cautiously with assistance, and drink clear liquids (juice, tea, broth).    RETURN APPOINTMENT:  Schedule a follow-up visit 2-3 weeks after discharge from the hospital.  Office Phone: [phone]     CONTACT US IF THE FOLLOWING DEVELOPS:   1. A fever that is above 101     2. If there is a large amount of drainage, bleeding, or swelling.   3. Severe pain that is not relieved by your prescription.   4. Drainage that is thick, cloudy, yellow, green or white.   5. Any other questions not answered by  Frequently Asked Questions  sheet.      FREQUENTLY ASKED QUESTIONS:    Q:  How should my incision look?    A:  Normally your incision will appear slightly swollen with light redness directly along the incision itself as it heals.  It may feel like a bump or ridge as the healing/scarring happens, and over time (3-4 months) this bump or ridge feeling should slowly go away.  In general, clear or pink watery drainage can be normal at first as your incision heals, but should decrease over time.    Q:  How do I know if my incision is infected?  A:  Look at your incision for signs of infection, like redness around the incision spreading to surrounding skin, or drainage of cloudy or foul-smelling drainage.  If you feel warm, check your temperature to see if you are running a fever.    **If any of these things occur, please notify the nurse at our office.  We may need you to come into the office for an incision check.      Q:  How do I take care of my incision?  A:  If you have a dressing in place -  Starting the day after surgery, replace the dressing 1-2 times a day until there is no further drainage from the incision.  At that time, a dressing is no longer needed.  Try to minimize tape on the skin if irritation is occurring at the tape sites.  If you have significant irritation from tape on the skin, please call the office to discuss other method of dressing your incision.    Small pieces of tape called  steri-strips  may be present directly overlying your incision; these may be removed 10 days after surgery unless otherwise specified by your surgeon.  If these tapes start to loosen at the ends, you may trim them back until they fall off or are removed.    A:  If you had  Dermabond  tissue glue used as a dressing (this causes your incision to look shiny with a clear covering over it) - This type of dressing wears off with time and does not require more dressings over the top unless it is draining around the glue as it wears off.  Do not apply ointments or lotions over the incisions until the glue has completely worn off.    Q:  There is a piece of tape or a sticky  lead  still on my skin.  Can I remove this?  A:  Sometimes the sticky  leads  used for monitoring during surgery or for evaluation in the emergency department are not all removed while you are in the hospital.  These sometimes have a tab or metal dot on them.  You can easily remove these on your own, like taking off a band-aid.  If there is a gel substance under the  lead , simply wipe/clean it off with a washcloth or paper towel.      Q:  What can I do to minimize constipation (very hard stools, or lack of stools)?  A:  Stay well hydrated.  Increase your dietary fiber intake or take a fiber supplement -with plenty of water.  Walk around frequently.  You may consider an over-the-counter stool-softener.  Your Pharmacist can assist you with choosing one that is stocked at your pharmacy.  Constipation is also one of the most common side effects of  pain medication.  If you are using pain medication, be pro-active and try to PREVENT problems with constipation by taking the steps above BEFORE constipation becomes a problem.    Q:  What do I do if I need more pain medications?  A:  Call the office to receive refills.  Be aware that certain pain meds cannot be called into a pharmacy and actually require a paper prescription.  A change may be made in your pain med as you progress thru your recovery period or if you have side effects to certain meds.    --Pain meds are NOT refilled after 5pm on weekdays, and NOT AT ALL on the weekends, so please look ahead to prevent problems.      Q:  Why am I having a hard time sleeping now that I am at home?  A:  Many medications you receive while you are in the hospital can impact your sleep for a number of days after your surgery/hospitalization.  Decreased level of activity and naps during the day may also make sleeping at night difficult.  Try to minimize day-time naps, and get up frequently during the day to walk around your home during your recovery time.  Sleep aides may be of some help, but are not recommended for long-term use.      Q:  I am having some back discomfort.  What should I do?  A:  This may be related to certain positioning that was required for your surgery, extended periods of time in bed, or other changes in your overall activity level.  You may try ice, heat, acetaminophen, or ibuprofen to treat this temporarily.  Note that many pain medications have acetaminophen in them and would state this on the prescription bottle.  Be sure not to exceed the maximum of 4000mg per day of acetaminophen.     **If the pain you are having does not resolve, is severe, or is a flare of back pain you have had on other occasions prior to surgery, please contact your primary physician for further recommendations or for an appointment to be examined at their office.    Q:  Why am I having headaches?  A:  Headaches can be caused  by many things:  caffeine withdrawal, use of pain meds, dehydration, high blood pressure, lack of sleep, over-activity/exhaustion, flare-up of usual migraine headaches.  If you feel this is related to muscle tension (a band-like feeling around the head, or a pressure at the low-back of the head) you may try ice or heat to this area.  You may need to drink more fluids (try electrolyte drink like Gatorade), rest, or take your usual migraine medications.   **If your headaches do not resolve, worsen, are accompanied by other symptoms, or if your blood pressure is high, please call your primary physician for recommendation and/or examination.    Q:  I am unable to urinate.  What do I do?  A:  A small percentage of people can have difficulty urinating initially after surgery.  This includes being able to urinate only a very small amount at a time and feeling discomfort or pressure in the very low abdomen.  This is called  urinary retention , and is actually an urgent situation.  Proceed to your nearest Emergency department for evaluation (not an Urgent Care Center).  Sometimes the bladder does not work correctly after certain medications you receive during surgery, or related to certain procedures.  You may need to have a catheter placed until your bladder recovers.  When planning to go to an Emergency department, it may help to call the ER to let them know you are coming in for this problem after a surgery.  This may help you get in quicker to be evaluated.  **If you have symptoms of a urinary tract infection, please contact your primary physician for the proper evaluation and treatment.          If you have other questions, please call the office Monday thru Friday between 8am and 5pm to discuss with the nurse.  # [phone]    There is a surgeon ON CALL on weekday evenings and over the weekend in case of urgent need only, and may be contacted at the same number.    If you are having an emergency, call 911 or proceed  to your nearest emergency department.                        Same Day Surgery Discharge Instructions  Special Precautions After Surgery - Adult    It is not unusual to feel lightheaded or faint, up to 24 hours after surgery or while taking pain medication.  If you have these symptoms; sit for a few minutes before standing and have someone assist you when getting up.  You should rest and relax for the next 24 hours and must have someone stay with you for at least 24 hours after your discharge.  DO NOT DRIVE any vehicle or operate mechanical equipment for 24 hours following the end of your surgery.  DO NOT DRIVE while taking narcotic pain medications that have been prescribed by your physician.  If you had a limb operated on, you must be able to use it fully to drive.  DO NOT drink alcoholic beverages for 24 hours following surgery or while taking prescription pain medication.  Drink clear liquids (apple juice, ginger ale, broth, 7-Up, etc.).  Progress to your regular diet as you feel able.  Any questions call your physician and do not make important decisions for 24 hours.    Nausea and Vomiting: Nausea and vomiting can occur any time after receiving anesthesia. If you experience nausea and vomiting we encourage you to move to a clear liquid diet and advance your diet as tolerated. If nausea and vomiting do not improve within 12 hours please call the surgeon or present to the Emergency department.     Break-through Bleeding: If your experience bleeding from your surgical site apply pressure and additional dressing per nurse instruction. For simple problems such as a saturated dressing, you may need to reinforce the dressing with more gauze and tape and put slight pressure on the site. If bleeding does not subside contact the surgeon or present to the Emergency Department.    Post-op Infection: If you develop a fever of 100.4 or greater, have pus like drainage, redness, swelling or severe pain at the surgical site not  alleviated with pain medications; please contact the surgeon or present to the Emergency Department.     Medications:  Acetaminophen (Tylenol):  Next dose: 12pm.  Ibuprofen (Motrin, Advil):  Next dose: 4pm.  Follow the instructions on the bottle.  __________________________________________________________________________________________________________________________________  IMPORTANT NUMBERS:    [facility] [place]:  [phone], [phone]  Pharmacy:  [phone]  Same Day Surgery:  [phone], for general post-op questions call Monday - Thursday until 8:30 p.m., Fridays until 6:00 p.m.  Nurse Advice Line:  [phone]                                                                         [facility]:  [phone]

## 2024-04-01 NOTE — ANESTHESIA POSTPROCEDURE EVALUATION
Patient: Ana Maria Espinoza    Procedure: Procedure(s):  HERNIORRHAPHY, INCISIONAL, ROBOT-ASSISTED, LAPAROSCOPIC, USING DA COLE XI       Anesthesia Type:  General    Note:  Disposition: Outpatient   Postop Pain Control: Uneventful            Sign Out: Well controlled pain   PONV: No   Neuro/Psych: Uneventful            Sign Out: Acceptable/Baseline neuro status   Airway/Respiratory: Uneventful            Sign Out: Acceptable/Baseline resp. status   CV/Hemodynamics: Uneventful            Sign Out: Acceptable CV status; No obvious hypovolemia; No obvious fluid overload   Other NRE: NONE   DID A NON-ROUTINE EVENT OCCUR? No           Last vitals:  Vitals Value Taken Time   /64 04/01/24 1145   Temp 36.9  C (98.4  F) 04/01/24 1100   Pulse 87 04/01/24 1147   Resp 15 04/01/24 1147   SpO2 97 % 04/01/24 1147   Vitals shown include unfiled device data.    Electronically Signed By: SY Galaviz CRNA  April 1, 2024  12:42 PM

## 2024-04-02 ENCOUNTER — TELEPHONE (OUTPATIENT)
Dept: SURGERY | Facility: CLINIC | Age: 34
End: 2024-04-02

## 2024-04-02 ENCOUNTER — HOSPITAL ENCOUNTER (EMERGENCY)
Facility: CLINIC | Age: 34
Discharge: HOME | End: 2024-04-02
Attending: NURSE PRACTITIONER | Admitting: NURSE PRACTITIONER
Payer: COMMERCIAL

## 2024-04-02 ENCOUNTER — ANESTHESIA EVENT (OUTPATIENT)
Dept: ULTRASOUND IMAGING | Facility: CLINIC | Age: 34
End: 2024-04-02

## 2024-04-02 ENCOUNTER — APPOINTMENT (OUTPATIENT)
Dept: CT IMAGING | Facility: CLINIC | Age: 34
End: 2024-04-02
Attending: NURSE PRACTITIONER
Payer: COMMERCIAL

## 2024-04-02 ENCOUNTER — ANESTHESIA (OUTPATIENT)
Dept: ULTRASOUND IMAGING | Facility: CLINIC | Age: 34
End: 2024-04-02

## 2024-04-02 VITALS
HEART RATE: 86 BPM | DIASTOLIC BLOOD PRESSURE: 95 MMHG | RESPIRATION RATE: 28 BRPM | OXYGEN SATURATION: 97 % | SYSTOLIC BLOOD PRESSURE: 127 MMHG

## 2024-04-02 DIAGNOSIS — R10.84 GENERALIZED ABDOMINAL PAIN: ICD-10-CM

## 2024-04-02 DIAGNOSIS — G89.18 ACUTE POST-OPERATIVE PAIN: ICD-10-CM

## 2024-04-02 DIAGNOSIS — Z98.891 HISTORY OF CESAREAN SECTION: ICD-10-CM

## 2024-04-02 LAB
ACANTHOCYTES BLD QL SMEAR: NORMAL
ANION GAP SERPL CALCULATED.3IONS-SCNC: 14 MMOL/L (ref 7–15)
AUER BODIES BLD QL SMEAR: NORMAL
BASO STIPL BLD QL SMEAR: NORMAL
BASOPHILS # BLD AUTO: 0 10E3/UL (ref 0–0.2)
BASOPHILS NFR BLD AUTO: 0 %
BITE CELLS BLD QL SMEAR: NORMAL
BLISTER CELLS BLD QL SMEAR: NORMAL
BUN SERPL-MCNC: 13.9 MG/DL (ref 6–20)
BURR CELLS BLD QL SMEAR: NORMAL
CALCIUM SERPL-MCNC: 9.2 MG/DL (ref 8.6–10)
CHLORIDE SERPL-SCNC: 103 MMOL/L (ref 98–107)
CREAT SERPL-MCNC: 1.12 MG/DL (ref 0.51–0.95)
CRP SERPL-MCNC: 14.57 MG/L
DACRYOCYTES BLD QL SMEAR: NORMAL
DEPRECATED HCO3 PLAS-SCNC: 22 MMOL/L (ref 22–29)
EGFRCR SERPLBLD CKD-EPI 2021: 66 ML/MIN/1.73M2
ELLIPTOCYTES BLD QL SMEAR: NORMAL
EOSINOPHIL # BLD AUTO: 0.1 10E3/UL (ref 0–0.7)
EOSINOPHIL NFR BLD AUTO: 1 %
ERYTHROCYTE [DISTWIDTH] IN BLOOD BY AUTOMATED COUNT: 13.5 % (ref 10–15)
FRAGMENTS BLD QL SMEAR: NORMAL
GIANT PLATELETS BLD QL SMEAR: NORMAL
GLUCOSE SERPL-MCNC: 89 MG/DL (ref 70–99)
HCT VFR BLD AUTO: 39.8 % (ref 35–47)
HGB BLD-MCNC: 13.8 G/DL (ref 11.7–15.7)
HGB C CRYSTALS: NORMAL
HOLD SPECIMEN: NORMAL
HOWELL-JOLLY BOD BLD QL SMEAR: NORMAL
IMM GRANULOCYTES # BLD: 0 10E3/UL
IMM GRANULOCYTES NFR BLD: 0 %
LACTATE SERPL-SCNC: 1.1 MMOL/L (ref 0.7–2)
LACTATE SERPL-SCNC: 2.4 MMOL/L (ref 0.7–2)
LYMPHOCYTES # BLD AUTO: 5.3 10E3/UL (ref 0.8–5.3)
LYMPHOCYTES NFR BLD AUTO: 41 %
MCH RBC QN AUTO: 31.7 PG (ref 26.5–33)
MCHC RBC AUTO-ENTMCNC: 34.7 G/DL (ref 31.5–36.5)
MCV RBC AUTO: 91 FL (ref 78–100)
MONOCYTES # BLD AUTO: 0.4 10E3/UL (ref 0–1.3)
MONOCYTES NFR BLD AUTO: 3 %
NEUTROPHILS # BLD AUTO: 7.2 10E3/UL (ref 1.6–8.3)
NEUTROPHILS NFR BLD AUTO: 55 %
NEUTS HYPERSEG BLD QL SMEAR: NORMAL
NRBC # BLD AUTO: 0 10E3/UL
NRBC BLD AUTO-RTO: 0 /100
PATH REV: NORMAL
PLAT MORPH BLD: NORMAL
PLATELET # BLD AUTO: 346 10E3/UL (ref 150–450)
POLYCHROMASIA BLD QL SMEAR: NORMAL
POTASSIUM SERPL-SCNC: 3.8 MMOL/L (ref 3.4–5.3)
PROCALCITONIN SERPL IA-MCNC: 0.05 NG/ML
RBC # BLD AUTO: 4.36 10E6/UL (ref 3.8–5.2)
RBC AGGLUT BLD QL: NORMAL
RBC MORPH BLD: NORMAL
ROULEAUX BLD QL SMEAR: NORMAL
SICKLE CELLS BLD QL SMEAR: NORMAL
SMUDGE CELLS BLD QL SMEAR: NORMAL
SODIUM SERPL-SCNC: 139 MMOL/L (ref 135–145)
SPHEROCYTES BLD QL SMEAR: NORMAL
STOMATOCYTES BLD QL SMEAR: NORMAL
TARGETS BLD QL SMEAR: NORMAL
TOXIC GRANULES BLD QL SMEAR: NORMAL
VARIANT LYMPHS BLD QL SMEAR: NORMAL
WBC # BLD AUTO: 13.1 10E3/UL (ref 4–11)

## 2024-04-02 PROCEDURE — 99285 EMERGENCY DEPT VISIT HI MDM: CPT | Performed by: NURSE PRACTITIONER

## 2024-04-02 PROCEDURE — 83605 ASSAY OF LACTIC ACID: CPT | Performed by: NURSE PRACTITIONER

## 2024-04-02 PROCEDURE — 250N000011 HC RX IP 250 OP 636: Performed by: NURSE ANESTHETIST, CERTIFIED REGISTERED

## 2024-04-02 PROCEDURE — 85025 COMPLETE CBC W/AUTO DIFF WBC: CPT | Performed by: NURSE PRACTITIONER

## 2024-04-02 PROCEDURE — 74177 CT ABD & PELVIS W/CONTRAST: CPT

## 2024-04-02 PROCEDURE — 36415 COLL VENOUS BLD VENIPUNCTURE: CPT | Performed by: NURSE PRACTITIONER

## 2024-04-02 PROCEDURE — 96361 HYDRATE IV INFUSION ADD-ON: CPT | Performed by: NURSE PRACTITIONER

## 2024-04-02 PROCEDURE — 80048 BASIC METABOLIC PNL TOTAL CA: CPT | Performed by: NURSE PRACTITIONER

## 2024-04-02 PROCEDURE — 96374 THER/PROPH/DIAG INJ IV PUSH: CPT | Mod: 59 | Performed by: NURSE PRACTITIONER

## 2024-04-02 PROCEDURE — 84145 PROCALCITONIN (PCT): CPT | Performed by: NURSE PRACTITIONER

## 2024-04-02 PROCEDURE — 250N000011 HC RX IP 250 OP 636: Performed by: NURSE PRACTITIONER

## 2024-04-02 PROCEDURE — 258N000003 HC RX IP 258 OP 636: Performed by: NURSE PRACTITIONER

## 2024-04-02 PROCEDURE — 250N000009 HC RX 250: Performed by: NURSE PRACTITIONER

## 2024-04-02 PROCEDURE — C9290 INJ, BUPIVACAINE LIPOSOME: HCPCS | Performed by: NURSE ANESTHETIST, CERTIFIED REGISTERED

## 2024-04-02 PROCEDURE — 99285 EMERGENCY DEPT VISIT HI MDM: CPT | Mod: 25 | Performed by: NURSE PRACTITIONER

## 2024-04-02 PROCEDURE — 86140 C-REACTIVE PROTEIN: CPT | Performed by: NURSE PRACTITIONER

## 2024-04-02 PROCEDURE — 96375 TX/PRO/DX INJ NEW DRUG ADDON: CPT | Performed by: NURSE PRACTITIONER

## 2024-04-02 RX ORDER — DIPHENHYDRAMINE HYDROCHLORIDE 50 MG/ML
25 INJECTION INTRAMUSCULAR; INTRAVENOUS ONCE
Status: DISCONTINUED | OUTPATIENT
Start: 2024-04-02 | End: 2024-04-02

## 2024-04-02 RX ORDER — OXYCODONE HYDROCHLORIDE 5 MG/1
10 TABLET ORAL
Status: DISCONTINUED | OUTPATIENT
Start: 2024-04-02 | End: 2024-04-02 | Stop reason: HOSPADM

## 2024-04-02 RX ORDER — IOPAMIDOL 755 MG/ML
85 INJECTION, SOLUTION INTRAVASCULAR ONCE
Status: COMPLETED | OUTPATIENT
Start: 2024-04-02 | End: 2024-04-02

## 2024-04-02 RX ORDER — KETOROLAC TROMETHAMINE 30 MG/ML
30 INJECTION, SOLUTION INTRAMUSCULAR; INTRAVENOUS ONCE
Status: COMPLETED | OUTPATIENT
Start: 2024-04-02 | End: 2024-04-02

## 2024-04-02 RX ORDER — FENTANYL CITRATE 50 UG/ML
INJECTION, SOLUTION INTRAMUSCULAR; INTRAVENOUS PRN
Status: DISCONTINUED | OUTPATIENT
Start: 2024-04-02 | End: 2024-04-02

## 2024-04-02 RX ORDER — LORAZEPAM 2 MG/ML
1 INJECTION INTRAMUSCULAR ONCE
Status: COMPLETED | OUTPATIENT
Start: 2024-04-02 | End: 2024-04-02

## 2024-04-02 RX ORDER — BUPIVACAINE HYDROCHLORIDE 2.5 MG/ML
INJECTION, SOLUTION EPIDURAL; INFILTRATION; INTRACAUDAL
Status: DISCONTINUED | OUTPATIENT
Start: 2024-04-02 | End: 2024-04-02

## 2024-04-02 RX ORDER — OXYCODONE HYDROCHLORIDE 5 MG/1
5 TABLET ORAL EVERY 6 HOURS PRN
Qty: 10 TABLET | Refills: 0 | Status: SHIPPED | OUTPATIENT
Start: 2024-04-02 | End: 2024-04-05

## 2024-04-02 RX ORDER — HYDROMORPHONE HYDROCHLORIDE 1 MG/ML
0.5 INJECTION, SOLUTION INTRAMUSCULAR; INTRAVENOUS; SUBCUTANEOUS EVERY 30 MIN PRN
Status: DISCONTINUED | OUTPATIENT
Start: 2024-04-02 | End: 2024-04-02

## 2024-04-02 RX ORDER — HYDROXYZINE HYDROCHLORIDE 25 MG/1
25 TABLET, FILM COATED ORAL EVERY 4 HOURS PRN
Qty: 30 TABLET | Refills: 0 | Status: SHIPPED | OUTPATIENT
Start: 2024-04-02

## 2024-04-02 RX ADMIN — MIDAZOLAM 2 MG: 1 INJECTION INTRAMUSCULAR; INTRAVENOUS at 14:39

## 2024-04-02 RX ADMIN — LORAZEPAM 1 MG: 2 INJECTION INTRAMUSCULAR; INTRAVENOUS at 11:27

## 2024-04-02 RX ADMIN — BUPIVACAINE 10 ML: 13.3 INJECTION, SUSPENSION, LIPOSOMAL INFILTRATION at 14:55

## 2024-04-02 RX ADMIN — IOPAMIDOL 85 ML: 755 INJECTION, SOLUTION INTRAVENOUS at 12:13

## 2024-04-02 RX ADMIN — MIDAZOLAM 2 MG: 1 INJECTION INTRAMUSCULAR; INTRAVENOUS at 14:36

## 2024-04-02 RX ADMIN — FENTANYL CITRATE 100 MCG: 50 INJECTION INTRAMUSCULAR; INTRAVENOUS at 14:36

## 2024-04-02 RX ADMIN — KETOROLAC TROMETHAMINE 30 MG: 30 INJECTION INTRAMUSCULAR; INTRAVENOUS at 11:34

## 2024-04-02 RX ADMIN — SODIUM CHLORIDE, POTASSIUM CHLORIDE, SODIUM LACTATE AND CALCIUM CHLORIDE 1000 ML: 600; 310; 30; 20 INJECTION, SOLUTION INTRAVENOUS at 12:30

## 2024-04-02 RX ADMIN — BUPIVACAINE HYDROCHLORIDE 10 ML: 2.5 INJECTION, SOLUTION EPIDURAL; INFILTRATION; INTRACAUDAL at 14:58

## 2024-04-02 RX ADMIN — BUPIVACAINE HYDROCHLORIDE 10 ML: 2.5 INJECTION, SOLUTION EPIDURAL; INFILTRATION; INTRACAUDAL at 14:54

## 2024-04-02 RX ADMIN — SODIUM CHLORIDE 61 ML: 9 INJECTION, SOLUTION INTRAVENOUS at 12:14

## 2024-04-02 RX ADMIN — SODIUM CHLORIDE, POTASSIUM CHLORIDE, SODIUM LACTATE AND CALCIUM CHLORIDE 1000 ML: 600; 310; 30; 20 INJECTION, SOLUTION INTRAVENOUS at 11:38

## 2024-04-02 RX ADMIN — BUPIVACAINE 10 ML: 13.3 INJECTION, SUSPENSION, LIPOSOMAL INFILTRATION at 14:58

## 2024-04-02 ASSESSMENT — ACTIVITIES OF DAILY LIVING (ADL)
ADLS_ACUITY_SCORE: 36

## 2024-04-02 ASSESSMENT — COLUMBIA-SUICIDE SEVERITY RATING SCALE - C-SSRS
1. IN THE PAST MONTH, HAVE YOU WISHED YOU WERE DEAD OR WISHED YOU COULD GO TO SLEEP AND NOT WAKE UP?: NO
6. HAVE YOU EVER DONE ANYTHING, STARTED TO DO ANYTHING, OR PREPARED TO DO ANYTHING TO END YOUR LIFE?: NO
2. HAVE YOU ACTUALLY HAD ANY THOUGHTS OF KILLING YOURSELF IN THE PAST MONTH?: NO

## 2024-04-02 NOTE — ANESTHESIA PREPROCEDURE EVALUATION
Anesthesia Pre-Procedure Evaluation    Patient: Ana Maria Espinoza   MRN: [id number] : 1990        Procedure : Procedure(s):  HERNIORRHAPHY, INCISIONAL, ROBOT-ASSISTED, LAPAROSCOPIC, USING DA COLE XI          Past Medical History:   Diagnosis Date    ADHD (attention deficit hyperactivity disorder)     Arthritis     Rheumatology    Blood clot in bladder 2001    right kidney involved and went into kidney failure    Chickenpox     x2    Chlamydia     Factor V Leiden (H24)     GERD (gastroesophageal reflux disease)     History of urinary tract infection     Postpartum depression     loss of child    Pre-eclampsia 10/15/2023    Renal failure     secondary to blood clot in     Uncomplicated asthma       Past Surgical History:   Procedure Laterality Date    ABDOMEN SURGERY      2 c sections  @      SECTION, TUBAL LIGATION, COMBINED N/A 2023    Procedure:  SECTION, WITH POSTPARTUM TUBAL LIGATION;  Surgeon: Katharina Lerner MD;  Location: [facility]    ESOPHAGOSCOPY, GASTROSCOPY, DUODENOSCOPY (EGD), COMBINED N/A 2021    Procedure: ESOPHAGOGASTRODUODENOSCOPY, WITH BIOPSY;  Surgeon: German Flynn MD;  Location: [facility]    HERNIORRHAPHY, INCISIONAL, ROBOT-ASSISTED, LAPAROSCOPIC, USING DA COLE XI N/A 2024    Procedure: Robotic incisional Hernia Repair with Mesh;  Surgeon: Renny Moreno DO;  Location: [facility]    ORTHOPEDIC SURGERY      Had surgery on both big toes    TONSILLECTOMY        Allergies   Allergen Reactions    Bee Venom Anaphylaxis     Has epipen    Aloe Hives     Dry skin as well     Codeine Rash      Social History     Tobacco Use    Smoking status: Some Days     Packs/day: 0.50     Years: 10.00     Additional pack years: 0.00     Total pack years: 5.00     Types: Cigarettes     Start date: 3/19/2009     Last attempt to quit: 2021     Years since quittin.7    Smokeless tobacco: Never    Tobacco comments:     Cut down 1  "cigarette    Substance Use Topics    Alcohol use: No      Wt Readings from Last 1 Encounters:   04/01/24 79.4 kg (175 lb)        Anesthesia Evaluation   Pt has had prior anesthetic. Type: General and MAC.        ROS/MED HX  ENT/Pulmonary:     (+)                      asthma                  Neurologic:       Cardiovascular:     (+) Dyslipidemia hypertension- -   -  - -                                      METS/Exercise Tolerance:     Hematologic:       Musculoskeletal:       GI/Hepatic:     (+) GERD,                   Renal/Genitourinary:     (+) renal disease,             Endo:       Psychiatric/Substance Use:     (+) psychiatric history depression  H/O chronic opiod use .     Infectious Disease:       Malignancy:       Other:            Physical Exam    Airway  airway exam normal      Mallampati: I   TM distance: > 3 FB   Neck ROM: full   Mouth opening: > 3 cm    Respiratory Devices and Support         Dental       (+) Removable bridges or other hardware      Cardiovascular   cardiovascular exam normal       Rhythm and rate: regular and normal     Pulmonary   pulmonary exam normal        breath sounds clear to auscultation           OUTSIDE LABS:  CBC:   Lab Results   Component Value Date    WBC 13.1 (H) 04/02/2024    WBC 10.9 03/18/2024    HGB 13.8 04/02/2024    HGB 14.2 03/18/2024    HCT 39.8 04/02/2024    HCT 42.1 03/18/2024     04/02/2024     03/18/2024     BMP:   Lab Results   Component Value Date     04/02/2024     03/18/2024    POTASSIUM 3.8 04/02/2024    POTASSIUM 5.2 03/18/2024    CHLORIDE 103 04/02/2024    CHLORIDE 104 03/18/2024    CO2 22 04/02/2024    CO2 27 03/18/2024    BUN 13.9 04/02/2024    BUN 11.2 03/18/2024    CR 1.12 (H) 04/02/2024    CR 0.96 (H) 03/18/2024    GLC 89 04/02/2024    GLC 96 03/18/2024     COAGS: No results found for: \"PTT\", \"INR\", \"FIBR\"  POC:   Lab Results   Component Value Date    HCG Negative 02/13/2023    HCGS Positive (A) 03/14/2023     HEPATIC: "   Lab Results   Component Value Date    ALBUMIN 3.1 (L) 11/13/2023    PROTTOTAL 5.2 (L) 11/13/2023    ALT 9 11/13/2023    AST 14 11/13/2023    ALKPHOS 130 (H) 11/13/2023    BILITOTAL 0.2 11/13/2023     OTHER:   Lab Results   Component Value Date    LACT 1.1 04/02/2024    FELICIA 9.2 04/02/2024    PHOS 4.1 04/11/2023    MAG 2.2 04/11/2023    LIPASE 34 03/23/2023    TSH 0.93 08/04/2021    CRP 3.5 04/15/2022    SED 8 04/15/2022       Anesthesia Plan    ASA Status:  3    NPO Status:  NPO Appropriate    Anesthesia Type: Peripheral Nerve Block.   Induction: N/a.   Maintenance: N/A.        Consents    Anesthesia Plan(s) and associated risks, benefits, and realistic alternatives discussed. Questions answered and patient/representative(s) expressed understanding.     - Discussed: Risks, Benefits and Alternatives for BOTH SEDATION and the PROCEDURE were discussed     - Discussed with:  Patient      - Extended Intubation/Ventilatory Support Discussed: No.      - Patient is DNR/DNI Status: No     Use of blood products discussed: No .     Postoperative Care    Pain management: IV analgesics, Peripheral nerve block (Single Shot).   PONV prophylaxis: Dexamethasone or Solumedrol, Ondansetron (or other 5HT-3)     Comments:               SY Hamilton CRNA    I have reviewed the pertinent notes and labs in the chart from the past 30 days and (re)examined the patient.  Any updates or changes from those notes are reflected in this note.            # Drug Induced Coagulation Defect: home medication list includes an anticoagulant medication  # Drug Induced Platelet Defect: home medication list includes an antiplatelet medication  # Obesity: Estimated body mass index is 34.18 kg/m  as calculated from the following:    Height as of 4/1/24: 1.524 m (5').    Weight as of 4/1/24: 79.4 kg (175 lb).

## 2024-04-02 NOTE — ANESTHESIA POSTPROCEDURE EVALUATION
Patient: Ana Maria Espinoza    Procedure: * No procedures listed *       Anesthesia Type:  Peripheral Nerve Block    Note:  Disposition: Outpatient   Postop Pain Control: Uneventful            Sign Out: Well controlled pain   PONV: No   Neuro/Psych: Uneventful            Sign Out: Acceptable/Baseline neuro status   Airway/Respiratory: Uneventful            Sign Out: Acceptable/Baseline resp. status   CV/Hemodynamics: Uneventful            Sign Out: Acceptable CV status; No obvious hypovolemia; No obvious fluid overload   Other NRE: NONE   DID A NON-ROUTINE EVENT OCCUR? No           Last vitals:  Vitals:    04/02/24 1130 04/02/24 1145 04/02/24 1200   BP:      Pulse:      Resp:      SpO2: 98% 96% 98%       Electronically Signed By: SY Hamilton CRNA  April 2, 2024  3:41 PM

## 2024-04-02 NOTE — DISCHARGE INSTRUCTIONS
I recommend try to get up and move as directed by the surgeons.  For pain management I recommend that you take either 3 regular strength Tylenol or 2 extra strength Tylenol 4 times daily.  For extra pain support you can also take 1 hydroxyzine every time you take a Tylenol to help make the Tylenol worked better.  For pain that is more severe than this I recommend that you take 1 oxycodone every 4-8 hours.  At bedtime you may need to oxycodone but uses sparingly as it causes severe constipation and addiction.  For constipation due to stool softeners by mouth twice daily.  If you do not have a bowel movement in 72 hours then I recommend to add additional milk of magnesia 30 mL by mouth once daily.  Return to the emergency room if you have uncontrolled pain or develop high fever of 101.5 or greater.

## 2024-04-02 NOTE — ANESTHESIA PROCEDURE NOTES
TAP Procedure Note    Pre-Procedure   Staff -        CRNA: Tayo Linn APRN CRNA       Other Anesthesia Staff: Beth Gomes       Performed By: CRNA       Location: ED       Procedure Start/Stop Times: 4/2/2024 2:36 PM and 4/2/2024 3:01 PM       Pre-Anesthestic Checklist: patient identified, IV checked, site marked, risks and benefits discussed, informed consent, monitors and equipment checked, pre-op evaluation, at physician/surgeon's request and post-op pain management  Timeout:       Correct Patient: Yes        Correct Procedure: Yes        Correct Site: Yes        Correct Position: Yes        Correct Laterality: Yes        Site Marked: Yes  Procedure Documentation  Procedure: TAP       Laterality: bilateral       Patient Position: supine       Patient Prep/Sterile Barriers: sterile gloves, mask, patient draped       Skin prep: Chloraprep       Local skin infiltrated with 4 mL of 1% lidocaine.        Needle Length (millimeters): 100        Catheter: 19 G.                Ultrasound guided       1. Ultrasound was used to identify targeted nerve, plexus, vascular marker, or fascial plane and place a needle adjacent to it in real-time.       2. Ultrasound was used to visualize the spread of anesthetic in close proximity to the above referenced structure.       3. A permanent image is entered into the patient's record.       4. The visualized anatomic structures appeared normal.       5. There were no apparent abnormal pathologic findings.    Assessment/Narrative         The placement was negative for: blood aspirated, painful injection and site bleeding       Paresthesias: No.       Bolus given via needle. no blood aspirated via catheter.        Secured via.        Insertion/Infusion Method: Single Shot       Complications: none       Injection made incrementally with aspirations every 5 mL.    Medication(s) Administered   Bupivacaine 0.25% PF (Infiltration) - Infiltration   10 mL - 4/2/2024 2:54:00 PM  Bupivacaine  "liposome (Exparel) 1.3% LA inj susp (Infiltration) - Infiltration   10 mL - 4/2/2024 2:55:00 PM  Medication Administration Time: 4/2/2024 2:36 PM      FOR [facility] ONLY:   Pain Team Contact information: please page the Pain Team Via [employer]. Search \"Pain\". During daytime hours, please page the attending first. At night please page the resident first.      "

## 2024-04-02 NOTE — TELEPHONE ENCOUNTER
"Patient taking OTC alternated every 2 hours and Ice, resting unable to get up or sit up,  wearing abd binder and pain med not reducing pain at all. Patient crying hard and at times unable to understand.  Said her pain spiked during discharge when attempting to place abd binder and took \"stronger pain med at that time to reduce it any\" then discharged home.  When that wore off at home, pain has been unable to reduce.    Advised pain this level would not be manageable at home and needs monitoring for something stronger. As well as should be checked for anything not usual post op.  Pt expressed understanding and will go to [facility] RN  "

## 2024-04-02 NOTE — ED PROVIDER NOTES
History     Chief Complaint   Patient presents with    Post-op Problem     HPI  Ana Maria Espinoza is a 33 year old female with past medical history of GERD, factor V Leiden, tobacco abuse, asthma, previous , recent hernia repair yesterday who presents emergency room with uncontrolled pain.  Patient states the pain seemed to start yesterday afternoon around the time of discharge when the abdominal binder was placed.  Patient states she has been taking Tylenol 650 mg every 6 hours and alternating this with oxycodone every 6 hours and the pain is severe and uncontrolled.  Patient describes the pain as generalized in the abdomen.  Patient denies nausea, vomiting.  She reports she is urinating without difficulty.  Patient states she is also taking stool softener but denies having a bowel movement this morning.  Patient states she has been unable to take her Lovenox shot this morning and wonders if someone can do that for her.    Allergies:  Allergies   Allergen Reactions    Bee Venom Anaphylaxis     Has epipen    Aloe Hives     Dry skin as well     Codeine Rash       Problem List:    Patient Active Problem List    Diagnosis Date Noted    Gestational hypertension 11/15/2023     Priority: Medium    Anemia due to blood loss, acute 2023     Priority: Medium    Pre-eclampsia 10/15/2023     Priority: Medium    Chronic, continuous use of opioids 2022     Priority: Medium    Microscopic hematuria 02/10/2022     Priority: Medium    Factor V Leiden mutation (H24) 2022     Priority: Medium    Renal infarction (H24) 2022     Priority: Medium    Tobacco use 2020     Priority: Medium    Hip pain 2020     Priority: Medium    Contact dermatitis due to chemicals 2019     Priority: Medium    Pain in left hand 2018     Priority: Medium    H/O herpes simplex type 2 infection 10/16/2017     Priority: Medium    Dyslipidemia 2016     Priority: Medium     Formatting of this note  might be different from the original.  Formatting of this note might be different from the original.  : to quit smoking and work otitis media a more active lifestyle.  Last Lipids:  Chol: 2016 142   73  HDL: 2016 27   LDL CHOLESTEROL (mg/dL)   Date Value   2016 100  Formatting of this note is different from the original.  : to quit smoking and work otitis media a more active lifestyle.  Last Lipids:  Chol: 2016 142   73  HDL: 2016 27   LDL CHOLESTEROL (mg/dL)   Date Value   2016 100      Knee strain 2016     Priority: Medium    Lumbar strain 2016     Priority: Medium    Wrist strain 2016     Priority: Medium    Mild persistent asthma without complication 2015     Priority: Medium    Pain medication agreement 2013     Priority: Medium     Formatting of this note might be different from the original.  Prn judicious use of vicodin or percocet      Tonsillar hypertrophy 2011     Priority: Medium    History of  section 2011     Priority: Medium    Patellar dislocation 2010     Priority: Medium    ADHD (attention deficit hyperactivity disorder) 2009     Priority: Medium     Formatting of this note might be different from the original.  Updated by system to replace inactive record  Formatting of this note might be different from the original.  Updated by system to replace inactive record      Vitamin D deficiency 2009     Priority: Medium    Reduced libido 2009     Priority: Medium    GERD (gastroesophageal reflux disease) 2009     Priority: Medium    Sciatica associated with disorder of lumbar spine 06/15/2005     Priority: Medium    Chronic low back pain 1993     Priority: Medium    History of chronic back pain 1992     Priority: Medium        Past Medical History:    Past Medical History:   Diagnosis Date    ADHD (attention deficit hyperactivity disorder)      Arthritis 2020    Blood clot in bladder 2001    Chickenpox     Chlamydia     Factor V Leiden (H24)     GERD (gastroesophageal reflux disease)     History of urinary tract infection     Postpartum depression 2009    Pre-eclampsia 10/15/2023    Renal failure     Uncomplicated asthma        Past Surgical History:    Past Surgical History:   Procedure Laterality Date    ABDOMEN SURGERY      2 c sections  @      SECTION, TUBAL LIGATION, COMBINED N/A 2023    Procedure:  SECTION, WITH POSTPARTUM TUBAL LIGATION;  Surgeon: Katharina Lerner MD;  Location: [facility]    ESOPHAGOSCOPY, GASTROSCOPY, DUODENOSCOPY (EGD), COMBINED N/A 2021    Procedure: ESOPHAGOGASTRODUODENOSCOPY, WITH BIOPSY;  Surgeon: German Flynn MD;  Location: [facility]    HERNIORRHAPHY, INCISIONAL, ROBOT-ASSISTED, LAPAROSCOPIC, USING DA COLE XI N/A 2024    Procedure: Robotic incisional Hernia Repair with Mesh;  Surgeon: Renny Moreno DO;  Location: [facility]    ORTHOPEDIC SURGERY      Had surgery on both big toes    TONSILLECTOMY         Family History:    Family History   Problem Relation Age of Onset    Depression Mother     Mental Illness Mother 56        suicide-    Obesity Mother     Connective Tissue Disorder Mother         EDS    Alcoholism Mother     Substance Abuse Mother     Diabetes Father     Obesity Father         Also had blood clots in both legs and is dual leg amputee    Deep Vein Thrombosis Father         double amputee below knee    Coronary Artery Disease Father     Cerebrovascular Disease Father     Other Cancer Father         Bladder cancer    Asthma Sister     Thyroid Disease Sister     Obesity Sister     Asthma Brother     Obesity Brother     Obesity Paternal Grandmother     Heart Defect Daughter          at 7 weeks after 3 heart surgeries    Genetic Disorder Daughter         Avascular necrosis and Legg calve perthes disease    Connective Tissue Disorder Maternal  Cousin         EDS    Connective Tissue Disorder Maternal Cousin         EDS       Social History:  Marital Status:  Single [1]  Social History     Tobacco Use    Smoking status: Some Days     Packs/day: 0.50     Years: 10.00     Additional pack years: 0.00     Total pack years: 5.00     Types: Cigarettes     Start date: 3/19/2009     Last attempt to quit: 2021     Years since quittin.7    Smokeless tobacco: Never    Tobacco comments:     Cut down 1 cigarette    Vaping Use    Vaping Use: Never used   Substance Use Topics    Alcohol use: No    Drug use: Not Currently     Types: Marijuana     Comment: quit with pregnancy        Medications:    hydrOXYzine HCl (ATARAX) 25 MG tablet  oxyCODONE (ROXICODONE) 5 MG tablet  albuterol (PROAIR HFA/PROVENTIL HFA/VENTOLIN HFA) 108 (90 Base) MCG/ACT inhaler  aspirin 81 MG EC tablet  docusate sodium (COLACE) 100 MG capsule  enoxaparin ANTICOAGULANT (LOVENOX) 40 MG/0.4ML syringe  ketoconazole (NIZORAL) 2 % external cream  ketoconazole (NIZORAL) 2 % external shampoo  nicotine (NICODERM CQ) 21 MG/24HR 24 hr patch  omeprazole (PRILOSEC) 40 MG DR capsule  oxyCODONE (ROXICODONE) 5 MG tablet      Review of Systems  As mentioned above in the history present illness. All other systems were reviewed and are negative.    Physical Exam   BP: (!) 146/96  Pulse: 107  Resp: 28  SpO2: 100 %      Physical Exam  Vitals and nursing note reviewed.   Constitutional:       General: She is in acute distress.      Appearance: She is well-developed. She is not ill-appearing, toxic-appearing or diaphoretic.   HENT:      Head: Normocephalic and atraumatic.      Right Ear: Hearing and external ear normal.      Left Ear: Hearing and external ear normal.      Nose: Nose normal.      Mouth/Throat:      Mouth: Mucous membranes are moist.      Pharynx: Uvula midline.      Tonsils: No tonsillar exudate.   Eyes:      General: No scleral icterus.        Right eye: No discharge.         Left eye: No  discharge.      Conjunctiva/sclera: Conjunctivae normal.   Cardiovascular:      Rate and Rhythm: Regular rhythm. Tachycardia present.      Heart sounds: Normal heart sounds. No murmur heard.     No friction rub.   Pulmonary:      Effort: Pulmonary effort is normal. No respiratory distress.      Breath sounds: Normal breath sounds. No stridor. No wheezing, rhonchi or rales.   Chest:      Chest wall: No tenderness.   Abdominal:      General: Bowel sounds are normal. There is no distension.      Palpations: Abdomen is soft. There is no mass.      Tenderness: There is abdominal tenderness (generalized- worse in RLQ). There is no rebound.      Hernia: No hernia is present.   Musculoskeletal:      Cervical back: Normal range of motion and neck supple.   Skin:     General: Skin is warm and dry.      Coloration: Skin is not pale.      Findings: No erythema or rash.   Neurological:      Mental Status: She is alert and oriented to person, place, and time.      Deep Tendon Reflexes: Reflexes normal.   Psychiatric:         Mood and Affect: Mood is anxious.         ED Course     Procedures    Results for orders placed or performed during the hospital encounter of 04/02/24 (from the past 24 hour(s))   CBC with platelets differential    Narrative    The following orders were created for panel order CBC with platelets differential.  Procedure                               Abnormality         Status                     ---------                               -----------         ------                     CBC with platelets and d...[[id number]]  Abnormal            Final result               RBC and Platelet Morphology[[id number]]                      Final result                 Please view results for these tests on the individual orders.   Basic metabolic panel   Result Value Ref Range    Sodium 139 135 - 145 mmol/L    Potassium 3.8 3.4 - 5.3 mmol/L    Chloride 103 98 - 107 mmol/L    Carbon Dioxide (CO2) 22 22 - 29 mmol/L    Anion  Gap 14 7 - 15 mmol/L    Urea Nitrogen 13.9 6.0 - 20.0 mg/dL    Creatinine 1.12 (H) 0.51 - 0.95 mg/dL    GFR Estimate 66 >60 mL/min/1.73m2    Calcium 9.2 8.6 - 10.0 mg/dL    Glucose 89 70 - 99 mg/dL   Lactic acid whole blood   Result Value Ref Range    Lactic Acid 2.4 (H) 0.7 - 2.0 mmol/L   Procalcitonin   Result Value Ref Range    Procalcitonin 0.05 <0.50 ng/mL   CRP inflammation   Result Value Ref Range    CRP Inflammation 14.57 (H) <5.00 mg/L   [place] Draw    Narrative    The following orders were created for panel order [place] Draw.  Procedure                               Abnormality         Status                     ---------                               -----------         ------                     Extra Blue Top Tube[[id number]]                              Final result                 Please view results for these tests on the individual orders.   CBC with platelets and differential   Result Value Ref Range    WBC Count 13.1 (H) 4.0 - 11.0 10e3/uL    RBC Count 4.36 3.80 - 5.20 10e6/uL    Hemoglobin 13.8 11.7 - 15.7 g/dL    Hematocrit 39.8 35.0 - 47.0 %    MCV 91 78 - 100 fL    MCH 31.7 26.5 - 33.0 pg    MCHC 34.7 31.5 - 36.5 g/dL    RDW 13.5 10.0 - 15.0 %    Platelet Count 346 150 - 450 10e3/uL    % Neutrophils 55 %    % Lymphocytes 41 %    % Monocytes 3 %    % Eosinophils 1 %    % Basophils 0 %    % Immature Granulocytes 0 %    NRBCs per 100 WBC 0 <1 /100    Absolute Neutrophils 7.2 1.6 - 8.3 10e3/uL    Absolute Lymphocytes 5.3 0.8 - 5.3 10e3/uL    Absolute Monocytes 0.4 0.0 - 1.3 10e3/uL    Absolute Eosinophils 0.1 0.0 - 0.7 10e3/uL    Absolute Basophils 0.0 0.0 - 0.2 10e3/uL    Absolute Immature Granulocytes 0.0 <=0.4 10e3/uL    Absolute NRBCs 0.0 10e3/uL   Extra Blue Top Tube   Result Value Ref Range    Hold Specimen [facility]    RBC and Platelet Morphology   Result Value Ref Range    RBC Morphology Confirmed RBC Indices     Platelet Assessment  Automated Count Confirmed. Platelet morphology is normal.      Automated Count Confirmed. Platelet morphology is normal.    Giant Platelets      Acanthocytes      Tre Rods      Basophilic Stippling      Bite Cells      Blister Cells      Gopal Cells      Elliptocytes      Hgb C Crystals      Del Angel-Jolly Bodies      Hypersegmented Neutrophils      Polychromasia      RBC agglutination      RBC Fragments      Reactive Lymphocytes      Rouleaux      Sickle Cells      Smudge Cells      Spherocytes      Stomatocytes      Target Cells      Teardrop Cells      Toxic Neutrophils      Pathologist Review Comments (Blood)     CT Abdomen Pelvis w Contrast    Narrative    CT ABDOMEN/PELVIS WITH CONTRAST April 2, 2024 12:26 PM    CLINICAL HISTORY: Acute abdominal pain. History of abdominal hernia  repair yesterday.    TECHNIQUE: CT scan of the abdomen and pelvis was performed following  injection of IV contrast. Multiplanar reformats were obtained. Dose  reduction techniques were used.  CONTRAST: 85 mL Isovue 370.    COMPARISON: CT of the abdomen and pelvis 11/24/2023.    FINDINGS:   LOWER CHEST: Small hiatal hernia.    HEPATOBILIARY: No hepatic masses. No calcified gallstones.    PANCREAS: Normal.    SPLEEN: Normal.    ADRENAL GLANDS: Normal.    KIDNEYS/BLADDER: Moderate cortical scarring and atrophy in the right  kidney is unchanged. No hydronephrosis.    BOWEL: Moderate amount of stool in the cecum, ascending colon, and  transverse colon. No bowel obstruction. No evidence for colitis or  diverticulitis. Unremarkable appendix.    LYMPH NODES: No lymphadenopathy.    VASCULATURE: Unremarkable.    PELVIC ORGANS: Unremarkable.    ADDITIONAL FINDINGS: Postoperative changes of ventral hernia repair in  the infraumbilical region. Scattered air bubbles in the subcutaneous  fat overlying the anterior abdominal wall are likely postoperative.  Gas and fluid collection in the subcutaneous fat of the infraumbilical  region right of midline measures 4.7 x 2.4 cm, and an abscess in this  location cannot  be excluded. Mild prominence of the right rectus  abdominis muscle (series 3 image 129) could represent postoperative  edema, although a small ill-defined rectus sheath hematoma is  possible.    MUSCULOSKELETAL: Unremarkable.      Impression    IMPRESSION:   1.  Postoperative changes of recent ventral hernia repair.  2.  There is a 4.7 cm gas and fluid collection in the subcutaneous fat  in the region of the hernia repair right of midline. An abscess in  this location cannot be excluded.  3.  Mild prominence of the right rectus abdominis muscle could be  postoperative, although a small ill-defined rectus sheath hematoma  could have this appearance.  4.  Moderate amount of stool in the cecum, ascending colon, and  transverse colon.    MEME DE LA TORRE MD         SYSTEM ID:  [id number]   Lactic acid whole blood   Result Value Ref Range    Lactic Acid 1.1 0.7 - 2.0 mmol/L   POC US GUIDANCE NEEDLE PLACEMENT    Impression    The visualized anatomic structures appeared normal. There were no apparent pathologic findings.           Medications   oxyCODONE (ROXICODONE) tablet 10 mg (has no administration in time range)   BUPivacaine liposome (EXPAREL) 1.3 % LA inj susp 20 mL (has no administration in time range)   lactated ringers BOLUS 1,000 mL (1,000 mLs Intravenous $New Bag 4/2/24 1138)   iopamidol (ISOVUE-370) solution 85 mL (85 mLs Intravenous $Given 4/2/24 1213)   sodium chloride 0.9 % bag 500mL for CT scan flush use (61 mLs Intravenous $Given 4/2/24 1214)   ketorolac (TORADOL) injection 30 mg (30 mg Intravenous $Given 4/2/24 1134)   LORazepam (ATIVAN) injection 1 mg (1 mg Intravenous $Given 4/2/24 1127)   lactated ringers BOLUS 1,000 mL (1,000 mLs Intravenous $New Bag 4/2/24 1230)   midazolam (VERSED) injection 2 mg (2 mg Intravenous $Given 4/2/24 1439)       Assessments & Plan (with Medical Decision Making)     I have reviewed the nursing notes.    I have reviewed the findings, diagnosis, plan and need for follow up  with the patient.  Ana Maria Espinoza is a 33 year old female with past medical history of GERD, factor V Leiden, tobacco abuse, asthma, previous , recent hernia repair yesterday who presents emergency room with uncontrolled pain.  Patient states the pain seemed to start yesterday afternoon around the time of discharge when the abdominal binder was placed.   On exam patient is found to be tachycardic and tachypneic, appeared to be in acute distress.  Patient has normal temperature.  She is alert and orientated, assessment of her abdomen is that it is tender.  There is no erythema, and is soft.  On palpation the abdomen is more painful on the right side.  Dr. Cohn and his PA presented for evaluation as well.  Initial workup reveals lactic to be elevated and subsequently after receiving 2 L of fluids resolved.  Labs are otherwise unremarkable.  Patient has no acute electrolyte derangement.  CT reveals possible fluid collection that was reviewed with Dr. Cohn who believes that this is more likely to be a hematoma than abscess.  He discussed these findings with patient.  Patient received IV narcotics and also received Ativan and Versed while in the ED.  Patient additionally received a pain block from anesthesia to help with postoperative pain.  Patient subsequently feels ready for discharge and pain management plan discussed with patient and spouse.  Prescription sent to [place] [facility].  Patient to follow-up next week with Dr. Cohn and his PA.  Written information regarding pain management plan provided and patient was discharged in stable condition.    New Prescriptions    HYDROXYZINE HCL (ATARAX) 25 MG TABLET    Take 1 tablet (25 mg) by mouth every 4 hours as needed for other (pain - take with one exycodone or with tylenol up to 4 times daily)    OXYCODONE (ROXICODONE) 5 MG TABLET    Take 1 tablet (5 mg) by mouth every 6 hours as needed for pain       Final diagnoses:   Generalized abdominal  pain   History of  section   Acute post-operative pain       2024   [facility] EMERGENCY DEPT       Polina Verma, SY CNP  24 1540

## 2024-04-02 NOTE — ED TRIAGE NOTES
Had hernia surgery yesterday, states pain meds not helping, called to get something else and they advised to come in, hyperventilating     Triage Assessment (Adult)       Row Name 04/02/24 1041          Triage Assessment    Airway WDL WDL        Respiratory WDL    Respiratory WDL WDL        Peripheral/Neurovascular WDL    Peripheral Neurovascular WDL WDL        Cognitive/Neuro/Behavioral WDL    Cognitive/Neuro/Behavioral WDL WDL

## 2024-04-02 NOTE — ANESTHESIA CARE TRANSFER NOTE
Patient: Ana Maria Espinoza    Procedure: * No procedures listed *       Diagnosis: * No pre-op diagnosis entered *  Diagnosis Additional Information: No value filed.    Anesthesia Type:   Peripheral Nerve Block     Note:    Oropharynx: oropharynx clear of all foreign objects and spontaneously breathing  Level of Consciousness: awake  Oxygen Supplementation: room air    Independent Airway: airway patency satisfactory and stable  Dentition: dentition unchanged  Vital Signs Stable: post-procedure vital signs reviewed and stable  Report to RN Given: handoff report given  Patient transferred to: Emergency Department    Handoff Report: Identifed the Patient, Identified the Reponsible Provider, Reviewed the pertinent medical history, Discussed the surgical course, Reviewed Intra-OP anesthesia mangement and issues during anesthesia, Set expectations for post-procedure period and Allowed opportunity for questions and acknowledgement of understanding      Vitals:  Vitals Value Taken Time   BP     Temp     Pulse 86 04/02/24 1530   Resp 11 04/02/24 1530   SpO2     Vitals shown include unfiled device data.    Electronically Signed By: SY Hamilton CRNA  April 2, 2024  3:41 PM

## 2024-04-02 NOTE — TELEPHONE ENCOUNTER
Reason for Call:  Other call back    Detailed comments: Patient is calling needing more pain medication after surgery. She had surgery on 4/1/24 for Robotic incisional Hernia Repair with Mesh (Abdomen). Please call when able.    Phone Number Patient can be reached at: Home number on file [phone] (home)    Best Time: Anytime    Can we leave a detailed message on this number? YES    Thank you,  Ester HEREDIA [facility]  [facility] - [facility]      Call taken on 4/2/2024 at 9:38 AM by Ester Narvaez

## 2024-04-03 ENCOUNTER — TELEPHONE (OUTPATIENT)
Dept: FAMILY MEDICINE | Facility: CLINIC | Age: 34
End: 2024-04-03
Payer: COMMERCIAL

## 2024-04-03 NOTE — TELEPHONE ENCOUNTER
"ED/Discharge Protocol    \"Hi, my name is Julie E, Behrendt, RN, a registered nurse, and I am calling on behalf of Susana Mccollum NP's office at [facility].  I am calling to follow up and see how things are going for you after your recent visit.\"    \"I see that you were in the (ER) on 4/2/24.    How are you doing now that you are home?\" Feeling better since the nerve block.    Is patient experiencing symptoms that may require a hospital visit?  No.    Discharge Instructions    \"Let's review your discharge instructions.  What is/are the follow-up recommendations?  Pt. Response: follow up with my surgeon as advised.    \"Were you instructed to make a follow-up appointment?\"  Pt. Response: Yes.  Has appointment been made?   Yes      \"When you see the provider, I would recommend that you bring your discharge instructions with you.    Medications    \"How many new medications are you on since your hospitalization/ED visit?\"    0-1  \"How many of your current medicines changed (dose, timing, name, etc.) while you were in the hospital/ED visit?\"   0-1  \"Do you have questions about your medications?\"   No  \"Were you newly diagnosed with heart failure, COPD, diabetes or did you have a heart attack?\"   No  For patients on insulin: \"Did you start on insulin in the hospital or did you have your insulin dose changed?\"   No  Post Discharge Medication Reconciliation Status: discharge medications reconciled, continue medications without change.    Was MTM referral placed (*Make sure to put transitions as reason for referral)?   No    Call Summary    \"Do you have any questions or concerns about your condition or care plan at the moment?\"    No  Triage nurse advice given: I recommend try to get up and move as directed by the surgeons.  For pain management I recommend that you take either 3 regular strength Tylenol or 2 extra strength Tylenol 4 times  daily. For extra pain support you can also take 1 hydroxyzine every time you take a Tylenol " "to help make the Tylenol  worked better.  For pain that is more severe than this I recommend that you take 1 oxycodone every 4-8 hours. At bedtime you may need  to oxycodone but uses sparingly as it causes severe constipation and addiction.  For constipation due to stool softeners by mouth twice daily. If you do not have a bowel movement in 72 hours then I  recommend to add additional milk of magnesia 30 mL by mouth once daily.  Return to the emergency room if you have uncontrolled pain or develop high fever of 101.5 or greater.    Patient was in ER x 6 in the past year (assess appropriateness of ER visits.)      \"If you have questions or things don't continue to improve, we encourage you contact us through the main clinic number,  [phone].  Even if the clinic is not open, triage nurses are available 24/7 to help you.     We would like you to know that our clinic has extended hours (provide information).  We also have urgent care (provide details on closest location and hours/contact info)\"      \"Thank you for your time and take care!\"     "

## 2024-04-08 ENCOUNTER — MYC MEDICAL ADVICE (OUTPATIENT)
Dept: SURGERY | Facility: CLINIC | Age: 34
End: 2024-04-08
Payer: COMMERCIAL

## 2024-04-08 DIAGNOSIS — K43.2 INCISIONAL HERNIA, WITHOUT OBSTRUCTION OR GANGRENE: Primary | ICD-10-CM

## 2024-04-08 RX ORDER — OXYCODONE HYDROCHLORIDE 5 MG/1
5 TABLET ORAL EVERY 6 HOURS PRN
Qty: 6 TABLET | Refills: 0 | Status: SHIPPED | OUTPATIENT
Start: 2024-04-08 | End: 2024-04-11

## 2024-04-08 RX ORDER — OXYCODONE HYDROCHLORIDE 5 MG/1
5 TABLET ORAL EVERY 4 HOURS PRN
Status: DISCONTINUED | OUTPATIENT
Start: 2024-04-08 | End: 2024-04-08 | Stop reason: HOSPADM

## 2024-04-08 RX ORDER — OXYCODONE HYDROCHLORIDE 5 MG/1
5 TABLET ORAL EVERY 4 HOURS PRN
Qty: 12 TABLET | Refills: 0 | Status: SHIPPED | OUTPATIENT
Start: 2024-04-08 | End: 2024-04-18

## 2024-04-08 NOTE — TELEPHONE ENCOUNTER
Oxycodone ordered as CAM order- to be administered in clinic  Pended prescription for patient's pharmacy.     Please sign if appropriate    Thank you,   Jaun MIXON RN  [facility]

## 2024-04-18 ENCOUNTER — OFFICE VISIT (OUTPATIENT)
Dept: SURGERY | Facility: CLINIC | Age: 34
End: 2024-04-18
Payer: COMMERCIAL

## 2024-04-18 VITALS
OXYGEN SATURATION: 98 % | HEIGHT: 60 IN | SYSTOLIC BLOOD PRESSURE: 123 MMHG | BODY MASS INDEX: 34.36 KG/M2 | DIASTOLIC BLOOD PRESSURE: 78 MMHG | HEART RATE: 93 BPM | WEIGHT: 175 LBS | TEMPERATURE: 98.4 F

## 2024-04-18 DIAGNOSIS — Z98.890 S/P HERNIA REPAIR: Primary | ICD-10-CM

## 2024-04-18 DIAGNOSIS — Z87.19 S/P HERNIA REPAIR: Primary | ICD-10-CM

## 2024-04-18 PROCEDURE — 99212 OFFICE O/P EST SF 10 MIN: CPT | Performed by: SURGERY

## 2024-04-18 ASSESSMENT — PAIN SCALES - GENERAL: PAINLEVEL: MILD PAIN (3)

## 2024-04-18 NOTE — PROGRESS NOTES
General Surgery Post Op    Pt returns for follow up visit s/p incisional hernia repair on 4/1/2024.    Patient has been doing well, tolerating diet. Bowels moving well. Pain controlled. No issues with wound healing/redness/drainage. No fevers.      Physical exam: Vitals: Ht 1.524 m (5')   Wt 79.4 kg (175 lb)   LMP 02/19/2024   BMI 34.18 kg/m    BMI= Body mass index is 34.18 kg/m .    Exam:  Constitutional: healthy, alert, and no distress  Cardiovascular: negative  Respiratory: negative  Gastrointestinal: Abdomen soft, non-tender. BS normal. Incisions C/D/I.  Seroma vs hematoma right rectus.  Non-tender.  No overlying skin changes      Path:  None    Assessment:     ICD-10-CM    1. S/P hernia repair  Z98.890     Z87.19         Plan: Ana Maria Espinoza was seen for follow-up after robotic incisional hernia repair with mesh.  Patient is doing well and recovering without issue at this time.  We reviewed her surgical images and post-operative CT.  We discussed routine post-operative care of wounds including avoiding sun exposure to wounds to limit scarring, no need for overlying ointments, and weight restrictions going forward.  Patient was instructed to call with any questions or concerns.  Ana Maria Espinoza can follow-up on an as needed basis.    Renny Moreno,  on 4/18/2024 at 7:58 AM

## 2024-04-18 NOTE — LETTER
4/18/2024         RE: Ana Maria Espinoza  [address] [place] [id number]        Dear Colleague,    Thank you for referring your patient, Ana Maria Espinoza, to the [facility]. Please see a copy of my visit note below.    General Surgery Post Op    Pt returns for follow up visit s/p incisional hernia repair on 4/1/2024.    Patient has been doing well, tolerating diet. Bowels moving well. Pain controlled. No issues with wound healing/redness/drainage. No fevers.      Physical exam: Vitals: Ht 1.524 m (5')   Wt 79.4 kg (175 lb)   LMP 02/19/2024   BMI 34.18 kg/m    BMI= Body mass index is 34.18 kg/m .    Exam:  Constitutional: healthy, alert, and no distress  Cardiovascular: negative  Respiratory: negative  Gastrointestinal: Abdomen soft, non-tender. BS normal. Incisions C/D/I.  Seroma vs hematoma right rectus.  Non-tender.  No overlying skin changes      Path:  None    Assessment:     ICD-10-CM    1. S/P hernia repair  Z98.890     Z87.19         Plan: Ana Maria Espinoza was seen for follow-up after robotic incisional hernia repair with mesh.  Patient is doing well and recovering without issue at this time.  We reviewed her surgical images and post-operative CT.  We discussed routine post-operative care of wounds including avoiding sun exposure to wounds to limit scarring, no need for overlying ointments, and weight restrictions going forward.  Patient was instructed to call with any questions or concerns.  Ana Maria Espinoza can follow-up on an as needed basis.    Renny Moreno DO on 4/18/2024 at 7:58 AM        Again, thank you for allowing me to participate in the care of your patient.        Sincerely,        Renny Moreno DO

## 2024-04-24 ENCOUNTER — HOSPITAL ENCOUNTER (OUTPATIENT)
Dept: CT IMAGING | Facility: HOSPITAL | Age: 34
Discharge: HOME | End: 2024-04-24
Attending: HOSPITALIST | Admitting: HOSPITALIST
Payer: COMMERCIAL

## 2024-04-24 DIAGNOSIS — N28.0 RENAL INFARCTION (H): ICD-10-CM

## 2024-04-24 PROCEDURE — 250N000011 HC RX IP 250 OP 636: Performed by: HOSPITALIST

## 2024-04-24 PROCEDURE — 74174 CTA ABD&PLVS W/CONTRAST: CPT

## 2024-04-24 PROCEDURE — 250N000009 HC RX 250: Performed by: HOSPITALIST

## 2024-04-24 RX ORDER — IOPAMIDOL 755 MG/ML
71 INJECTION, SOLUTION INTRAVASCULAR ONCE
Status: COMPLETED | OUTPATIENT
Start: 2024-04-24 | End: 2024-04-24

## 2024-04-24 RX ADMIN — SODIUM CHLORIDE 95 ML: 9 INJECTION, SOLUTION INTRAVENOUS at 14:38

## 2024-04-24 RX ADMIN — IOPAMIDOL 71 ML: 755 INJECTION, SOLUTION INTRAVENOUS at 14:32

## 2024-04-28 NOTE — PROGRESS NOTES
[facility]        Patient is here for a consult to discuss renal infarcts, hx Factor V.  Patient states she is smoking 2-3 cigarettes a couple of times a week.     Pt is currently taking Aspirin.    /88   Pulse 100   Temp 98.2  F (36.8  C)   Resp 18   Ht 5' (1.524 m)   Wt 181 lb 4.8 oz (82.2 kg)   LMP 02/19/2024   SpO2 98%   BMI 35.41 kg/m      The provider has been notified that the patient has no concerns.     Questions patient would like addressed today are: N/A.    Refills are needed: N/A    Has homecare services and agency name:  No

## 2024-04-28 NOTE — PATIENT INSTRUCTIONS
Ajit Rodgers,    Thank you for entrusting your care with us today. After your visit today with MD Mildred Enriquez this is the plan that was discussed at your appointment.    Continue aspirin 81mg daily.     Lovenox injections recommended with any high risk procedure.     Wear graduated compression stockings 20-30mmHg when flying or on long car rides.  It is important during either of those to take time to stand up and walk around periodically.     Stop smoking.  See below for a resource that may help you.    Follow up in 1 year with Dr. Enriquez and a CTA scan before.  A  will reach out to you closer to that time to schedule.    I am including additional information on these things and our contact information if you have any questions or concerns.   Please do not hesitate to reach out to us if you felt we did not answer your questions or you are unsure of the treatment plan after your visit today. Our number is [phone].Thank you for trusting us with your care.         Again thank you for your time.     Wear your compression stockings every day; put them on first thing in the morning and remove at bedtime    Replace your compression stockings every 6 months; these garments will lose their elasticity and become ineffective    Hang your stockings, do not put them in the dryer.  This will help prolong the life of your compressions stockings.     Do calf pumping exercises periodically throughout the day.        [employer] is for any [place] looking for free support to quit smoking, vaping or chewing.   [employer] will offer many quit support options and resources so [place] residents can continue to find the way to quit that works best for them.   Free support includes personalized coaching, email and text support, educational materials, and quit medication (nicotine patches, gum or lozenges) delivered by mail.     Contact [employer] at 4-983FlashtalkingQUIT-NOW or online at [employer] to receive support  on your quit journey.      Computed Tomography (CT) Scan: About This Test    What is it?  A computed tomography (CT) scan uses X-rays to make detailed pictures of parts of your body and the structures inside your body. During the test, you will lie on a table that is attached to the CT scanner. The CT scanner is a large doughnut-shaped machine.    Why is this test done?  Doctors use CT scans to study areas of the body, such as the brain, chest, belly, spine, bones, or joints. CT scans are also used to assist with or check on the success of a procedure or surgery.    How do you prepare for the test?  In general, there's nothing you have to do before this test, unless your doctor tells you to.    Tell your doctor if you get nervous in tight spaces. You may get a medicine to help you relax. If you think you'll get this medicine, be sure you have someone to take you home.    How is the test done?  Before the test  You may have to take off jewelry.  You will take off all or most of your clothes and change into a gown. If you do leave some clothes on, make sure you take everything out of your pockets.  You may have contrast material (dye) put into your arm through a tube called an IV.    During the test  You will lie on a table that is attached to the CT scanner.  The table slides into the round opening of the scanner. The table will move during the scan. The scanner moves within the doughnut-shaped casing around your body.  You will be asked to hold still during the scan. You may be asked to hold your breath for short periods.  You may be alone in the scanning room. But a technologist will watch you through a window and talk with you during the test.    How does the test feel?  The test will not cause pain, but some people feel nervous inside the CT scanner.    If a medicine to help you relax (sedative) or dye is used, you may feel a quick sting or pinch when the IV is started. The dye may make you feel warm and flushed  and give you a metallic taste in your mouth. Some people feel sick to their stomach or get a headache. Tell the technologist or your doctor how you are feeling.    How long does the test take?  The test will take about 30 to 60 minutes. Most of this time is spent getting ready for the scan. The actual test takes a few minutes.    What happens after the test?  You will probably be able to go home right away.  You can go back to your usual activities right away.  If dye was used, drink plenty of fluids for 24 hours after the test, unless your doctor tells you not to.  Follow-up care is a key part of your treatment and safety. Be sure to make and go to all appointments, and call your doctor if you are having problems. It's also a good idea to keep a list of the medicines you take. Ask your doctor when you can expect to have your test results.    Current as of: December 19, 2022  Author: [employer]wise Staff  Medical Review:Bull Bailey MD - Family Medicine & ECTOR Benavidez MD - Internal Medicine & Rohit Velasco MD - Family Medicine & Luis A Blackmon MD - Family Medicine & Serjio Ford MD - Diagnostic Radiology

## 2024-04-30 ENCOUNTER — OFFICE VISIT (OUTPATIENT)
Dept: VASCULAR SURGERY | Facility: CLINIC | Age: 34
End: 2024-04-30
Attending: HOSPITALIST
Payer: COMMERCIAL

## 2024-04-30 ENCOUNTER — LAB (OUTPATIENT)
Dept: LAB | Facility: CLINIC | Age: 34
End: 2024-04-30
Payer: COMMERCIAL

## 2024-04-30 VITALS
RESPIRATION RATE: 18 BRPM | SYSTOLIC BLOOD PRESSURE: 130 MMHG | OXYGEN SATURATION: 98 % | WEIGHT: 181.3 LBS | HEART RATE: 100 BPM | BODY MASS INDEX: 35.6 KG/M2 | HEIGHT: 60 IN | TEMPERATURE: 98.2 F | DIASTOLIC BLOOD PRESSURE: 88 MMHG

## 2024-04-30 DIAGNOSIS — N28.0 RENAL INFARCTION (H): ICD-10-CM

## 2024-04-30 DIAGNOSIS — D68.51 FACTOR V LEIDEN MUTATION (H): ICD-10-CM

## 2024-04-30 DIAGNOSIS — N28.0 RENAL INFARCTION (H): Primary | ICD-10-CM

## 2024-04-30 LAB
FACTOR 2 INTERPRETATION: NORMAL
HCYS SERPL-SCNC: 12.3 UMOL/L (ref 0–15)
LAB DIRECTOR COMMENTS: NORMAL
LAB DIRECTOR DISCLAIMER: NORMAL
LAB DIRECTOR INTERPRETATION: NORMAL
LAB DIRECTOR METHODOLOGY: NORMAL
LAB DIRECTOR RESULTS: NORMAL
SPECIMEN DESCRIPTION: NORMAL

## 2024-04-30 PROCEDURE — 83090 ASSAY OF HOMOCYSTEINE: CPT

## 2024-04-30 PROCEDURE — 36415 COLL VENOUS BLD VENIPUNCTURE: CPT

## 2024-04-30 PROCEDURE — 85303 CLOT INHIBIT PROT C ACTIVITY: CPT

## 2024-04-30 PROCEDURE — G0463 HOSPITAL OUTPT CLINIC VISIT: HCPCS | Performed by: HOSPITALIST

## 2024-04-30 PROCEDURE — 99204 OFFICE O/P NEW MOD 45 MIN: CPT | Performed by: HOSPITALIST

## 2024-04-30 PROCEDURE — 85306 CLOT INHIBIT PROT S FREE: CPT

## 2024-04-30 PROCEDURE — 85300 ANTITHROMBIN III ACTIVITY: CPT

## 2024-04-30 PROCEDURE — 81240 F2 GENE: CPT

## 2024-04-30 PROCEDURE — G0452 MOLECULAR PATHOLOGY INTERPR: HCPCS | Performed by: STUDENT IN AN ORGANIZED HEALTH CARE EDUCATION/TRAINING PROGRAM

## 2024-04-30 ASSESSMENT — PAIN SCALES - GENERAL: PAINLEVEL: NO PAIN (0)
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
## 2024-05-02 ENCOUNTER — TELEPHONE (OUTPATIENT)
Dept: VASCULAR SURGERY | Facility: CLINIC | Age: 34
End: 2024-05-02
Payer: COMMERCIAL

## 2024-05-02 NOTE — TELEPHONE ENCOUNTER
Spoke with Jayne, they did just want to be sure Dr. Enriquez was aware that Factor 5 was already ran, as was noted noted in the [facility] system. They will run the Factor 2.     Sierra Campo RN, CWOCN

## 2024-05-02 NOTE — TELEPHONE ENCOUNTER
Caller: [facility]    Provider: MD Mildred Enriquez    Detailed reason for call: Lab has some questions regarding F2 prothrombin test ordered by Dr. Enriquez; sounds like they need more information/an additional test for factor V before they can schedule.    Best phone number to contact: [phone]    Best time to contact: Any time    Ok to leave a detailed message: Yes    Ok to speak to authorized person if needed: Yes: ASK FOR JIMY

## 2024-05-02 NOTE — TELEPHONE ENCOUNTER
Attempted to call Jayne back, left message with lab staff for her to call back to discuss.  The reason only Factor 2 was because patient has already tested positive for Factor 5, no need to retest for Factor 5 again.

## 2024-05-03 LAB
AT III ACT/NOR PPP CHRO: 110 % (ref 85–135)
PROT C ACT/NOR PPP CHRO: 125 % (ref 70–170)
PROT S FREE AG ACT/NOR PPP IA: 112 % (ref 55–125)

## 2024-05-17 ENCOUNTER — E-VISIT (OUTPATIENT)
Dept: FAMILY MEDICINE | Facility: CLINIC | Age: 34
End: 2024-05-17
Payer: COMMERCIAL

## 2024-05-17 DIAGNOSIS — J30.9 ALLERGIC RHINITIS WITH POSTNASAL DRIP: Primary | ICD-10-CM

## 2024-05-17 DIAGNOSIS — R09.82 ALLERGIC RHINITIS WITH POSTNASAL DRIP: Primary | ICD-10-CM

## 2024-05-17 PROCEDURE — 99207 PR NON-BILLABLE SERV PER CHARTING: CPT | Performed by: NURSE PRACTITIONER

## 2024-05-17 RX ORDER — FLUTICASONE PROPIONATE 50 MCG
2 SPRAY, SUSPENSION (ML) NASAL DAILY
Qty: 16 G | Refills: 0 | Status: SHIPPED | OUTPATIENT
Start: 2024-05-17

## 2024-05-17 RX ORDER — CETIRIZINE HYDROCHLORIDE 10 MG/1
10 TABLET ORAL DAILY
Qty: 30 TABLET | Refills: 0 | Status: SHIPPED | OUTPATIENT
Start: 2024-05-17

## 2024-05-17 NOTE — PATIENT INSTRUCTIONS
Thank you for choosing us for your care. I have placed an order for a prescription so that you can start treatment. View your full visit summary for details by clicking on the link below. Your pharmacist will able to address any questions you may have about the medication.     The symptoms you describe suggest a viral cause, which is much more common than a bacterial cause. Antibiotics will treat bacterial infections, but have no effect on viral infections. If possible, especially if improving, start with symptom care for the first 7-10 days, then consider seeking further treatment or taking an antibiotic. Bacterial infections generally are more severe, including symptoms such as pus, fever over 101degrees F, or rapidly worsening.    If you're not feeling better within 5-7 days, please schedule an appointment.  You can schedule an appointment right here in [facility], or call [phone]  If the visit is for the same symptoms as your eVisit, we'll refund the cost of your eVisit if seen within seven days.

## 2024-05-24 ENCOUNTER — MEDICAL CORRESPONDENCE (OUTPATIENT)
Dept: HEALTH INFORMATION MANAGEMENT | Facility: CLINIC | Age: 34
End: 2024-05-24
Payer: COMMERCIAL

## 2024-06-14 ENCOUNTER — MYC REFILL (OUTPATIENT)
Dept: OBGYN | Facility: CLINIC | Age: 34
End: 2024-06-14

## 2024-06-14 ENCOUNTER — E-VISIT (OUTPATIENT)
Dept: FAMILY MEDICINE | Facility: CLINIC | Age: 34
End: 2024-06-14
Payer: COMMERCIAL

## 2024-06-14 DIAGNOSIS — K21.9 GASTROESOPHAGEAL REFLUX DISEASE WITHOUT ESOPHAGITIS: ICD-10-CM

## 2024-06-14 DIAGNOSIS — J01.90 ACUTE SINUSITIS WITH SYMPTOMS > 10 DAYS: Primary | ICD-10-CM

## 2024-06-14 PROCEDURE — 99421 OL DIG E/M SVC 5-10 MIN: CPT | Performed by: NURSE PRACTITIONER

## 2024-06-14 RX ORDER — OMEPRAZOLE 40 MG/1
40 CAPSULE, DELAYED RELEASE ORAL DAILY
Qty: 90 CAPSULE | Refills: 4 | Status: SHIPPED | OUTPATIENT
Start: 2024-06-14

## 2024-06-14 NOTE — PATIENT INSTRUCTIONS
Acute Sinusitis: Care Instructions  Overview     Acute sinusitis is an inflammation of the mucous membranes inside the nose and sinuses. Sinuses are the hollow spaces in your skull around the eyes and nose. Acute sinusitis often follows a cold. Acute sinusitis causes thick, discolored mucus that drains from the nose or down the back of the throat. It also can cause pain and pressure in your head and face along with a stuffy or blocked nose.  In most cases, sinusitis gets better on its own in 1 to 2 weeks. But some mild symptoms may last for several weeks. Sometimes antibiotics are needed if there is a bacterial infection.  Follow-up care is a key part of your treatment and safety. Be sure to make and go to all appointments, and call your doctor if you are having problems. It's also a good idea to know your test results and keep a list of the medicines you take.  How can you care for yourself at home?  Use saline (saltwater) nasal washes. This can help keep your nasal passages open and wash out mucus and allergens.  You can buy saline nose washes at a grocery store or drugstore. Follow the instructions on the package.  You can make your own at home. Add 1 teaspoon of non-iodized salt and 1 teaspoon of baking soda to 2 cups of distilled or boiled and cooled water. Fill a squeeze bottle or a nasal cleansing pot (such as a neti pot) with the nasal wash. Then put the tip into your nostril, and lean over the sink. With your mouth open, gently squirt the liquid. Repeat on the other side.  Try a decongestant nasal spray like oxymetazoline (Afrin). Do not use it for more than 3 days in a row. Using it for more than 3 days can make your congestion worse.  If needed, take an over-the-counter pain medicine, such as acetaminophen (Tylenol), ibuprofen (Advil, Motrin), or naproxen (Aleve). Read and follow all instructions on the label.  If the doctor prescribed antibiotics, take them as directed. Do not stop taking them just  "because you feel better. You need to take the full course of antibiotics.  Be careful when taking over-the-counter cold or flu medicines and Tylenol at the same time. Many of these medicines have acetaminophen, which is Tylenol. Read the labels to make sure that you are not taking more than the recommended dose. Too much acetaminophen (Tylenol) can be harmful.  Try a steroid nasal spray. It may help with your symptoms.  Breathe warm, moist air. You can use a steamy shower, a hot bath, or a sink filled with hot water. Avoid cold, dry air. Using a humidifier in your home may help. Follow the directions for cleaning the machine.  When should you call for help?   Call your doctor now or seek immediate medical care if:    You have new or worse swelling, redness, or pain in your face or around one or both of your eyes.     You have double vision or a change in your vision.     You have a high fever.     You have a severe headache and a stiff neck.     You have mental changes, such as feeling confused or much less alert.   Watch closely for changes in your health, and be sure to contact your doctor if:    You are not getting better as expected.   Where can you learn more?  Go to https://www.[employer]ash[employer].net/patiented  Enter I933 in the search box to learn more about \"Acute Sinusitis: Care Instructions.\"  Current as of: September 27, 2023               Content Version: 14.0    6926-4124 [employer].   Care instructions adapted under license by your healthcare professional. If you have questions about a medical condition or this instruction, always ask your healthcare professional. [employer] disclaims any warranty or liability for your use of this information.      Dear Ana Maria Espinoza    After reviewing your responses, I've been able to diagnose you with Acute sinusitis with symptoms > 10 days.      Based on your responses and diagnosis, I have prescribed   Orders Placed This Encounter   Medications "     amoxicillin-clavulanate (AUGMENTIN) 875-125 MG tablet     Sig: Take 1 tablet by mouth 2 times daily for 7 days     Dispense:  14 tablet     Refill:  0      to treat your symptoms. I have sent this to your pharmacy.?     It is also important to stay well hydrated, get lots of rest and take over-the-counter decongestants,?tylenol?or ibuprofen if you?are able to?take those medications per your primary care provider to help relieve discomfort.?     It is important that you take?all of?your prescribed medication even if your symptoms are improving after a few doses.? Taking?all of?your medicine helps prevent the symptoms from returning.?     If your symptoms worsen, you develop severe headache, vomiting, high fever (>102), or are not improving in 7 days, please contact your primary care provider for an appointment or visit any of our convenient Walk-in Care or Urgent Care Centers to be seen which can be found on our website?here.?     Thanks again for choosing?us?as your health care partner,?   ?  Susana HEREDIA, Jing DNP,, APRN CNP?   Thank you for choosing us for your care. I have placed an order for a prescription so that you can start treatment. View your full visit summary for details by clicking on the link below. Your pharmacist will able to address any questions you may have about the medication.     If you're not feeling better within 5-7 days, please schedule an appointment.  You can schedule an appointment right here in [facility], or call [phone]  If the visit is for the same symptoms as your eVisit, we'll refund the cost of your eVisit if seen within seven days.

## 2024-07-19 ENCOUNTER — MYC MEDICAL ADVICE (OUTPATIENT)
Dept: FAMILY MEDICINE | Facility: CLINIC | Age: 34
End: 2024-07-19
Payer: COMMERCIAL

## 2024-07-19 DIAGNOSIS — Z86.19 H/O HERPES SIMPLEX TYPE 2 INFECTION: ICD-10-CM

## 2024-07-19 RX ORDER — VALACYCLOVIR HYDROCHLORIDE 500 MG/1
500 TABLET, FILM COATED ORAL 2 TIMES DAILY
Qty: 6 TABLET | Refills: 0 | Status: SHIPPED | OUTPATIENT
Start: 2024-07-19 | End: 2024-07-22

## 2024-07-19 NOTE — TELEPHONE ENCOUNTER
Patient requesting refill of valtrex.     Medication is not active on current medication list.     Last Written Prescription Date:  6/22/21  Last Fill Quantity: 6,  # refills: 3   Last office visit: 3/18/2024 ; last virtual visit: 2/2/2024 with prescribing provider:     Future Office Visit:      RX pended and message routed to provider for consideration.     Julie Behrendt RN

## 2024-08-29 ENCOUNTER — TELEPHONE (OUTPATIENT)
Dept: FAMILY MEDICINE | Facility: CLINIC | Age: 34
End: 2024-08-29
Payer: COMMERCIAL

## 2024-08-29 NOTE — TELEPHONE ENCOUNTER
Patient Quality Outreach    Patient is due for the following:   Asthma  -  ACT needed, Asthma follow-up visit, and AAP  Physical Preventive Adult Physical  Chronic Opioid Use -  Treatment Agreement (CSA), Urine Drug Screen, DEENA-7, and PHQ-9-Patient is not currently on any opioids, so not sure why it states she is due for these items.      Next Steps:   Schedule a Adult Preventative    Type of outreach:    Sent [employer] message.      Questions for provider review:    None           Bailee Kahler

## 2024-11-24 ENCOUNTER — HEALTH MAINTENANCE LETTER (OUTPATIENT)
Age: 34
End: 2024-11-24

## 2025-01-22 ENCOUNTER — TELEPHONE (OUTPATIENT)
Dept: FAMILY MEDICINE | Facility: CLINIC | Age: 35
End: 2025-01-22
Payer: COMMERCIAL

## 2025-01-22 NOTE — CONFIDENTIAL NOTE
Patient Quality Outreach    Patient is due for the following:   Asthma  -  ACT needed    Action(s) Taken:   No follow up needed at this time.    Type of outreach:    Sent [email or web address] message.    Questions for provider review:    None           Karina Sherwood CMA

## 2025-03-04 ENCOUNTER — TELEPHONE (OUTPATIENT)
Dept: FAMILY MEDICINE | Facility: CLINIC | Age: 35
End: 2025-03-04
Payer: COMMERCIAL

## 2025-03-04 NOTE — TELEPHONE ENCOUNTER
Patient Quality Outreach    Patient is due for the following:   Asthma  -  ACT needed    Action(s) Taken:   No follow up needed at this time.    Type of outreach:    Sent [employer] message.    Questions for provider review:    None           Karina Sherwood CMA

## 2025-03-22 ENCOUNTER — HOSPITAL ENCOUNTER (OUTPATIENT)
Dept: CT IMAGING | Facility: CLINIC | Age: 35
Discharge: HOME | End: 2025-03-22
Attending: HOSPITALIST | Admitting: HOSPITALIST
Payer: COMMERCIAL

## 2025-03-22 DIAGNOSIS — N28.0 RENAL INFARCTION: ICD-10-CM

## 2025-03-22 LAB
CREAT BLD-MCNC: 1.2 MG/DL (ref 0.5–1)
EGFRCR SERPLBLD CKD-EPI 2021: 60 ML/MIN/1.73M2

## 2025-03-22 PROCEDURE — 74174 CTA ABD&PLVS W/CONTRAST: CPT

## 2025-03-22 PROCEDURE — 82565 ASSAY OF CREATININE: CPT

## 2025-03-22 PROCEDURE — 250N000009 HC RX 250: Performed by: HOSPITALIST

## 2025-03-22 PROCEDURE — 250N000011 HC RX IP 250 OP 636: Performed by: HOSPITALIST

## 2025-03-22 RX ORDER — IOPAMIDOL 755 MG/ML
72 INJECTION, SOLUTION INTRAVASCULAR ONCE
Status: COMPLETED | OUTPATIENT
Start: 2025-03-22 | End: 2025-03-22

## 2025-03-22 RX ADMIN — IOPAMIDOL 72 ML: 755 INJECTION, SOLUTION INTRAVENOUS at 15:27

## 2025-03-22 RX ADMIN — SODIUM CHLORIDE 100 ML: 9 INJECTION, SOLUTION INTRAVENOUS at 15:27

## 2025-03-31 ENCOUNTER — PATIENT OUTREACH (OUTPATIENT)
Dept: CARE COORDINATION | Facility: CLINIC | Age: 35
End: 2025-03-31
Payer: COMMERCIAL

## 2025-03-31 NOTE — PROGRESS NOTES
Vascular Clinic Rooming Questions      Patient is here for 1 year follow up for renal infarcts.       /86 (BP Location: Right arm)   Pulse 69   Temp 97.7  F (36.5  C)   SpO2 97%     The provider has been notified that the patient has no concerns.     Questions patient would like addressed today are: N/A.    Refills are needed: N/A    Has homecare services and agency name:  No

## 2025-04-01 ENCOUNTER — OFFICE VISIT (OUTPATIENT)
Dept: VASCULAR SURGERY | Facility: CLINIC | Age: 35
End: 2025-04-01
Attending: HOSPITALIST
Payer: COMMERCIAL

## 2025-04-01 VITALS
SYSTOLIC BLOOD PRESSURE: 122 MMHG | DIASTOLIC BLOOD PRESSURE: 86 MMHG | HEART RATE: 69 BPM | TEMPERATURE: 97.7 F | OXYGEN SATURATION: 97 %

## 2025-04-01 DIAGNOSIS — N28.0 RENAL INFARCTION: Primary | ICD-10-CM

## 2025-04-01 PROCEDURE — G0463 HOSPITAL OUTPT CLINIC VISIT: HCPCS | Performed by: HOSPITALIST

## 2025-04-01 ASSESSMENT — PAIN SCALES - GENERAL: PAINLEVEL_OUTOF10: NO PAIN (0)

## 2025-04-01 NOTE — Clinical Note
Needs 1 year follow up around 4/1/26 with Dr. Enriquez and CTA abdo pelvis prior.  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

## 2025-04-01 NOTE — PATIENT INSTRUCTIONS
Ajit Rodgers,    Thank you for entrusting your care with us today. After your visit today with Dr. Mildred Enriquez this is the plan that was discussed at your appointment.    Continue taking aspirin 81mg daily.      Wear graduated compression stockings 20-30mmHg when flying or long car rides.  See below for more information on use.     Follow up in 1 year with Dr. Enriquez and CTA scan prior.  A  will reach out to you closer to that time to schedule.     I am including additional information on these things and our contact information if you have any questions or concerns.   Please do not hesitate to reach out to us if you felt we did not answer your questions or you are unsure of the treatment plan after your visit today. Our number is [phone].Thank you for trusting us with your care.         Again thank you for your time.     When you wear your compression stockings, put them on first thing in the morning and remove at bedtime    Replace your compression stockings every 6-12 months; these garments will lose their elasticity and become ineffective    Hang your stockings, do not put them in the dryer.  This will help prolong the life of your compressions stockings.     Do calf pumping exercises periodically throughout your flights or car rides      ===========================================================    Your provider has recommended you start wearing compression stockings. You may get these at any [employer] supply store. You may also purchase stockings online through outside vendors. If you order online, be sure you are purchasing the correct compression stockings based on the prescription from your doctor. If you are unsure what to order, please call our clinic at [phone] and ask to speak with a nurse or send us a [employer] message.    Online vendors  [employer]: www.[employer]  [employer]: [email or web address]  [employer]:  [email or web address]    ===========================================================  Learning About Using Compression Stockings  Compression stockings help prevent blood and fluid from pooling in the legs. They may be used for problems like varicose veins, skin ulcers, and deep vein thrombosis (blood clot in the leg). There are different types of stockings, and they need to fit right. Your doctor will recommend what you need.  5 tips for putting on compression stockings    If your stockings are new, wash them in cold water.  This can make them easier to put on.     Put on your stockings early in the morning, if you can.  This is when you have the least swelling in your legs.     Wear them every day while you're awake, especially while you're on your feet.       Try putting silicone lotion or cornstarch on your legs.  This can make it easier to slide the stockings on.     To help your , try using rubber gloves.  Ask your doctor about other tools to help if it's hard to put on the stockings.   How to put on compression stockings  It can be a little tricky to put on compression stockings at first. But after you practice a few times, it may get easier. Here are the details.    Sit on a firm surface where your feet can touch the ground.   Hold the top of the stocking with one hand. Then with your other hand, reach in and grab the heel.     When you have a firm  on the heel, pull your hand back up through the stocking, turning it inside out as far as the heel.   Put your toes in as far as they will go. Then center your heel in the stocking and pull it up slightly, just around your heel.     Use both hands to grasp the folded part of the stocking about 2 inches below the fold. Pull that section up over your ankle.   Next, from above your ankle, grasp the folded part of the stocking about 2 inches below the fold. Pull that section up.     Continue pulling the stocking up in short sections until it is in its final  "position. The final position may be below your knee. Or it might be above your knee.   Run your hands over the stocking to smooth it out.   Where can you learn more?  Go to https://www.[employer].net/patiented  Enter J166 in the search box to learn more about \"Learning About Using Compression Stockings.\"  Current as of: December 19, 2022               Content Version: 13.7    3520-4136 [employer].   Care instructions adapted under license by your healthcare professional. If you have questions about a medical condition or this instruction, always ask your healthcare professional. [employer] disclaims any warranty or liability for your use of this information.                  "
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
## (undated) DEVICE — SOL NACL 0.9% IRRIG 1000ML BOTTLE 07138-09

## (undated) DEVICE — CANISTER PREVENA NEGATIVE PRESSURE 150ML PRE4095.S

## (undated) DEVICE — ESU PENCIL W/COATED BLADE E2450H

## (undated) DEVICE — SYR 20ML SLIP TIP

## (undated) DEVICE — PACK C-SECTION LF PL15OTA83B

## (undated) DEVICE — DAVINCI HOT SHEARS TIP COVER  400180

## (undated) DEVICE — KIT PATIENT POSITIONING PIGAZZI LATEX FREE 40580

## (undated) DEVICE — SUCTION MANIFOLD NEPTUNE 2 SYS 1 PORT 702-025-000

## (undated) DEVICE — DRAPE SHEET REV FOLD 3/4 9349

## (undated) DEVICE — RULER SURGICAL PLASTIC STRL LF CS628

## (undated) DEVICE — DRSG WND NEGATIVE PRESSURE PREVENA 20CM PRE1055US.S

## (undated) DEVICE — DAVINCI XI DRAPE ARM 470015

## (undated) DEVICE — DAVINCI XI DRAPE COLUMN 470341

## (undated) DEVICE — GLOVE BIOGEL PI MICRO SZ 6.5 48565

## (undated) DEVICE — NEEDLE HYPO 23GA 1.5IN BD REG BVL STRL 305194

## (undated) DEVICE — PREP CHLORAPREP 26ML TINTED ORANGE  260815

## (undated) DEVICE — SU ETHILON 2-0 FS 18" 664G

## (undated) DEVICE — SOL WATER IRRIG 1000ML BOTTLE 07139-09

## (undated) DEVICE — DAVINCI XI SEAL UNIVERSAL 5-8MM 470361

## (undated) DEVICE — SU VICRYL 0 CT-1 36" J946H

## (undated) DEVICE — SU STRATAFIX PDS PLUS 0 CT-2 9" SXPP1A446

## (undated) DEVICE — DECANTER VIAL 2006S

## (undated) DEVICE — DAVINCI XI MONOPOLAR SCISSORS HOT SHEARS 8MM 470179

## (undated) DEVICE — DEVICE SUTURE GRASPER TROCAR CLOSURE 14GAX15CM PMI-TC-SG

## (undated) DEVICE — GLOVE BIOGEL PI MICRO SZ 7.5 48575

## (undated) DEVICE — GOWN XLG DISP 9545

## (undated) DEVICE — SU MONOCRYL 4-0 PS-2 18" UND Y496G

## (undated) DEVICE — DRAPE U SPLIT 74X120" 29440

## (undated) DEVICE — SU STRATAFIX PDS PLUS 1 CT-1 18" SXPP1A404

## (undated) DEVICE — DRAPE TIBURON GENERAL ENDOSCOPY 9458

## (undated) DEVICE — DAVINCI XI ESU FORCE BIPOLAR 8MM 471405

## (undated) DEVICE — SU MONOCRYL 0 CT-1 27" Y340H

## (undated) DEVICE — CATH TRAY FOLEY SURESTEP 16FR W/URINE MTR STATLK LF A303416A

## (undated) DEVICE — ESU PENCIL SMOKE EVAC W/ROCKER SWITCH 0703-047-000

## (undated) DEVICE — SU DERMABOND DHV12

## (undated) DEVICE — GLOVE BIOGEL PI MICRO INDICATOR UNDERGLOVE SZ 7.0 48970

## (undated) DEVICE — SU STRATAFIX PDS PLUS 2-0 SPIRAL SH 23CM SXPP1B433

## (undated) DEVICE — SU DERMABOND ADVANCED .7ML DNX12

## (undated) DEVICE — DAVINCI XI DRIVER NDL MEGA SUTURECUT 8MM EXT 471309

## (undated) DEVICE — RETR PANNICULUS TRAXI FOR PT POSITIONING PRS-1030

## (undated) DEVICE — SU VICRYL 3-0 SH 27" UND J416H

## (undated) DEVICE — Device

## (undated) DEVICE — STOCKING SLEEVE COMPRESSION CALF MED

## (undated) DEVICE — BNDG ABDOMINAL BINDER 9X30-45" 79-89070

## (undated) DEVICE — DAVINCI XI OBTURATOR BLADELESS 8MM 470359

## (undated) DEVICE — LABEL MEDICATION SYSTEM  3304

## (undated) RX ORDER — FENTANYL CITRATE 50 UG/ML
INJECTION, SOLUTION INTRAMUSCULAR; INTRAVENOUS
Status: DISPENSED
Start: 2024-04-01

## (undated) RX ORDER — FENTANYL CITRATE-0.9 % NACL/PF 10 MCG/ML
PLASTIC BAG, INJECTION (ML) INTRAVENOUS
Status: DISPENSED
Start: 2023-11-13

## (undated) RX ORDER — HYDRALAZINE HYDROCHLORIDE 20 MG/ML
INJECTION INTRAMUSCULAR; INTRAVENOUS
Status: DISPENSED
Start: 2024-04-01

## (undated) RX ORDER — OXYCODONE HYDROCHLORIDE 5 MG/1
TABLET ORAL
Status: DISPENSED
Start: 2024-04-01

## (undated) RX ORDER — METOPROLOL TARTRATE 1 MG/ML
INJECTION, SOLUTION INTRAVENOUS
Status: DISPENSED
Start: 2024-04-01

## (undated) RX ORDER — BUPIVACAINE HYDROCHLORIDE 5 MG/ML
INJECTION, SOLUTION EPIDURAL; INTRACAUDAL
Status: DISPENSED
Start: 2024-04-01

## (undated) RX ORDER — LIDOCAINE HYDROCHLORIDE AND EPINEPHRINE 10; 10 MG/ML; UG/ML
INJECTION, SOLUTION INFILTRATION; PERINEURAL
Status: DISPENSED
Start: 2024-04-01

## (undated) RX ORDER — ACETAMINOPHEN 325 MG/1
TABLET ORAL
Status: DISPENSED
Start: 2024-04-01

## (undated) RX ORDER — LIDOCAINE HYDROCHLORIDE 10 MG/ML
INJECTION, SOLUTION EPIDURAL; INFILTRATION; INTRACAUDAL; PERINEURAL
Status: DISPENSED
Start: 2021-07-20

## (undated) RX ORDER — PROPOFOL 10 MG/ML
INJECTION, EMULSION INTRAVENOUS
Status: DISPENSED
Start: 2024-04-01

## (undated) RX ORDER — MORPHINE SULFATE 1 MG/ML
INJECTION, SOLUTION EPIDURAL; INTRATHECAL; INTRAVENOUS
Status: DISPENSED
Start: 2023-11-13

## (undated) RX ORDER — ONDANSETRON 2 MG/ML
INJECTION INTRAMUSCULAR; INTRAVENOUS
Status: DISPENSED
Start: 2023-11-13

## (undated) RX ORDER — HYDROMORPHONE HCL IN WATER/PF 6 MG/30 ML
PATIENT CONTROLLED ANALGESIA SYRINGE INTRAVENOUS
Status: DISPENSED
Start: 2024-04-01

## (undated) RX ORDER — KETOROLAC TROMETHAMINE 30 MG/ML
INJECTION, SOLUTION INTRAMUSCULAR; INTRAVENOUS
Status: DISPENSED
Start: 2024-04-01

## (undated) RX ORDER — HYDROXYZINE HYDROCHLORIDE 25 MG/1
TABLET, FILM COATED ORAL
Status: DISPENSED
Start: 2024-04-01

## (undated) RX ORDER — FENTANYL CITRATE 50 UG/ML
INJECTION, SOLUTION INTRAMUSCULAR; INTRAVENOUS
Status: DISPENSED
Start: 2024-04-02

## (undated) RX ORDER — DEXAMETHASONE SODIUM PHOSPHATE 4 MG/ML
INJECTION, SOLUTION INTRA-ARTICULAR; INTRALESIONAL; INTRAMUSCULAR; INTRAVENOUS; SOFT TISSUE
Status: DISPENSED
Start: 2024-04-01

## (undated) RX ORDER — MAGNESIUM SULFATE HEPTAHYDRATE 40 MG/ML
INJECTION, SOLUTION INTRAVENOUS
Status: DISPENSED
Start: 2024-04-01

## (undated) RX ORDER — BUPIVACAINE HYDROCHLORIDE 5 MG/ML
INJECTION, SOLUTION EPIDURAL; INTRACAUDAL
Status: DISPENSED
Start: 2024-04-02

## (undated) RX ORDER — CEFAZOLIN SODIUM/WATER 2 G/20 ML
SYRINGE (ML) INTRAVENOUS
Status: DISPENSED
Start: 2024-04-01